# Patient Record
Sex: MALE | Race: WHITE | NOT HISPANIC OR LATINO | Employment: FULL TIME | URBAN - METROPOLITAN AREA
[De-identification: names, ages, dates, MRNs, and addresses within clinical notes are randomized per-mention and may not be internally consistent; named-entity substitution may affect disease eponyms.]

---

## 2017-02-27 ENCOUNTER — HOSPITAL PATHOLOGY (OUTPATIENT)
Dept: OTHER | Facility: CLINIC | Age: 52
End: 2017-02-27

## 2017-03-01 LAB — COPATH REPORT: NORMAL

## 2017-03-04 ENCOUNTER — HOSPITAL ENCOUNTER (OUTPATIENT)
Facility: CLINIC | Age: 52
Setting detail: OBSERVATION
Discharge: HOME | End: 2017-03-05
Attending: EMERGENCY MEDICINE | Admitting: HOSPITALIST
Payer: COMMERCIAL

## 2017-03-04 ENCOUNTER — APPOINTMENT (OUTPATIENT)
Dept: CT IMAGING | Facility: CLINIC | Age: 52
End: 2017-03-04
Attending: EMERGENCY MEDICINE
Payer: COMMERCIAL

## 2017-03-04 DIAGNOSIS — N23 RENAL COLIC: ICD-10-CM

## 2017-03-04 LAB
ALBUMIN UR-MCNC: 30 MG/DL
ANION GAP SERPL CALCULATED.3IONS-SCNC: 9 MMOL/L (ref 3–14)
APPEARANCE UR: ABNORMAL
BACTERIA #/AREA URNS HPF: ABNORMAL /HPF
BASOPHILS # BLD AUTO: 0 10E9/L (ref 0–0.2)
BASOPHILS NFR BLD AUTO: 0.4 %
BILIRUB UR QL STRIP: NEGATIVE
BUN SERPL-MCNC: 15 MG/DL (ref 7–30)
CALCIUM SERPL-MCNC: 8.8 MG/DL (ref 8.5–10.1)
CHLORIDE SERPL-SCNC: 110 MMOL/L (ref 94–109)
CO2 SERPL-SCNC: 24 MMOL/L (ref 20–32)
COLOR UR AUTO: YELLOW
CREAT SERPL-MCNC: 1.11 MG/DL (ref 0.66–1.25)
DIFFERENTIAL METHOD BLD: ABNORMAL
EOSINOPHIL # BLD AUTO: 0 10E9/L (ref 0–0.7)
EOSINOPHIL NFR BLD AUTO: 0.3 %
ERYTHROCYTE [DISTWIDTH] IN BLOOD BY AUTOMATED COUNT: 12.3 % (ref 10–15)
GFR SERPL CREATININE-BSD FRML MDRD: 69 ML/MIN/1.7M2
GLUCOSE SERPL-MCNC: 108 MG/DL (ref 70–99)
GLUCOSE UR STRIP-MCNC: NEGATIVE MG/DL
HCT VFR BLD AUTO: 46.6 % (ref 40–53)
HGB BLD-MCNC: 16.1 G/DL (ref 13.3–17.7)
HGB UR QL STRIP: ABNORMAL
IMM GRANULOCYTES # BLD: 0 10E9/L (ref 0–0.4)
IMM GRANULOCYTES NFR BLD: 0.2 %
KETONES UR STRIP-MCNC: NEGATIVE MG/DL
LEUKOCYTE ESTERASE UR QL STRIP: NEGATIVE
LYMPHOCYTES # BLD AUTO: 0.9 10E9/L (ref 0.8–5.3)
LYMPHOCYTES NFR BLD AUTO: 8.6 %
MCH RBC QN AUTO: 31.6 PG (ref 26.5–33)
MCHC RBC AUTO-ENTMCNC: 34.5 G/DL (ref 31.5–36.5)
MCV RBC AUTO: 91 FL (ref 78–100)
MONOCYTES # BLD AUTO: 0.7 10E9/L (ref 0–1.3)
MONOCYTES NFR BLD AUTO: 6.8 %
MUCOUS THREADS #/AREA URNS LPF: PRESENT /LPF
NEUTROPHILS # BLD AUTO: 8.4 10E9/L (ref 1.6–8.3)
NEUTROPHILS NFR BLD AUTO: 83.7 %
NITRATE UR QL: NEGATIVE
NRBC # BLD AUTO: 0 10*3/UL
NRBC BLD AUTO-RTO: 0 /100
PH UR STRIP: 8.5 PH (ref 5–7)
PLATELET # BLD AUTO: 209 10E9/L (ref 150–450)
POTASSIUM SERPL-SCNC: 4.3 MMOL/L (ref 3.4–5.3)
RBC # BLD AUTO: 5.1 10E12/L (ref 4.4–5.9)
RBC #/AREA URNS AUTO: 44 /HPF (ref 0–2)
SODIUM SERPL-SCNC: 143 MMOL/L (ref 133–144)
SP GR UR STRIP: 1.02 (ref 1–1.03)
SQUAMOUS #/AREA URNS AUTO: <1 /HPF (ref 0–1)
URN SPEC COLLECT METH UR: ABNORMAL
UROBILINOGEN UR STRIP-MCNC: 0 MG/DL (ref 0–2)
WBC # BLD AUTO: 10.1 10E9/L (ref 4–11)
WBC #/AREA URNS AUTO: 0 /HPF (ref 0–2)

## 2017-03-04 PROCEDURE — 74176 CT ABD & PELVIS W/O CONTRAST: CPT

## 2017-03-04 PROCEDURE — 25000132 ZZH RX MED GY IP 250 OP 250 PS 637: Performed by: PHYSICIAN ASSISTANT

## 2017-03-04 PROCEDURE — G0378 HOSPITAL OBSERVATION PER HR: HCPCS

## 2017-03-04 PROCEDURE — 99207 ZZC CDG-MDM COMPONENT: MEETS MODERATE - DOWN CODED: CPT | Performed by: PHYSICIAN ASSISTANT

## 2017-03-04 PROCEDURE — 96361 HYDRATE IV INFUSION ADD-ON: CPT

## 2017-03-04 PROCEDURE — 25000128 H RX IP 250 OP 636: Performed by: EMERGENCY MEDICINE

## 2017-03-04 PROCEDURE — 96376 TX/PRO/DX INJ SAME DRUG ADON: CPT

## 2017-03-04 PROCEDURE — 80048 BASIC METABOLIC PNL TOTAL CA: CPT | Performed by: EMERGENCY MEDICINE

## 2017-03-04 PROCEDURE — 99219 ZZC INITIAL OBSERVATION CARE,LEVL II: CPT | Performed by: PHYSICIAN ASSISTANT

## 2017-03-04 PROCEDURE — 81001 URINALYSIS AUTO W/SCOPE: CPT | Performed by: EMERGENCY MEDICINE

## 2017-03-04 PROCEDURE — 85025 COMPLETE CBC W/AUTO DIFF WBC: CPT | Performed by: EMERGENCY MEDICINE

## 2017-03-04 PROCEDURE — 25000128 H RX IP 250 OP 636: Performed by: PHYSICIAN ASSISTANT

## 2017-03-04 PROCEDURE — 96375 TX/PRO/DX INJ NEW DRUG ADDON: CPT

## 2017-03-04 PROCEDURE — 96374 THER/PROPH/DIAG INJ IV PUSH: CPT

## 2017-03-04 PROCEDURE — 87086 URINE CULTURE/COLONY COUNT: CPT | Performed by: PHYSICIAN ASSISTANT

## 2017-03-04 PROCEDURE — 99285 EMERGENCY DEPT VISIT HI MDM: CPT | Mod: 25

## 2017-03-04 RX ORDER — PROCHLORPERAZINE 25 MG
25 SUPPOSITORY, RECTAL RECTAL EVERY 12 HOURS PRN
Status: DISCONTINUED | OUTPATIENT
Start: 2017-03-04 | End: 2017-03-05 | Stop reason: HOSPADM

## 2017-03-04 RX ORDER — AMOXICILLIN 250 MG
2 CAPSULE ORAL DAILY PRN
Status: DISCONTINUED | OUTPATIENT
Start: 2017-03-04 | End: 2017-03-05 | Stop reason: HOSPADM

## 2017-03-04 RX ORDER — SODIUM CHLORIDE 9 MG/ML
INJECTION, SOLUTION INTRAVENOUS CONTINUOUS
Status: DISCONTINUED | OUTPATIENT
Start: 2017-03-04 | End: 2017-03-05 | Stop reason: HOSPADM

## 2017-03-04 RX ORDER — OXYCODONE HYDROCHLORIDE 5 MG/1
5-10 TABLET ORAL
Status: DISCONTINUED | OUTPATIENT
Start: 2017-03-04 | End: 2017-03-05 | Stop reason: HOSPADM

## 2017-03-04 RX ORDER — ACETAMINOPHEN 325 MG/1
975 TABLET ORAL 3 TIMES DAILY
Status: DISCONTINUED | OUTPATIENT
Start: 2017-03-04 | End: 2017-03-05 | Stop reason: HOSPADM

## 2017-03-04 RX ORDER — ONDANSETRON 4 MG/1
4 TABLET, ORALLY DISINTEGRATING ORAL EVERY 6 HOURS PRN
Status: DISCONTINUED | OUTPATIENT
Start: 2017-03-04 | End: 2017-03-05 | Stop reason: HOSPADM

## 2017-03-04 RX ORDER — HYDROMORPHONE HYDROCHLORIDE 1 MG/ML
.3-.5 INJECTION, SOLUTION INTRAMUSCULAR; INTRAVENOUS; SUBCUTANEOUS
Status: DISCONTINUED | OUTPATIENT
Start: 2017-03-04 | End: 2017-03-05 | Stop reason: HOSPADM

## 2017-03-04 RX ORDER — AMOXICILLIN 250 MG
2 CAPSULE ORAL DAILY PRN
Status: ON HOLD | COMMUNITY
End: 2017-03-05

## 2017-03-04 RX ORDER — TAMSULOSIN HYDROCHLORIDE 0.4 MG/1
0.4 CAPSULE ORAL DAILY
Status: ON HOLD | COMMUNITY
End: 2017-03-05

## 2017-03-04 RX ORDER — OXYCODONE HYDROCHLORIDE 5 MG/1
5-15 TABLET ORAL
Status: ON HOLD | COMMUNITY
End: 2017-03-05

## 2017-03-04 RX ORDER — HYDROMORPHONE HYDROCHLORIDE 1 MG/ML
0.5 INJECTION, SOLUTION INTRAMUSCULAR; INTRAVENOUS; SUBCUTANEOUS ONCE
Status: COMPLETED | OUTPATIENT
Start: 2017-03-04 | End: 2017-03-04

## 2017-03-04 RX ORDER — HYDROMORPHONE HYDROCHLORIDE 1 MG/ML
.3-.5 INJECTION, SOLUTION INTRAMUSCULAR; INTRAVENOUS; SUBCUTANEOUS
Status: DISCONTINUED | OUTPATIENT
Start: 2017-03-04 | End: 2017-03-04

## 2017-03-04 RX ORDER — LEVOTHYROXINE SODIUM 125 UG/1
125 TABLET ORAL EVERY EVENING
Status: DISCONTINUED | OUTPATIENT
Start: 2017-03-04 | End: 2017-03-05 | Stop reason: HOSPADM

## 2017-03-04 RX ORDER — PROCHLORPERAZINE MALEATE 5 MG
5-10 TABLET ORAL EVERY 6 HOURS PRN
Status: DISCONTINUED | OUTPATIENT
Start: 2017-03-04 | End: 2017-03-05 | Stop reason: HOSPADM

## 2017-03-04 RX ORDER — ONDANSETRON 2 MG/ML
4 INJECTION INTRAMUSCULAR; INTRAVENOUS ONCE
Status: COMPLETED | OUTPATIENT
Start: 2017-03-04 | End: 2017-03-04

## 2017-03-04 RX ORDER — ONDANSETRON 2 MG/ML
4 INJECTION INTRAMUSCULAR; INTRAVENOUS EVERY 6 HOURS PRN
Status: DISCONTINUED | OUTPATIENT
Start: 2017-03-04 | End: 2017-03-05 | Stop reason: HOSPADM

## 2017-03-04 RX ORDER — AMOXICILLIN 250 MG
1-2 CAPSULE ORAL 2 TIMES DAILY
Status: DISCONTINUED | OUTPATIENT
Start: 2017-03-04 | End: 2017-03-05 | Stop reason: HOSPADM

## 2017-03-04 RX ORDER — NALOXONE HYDROCHLORIDE 0.4 MG/ML
.1-.4 INJECTION, SOLUTION INTRAMUSCULAR; INTRAVENOUS; SUBCUTANEOUS
Status: DISCONTINUED | OUTPATIENT
Start: 2017-03-04 | End: 2017-03-05 | Stop reason: HOSPADM

## 2017-03-04 RX ORDER — IBUPROFEN 600 MG/1
600 TABLET, FILM COATED ORAL 3 TIMES DAILY
Status: DISCONTINUED | OUTPATIENT
Start: 2017-03-04 | End: 2017-03-05 | Stop reason: HOSPADM

## 2017-03-04 RX ORDER — HYDROXYZINE PAMOATE 25 MG/1
25 CAPSULE ORAL EVERY 6 HOURS PRN
Status: ON HOLD | COMMUNITY
End: 2017-03-05

## 2017-03-04 RX ORDER — TAMSULOSIN HYDROCHLORIDE 0.4 MG/1
0.4 CAPSULE ORAL DAILY
Status: DISCONTINUED | OUTPATIENT
Start: 2017-03-05 | End: 2017-03-05 | Stop reason: HOSPADM

## 2017-03-04 RX ADMIN — HYDROMORPHONE HYDROCHLORIDE 0.5 MG: 10 INJECTION, SOLUTION INTRAMUSCULAR; INTRAVENOUS; SUBCUTANEOUS at 20:29

## 2017-03-04 RX ADMIN — HYDROMORPHONE HYDROCHLORIDE 1 MG: 1 INJECTION, SOLUTION INTRAMUSCULAR; INTRAVENOUS; SUBCUTANEOUS at 16:52

## 2017-03-04 RX ADMIN — SODIUM CHLORIDE: 9 INJECTION, SOLUTION INTRAVENOUS at 20:27

## 2017-03-04 RX ADMIN — SODIUM CHLORIDE 1000 ML: 9 INJECTION, SOLUTION INTRAVENOUS at 20:52

## 2017-03-04 RX ADMIN — HYDROMORPHONE HYDROCHLORIDE 0.5 MG: 1 INJECTION, SOLUTION INTRAMUSCULAR; INTRAVENOUS; SUBCUTANEOUS at 16:20

## 2017-03-04 RX ADMIN — IBUPROFEN 600 MG: 600 TABLET ORAL at 20:29

## 2017-03-04 RX ADMIN — ONDANSETRON 4 MG: 2 INJECTION INTRAMUSCULAR; INTRAVENOUS at 16:18

## 2017-03-04 RX ADMIN — LEVOTHYROXINE SODIUM 125 MCG: 125 TABLET ORAL at 22:05

## 2017-03-04 RX ADMIN — HYDROMORPHONE HYDROCHLORIDE 1 MG: 1 INJECTION, SOLUTION INTRAMUSCULAR; INTRAVENOUS; SUBCUTANEOUS at 18:30

## 2017-03-04 RX ADMIN — SODIUM CHLORIDE 1000 ML: 9 INJECTION, SOLUTION INTRAVENOUS at 16:18

## 2017-03-04 RX ADMIN — SENNOSIDES AND DOCUSATE SODIUM 2 TABLET: 8.6; 5 TABLET ORAL at 20:29

## 2017-03-04 RX ADMIN — ACETAMINOPHEN 975 MG: 325 TABLET, FILM COATED ORAL at 20:29

## 2017-03-04 RX ADMIN — SODIUM CHLORIDE: 9 INJECTION, SOLUTION INTRAVENOUS at 20:40

## 2017-03-04 RX ADMIN — SODIUM CHLORIDE: 9 INJECTION, SOLUTION INTRAVENOUS at 22:05

## 2017-03-04 RX ADMIN — HYDROMORPHONE HYDROCHLORIDE 0.5 MG: 10 INJECTION, SOLUTION INTRAMUSCULAR; INTRAVENOUS; SUBCUTANEOUS at 22:33

## 2017-03-04 ASSESSMENT — ENCOUNTER SYMPTOMS
DYSURIA: 0
HEMATURIA: 0
FLANK PAIN: 1
CHILLS: 0
DIFFICULTY URINATING: 0
FREQUENCY: 0
FEVER: 0
NAUSEA: 1
VOMITING: 1
ABDOMINAL PAIN: 1
CONSTIPATION: 1

## 2017-03-04 NOTE — IP AVS SNAPSHOT
[facility] Observation Department    [address]    [facility] [address]    Phone:  [phone]                                       After Visit Summary   3/4/2017    Rey Andre    MRN: [id number]           After Visit Summary Signature Page     I have received my discharge instructions, and my questions have been answered. I have discussed any challenges I see with this plan with the nurse or doctor.    ..........................................................................................................................................  Patient/Patient Representative Signature      ..........................................................................................................................................  Patient Representative Print Name and Relationship to Patient    ..................................................               ................................................  Date                                            Time    ..........................................................................................................................................  Reviewed by Signature/Title    ...................................................              ..............................................  Date                                                            Time

## 2017-03-04 NOTE — ED NOTES
Right flank pain since this morning.  Feels like previous kidney stones.      ABCs intact.  Alert and oriented x 3.

## 2017-03-04 NOTE — IP AVS SNAPSHOT
MRN:[id number]                      After Visit Summary   3/4/2017    Rey Andre    MRN: [id number]           Thank you!     Thank you for choosing [facility] for your care. Our goal is always to provide you with excellent care. Hearing back from our patients is one way we can continue to improve our services. Please take a few minutes to complete the written survey that you may receive in the mail after you visit. If you would like to speak to someone directly about your visit please contact Patient Relations at [phone]. Thank you!          Patient Information     Date Of Birth          1965        About your hospital stay     You were admitted on:  March 4, 2017 You last received care in the:  [facility] Observation Department    You were discharged on:  March 5, 2017        Reason for your hospital stay       You were admitted for concern of right flank pain related to a kidney stone. You were treated supportively with IV fluids, pain control, and Flomax. Urology was consulted and took you for removal of your kidney stone this morning. You tolerated the procedure well and safe to discharge home with follow up.                  Who to Call     For medical emergencies, please call [phone].  For non-urgent questions about your medical care, please call your primary care provider or clinic, [phone]  For questions related to your surgery, please call your surgery clinic        Attending Provider     Provider Specialty    Dorothy Florez MD Emergency Medicine    Hermes Quiñonez MD Internal Medicine       Primary Care Provider Office Phone # Fax #    Pau Pozo [phone]       [facility] [id number] [place] [address]        After Care Instructions     Activity       Your activity upon discharge: activity as tolerated            Diet       Follow this diet upon discharge: Orders Placed This Encounter      Advance  "Diet as Tolerated: Clear Liquid Diet                  Follow-up Appointments     Follow-up and recommended labs and tests        Follow up with me,  Chris Barrios or colleague, within 2 weeks. to evaluate after surgery.  The following labs/tests are recommended: stent removal.                             Pending Results     Date and Time Order Name Status Description    3/5/2017 0915 Stone analysis In process     3/5/2017 0622 XR Surgery JEFFERY L/T 5 Min Fluoro w Stills Preliminary     3/4/2017 2030 Urine Culture Aerobic Bacterial Preliminary             Statement of Approval     Ordered          17 1215  I have reviewed and agree with all the recommendations and orders detailed in this document.  EFFECTIVE NOW     Approved and electronically signed by:  Nohemi Trammell PA-C             Admission Information     Date & Time Provider Department Dept. Phone    3/4/2017 Hermes Quiñonez MD [facility] Observation Department [phone]      Your Vitals Were     Blood Pressure Pulse Temperature Respirations Height Weight    131/81 66 95.9  F (35.5  C) (Axillary) 12 1.803 m (5' 11\") 113.4 kg (250 lb)    Pulse Oximetry BMI (Body Mass Index)                97% 34.87 kg/m2          MyChart Information     [employer] lets you send messages to your doctor, view your test results, renew your prescriptions, schedule appointments and more. To sign up, go to [email or web address] . Click on \"Log in\" on the left side of the screen, which will take you to the Welcome page. Then click on \"Sign up Now\" on the right side of the page.     You will be asked to enter the access code listed below, as well as some personal information. Please follow the directions to create your username and password.     Your access code is: [email or web address]  Expires: 6/3/2017 12:16 PM     Your access code will  in 90 days. If you need help or a new code, please call your [place] clinic or [phone].        Care EveryWhere ID  "    This is your Care EveryWhere ID. This could be used by other organizations to access your [facility] medical records  [id number]           Review of your medicines      START taking        Dose / Directions    ciprofloxacin 500 MG tablet   Commonly known as:  CIPRO   Used for:  Renal colic        Dose:  500 mg   Take 1 tablet (500 mg) by mouth 2 times daily for 2 days   Quantity:  4 tablet   Refills:  0       HYDROmorphone 4 MG tablet   Commonly known as:  DILAUDID   Used for:  Renal colic        Dose:  4 mg   Take 1 tablet (4 mg) by mouth every 6 hours as needed for moderate to severe pain maximum 4 tablet(s) per day   Quantity:  12 tablet   Refills:  0       tolterodine 2 MG 24 hr capsule   Commonly known as:  DETROL LA   Used for:  Renal colic        Dose:  2 mg   Take 1 capsule (2 mg) by mouth daily   Quantity:  20 capsule   Refills:  1         CONTINUE these medicines which have NOT CHANGED        Dose / Directions    SYNTHROID PO        Dose:  125 mcg   Take 125 mcg by mouth every evening   Refills:  0         STOP taking     hydrOXYzine 25 MG capsule   Commonly known as:  VISTARIL           oxyCODONE 5 MG IR tablet   Commonly known as:  ROXICODONE           senna-docusate 8.6-50 MG per tablet   Commonly known as:  SENOKOT-S;PERICOLACE           tamsulosin 0.4 MG capsule   Commonly known as:  FLOMAX                Where to get your medicines      These medications were sent to [facility] [place], [place] - [address] [place] [address]     Phone:  [phone]     ciprofloxacin 500 MG tablet    tolterodine 2 MG 24 hr capsule         Some of these will need a paper prescription and others can be bought over the counter. Ask your nurse if you have questions.     Bring a paper prescription for each of these medications     HYDROmorphone 4 MG tablet                Protect others around you: Learn how to safely use, store and throw away your medicines at  www.disposemymeds.org.             Medication List: This is a list of all your medications and when to take them. Check marks below indicate your daily home schedule. Keep this list as a reference.      Medications           Morning Afternoon Evening Bedtime As Needed    ciprofloxacin 500 MG tablet   Commonly known as:  CIPRO   Take 1 tablet (500 mg) by mouth 2 times daily for 2 days                                HYDROmorphone 4 MG tablet   Commonly known as:  DILAUDID   Take 1 tablet (4 mg) by mouth every 6 hours as needed for moderate to severe pain maximum 4 tablet(s) per day                                SYNTHROID PO   Take 125 mcg by mouth every evening   Last time this was given:  125 mcg on 3/4/2017 10:05 PM                                tolterodine 2 MG 24 hr capsule   Commonly known as:  DETROL LA   Take 1 capsule (2 mg) by mouth daily

## 2017-03-04 NOTE — ED PROVIDER NOTES
"  History     Chief Complaint:  Flank Pain      HPI   Rey Andre is a 51 year old male with history of kidney stones with two prior lithotripsy's and stenting last completed in 2014 who presents to the emergency department today with flank pain. This morning the patient awoke with a constant isolated right flank pain with associated nausea and vomiting. Denies exacerbating or alleviating factors to this otherwise constant pain. He states that his current symptoms are similar to prior kidney stones. Denies urinary symptoms. Denies changes to bowel habits; however, he states that he feels constipated with diffuse abdominal discomfort. Denies fevers or chills. He took two tablets of Oxycodone an hour ago without relief of his symptoms. 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     Allergies:  No Known Drug Allergies      Medications:    Detrol  Oxycodone   Zofran   Synthroid    Past Medical History:    Kidney stone   Sleep apnea   Thyroid     Past Surgical History:    Orthopedic surgery   T & A  Laser holmium lithotripsy ureter insert stent, combined - 07/27/2012 - 05/24/2014  Cystoscopy, retrogrades, combined     Family History:    History reviewed. No pertinent family history.        Social History:  The patient was accompanied to the ED by wife.  Smoking Status: Never smoker  Alcohol Use: No     Marital Status:        Review of Systems   Constitutional: Negative for chills and fever.   Gastrointestinal: Positive for abdominal pain, constipation, nausea and vomiting.   Genitourinary: Positive for flank pain. Negative for decreased urine volume, difficulty urinating, dysuria, frequency, hematuria and urgency.   All other systems reviewed and are negative.    Physical Exam   First Vitals:  BP: (!) 154/95  Pulse: 66  Temp: 97.6  F (36.4  C)  Resp: 20  Height: 180.3 cm (5' 11\")  Weight: 113.4 kg (250 lb)  SpO2: 95 %    Physical Exam  Constitutional: The patient is " oriented to person, place, and time. Alert and cooperative.  HENT:   Right Ear: External ear normal.   Left Ear: External ear normal.   Nose: Nose normal.   Mouth/Throat: Uvula is midline, oropharynx is clear and moist and mucous membranes are normal. No posterior oropharyngeal edema or erythema.   Eyes: Conjunctivae, EOM and lids are normal. Pupils are equal, round, and reactive to light.   Neck: Trachea normal. Normal range of motion. Neck supple.   Cardiovascular: Normal rate, regular rhythm, normal heart sounds, and intact distal pulses.    Pulmonary/Chest: Effort normal and breath sounds equal bilaterally. No crackles or wheezing.   Abdominal: Soft. Mild diffuse tenderness. No rebound and no guarding. R CVA tenderness.  Musculoskeletal: Normal range of motion.  No extremity tenderness or edema.  Neurological: Alert and Oriented. Strength 5/5 in upper and lower extremities bilaterally. Sensation intact to light touch throughout.  Skin: Skin is dry. No rash noted.       Emergency Department Course     Imaging:  Radiology findings were communicated with the patient who voiced understanding of the findings.    CT Abdomen Pelvis w/o Contrast:   IMPRESSION:  1. At least partially obstructive right ureteropelvic junction  calculus measuring 0.6 x 0.6 x 0.9 cm.  2. Multiple nonobstructive right intrarenal calculi.  3. Groundglass density in the adipose tissues of the root of the  mesentery with minimally prominent lymph nodes that is stable since  2014 and therefore benign.   Preliminary result per radiology      Laboratory:  Laboratory findings were communicated with the patient who voiced understanding of the findings.    CBC:WBC 10.1, HGB 16.1,    BMP: Chloride 110 (H), Glucose 108 (H) o/w WNL Creatinine 1.11     UA: Blood Small (A), pH 8.5 (H), Protein albumin 30 (A), RBC 44 (H), Bacteria Few (A), Mucous Present (A) o/w Negative      Interventions:  1618 NS 1000 mL IV   1618 Zofran 4 mg IV   1620 Dilaudid  0.5 mg IV   1652 Dilaudid 1 mg IV   1830 Dilaudid 1 mg IV     Emergency Department Course:  Nursing notes and vitals reviewed.  I performed an exam of the patient as documented above.   The patient was sent for a CT Abdomen Pelvis w/o Contrast while in the emergency department, results above.    IV was inserted and blood was drawn for laboratory testing, results above.   The patient provided a urine sample here in the emergency department. This was sent for laboratory testing, findings above.     1724: I spoke with Dr. Barriso of the urology service regarding patient's presentation, findings, and plan of care.     1857: I spoke with Jacque SCHRADER of the hospitalist service regarding patient's presentation, findings, and plan of care.   .  I discussed the treatment plan with the patient. They expressed understanding of this plan and consented to admission. Dr. Pereira will admit the patient to a monitored bed for further evaluation and treatment.     Impression & Plan      Medical Decision Making:  Rey Andre is a 51 year old male with a history of kidney stones who presents to the emergency department for evaluation of right flank pain. Upon presentation to the ED, the patient is nontoxic appearing. He is hypertensive, but vital signs are otherwise within normal limits and stable. On exam, he is well appearing. He is alert, oriented, and neurologic exam is nonfocal. Cardiopulmonary exam is unremarkable. On exam of his abdomen he does have mild right CVA tenderness with palpation. He also endorses mild diffuse abdominal tenderness with palpation. There is no rebound or guarding. The rest of his exam is as mentioned above. Labs were obtained and are as mentioned above. Notably  he does not have a leukocytosis. Urinalysis is not concerning for an infectious process. CT of the abdomen was obtained and does demonstrate a partially obstructed UPJ calculus measuring 6 x 6 x 9 mm. These results were discussed  with the patient and he notes understanding. Given the large size of the stone I discussed the patient with the urologist on call. The urologist notes that if the patient's pain is well managed he can likely be managed as a outpatient with close follow up in clinic. Upon my repeat evaluation of the patient he notes that he is continuing to have significant pain. He states that he has already been taking Oxycodone at home for a recent Sen's Neuroma removal and he does not feel that his pain was well managed; therefore, he does feel that admission for pain management would be beneficial for him at this time. The patient was discussed then with the hospitalist and they agreed to accept this patient. The patient was stable/improved at the time of admission.      Diagnosis:    ICD-10-CM    1. Renal colic N23         Scribe Disclosure:  Stan PERDOMO, am serving as a scribe at 3:56 PM on 3/4/2017 to document services personally performed by Dorothy Florez MD, based on my observations and the provider's statements to me.   3/4/2017   [facility] EMERGENCY DEPARTMENT       Dorothy Florez MD  03/05/17 0139

## 2017-03-05 ENCOUNTER — APPOINTMENT (OUTPATIENT)
Dept: GENERAL RADIOLOGY | Facility: CLINIC | Age: 52
End: 2017-03-05
Attending: UROLOGY
Payer: COMMERCIAL

## 2017-03-05 ENCOUNTER — ANESTHESIA (OUTPATIENT)
Dept: SURGERY | Facility: CLINIC | Age: 52
End: 2017-03-05
Payer: COMMERCIAL

## 2017-03-05 ENCOUNTER — ANESTHESIA EVENT (OUTPATIENT)
Dept: SURGERY | Facility: CLINIC | Age: 52
End: 2017-03-05
Payer: COMMERCIAL

## 2017-03-05 VITALS
WEIGHT: 250 LBS | BODY MASS INDEX: 35 KG/M2 | DIASTOLIC BLOOD PRESSURE: 73 MMHG | RESPIRATION RATE: 18 BRPM | HEIGHT: 71 IN | TEMPERATURE: 95.9 F | OXYGEN SATURATION: 95 % | HEART RATE: 63 BPM | SYSTOLIC BLOOD PRESSURE: 120 MMHG

## 2017-03-05 DIAGNOSIS — N20.1 CALCULUS OF URETER: Primary | ICD-10-CM

## 2017-03-05 LAB
ANION GAP SERPL CALCULATED.3IONS-SCNC: 9 MMOL/L (ref 3–14)
BACTERIA SPEC CULT: NO GROWTH
BUN SERPL-MCNC: 18 MG/DL (ref 7–30)
CALCIUM SERPL-MCNC: 7.8 MG/DL (ref 8.5–10.1)
CHLORIDE SERPL-SCNC: 111 MMOL/L (ref 94–109)
CO2 SERPL-SCNC: 25 MMOL/L (ref 20–32)
CREAT SERPL-MCNC: 0.93 MG/DL (ref 0.66–1.25)
ERYTHROCYTE [DISTWIDTH] IN BLOOD BY AUTOMATED COUNT: 12.7 % (ref 10–15)
GFR SERPL CREATININE-BSD FRML MDRD: 85 ML/MIN/1.7M2
GLUCOSE SERPL-MCNC: 89 MG/DL (ref 70–99)
HCT VFR BLD AUTO: 39.9 % (ref 40–53)
HGB BLD-MCNC: 13.4 G/DL (ref 13.3–17.7)
Lab: NORMAL
MCH RBC QN AUTO: 31.6 PG (ref 26.5–33)
MCHC RBC AUTO-ENTMCNC: 33.6 G/DL (ref 31.5–36.5)
MCV RBC AUTO: 94 FL (ref 78–100)
MICRO REPORT STATUS: NORMAL
PLATELET # BLD AUTO: 180 10E9/L (ref 150–450)
POTASSIUM SERPL-SCNC: 4 MMOL/L (ref 3.4–5.3)
RBC # BLD AUTO: 4.24 10E12/L (ref 4.4–5.9)
SODIUM SERPL-SCNC: 145 MMOL/L (ref 133–144)
SPECIMEN SOURCE: NORMAL
WBC # BLD AUTO: 7.1 10E9/L (ref 4–11)

## 2017-03-05 PROCEDURE — 85027 COMPLETE CBC AUTOMATED: CPT | Performed by: PHYSICIAN ASSISTANT

## 2017-03-05 PROCEDURE — 36000058 ZZH SURGERY LEVEL 3 EA 15 ADDTL MIN: Performed by: UROLOGY

## 2017-03-05 PROCEDURE — 88300 SURGICAL PATH GROSS: CPT | Performed by: UROLOGY

## 2017-03-05 PROCEDURE — 25000132 ZZH RX MED GY IP 250 OP 250 PS 637: Performed by: UROLOGY

## 2017-03-05 PROCEDURE — 96361 HYDRATE IV INFUSION ADD-ON: CPT

## 2017-03-05 PROCEDURE — 27210794 ZZH OR GENERAL SUPPLY STERILE: Performed by: UROLOGY

## 2017-03-05 PROCEDURE — 40000306 ZZH STATISTIC PRE PROC ASSESS II: Performed by: UROLOGY

## 2017-03-05 PROCEDURE — 25800025 ZZH RX 258: Performed by: ANESTHESIOLOGY

## 2017-03-05 PROCEDURE — 25000125 ZZHC RX 250: Performed by: NURSE ANESTHETIST, CERTIFIED REGISTERED

## 2017-03-05 PROCEDURE — 25000128 H RX IP 250 OP 636: Performed by: NURSE ANESTHETIST, CERTIFIED REGISTERED

## 2017-03-05 PROCEDURE — 82365 CALCULUS SPECTROSCOPY: CPT | Performed by: UROLOGY

## 2017-03-05 PROCEDURE — G0378 HOSPITAL OBSERVATION PER HR: HCPCS

## 2017-03-05 PROCEDURE — 25800025 ZZH RX 258: Performed by: UROLOGY

## 2017-03-05 PROCEDURE — 80048 BASIC METABOLIC PNL TOTAL CA: CPT | Performed by: PHYSICIAN ASSISTANT

## 2017-03-05 PROCEDURE — 96376 TX/PRO/DX INJ SAME DRUG ADON: CPT

## 2017-03-05 PROCEDURE — 25000566 ZZH SEVOFLURANE, EA 15 MIN: Performed by: UROLOGY

## 2017-03-05 PROCEDURE — 27210995 ZZH RX 272: Performed by: UROLOGY

## 2017-03-05 PROCEDURE — C1769 GUIDE WIRE: HCPCS | Performed by: UROLOGY

## 2017-03-05 PROCEDURE — 25000125 ZZHC RX 250: Performed by: ANESTHESIOLOGY

## 2017-03-05 PROCEDURE — 25000128 H RX IP 250 OP 636: Performed by: PHYSICIAN ASSISTANT

## 2017-03-05 PROCEDURE — 25500064 ZZH RX 255 OP 636: Performed by: UROLOGY

## 2017-03-05 PROCEDURE — C2617 STENT, NON-COR, TEM W/O DEL: HCPCS | Performed by: UROLOGY

## 2017-03-05 PROCEDURE — 88300 SURGICAL PATH GROSS: CPT | Mod: 26 | Performed by: UROLOGY

## 2017-03-05 PROCEDURE — 99217 ZZC OBSERVATION CARE DISCHARGE: CPT | Performed by: PHYSICIAN ASSISTANT

## 2017-03-05 PROCEDURE — 99222 1ST HOSP IP/OBS MODERATE 55: CPT | Mod: 25 | Performed by: UROLOGY

## 2017-03-05 PROCEDURE — 52356 CYSTO/URETERO W/LITHOTRIPSY: CPT | Performed by: UROLOGY

## 2017-03-05 PROCEDURE — 71000012 ZZH RECOVERY PHASE 1 LEVEL 1 FIRST HR: Performed by: UROLOGY

## 2017-03-05 PROCEDURE — 40000277 XR SURGERY CARM FLUORO LESS THAN 5 MIN W STILLS

## 2017-03-05 PROCEDURE — 37000009 ZZH ANESTHESIA TECHNICAL FEE, EACH ADDTL 15 MIN: Performed by: UROLOGY

## 2017-03-05 PROCEDURE — 37000008 ZZH ANESTHESIA TECHNICAL FEE, 1ST 30 MIN: Performed by: UROLOGY

## 2017-03-05 PROCEDURE — 36000060 ZZH SURGERY LEVEL 3 W FLUORO 1ST 30 MIN: Performed by: UROLOGY

## 2017-03-05 PROCEDURE — 71000013 ZZH RECOVERY PHASE 1 LEVEL 1 EA ADDTL HR: Performed by: UROLOGY

## 2017-03-05 PROCEDURE — 36415 COLL VENOUS BLD VENIPUNCTURE: CPT | Performed by: PHYSICIAN ASSISTANT

## 2017-03-05 PROCEDURE — 27211024 ZZHC OR SUPPLY OTHER OPNP: Performed by: UROLOGY

## 2017-03-05 PROCEDURE — 74010 XR KUB: CPT | Mod: 52

## 2017-03-05 DEVICE — STENT URETERAL DBL PIGTAIL INLAY 6FRX26CM 778626: Type: IMPLANTABLE DEVICE | Site: URETER | Status: FUNCTIONAL

## 2017-03-05 RX ORDER — LIDOCAINE HYDROCHLORIDE 10 MG/ML
INJECTION, SOLUTION INFILTRATION; PERINEURAL PRN
Status: DISCONTINUED | OUTPATIENT
Start: 2017-03-05 | End: 2017-03-05

## 2017-03-05 RX ORDER — ONDANSETRON 4 MG/1
4 TABLET, ORALLY DISINTEGRATING ORAL EVERY 30 MIN PRN
Status: DISCONTINUED | OUTPATIENT
Start: 2017-03-05 | End: 2017-03-05 | Stop reason: HOSPADM

## 2017-03-05 RX ORDER — HYDROMORPHONE HYDROCHLORIDE 1 MG/ML
.3-.5 INJECTION, SOLUTION INTRAMUSCULAR; INTRAVENOUS; SUBCUTANEOUS EVERY 10 MIN PRN
Status: DISCONTINUED | OUTPATIENT
Start: 2017-03-05 | End: 2017-03-05 | Stop reason: HOSPADM

## 2017-03-05 RX ORDER — PROPOFOL 10 MG/ML
INJECTION, EMULSION INTRAVENOUS PRN
Status: DISCONTINUED | OUTPATIENT
Start: 2017-03-05 | End: 2017-03-05

## 2017-03-05 RX ORDER — DEXAMETHASONE SODIUM PHOSPHATE 4 MG/ML
INJECTION, SOLUTION INTRA-ARTICULAR; INTRALESIONAL; INTRAMUSCULAR; INTRAVENOUS; SOFT TISSUE PRN
Status: DISCONTINUED | OUTPATIENT
Start: 2017-03-05 | End: 2017-03-05

## 2017-03-05 RX ORDER — GLYCOPYRROLATE 0.2 MG/ML
INJECTION, SOLUTION INTRAMUSCULAR; INTRAVENOUS PRN
Status: DISCONTINUED | OUTPATIENT
Start: 2017-03-05 | End: 2017-03-05

## 2017-03-05 RX ORDER — NALOXONE HYDROCHLORIDE 0.4 MG/ML
.1-.4 INJECTION, SOLUTION INTRAMUSCULAR; INTRAVENOUS; SUBCUTANEOUS
Status: DISCONTINUED | OUTPATIENT
Start: 2017-03-05 | End: 2017-03-05 | Stop reason: HOSPADM

## 2017-03-05 RX ORDER — ONDANSETRON 2 MG/ML
4 INJECTION INTRAMUSCULAR; INTRAVENOUS EVERY 30 MIN PRN
Status: DISCONTINUED | OUTPATIENT
Start: 2017-03-05 | End: 2017-03-05 | Stop reason: HOSPADM

## 2017-03-05 RX ORDER — CIPROFLOXACIN 500 MG/1
500 TABLET, FILM COATED ORAL 2 TIMES DAILY
Qty: 4 TABLET | Refills: 0 | Status: SHIPPED | OUTPATIENT
Start: 2017-03-05 | End: 2017-03-07

## 2017-03-05 RX ORDER — FENTANYL CITRATE 50 UG/ML
25-50 INJECTION, SOLUTION INTRAMUSCULAR; INTRAVENOUS
Status: DISCONTINUED | OUTPATIENT
Start: 2017-03-05 | End: 2017-03-05 | Stop reason: HOSPADM

## 2017-03-05 RX ORDER — SODIUM CHLORIDE, SODIUM LACTATE, POTASSIUM CHLORIDE, CALCIUM CHLORIDE 600; 310; 30; 20 MG/100ML; MG/100ML; MG/100ML; MG/100ML
INJECTION, SOLUTION INTRAVENOUS CONTINUOUS
Status: DISCONTINUED | OUTPATIENT
Start: 2017-03-05 | End: 2017-03-05 | Stop reason: HOSPADM

## 2017-03-05 RX ORDER — FENTANYL CITRATE 50 UG/ML
INJECTION, SOLUTION INTRAMUSCULAR; INTRAVENOUS PRN
Status: DISCONTINUED | OUTPATIENT
Start: 2017-03-05 | End: 2017-03-05

## 2017-03-05 RX ORDER — TOLTERODINE 2 MG/1
2 CAPSULE, EXTENDED RELEASE ORAL DAILY
Qty: 20 CAPSULE | Refills: 1 | Status: SHIPPED | OUTPATIENT
Start: 2017-03-05 | End: 2020-08-20

## 2017-03-05 RX ORDER — LIDOCAINE 40 MG/G
CREAM TOPICAL
Status: DISCONTINUED | OUTPATIENT
Start: 2017-03-05 | End: 2017-03-05 | Stop reason: HOSPADM

## 2017-03-05 RX ORDER — HYDROMORPHONE HYDROCHLORIDE 4 MG/1
4 TABLET ORAL EVERY 6 HOURS PRN
Qty: 12 TABLET | Refills: 0 | Status: ON HOLD | OUTPATIENT
Start: 2017-03-05 | End: 2020-03-06

## 2017-03-05 RX ORDER — MEPERIDINE HYDROCHLORIDE 25 MG/ML
12.5 INJECTION INTRAMUSCULAR; INTRAVENOUS; SUBCUTANEOUS
Status: DISCONTINUED | OUTPATIENT
Start: 2017-03-05 | End: 2017-03-05 | Stop reason: HOSPADM

## 2017-03-05 RX ADMIN — FENTANYL CITRATE 50 MCG: 50 INJECTION INTRAMUSCULAR; INTRAVENOUS at 10:09

## 2017-03-05 RX ADMIN — FENTANYL CITRATE 100 MCG: 50 INJECTION, SOLUTION INTRAMUSCULAR; INTRAVENOUS at 08:13

## 2017-03-05 RX ADMIN — ONDANSETRON 4 MG: 2 INJECTION INTRAMUSCULAR; INTRAVENOUS at 08:26

## 2017-03-05 RX ADMIN — DEXAMETHASONE SODIUM PHOSPHATE 4 MG: 4 INJECTION, SOLUTION INTRAMUSCULAR; INTRAVENOUS at 08:13

## 2017-03-05 RX ADMIN — SODIUM CHLORIDE: 9 INJECTION, SOLUTION INTRAVENOUS at 04:04

## 2017-03-05 RX ADMIN — SODIUM CHLORIDE, POTASSIUM CHLORIDE, SODIUM LACTATE AND CALCIUM CHLORIDE: 600; 310; 30; 20 INJECTION, SOLUTION INTRAVENOUS at 08:01

## 2017-03-05 RX ADMIN — PROPOFOL 250 MG: 10 INJECTION, EMULSION INTRAVENOUS at 08:13

## 2017-03-05 RX ADMIN — GLYCOPYRROLATE 0.2 MG: 0.2 INJECTION, SOLUTION INTRAMUSCULAR; INTRAVENOUS at 08:13

## 2017-03-05 RX ADMIN — HYDROMORPHONE HYDROCHLORIDE 0.5 MG: 10 INJECTION, SOLUTION INTRAMUSCULAR; INTRAVENOUS; SUBCUTANEOUS at 11:07

## 2017-03-05 RX ADMIN — MIDAZOLAM HYDROCHLORIDE 2 MG: 1 INJECTION, SOLUTION INTRAMUSCULAR; INTRAVENOUS at 08:09

## 2017-03-05 RX ADMIN — HYDROMORPHONE HYDROCHLORIDE 0.5 MG: 10 INJECTION, SOLUTION INTRAMUSCULAR; INTRAVENOUS; SUBCUTANEOUS at 04:04

## 2017-03-05 RX ADMIN — FENTANYL CITRATE 50 MCG: 50 INJECTION INTRAMUSCULAR; INTRAVENOUS at 09:40

## 2017-03-05 RX ADMIN — LIDOCAINE HYDROCHLORIDE 50 MG: 10 INJECTION, SOLUTION INFILTRATION; PERINEURAL at 08:13

## 2017-03-05 ASSESSMENT — ENCOUNTER SYMPTOMS: DYSRHYTHMIAS: 0

## 2017-03-05 ASSESSMENT — LIFESTYLE VARIABLES: TOBACCO_USE: 0

## 2017-03-05 NOTE — PLAN OF CARE
Problem: Discharge Planning  Goal: Discharge Planning (Adult, OB, Behavioral, Peds)  PRIMARY DIAGNOSIS: ACUTE RENAL COLIC  OUTPATIENT/OBSERVATION GOALS TO BE MET BEFORE DISCHARGE:     1. Pain status: Improved but still requiring IV narcotics.   2. Tolerating adequate PO diet: Yes, will be NPO after midnight for possible Cystoscopy with stent placement in AM  3. Cleared by consultants (if involved): No,Urology following will see in AM  4. ADLs back to baseline?  Yes  5. Activity and level of assistance: Ambulating independently.  6. Barriers to discharge noted No

## 2017-03-05 NOTE — PHARMACY-ADMISSION MEDICATION HISTORY
Admission medication history interview status for this patient is complete. See [facility] admission navigator for allergy information, prior to admission medications and immunization status.     Medication history interview source(s):Patient  Medication history resources (including written lists, pill bottles, clinic record):Pill bottles  [facility] pharmacy[facility]    Changes made to PTA medication list:  Added: Senna-S, Hydroxyzine, Tamsulosin  Deleted: Zofran, percocet, tolteridone  Changed: Oxycodone from 1-2 q4h prn to 1-3 q3h prn    Actions taken by pharmacist (provider contacted, etc):None     Additional medication history information:None    Medication reconciliation/reorder completed by provider prior to medication history? No    For patients on insulin therapy: No     Prior to Admission medications    Medication Sig Last Dose Taking? Auth Provider   senna-docusate (SENOKOT-S;PERICOLACE) 8.6-50 MG per tablet Take 2 tablets by mouth daily as needed for constipation 3/4/2017 at am Yes Unknown, Entered By History   hydrOXYzine (VISTARIL) 25 MG capsule Take 25 mg by mouth every 6 hours as needed for itching, anxiety or other 3/4/2017 at 1100 Yes Unknown, Entered By History   tamsulosin (FLOMAX) 0.4 MG capsule Take 0.4 mg by mouth daily 3/4/2017 at 1200 Yes Unknown, Entered By History   oxyCODONE (ROXICODONE) 5 MG IR tablet Take 5-15 mg by mouth every 3 hours as needed for moderate to severe pain 3/4/2017 at 1500 Yes Unknown, Entered By History   Levothyroxine Sodium (SYNTHROID PO) Take 125 mcg by mouth every evening 3/3/2017 at pm Yes Unknown, Entered By History

## 2017-03-05 NOTE — PLAN OF CARE
Problem: Discharge Planning  Goal: Discharge Planning (Adult, OB, Behavioral, Peds)  PRIMARY DIAGNOSIS: ACUTE RENAL COLIC  OUTPATIENT/OBSERVATION GOALS TO BE MET BEFORE DISCHARGE:      1. Pain status: Improved but still requiring IV narcotics.   2. Tolerating adequate PO diet: NPO for Cystoscopy with stent placement in AM  3. Cleared by consultants (if involved): Urology to see in AM  4. ADLs back to baseline? Yes  5. Activity and level of assistance: Ambulating independently.  6. Barriers to discharge noted No

## 2017-03-05 NOTE — OP NOTE
DATE OF PROCEDURE:  03/05/2017      PREOPERATIVE DIAGNOSIS:  Right upper ureteral stone.      POSTOPERATIVE DIAGNOSIS:  Right upper ureteral stone.      PROCEDURE:  Cystoscopy, right ureteroscopy, holmium laser lithotripsy of right ureteral stone.  Basketing of fragments and insertion of right double-J stent.      SURGEON:  Chris Barrios MD      ANESTHESIA:  General.      INDICATIONS:  Please see my preoperative consultation.      DESCRIPTION OF PROCEDURE:  Rey Andre was brought to the operative suite and after induction of anesthesia, was placed in the dorsal lithotomy position with the genital area prepped and draped in customary fashion.  Timeout was called.      A 24 [place] cystoscope was then passed into the urethra.  The penile urethra was normal.  The external sphincter was intact.  When viewed from the verumontanum there was minimal enlargement of the lateral lobes of the prostate and the bladder was entered without difficulty.  There was no evidence of neoplasm or stone in the bladder.  There was very mild trabeculation noted.  Ureteric orifices were in normal position  Using a 6 [place]er open-ended ureteral catheter, I then passed an open-ended catheter into the ureter.  I was able to pass an 0.35 Glidewire up the ureter, past the stone into the renal pelvis where the stone was coiled.  On the fluoroscan the stone seemed to be between L3 and L4.  I then withdrew the cystoscope and the open-ended catheter, secured the wire in the usual fashion.  I then passed a 6.9 [place] Islander rigid ureteroscope through the urethra into the bladder carefully negotiated into the right ureter.  Using a 0.035 Sensor wire, I was then able to carefully negotiate the instrument over the pelvic brim and into the upper ureter where the stone was identified.  I then removed the Sensor wire then passed a 1.9 0 tip basket up the ureter, past the stone and opened the basket.  With the stone in the ureter using a 365 micron holmium  laser fiber and with some difficulty engaging the fiber on the stone initially at a setting of 0.2 joules at 50 a second, and also 1 joule at 10 a second I was able to essentially fragment the stone into small pieces and carefully retrieve the fragments.  Finally, ureteroscopy showed no significant residual stones in the ureter, although number of fragments small pieces will pass spontaneously and after withdrawing the ureteroscope, I passed a 6-Maori 26 cm double-pigtail stent over the Glidewire until one end was in the kidney and the other was released in the bladder after removing the wire.  The bladder was emptied with the cystoscope sheath so stone fragments could be retrieved.  A 30 mg suppository was placed in the rectum.  The patient was then taken to the recovery room, having tolerated the procedure well.      CONCLUSION:  The patient will go home today with the stent in, which will need to stay in for 2 weeks which could then be removed in the office.  He will need to be seen in followup about a month after that for an ultrasound of the kidneys and a KUB and then will have discussions about preventative measures to try and prevent further stones forming in the future.  Certainly at this point he will need metabolic stone evaluation due to the recurrent nature of stones in the past.         CHRIS OVERTON MD             D: 2017 09:34   T: 2017 10:47   MT: [id number]      Name:     RAFFY MELGAR   MRN:      [id number]        Account:        [id number]   :      1965           Procedure Date: 2017      Document: [id number]       cc: Chris Pozo MD

## 2017-03-05 NOTE — PLAN OF CARE
Problem: Discharge Planning  Goal: Discharge Planning (Adult, OB, Behavioral, Peds)  PRIMARY DIAGNOSIS: Acute Traumatic Pain  OUTPATIENT/OBSERVATION GOALS TO BE MET BEFORE DISCHARGE:     1. Tolerate PO Intake: Patient was sick earlier today and feeling nauseated with pain, stomach feels sore and bloated. Patient trying to tolerate ice and water.   2. Cleared for discharge from consultants involved: No  3. ADLs back to baseline?  No  4. Activity and level of assistance: Ambulating independently.  5. Pain status: Improved but still requiring IV narcotics.  6. Barriers to discharge noted No

## 2017-03-05 NOTE — ANESTHESIA PREPROCEDURE EVALUATION
PAC NOTE:       ANESTHESIA PRE EVALUATION:  Anesthesia Evaluation     . Pt has had prior anesthetic. Type: General    No history of anesthetic complications     ROS/MED HX    ENT/Pulmonary:     (+)sleep apnea, , . .   (-) tobacco use, asthma and Other pulmonary disease   Neurologic:      (-) Other neuro hx and Dementia   Cardiovascular:        (-) hypertension, CAD, CHF, arrhythmias, valvular problems/murmurs and dyslipidemia   METS/Exercise Tolerance:     Hematologic:        (-) history of blood clots and anemia   Musculoskeletal:        (-) arthritis   GI/Hepatic:        (-) GERD, hiatal hernia and hepatitis   Renal/Genitourinary:     (+) Nephrolithiasis ,       Endo:     (+) thyroid problem hypothyroidism, Obesity, .   (-) Type I DM, Type II DM and chronic steroid usage   Psychiatric:        (-) psychiatric history   Infectious Disease:         Malignancy:         Other:               Physical Exam      Airway   Mallampati: II  TM distance: >3 FB  Neck ROM: full    Dental     Cardiovascular   Rhythm and rate: regular and normal  (-) no murmur    Pulmonary    breath sounds clear to auscultation    Other findings: Lab Test        03/05/17 03/04/17 05/23/14                       0523          1620          1115          WBC          7.1          10.1         6.4           HGB          13.4         16.1         14.4          MCV          94           91           91            PLT          180          209          182            Lab Test        03/05/17 03/04/17 05/23/14                       0523          1620          1115          NA           145*         143          144           POTASSIUM    4.0          4.3          4.3           CHLORIDE     111*         110*         106           CO2          25           24           27            BUN          18           15           19            CR           0.93         1.11         1.24          ANIONGAP     9            9            11             DAMARIS          7.8*         8.8          8.8           GLC          89           108*         87                   Anesthesia Plan      History & Physical Review  History and physical reviewed and following examination; no interval change.    ASA Status:  2 emergent.    NPO Status:  > 8 hours    Plan for General and LMA with Propofol induction. Maintenance will be Balanced.    PONV prophylaxis:  Ondansetron (or other 5HT-3) and Dexamethasone or Solumedrol       Postoperative Care  Postoperative pain management:  IV analgesics and Oral pain medications.      Consents  Anesthetic plan, risks, benefits and alternatives discussed with:  Patient..                            .

## 2017-03-05 NOTE — ED NOTES
Observation Brochure and Video   Patient and family informed of observation status based on provider's order. Observation Brochure was given and video watched.  Jonathan Seaver, RN

## 2017-03-05 NOTE — PLAN OF CARE
Problem: Discharge Planning  Goal: Discharge Planning (Adult, OB, Behavioral, Peds)  ROOM # 208-2     Living Situation (if not independent, order SW consult): home with wife.   Facility name:     Activity level at baseline: IND  Activity level on admit: IND     Is patient a falls risk? No  Falls armband on? No  Within Arm's Reach? Yes   Bed alarm turned on?   No  Personal alarm in place and turned on?   Not applicable     Patient registered to observation; given Patient Bill of Rights; given the opportunity to ask questions about observation status and their plan of care.  Patient has been oriented to the observation room, bathroom and call light is in place.     :  Wife.

## 2017-03-05 NOTE — DISCHARGE SUMMARY
[facility] Outpatient / Observation Unit  Discharge Summary        Rey Andre MRN# [id number]   YOB: 1965 Age: 51 year old     Date of Admission:  3/4/2017  Date of Discharge:  3/5/2017  Admitting Physician:  Hermes Quiñonez MD  Discharge Physician: Nohemi Trammell PA-C  Discharging Service: Hospitalist      Primary Provider: Pau Pozo  Primary Care Physician Phone Number: [phone]         Primary Discharge Diagnoses:    Rey Andre was admitted on 3/4/2017 for acute renal colic.     1. Acute renal colic: sxs improved. Stone passed.  2. S/p cystoscopy, right ureteroscopy, holmium laser lithotripsy of right ureteral stone, basketing of fragments and insertion of stent         Secondary Discharge Diagnoses:     Past Medical History   Diagnosis Date     Kidney stone      Sleep apnea      CPAP     Thyroid disease             Code Status:      Full Code        Brief Hospital Summary:       Reason for your hospital stay      You were admitted for concern of right flank pain related to a kidney stone. You were treated supportively with IV fluids, pain control, and Flomax. Urology was consulted and took you for removal of your kidney stone this morning. You tolerated the procedure well and safe to discharge home with follow up.       Please refer to initial admission history and physical for further details.   Briefly, Rey Andre was admitted on 3/4/2017 with acute renal colic. Initial work up in the ED did not reveal evidence of grossly infected stone or significant renal failure/ATN to require inpatient hospitalization. Pt was registered to the Observation Unit for pain control and supportive measures.     Pt was resuscitated with IVF, and anti-emetics. Urine was strained and stone likely not passed. Urology consult was obtained and patient was taken for a procedure for stone removal. Patient returned to the observation unit after his procedure until he was able to void. Labs reviewed  and significant results addressed. On the day of discharge, pt's pain was controlled and was able to tolerate PO intake.  Medications were reviewed and adjustments made as necessary. Pt is instructed to follow up as below.         Significant Lab During Hospitalization:        Recent Labs  Lab 03/05/17 0523 03/04/17  1620   WBC 7.1 10.1   HGB 13.4 16.1   HCT 39.9* 46.6   MCV 94 91    209       Recent Labs  Lab 03/05/17  0523 03/04/17  1620   * 143   POTASSIUM 4.0 4.3   CHLORIDE 111* 110*   CO2 25 24   ANIONGAP 9 9   GLC 89 108*   BUN 18 15   CR 0.93 1.11   GFRESTIMATED 85 69   GFRESTBLACK >90African American GFR Calc 84   DAMARIS 7.8* 8.8       Recent Labs  Lab 03/04/17  1640   COLOR Yellow   APPEARANCE Cloudy   URINEGLC Negative   URINEBILI Negative   URINEKETONE Negative   SG 1.021   UBLD Small*   URINEPH 8.5*   PROTEIN 30*   NITRITE Negative   LEUKEST Negative   RBCU 44*   WBCU 0              Significant Imaging During Hospitalization:      Results for orders placed or performed during the hospital encounter of 03/04/17   Abd/pelvis CT no contrast - Stone Protocol    Narrative    CT ABDOMEN PELVIS WITHOUT CONTRAST  3/4/2017 4:40 PM    HISTORY:  Right flank pain. History of stones.    TECHNIQUE: Scans obtained from the diaphragm through the pelvis  without oral or IV contrast.   Radiation dose for this scan was reduced using automated exposure  control, adjustment of the mA and/or kV according to patient size, or  iterative reconstruction technique.    COMPARISON:  CT abdomen and pelvis dated 5/18/2014.    FINDINGS:   Visualized portions of the lung bases and mediastinal contents are  unremarkable. There are no aggressive osseous lesions. Degenerative  changes are seen in the lower lumbar spine. There is partial fusion of  the anterior aspects of the bilateral SI joints, similar to the prior  study.    The liver, gallbladder, pancreas, spleen, bilateral adrenal glands and  left kidney are normal in  appearance for noncontrast CT. Multiple  nonobstructive right intrarenal calculi are noted numbering at least  three with the largest measuring up to 0.5 cm. There is mild right  hydronephrosis. There is a right ureteropelvic junction calculus  measuring 0.6 x 0.6 x 0.9 cm. The ureter distal to this portion is of  normal caliber without other evidence for ureteral calculus seen.  Urinary bladder is mostly decompressed but is otherwise unremarkable.    No adenopathy, free fluid, or free air is seen in the peritoneal  cavity. Aorta is normal appearance.    The colon is grossly of normal caliber without pericolonic  inflammatory change to suggest acute diverticulitis. Appendix is  normal in appearance. Small bowel is of normal caliber. The stomach  contains some fluid and air but is otherwise unremarkable.    There is groundglass density of the adipose tissue of the root of the  mesentery, similar to the prior study. Minimally prominent lymph nodes  are seen in the mesenteric adipose tissue in this region but are less  than CT pathologic size criteria and do not appear significantly  changed since 2014. This is likely a chronic benign finding.      Impression    IMPRESSION:  1. At least partially obstructive right ureteropelvic junction  calculus measuring 0.6 x 0.6 x 0.9 cm.  2. Multiple nonobstructive right intrarenal calculi.  3. Groundglass density in the adipose tissues of the root of the  mesentery with minimally prominent lymph nodes that is stable since  2014 and therefore benign.     MANASA ZUNIGA MD   XR KUB    Narrative    XR KUB 3/5/2017 7:05 AM    HISTORY: Ureteral stone.    COMPARISON: CT scan dated March 4, 2017.      Impression    IMPRESSION: There is a 1.2 cm calcification overlying the right  lateral process of L3, likely within the right mid ureter.    BARBARA GARCIA MD   XR Surgery JEFFERY L/T 5 Min Fluoro w Stills    Narrative    SURGERY C-ARM FLUOROSCOPY LESS THAN FIVE MINUTES WITH STILLS  3/5/2017  10:37 AM     HISTORY: Cysto       Impression    IMPRESSION: 0.5 minutes of fluoroscopy time provided for placement of  a right ureteral stent. Two images in one projection.              Pending Results:        Unresulted Labs Ordered in the Past 30 Days of this Admission     Date and Time Order Name Status Description    3/5/2017 0915 Stone analysis In process     3/4/2017 2030 Urine Culture Aerobic Bacterial Preliminary             Consultations This Hospital Stay:      Consultation during this admission received from urology         Discharge Instructions and Follow-Up:      Follow-up Appointments    Follow up with me,  Chris Barrios or colleague, within 2 weeks to evaluate after surgery.   The following labs/tests are recommended: stent removal.           Discharge Disposition:      Discharged to home         Discharge Medications:        Current Discharge Medication List      START taking these medications    Details   HYDROmorphone (DILAUDID) 4 MG tablet Take 1 tablet (4 mg) by mouth every 6 hours as needed for moderate to severe pain maximum 4 tablet(s) per day  Qty: 12 tablet, Refills: 0    Associated Diagnoses: Renal colic      ciprofloxacin (CIPRO) 500 MG tablet Take 1 tablet (500 mg) by mouth 2 times daily for 2 days  Qty: 4 tablet, Refills: 0    Associated Diagnoses: Renal colic      tolterodine (DETROL LA) 2 MG 24 hr capsule Take 1 capsule (2 mg) by mouth daily  Qty: 20 capsule, Refills: 1    Associated Diagnoses: Renal colic         CONTINUE these medications which have NOT CHANGED    Details   Levothyroxine Sodium (SYNTHROID PO) Take 125 mcg by mouth every evening         STOP taking these medications       senna-docusate (SENOKOT-S;PERICOLACE) 8.6-50 MG per tablet Comments:   Reason for Stopping:         hydrOXYzine (VISTARIL) 25 MG capsule Comments:   Reason for Stopping:         tamsulosin (FLOMAX) 0.4 MG capsule Comments:   Reason for Stopping:         oxyCODONE (ROXICODONE) 5 MG IR tablet  "Comments:   Reason for Stopping:                 Allergies:         Allergies   Allergen Reactions     No Known Drug Allergies          Condition and Physical on Discharge:      Discharge condition: Stable   Vitals: Blood pressure 131/81, pulse 66, temperature 95.9  F (35.5  C), temperature source Axillary, resp. rate 12, height 1.803 m (5' 11\"), weight 113.4 kg (250 lb), SpO2 97 %.  250 lbs 0 oz      GENERAL:  Comfortable.  PSYCH: pleasant, oriented, No acute distress.  HEENT:  PERRLA. Normal conjunctiva, normal hearing, nasal mucosa and Oropharynx are normal.  NECK:  Supple, no neck vein distention, adenopathy or bruits, normal thyroid.  HEART:  Normal S1, S2 with no murmur, no pericardial rub, gallops or S3 or S4.  LUNGS:  Clear to auscultation, normal respiratory effort. No wheezing, rales or ronchi.  ABDOMEN:  Soft, no hepatosplenomegaly, normal bowel sounds. Non-tender, non distended.   EXTREMITIES:  No pedal edema.  SKIN:  Dry to touch, No rash, wound or ulcerations.  NEUROLOGIC:  CN 2-12 intact, BL 5/5 symmetric upper and lower extremity strength, sensation is intact with no focal deficits.     Nohemi Trammell PA-C  "

## 2017-03-05 NOTE — ANESTHESIA POSTPROCEDURE EVALUATION
Patient: Rey Andre    Procedure(s):  Cystoscopy, Right Retrogrades, LASER Lithotripsy, stone extraction and insertion of right ureteral stent. - Wound Class: II-Clean Contaminated    Diagnosis:Kidney Stones  Diagnosis Additional Information: Cystoscopy, Right Retrogrades, LASER Lithotripsy, stone extraction and insertion of right ureteral stent. - Wound Class: II-Clean Contaminated    Anesthesia Type:  General, LMA    Note:  Anesthesia Post Evaluation    Patient location during evaluation: PACU  Patient participation: Able to fully participate in evaluation  Level of consciousness: awake  Pain management: adequate  Airway patency: patent  Cardiovascular status: acceptable  Respiratory status: acceptable  Hydration status: euvolemic  PONV: controlled     Anesthetic complications: None          Last vitals:  Vitals:    03/05/17 0404 03/05/17 0735 03/05/17 1053   BP: 111/68 126/79 140/88   Pulse:      Resp: 18 12 12   Temp: 97.1  F (36.2  C) 98.1  F (36.7  C) 95.9  F (35.5  C)   SpO2: 94% 96% 97%         Electronically Signed By: Chris Elliott MD  March 5, 2017  11:17 AM

## 2017-03-05 NOTE — PLAN OF CARE
Problem: Discharge Planning  Goal: Discharge Planning (Adult, OB, Behavioral, Peds)  OBSERVATION patient END time: 0093

## 2017-03-05 NOTE — H&P
[facility] Outpatient / Observation Unit  History and Physical Exam     Rey Andre MRN# [id number]   YOB: 1965 Age: 51 year old      Date of Admission:  3/4/2017    Primary care provider: Pau Pozo          Assessment:   Rey Andre is a 51 year old male with a PMH significant for previous hx of renal stones s/p stents and lithotripsy (last episode in 2014 by Dr. Lerma), who presents with complaints of right flank pain along with nausea and vomiting since this am.   Work up in the ED reveals:  6x6x9 mm right UPJ stone that's partially obstructive but normal CR. UA shows few bacteria but no WBC, and no obvious infection.    Patient will be registered to Observation for further evaluation and symptom management for acute renal colic.     1. Acute renal colic-admitted to OBS for pain control. Required 2mg of IV Dilaudid in ED but still with persistent pain. Also less likely to pass stone given its size.     2. S/p recent neuroma excision of right foot. Stable.   3. Hypothyroidism: stable, resume home meds.            Plan:     1. [place] to Observation  2. Aggressive IV hydration with 2nd NS bolus and NS at 175cc/hr.   3. Cont supportive care with anti-emetics and pain control (hold toradol)  4. Strain Urine (but highly doubt it will pass given it's size), send stone for analysis if applicable  5. Initiate Flomax  6. KUB in am, and Urology consultation in am. But ok to cancel consult if pt sxs and pain is controlled on oral pain meds and can proceed with outpt follow up.   7. Regular diet and NPO after MN in case OR is warranted.   8. DVT prophylaxis: pt at low risk, encourage ambulation  9. Cont PRN pain meds for neuroma excision   10. Resume Levothyroxine.                   Chief Complaint:   Nausea, vomiting and right flank pain         History of Present Illness:      Rey Andre is a 51 year old male with a PMH significant for previous hx of renal stones s/p stents and lithotripsy  (last episode in 2014 by Dr. Lerma), who presents with complaints of right flank pain along with nausea and vomiting since this am. Pt recently had excision of lauren neuroma this past Monday. He was taking a total of 4-6 oxycodone a day for the pain and recently titrated down to plain Ibuprofen since Thurs. His last kidney stone intervention was 2 yrs ago but has had previous episode of waxing and waning flank pain that resolves. However this am he had sharp pain in the right flank along with n/v that has been persistent. He also developed n/v. He took Oxycodone and did not control his sxs and hence brought him to the ED.   Work up in the ED reveals: 6x6x9 mm right UPJ stone that's partially obstructive but normal CR. UA shows few bacteria but no WBC, and no obvious infection. He denies any fever, chills, no dysuria or frequency. Last BM was this am and normal. Right foot actually healing well, he is ambulatory with a post op shoe. No calf swelling or tenderness.   Patient will be registered to Observation for further evaluation and pain management for acute renal colic           Past Medical History:     Past Medical History   Diagnosis Date     Kidney stone      Sleep apnea      CPAP     Thyroid disease            Past Surgical History:     Past Surgical History   Procedure Laterality Date     Orthopedic surgery       Ent surgery       T & A     Laser holmium lithotripsy ureter(s), insert stent, combined  7/27/2012     Procedure: COMBINED CYSTOSCOPY, URETEROSCOPY, LASER HOLMIUM LITHOTRIPSY URETER(S), INSERT STENT;  Video cystoscopy, Right Retrograde Right Ureteroscopy with Holmium Laser, Stone Extraction,  JJ Stent Placement ;  Surgeon: Francisco J Lerma MD;  Location:  OR     Laser holmium lithotripsy ureter(s), insert stent, combined  5/24/2014     Procedure: COMBINED CYSTOSCOPY, URETEROSCOPY, LASER HOLMIUM LITHOTRIPSY URETER(S), INSERT STENT;  Surgeon: Francisco J Lerma MD;  Location:  OR      "Cystoscopy, retrogrades, combined  5/24/2014     Procedure: COMBINED CYSTOSCOPY, RETROGRADES;  Surgeon: Francisco J Lerma MD;  Location:  OR               Social History:     Social History     Social History     Marital status:      Spouse name: N/A     Number of children: N/A     Years of education: N/A     Non smoker, social ETOH          Family History:   Father with DVT         Allergies:      Allergies   Allergen Reactions     No Known Drug Allergies                Medications:     Prior to Admission medications    Medication Sig Last Dose Taking? Auth Provider   senna-docusate (SENOKOT-S;PERICOLACE) 8.6-50 MG per tablet Take 2 tablets by mouth daily as needed for constipation 3/4/2017 at am Yes Unknown, Entered By History   hydrOXYzine (VISTARIL) 25 MG capsule Take 25 mg by mouth every 6 hours as needed for itching, anxiety or other 3/4/2017 at 1100 Yes Unknown, Entered By History   tamsulosin (FLOMAX) 0.4 MG capsule Take 0.4 mg by mouth daily 3/4/2017 at 1200 Yes Unknown, Entered By History   oxyCODONE (ROXICODONE) 5 MG IR tablet Take 5-15 mg by mouth every 3 hours as needed for moderate to severe pain 3/4/2017 at 1500 Yes Unknown, Entered By History   Levothyroxine Sodium (SYNTHROID PO) Take 125 mcg by mouth every evening 3/3/2017 at pm Yes Unknown, Entered By History              Review of Systems:   A Comprehensive greater than 10 system review of systems was carried out.  Pertinent positives and negatives are noted above.  Otherwise negative for contributory information.            Physical Exam:   Blood pressure 139/81, pulse 66, temperature 96.1  F (35.6  C), resp. rate 18, height 1.803 m (5' 11\"), weight 113.4 kg (250 lb), SpO2 93 %.    GENERAL: healthy, alert, mild distress, non-toxic appearing  EYES: Eyes grossly normal to inspection, extraocular movements - intact, and PERRL  HENT: Nose- normal; Mouth- no ulcers, no lesions.   ORAL MUCOSA: within normal limits, no visible " lesions  NECK: no tenderness, no adenopathy, no asymmetry, no masses, no stiffness; thyroid- normal to palpation  RESP: lungs clear to auscultation - no rales, no rhonchi, no wheezes  CV: regular rates and rhythm, normal S1 S2, no S3 or S4 and no murmur, no click or rub ABDOMEN: soft, nontender, without hepatosplenomegaly or masses, aorta normal and bowel sounds normal  BACK: absent CVAT, no paralumbar tenderness  MS: extremities- no gross deformities noted, no edema, right foot dressing c/d/i, Calves supple and soft w/o swelling or tenderness  SKIN: no suspicious lesions, no rashes  NEURO: strength and tone- normal, sensory exam- grossly normal, mentation- intact, speech- normal, reflexes- symmetric  PSYCH: Alert and oriented times 3. Affect is normal.            Data:       Recent Labs  Lab 03/04/17  1620   WBC 10.1   HGB 16.1   HCT 46.6   MCV 91          Recent Labs  Lab 03/04/17  1620      POTASSIUM 4.3   CHLORIDE 110*   CO2 24   ANIONGAP 9   *   BUN 15   CR 1.11   GFRESTIMATED 69   GFRESTBLACK 84   DAMARIS 8.8       Recent Labs  Lab 03/04/17  1640   COLOR Yellow   APPEARANCE Cloudy   URINEGLC Negative   URINEBILI Negative   URINEKETONE Negative   SG 1.021   UBLD Small*   URINEPH 8.5*   PROTEIN 30*   NITRITE Negative   LEUKEST Negative   RBCU 44*   WBCU 0         Results for orders placed or performed during the hospital encounter of 03/04/17   Abd/pelvis CT no contrast - Stone Protocol    Narrative    CT ABDOMEN PELVIS WITHOUT CONTRAST  3/4/2017 4:40 PM    HISTORY:  Right flank pain. History of stones.    TECHNIQUE: Scans obtained from the diaphragm through the pelvis  without oral or IV contrast.   Radiation dose for this scan was reduced using automated exposure  control, adjustment of the mA and/or kV according to patient size, or  iterative reconstruction technique.    COMPARISON:  CT abdomen and pelvis dated 5/18/2014.    FINDINGS:   Visualized portions of the lung bases and mediastinal  contents are  unremarkable. There are no aggressive osseous lesions. Degenerative  changes are seen in the lower lumbar spine. There is partial fusion of  the anterior aspects of the bilateral SI joints, similar to the prior  study.    The liver, gallbladder, pancreas, spleen, bilateral adrenal glands and  left kidney are normal in appearance for noncontrast CT. Multiple  nonobstructive right intrarenal calculi are noted numbering at least  three with the largest measuring up to 0.5 cm. There is mild right  hydronephrosis. There is a right ureteropelvic junction calculus  measuring 0.6 x 0.6 x 0.9 cm. The ureter distal to this portion is of  normal caliber without other evidence for ureteral calculus seen.  Urinary bladder is mostly decompressed but is otherwise unremarkable.    No adenopathy, free fluid, or free air is seen in the peritoneal  cavity. Aorta is normal appearance.    The colon is grossly of normal caliber without pericolonic  inflammatory change to suggest acute diverticulitis. Appendix is  normal in appearance. Small bowel is of normal caliber. The stomach  contains some fluid and air but is otherwise unremarkable.    There is groundglass density of the adipose tissue of the root of the  mesentery, similar to the prior study. Minimally prominent lymph nodes  are seen in the mesenteric adipose tissue in this region but are less  than CT pathologic size criteria and do not appear significantly  changed since 2014. This is likely a chronic benign finding.      Impression    IMPRESSION:  1. At least partially obstructive right ureteropelvic junction  calculus measuring 0.6 x 0.6 x 0.9 cm.  2. Multiple nonobstructive right intrarenal calculi.  3. Groundglass density in the adipose tissues of the root of the  mesentery with minimally prominent lymph nodes that is stable since  2014 and therefore benign.     MD Jacque GUAJARDO PA-C

## 2017-03-05 NOTE — PLAN OF CARE
Problem: Discharge Planning  Goal: Discharge Planning (Adult, OB, Behavioral, Peds)  Outcome: Adequate for Discharge Date Met:  03/05/17  Patient's After Visit Summary was reviewed with patient and/or significant other.   Patient verbalized understanding of After Visit Summary, recommended follow up and was given an opportunity to ask questions.   Discharge medications sent home with patient/family: YES,    Discharged with spouse. Patient discharged via wheelchair and was stable at discharge.

## 2017-03-05 NOTE — ANESTHESIA CARE TRANSFER NOTE
Patient: Rey Andre    Procedure(s):  Cystoscopy, Right Retrogrades, LASER Lithotripsy, stone extraction and insertion of right ureteral stent. - Wound Class: II-Clean Contaminated    Diagnosis: Kidney Stones  Diagnosis Additional Information: No value filed.    Anesthesia Type:   General, LMA     Note:  Airway :Face Mask  Patient transferred to:PACU  Comments: Pt sv good tidal volumes, awake LMA removed prepare to transfer to PACU,  Report to PACU RN.  VSS transfer care      Vitals: (Last set prior to Anesthesia Care Transfer)    CRNA VITALS  3/5/2017 0854 - 3/5/2017 0930      3/5/2017             Pulse: 84    SpO2: 93 %    Resp Rate (observed): (!)  2                Electronically Signed By: CASSANDRA Ramos CRNA  March 5, 2017  9:30 AM

## 2017-03-05 NOTE — CONSULTS
INITIAL UROLOGY CONSULTATION       HISTORY OF PRESENT ILLNESS:  It was a great pleasure to see Rey Andre, a very pleasant 51-year-old gentleman in initial consultation today at the request of the inpatient Hospitalist Service.  He was admitted during the night with severe right-sided flank pain and CT scan in the emergency room had shown what appeared to be a 1.2 cm calculus in the region of the right upper ureter with moderate hydronephrosis.  There was also a 5 mm stone in the upper dre of the right kidney.  No stones were seen in the left kidney.      He does have a past history of urinary stone disease.  He has had 2 prior treatments by ESWL in the past.      ALLERGIES:  None.      MEDICATIONS:  Currently on Synthroid, has more recently been using Detrol, oxycodone and Zofran for renal colic.      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      PAST SURGICAL HISTORY:  Includes surgery for Sen's metatarsalgia, which is recent, tonsils and adenoids procedure many years ago and on 2 occasions, he has had to ureteroscopy with laser lithotripsy for ureteral stones.      FAMILY HISTORY:  None relevant.      SOCIAL HISTORY:  The patient is .  He does not smoke cigarettes and does drink alcohol and he works all [employer], which is the [employer].      REVIEW OF SYSTEMS:  Other than the symptoms of flank pain and abdominal pain, he also has been having some constipation, nausea and vomiting.  There were no other significant features, however, in a 10-system review.      PHYSICAL EXAMINATION:   VITAL SIGNS:  At the time I saw him, his vital signs were as follows:  The patient was afebrile with a blood pressure of 111/59 and a pulse rate of 66.   IN GENERAL:  Pleasant gentleman in moderate distress, although he has been receiving Dilaudid and is  otherwise well oriented in time, place and person.   HEENT:  There is no evidence of jaundice and the mucous membranes are normal.     MENTAL STATUS:  Mildly anxious.   SKIN:  The skin is otherwise normal to examination.    RESPIRATORY:  The respiratory cycle is normal.  It was also normal to percussion and auscultation.   CARDIOVASCULAR:  Heart sounds were normal without murmurs.   ABDOMEN:  Soft abdomen, there was some mild right upper quadrant tenderness.     BACK AND FLANKS:  There was some mild to moderate tenderness in the right renal angle as compared to the left.   EXTREMITIES:  There is no peripheral edema; however, there was significant swelling of the right foot from recent surgery for surgery for Sen's metatarsalgia.    NEUROLOGIC:  There were no focal abnormal signs in the central or peripheral nervous systems.        IMPRESSION:  The patient has at least a 1.2 cm stone in the right upper ureter.  The patient is very keen not to have a stent in for a lengthy period.  The options are therefore to either put a stent in today, disimpact the stone and put it back into the kidney and do shock wave treatment at a later time or attempt ureteroscopy today and [place] laser lithotripsy appears not too challenging.  Either way, he will need a stent in and there is no way to avoid that.      LABORATORY STUDIES:  Showed that the creatinine is 0.93, white cell count is normal 7100 and hematocrit 39.9 with hemoglobin of 13.4.  Urinalysis showed large number of red cells in the urine, but was otherwise nitrite negative.  CT scan and KUB this morning shows a 1.2 cm stone in the right upper ureter, which appears to be probably in the upper part of the ureter rather than ureteropelvic junction.  There was also a 5 mm stone in the upper dre of the right kidney.  No stones were seen in the left kidney.  There is mild to moderate hydronephrosis of the right kidney.  The stone has not changed in position on the KUB this  morning.      CONCLUSION:  I will proceed as planned with cystoscopy, possible ureteroscopy with holmium laser, removal of fragments with stent placement.  If not feasible, then we will merely just place a stent.  If that is the case, he will need ESWL.         AWA OVERTON MD             D: 2017 09:29   T: 2017 10:52   MT: [id number]      Name:     RAFFY MELGAR   MRN:      [id number]        Account:       [id number]   :      1965           Consult Date:  2017      Document: [id number]

## 2017-03-06 LAB — COPATH REPORT: NORMAL

## 2017-03-10 LAB
APPEARANCE STONE: NORMAL
COMPN STONE: NORMAL
NUMBER STONE: NORMAL
SIZE STONE: NORMAL MM3
WT STONE: 30 MG

## 2017-03-17 ENCOUNTER — OFFICE VISIT (OUTPATIENT)
Dept: UROLOGY | Facility: CLINIC | Age: 52
End: 2017-03-17
Payer: COMMERCIAL

## 2017-03-17 VITALS — OXYGEN SATURATION: 98 % | HEIGHT: 71 IN | HEART RATE: 86 BPM | WEIGHT: 240 LBS | BODY MASS INDEX: 33.6 KG/M2

## 2017-03-17 DIAGNOSIS — N20.0 CALCULUS OF KIDNEY: Primary | ICD-10-CM

## 2017-03-17 PROCEDURE — 52310 CYSTOSCOPY AND TREATMENT: CPT | Performed by: UROLOGY

## 2017-03-17 PROCEDURE — 99212 OFFICE O/P EST SF 10 MIN: CPT | Mod: 25 | Performed by: UROLOGY

## 2017-03-17 RX ORDER — CIPROFLOXACIN 500 MG/1
500 TABLET, FILM COATED ORAL ONCE
Qty: 1 TABLET | Refills: 0 | Status: SHIPPED | OUTPATIENT
Start: 2017-03-17 | End: 2017-03-17

## 2017-03-17 ASSESSMENT — PAIN SCALES - GENERAL: PAINLEVEL: MILD PAIN (3)

## 2017-03-17 NOTE — PROGRESS NOTES
Office Visit Note  [facility]  [phone]    UROLOGIC DIAGNOSES:   Right ureteral stone    CURRENT INTERVENTIONS:   S/P removal via ureteroscopy    HISTORY:   This is a 50-year-old gentleman who was recently seen by Dr. Barrios for a large 9 mm proximal right ureteral stone. The stone was removed in the operating room and a stent was placed. He is here today for stent removal. He has felt well since his procedure.      PAST MEDICAL HISTORY:   Past Medical History   Diagnosis Date     Kidney stone      Sleep apnea      CPAP     Thyroid disease        PAST SURGICAL HISTORY:   Past Surgical History   Procedure Laterality Date     Orthopedic surgery       Ent surgery       T & A     Laser holmium lithotripsy ureter(s), insert stent, combined  7/27/2012     Procedure: COMBINED CYSTOSCOPY, URETEROSCOPY, LASER HOLMIUM LITHOTRIPSY URETER(S), INSERT STENT;  Video cystoscopy, Right Retrograde Right Ureteroscopy with Holmium Laser, Stone Extraction,  JJ Stent Placement ;  Surgeon: Francisco J Lerma MD;  Location: [facility]     Laser holmium lithotripsy ureter(s), insert stent, combined  5/24/2014     Procedure: COMBINED CYSTOSCOPY, URETEROSCOPY, LASER HOLMIUM LITHOTRIPSY URETER(S), INSERT STENT;  Surgeon: Francisco J Lerma MD;  Location: [facility] OR     Cystoscopy, retrogrades, combined  5/24/2014     Procedure: COMBINED CYSTOSCOPY, RETROGRADES;  Surgeon: Francisco J Leram MD;  Location: [facility]     Laser holmium lithotripsy ureter(s), insert stent, combined Right 3/5/2017     Procedure: COMBINED CYSTOSCOPY, URETEROSCOPY, LASER HOLMIUM LITHOTRIPSY URETER(S), INSERT STENT;  Surgeon: Chris Barrios MD;  Location: [facility] OR     Cystoscopy         FAMILY HISTORY: History reviewed. No pertinent family history.    SOCIAL HISTORY:   Social History   Substance Use Topics     Smoking status: Never Smoker     Smokeless tobacco: Not on file     Alcohol use No       Current Outpatient Prescriptions   Medication  "    ciprofloxacin (CIPRO) 500 MG tablet     HYDROmorphone (DILAUDID) 4 MG tablet     tolterodine (DETROL LA) 2 MG 24 hr capsule     Levothyroxine Sodium (SYNTHROID PO)     No current facility-administered medications for this visit.          PHYSICAL EXAM:    Pulse 86  Ht 1.803 m (5' 11\")  Wt 108.9 kg (240 lb)  SpO2 98%  BMI 33.47 kg/m2    HEENT: Normocephalic and atraumatic   Cardiac: Not done  Back/Flank: Not done  CNS/PNS: Not done  Respiratory: Normal non-labored breathing  Abdomen: Soft nontender and nondistended  Peripheral Vascular: Not done  Mental Status: Not done    Penis: Not done  Scrotal Skin: Not done  Testicles: Not done  Epididymis: Not done  Digital Rectal Exam:     Cystoscopy: I performed flexible cystoscopy in the stent was removed without difficulty    Imaging: None    Urinalysis: UA RESULTS:  Recent Labs   Lab Test  03/04/17   1640   COLOR  Yellow   APPEARANCE  Cloudy   URINEGLC  Negative   URINEBILI  Negative   URINEKETONE  Negative   SG  1.021   UBLD  Small*   URINEPH  8.5*   PROTEIN  30*   NITRITE  Negative   LEUKEST  Negative   RBCU  44*   WBCU  0       PSA:     Post Void Residual:     Other labs: None today      IMPRESSION:  Doing well, stent removed    PLAN:  We discussed prevention methods for future stones. Dr. Barrios had requested that the patient come back to the clinic in 1 month with a KUB so we will get that scheduled.    Total Time: 15 minutes                                      Total in Consultation: 10 minutes      Francisco J Lerma M.D.            "

## 2017-03-17 NOTE — PATIENT INSTRUCTIONS
"     AFTER YOUR CYSTOSCOPY         You have just completed a cystoscopy, or \"cysto\", which allowed your physician to learn more about your bladder (or to remove a stent placed after surgery). We suggest that you continue to avoid caffeine, fruit juice, and alcohol for the next 24 hours, however, you are encouraged to return to your normal activities.       A few things that are considered normal after your cystoscopy:    * small amount of bleeding (or spotting) that clears within the next 24 hours    * slight burning sensation with urination    * sensation to of needing to avoid more frequently    * the feeling of \"air\" in your urine    * mild discomfort that is relieved with Tylonol        Please contact our office promptly if you:    * develop a fever above 101 degrees    * are unable to urinate    * develop bright red blood that does not stop    * severe pain or swelling        And of course, please contact our office with any concerns or questions [phone]        "

## 2017-03-17 NOTE — MR AVS SNAPSHOT
"              After Visit Summary   3/17/2017    Rey Andre    MRN: [id number]           Patient Information     Date Of Birth          1965        Visit Information        Provider Department      3/17/2017 1:00 PM Francisco J Lerma MD; UB CYF [facility] [place]        Today's Diagnoses     Calculus of kidney    -  1      Care Instructions         AFTER YOUR CYSTOSCOPY         You have just completed a cystoscopy, or \"cysto\", which allowed your physician to learn more about your bladder (or to remove a stent placed after surgery). We suggest that you continue to avoid caffeine, fruit juice, and alcohol for the next 24 hours, however, you are encouraged to return to your normal activities.       A few things that are considered normal after your cystoscopy:    * small amount of bleeding (or spotting) that clears within the next 24 hours    * slight burning sensation with urination    * sensation to of needing to avoid more frequently    * the feeling of \"air\" in your urine    * mild discomfort that is relieved with Tylonol        Please contact our office promptly if you:    * develop a fever above 101 degrees    * are unable to urinate    * develop bright red blood that does not stop    * severe pain or swelling        And of course, please contact our office with any concerns or questions [phone]              Follow-ups after your visit        Follow-up notes from your care team     Return in about 1 month (around 4/17/2017) for KUB with [facility].      Your next 10 appointments already scheduled     Apr 18, 2017  3:30 PM CDT   XR KUB with [id number]   [facility] ([facility])    [address] [place] [id number] [phone]           Please bring a list of your current medicines to your exam. (Include vitamins, minerals and over-thecounter medicines.) Leave your valuables at home.  Tell your " "doctor if there is a chance you may be pregnant.  You do not need to do anything special for this exam.            2017  4:00 PM CDT   Return Visit with Chris Barrios MD   [facility] [place] ([facility])    [address]  [address]  [facility] [id number] [phone]              Future tests that were ordered for you today     Open Future Orders        Priority Expected Expires Ordered    XR KUB Routine 3/17/2017 3/17/2018 3/17/2017            Who to contact     If you have questions or need follow up information about today's clinic visit or your schedule please contact [facility] directly at [phone].  Normal or non-critical lab and imaging results will be communicated to you by FriendsEAThart, letter or phone within 4 business days after the clinic has received the results. If you do not hear from us within 7 days, please contact the clinic through [employer]EA[employer] or phone. If you have a critical or abnormal lab result, we will notify you by phone as soon as possible.  Submit refill requests through [employer] or call your pharmacy and they will forward the refill request to us. Please allow 3 business days for your refill to be completed.          Additional Information About Your Visit        Cloneless Information     Cl[employer]eless lets you send messages to your doctor, view your test results, renew your prescriptions, schedule appointments and more. To sign up, go to [email or web address] . Click on \"Log in\" on the left side of the screen, which will take you to the Welcome page. Then click on \"Sign up Now\" on the right side of the page.     You will be asked to enter the access code listed below, as well as some personal information. Please follow the directions to create your username and password.     Your access code is: [email or web address]  Expires: 6/3/2017  1:16 PM     Your access code will  in " "90 days. If you need help or a new code, please call your [facility] or [phone].        Care EveryWhere ID     This is your Care EveryWhere ID. This could be used by other organizations to access your [place] medical records  [id number]        Your Vitals Were     Pulse Height Pulse Oximetry BMI (Body Mass Index)          86 1.803 m (5' 11\") 98% 33.47 kg/m2         Blood Pressure from Last 3 Encounters:   03/05/17 120/73   05/24/14 152/88   05/23/14 114/70    Weight from Last 3 Encounters:   03/17/17 108.9 kg (240 lb)   03/04/17 113.4 kg (250 lb)   05/23/14 120.2 kg (265 lb)                 Today's Medication Changes          These changes are accurate as of: 3/17/17  1:30 PM.  If you have any questions, ask your nurse or doctor.               Start taking these medicines.        Dose/Directions    ciprofloxacin 500 MG tablet   Commonly known as:  CIPRO   Used for:  Calculus of kidney   Started by:  Francisco J Lerma MD        Dose:  500 mg   Take 1 tablet (500 mg) by mouth once for 1 dose   Quantity:  1 tablet   Refills:  0            Where to get your medicines      These medications were sent to [facility] [address] [place] [address] AT [facility] [address]  [address] [place] [address]     Phone:  [phone]     ciprofloxacin 500 MG tablet                Primary Care Provider Office Phone # Fax #    Pau Pozo [phone]       [facility] [address] [facility] [address]        Thank you!     Thank you for choosing [facility] [place]  for your care. Our goal is always to provide you with excellent care. Hearing back from our patients is one way we can continue to improve our services. Please take a few minutes to complete the written survey that you may receive in the mail after your visit with us. Thank you!             Your Updated Medication List - Protect others around " you: Learn how to safely use, store and throw away your medicines at www.disposemymeds.org.          This list is accurate as of: 3/17/17  1:30 PM.  Always use your most recent med list.                   Brand Name Dispense Instructions for use    ciprofloxacin 500 MG tablet    CIPRO    1 tablet    Take 1 tablet (500 mg) by mouth once for 1 dose       HYDROmorphone 4 MG tablet    DILAUDID    12 tablet    Take 1 tablet (4 mg) by mouth every 6 hours as needed for moderate to severe pain maximum 4 tablet(s) per day       SYNTHROID PO      Take 125 mcg by mouth every evening       tolterodine 2 MG 24 hr capsule    DETROL LA    20 capsule    Take 1 capsule (2 mg) by mouth daily

## 2017-03-17 NOTE — NURSING NOTE
Prior to the start of the procedure and with procedural staff participation, I verbally confirmed the patient s identity using two indicators, relevant allergies, that the procedure was appropriate and matched the consent or emergent situation, and that the correct equipment/implants were available. Immediately prior to starting the procedure I conducted the Time Out with the procedural staff and re-confirmed the patient s name, procedure, and site/side. (The Joint Commission universal protocol was followed.)  Yes    Sedation (Moderate or Deep): None  Pt has signed the consent form stating that we will be doing a CYSTOSCOPY (with or without stent removal) today, and that it is the correct procedure. I verbally confirmed the patient s identity using two indicators, relevant allergies, and that the correct equipment was available. Post-op information given to the pt as needed at check-out. I have sent an appropriate antibiotic to the pharmacy in our building as recommended by the MD. GIANA Sweet CMA

## 2017-03-17 NOTE — LETTER
3/17/2017       RE: Rey Andre  [address]  [place] [address]     Dear Colleague,    Thank you for referring your patient, Rey Andre, to the [facility] [place] at [facility]. Please see a copy of my visit note below.    Office Visit Note  [facility]  [phone]    UROLOGIC DIAGNOSES:   Right ureteral stone    CURRENT INTERVENTIONS:   S/P removal via ureteroscopy    HISTORY:   This is a 50-year-old gentleman who was recently seen by Dr. Barrios for a large 9 mm proximal right ureteral stone. The stone was removed in the operating room and a stent was placed. He is here today for stent removal. He has felt well since his procedure.      PAST MEDICAL HISTORY:   Past Medical History   Diagnosis Date     Kidney stone      Sleep apnea      CPAP     Thyroid disease        PAST SURGICAL HISTORY:   Past Surgical History   Procedure Laterality Date     Orthopedic surgery       Ent surgery       T & A     Laser holmium lithotripsy ureter(s), insert stent, combined  7/27/2012     Procedure: COMBINED CYSTOSCOPY, URETEROSCOPY, LASER HOLMIUM LITHOTRIPSY URETER(S), INSERT STENT;  Video cystoscopy, Right Retrograde Right Ureteroscopy with Holmium Laser, Stone Extraction,  JJ Stent Placement ;  Surgeon: Francisco J Lerma MD;  Location: [facility] OR     Laser holmium lithotripsy ureter(s), insert stent, combined  5/24/2014     Procedure: COMBINED CYSTOSCOPY, URETEROSCOPY, LASER HOLMIUM LITHOTRIPSY URETER(S), INSERT STENT;  Surgeon: Francisco J Lerma MD;  Location: [facility] OR     Cystoscopy, retrogrades, combined  5/24/2014     Procedure: COMBINED CYSTOSCOPY, RETROGRADES;  Surgeon: Francisco J Lerma MD;  Location: [facility] OR     Laser holmium lithotripsy ureter(s), insert stent, combined Right 3/5/2017     Procedure: COMBINED CYSTOSCOPY, URETEROSCOPY, LASER HOLMIUM LITHOTRIPSY URETER(S), INSERT STENT;  Surgeon: Sophie  "Chris Encinas MD;  Location: [facility] OR     Cystoscopy         FAMILY HISTORY: History reviewed. No pertinent family history.    SOCIAL HISTORY:   Social History   Substance Use Topics     Smoking status: Never Smoker     Smokeless tobacco: Not on file     Alcohol use No       Current Outpatient Prescriptions   Medication     ciprofloxacin (CIPRO) 500 MG tablet     HYDROmorphone (DILAUDID) 4 MG tablet     tolterodine (DETROL LA) 2 MG 24 hr capsule     Levothyroxine Sodium (SYNTHROID PO)     No current facility-administered medications for this visit.          PHYSICAL EXAM:    Pulse 86  Ht 1.803 m (5' 11\")  Wt 108.9 kg (240 lb)  SpO2 98%  BMI 33.47 kg/m2    HEENT: Normocephalic and atraumatic   Cardiac: Not done  Back/Flank: Not done  CNS/PNS: Not done  Respiratory: Normal non-labored breathing  Abdomen: Soft nontender and nondistended  Peripheral Vascular: Not done  Mental Status: Not done    Penis: Not done  Scrotal Skin: Not done  Testicles: Not done  Epididymis: Not done  Digital Rectal Exam:     Cystoscopy: I performed flexible cystoscopy in the stent was removed without difficulty    Imaging: None    Urinalysis: UA RESULTS:  Recent Labs   Lab Test  03/04/17   1640   COLOR  Yellow   APPEARANCE  Cloudy   URINEGLC  Negative   URINEBILI  Negative   URINEKETONE  Negative   SG  1.021   UBLD  Small*   URINEPH  8.5*   PROTEIN  30*   NITRITE  Negative   LEUKEST  Negative   RBCU  44*   WBCU  0       PSA:     Post Void Residual:     Other labs: None today      IMPRESSION:  Doing well, stent removed    PLAN:  We discussed prevention methods for future stones. Dr. Barrios had requested that the patient come back to the clinic in 1 month with a KUB so we will get that scheduled.    Total Time: 15 minutes                                      Total in Consultation: 10 minutes      Francisco J Lerma M.D.              "

## 2020-03-05 ENCOUNTER — APPOINTMENT (OUTPATIENT)
Dept: INTERVENTIONAL RADIOLOGY/VASCULAR | Facility: CLINIC | Age: 55
DRG: 024 | End: 2020-03-05
Payer: COMMERCIAL

## 2020-03-05 ENCOUNTER — APPOINTMENT (OUTPATIENT)
Dept: CT IMAGING | Facility: CLINIC | Age: 55
DRG: 024 | End: 2020-03-05
Attending: EMERGENCY MEDICINE
Payer: COMMERCIAL

## 2020-03-05 ENCOUNTER — HOSPITAL ENCOUNTER (INPATIENT)
Facility: CLINIC | Age: 55
LOS: 2 days | Discharge: HOME | DRG: 024 | End: 2020-03-07
Attending: EMERGENCY MEDICINE | Admitting: INTERNAL MEDICINE
Payer: COMMERCIAL

## 2020-03-05 ENCOUNTER — APPOINTMENT (OUTPATIENT)
Dept: CT IMAGING | Facility: CLINIC | Age: 55
DRG: 024 | End: 2020-03-05
Payer: COMMERCIAL

## 2020-03-05 ENCOUNTER — APPOINTMENT (OUTPATIENT)
Dept: CARDIOLOGY | Facility: CLINIC | Age: 55
DRG: 024 | End: 2020-03-05
Payer: COMMERCIAL

## 2020-03-05 ENCOUNTER — APPOINTMENT (OUTPATIENT)
Dept: SPEECH THERAPY | Facility: CLINIC | Age: 55
DRG: 024 | End: 2020-03-05
Payer: COMMERCIAL

## 2020-03-05 DIAGNOSIS — I63.412 CEREBROVASCULAR ACCIDENT (CVA) DUE TO EMBOLISM OF LEFT MIDDLE CEREBRAL ARTERY (H): ICD-10-CM

## 2020-03-05 DIAGNOSIS — R52 PAIN: Primary | ICD-10-CM

## 2020-03-05 DIAGNOSIS — I63.512 CEREBROVASCULAR ACCIDENT (CVA) DUE TO OCCLUSION OF LEFT MIDDLE CEREBRAL ARTERY (H): ICD-10-CM

## 2020-03-05 PROBLEM — I63.9 STROKE, EMBOLIC (H): Status: ACTIVE | Noted: 2020-03-05

## 2020-03-05 PROBLEM — G47.33 OSA ON CPAP: Status: ACTIVE | Noted: 2020-03-05

## 2020-03-05 PROBLEM — I63.9 STROKE (H): Status: ACTIVE | Noted: 2020-03-05

## 2020-03-05 LAB
ALBUMIN SERPL-MCNC: 3.6 G/DL (ref 3.4–5)
ALP SERPL-CCNC: 82 U/L (ref 40–150)
ALT SERPL W P-5'-P-CCNC: 34 U/L (ref 0–70)
ANION GAP SERPL CALCULATED.3IONS-SCNC: 1 MMOL/L (ref 3–14)
APTT PPP: 27 SEC (ref 22–37)
AST SERPL W P-5'-P-CCNC: 13 U/L (ref 0–45)
BASOPHILS # BLD AUTO: 0 10E9/L (ref 0–0.2)
BASOPHILS NFR BLD AUTO: 0.3 %
BILIRUB DIRECT SERPL-MCNC: <0.1 MG/DL (ref 0–0.2)
BILIRUB SERPL-MCNC: 0.3 MG/DL (ref 0.2–1.3)
BUN SERPL-MCNC: 16 MG/DL (ref 7–30)
CALCIUM SERPL-MCNC: 8.1 MG/DL (ref 8.5–10.1)
CHLORIDE SERPL-SCNC: 112 MMOL/L (ref 94–109)
CHOLEST SERPL-MCNC: 158 MG/DL
CK SERPL-CCNC: 84 U/L (ref 30–300)
CO2 SERPL-SCNC: 27 MMOL/L (ref 20–32)
CREAT SERPL-MCNC: 1.01 MG/DL (ref 0.66–1.25)
DIFFERENTIAL METHOD BLD: NORMAL
EOSINOPHIL # BLD AUTO: 0.1 10E9/L (ref 0–0.7)
EOSINOPHIL NFR BLD AUTO: 1.9 %
ERYTHROCYTE [DISTWIDTH] IN BLOOD BY AUTOMATED COUNT: 13.1 % (ref 10–15)
GFR SERPL CREATININE-BSD FRML MDRD: 83 ML/MIN/{1.73_M2}
GLUCOSE BLDC GLUCOMTR-MCNC: 99 MG/DL (ref 70–99)
GLUCOSE SERPL-MCNC: 105 MG/DL (ref 70–99)
GLUCOSE SERPL-MCNC: 123 MG/DL (ref 70–99)
GLUCOSE SERPL-MCNC: 94 MG/DL (ref 70–99)
HBA1C MFR BLD: 5.5 % (ref 0–5.6)
HCT VFR BLD AUTO: 42.6 % (ref 40–53)
HDLC SERPL-MCNC: 48 MG/DL
HGB BLD-MCNC: 14.4 G/DL (ref 13.3–17.7)
IMM GRANULOCYTES # BLD: 0 10E9/L (ref 0–0.4)
IMM GRANULOCYTES NFR BLD: 0 %
INR PPP: 1.02 (ref 0.86–1.14)
INTERPRETATION ECG - MUSE: NORMAL
LDLC SERPL CALC-MCNC: 84 MG/DL
LYMPHOCYTES # BLD AUTO: 1.2 10E9/L (ref 0.8–5.3)
LYMPHOCYTES NFR BLD AUTO: 18 %
MCH RBC QN AUTO: 31.2 PG (ref 26.5–33)
MCHC RBC AUTO-ENTMCNC: 33.8 G/DL (ref 31.5–36.5)
MCV RBC AUTO: 92 FL (ref 78–100)
MONOCYTES # BLD AUTO: 0.6 10E9/L (ref 0–1.3)
MONOCYTES NFR BLD AUTO: 9.1 %
NEUTROPHILS # BLD AUTO: 4.6 10E9/L (ref 1.6–8.3)
NEUTROPHILS NFR BLD AUTO: 70.7 %
NONHDLC SERPL-MCNC: 110 MG/DL
NRBC # BLD AUTO: 0 10*3/UL
NRBC BLD AUTO-RTO: 0 /100
PLATELET # BLD AUTO: 188 10E9/L (ref 150–450)
POTASSIUM SERPL-SCNC: 4.2 MMOL/L (ref 3.4–5.3)
PROT SERPL-MCNC: 6.9 G/DL (ref 6.8–8.8)
RBC # BLD AUTO: 4.61 10E12/L (ref 4.4–5.9)
SODIUM SERPL-SCNC: 140 MMOL/L (ref 133–144)
TRIGL SERPL-MCNC: 129 MG/DL
TROPONIN I SERPL-MCNC: <0.015 UG/L (ref 0–0.04)
TROPONIN I SERPL-MCNC: <0.015 UG/L (ref 0–0.04)
WBC # BLD AUTO: 6.5 10E9/L (ref 4–11)

## 2020-03-05 PROCEDURE — 82947 ASSAY GLUCOSE BLOOD QUANT: CPT | Performed by: STUDENT IN AN ORGANIZED HEALTH CARE EDUCATION/TRAINING PROGRAM

## 2020-03-05 PROCEDURE — 25800030 ZZH RX IP 258 OP 636: Performed by: NEUROLOGICAL SURGERY

## 2020-03-05 PROCEDURE — 99285 EMERGENCY DEPT VISIT HI MDM: CPT | Mod: 25

## 2020-03-05 PROCEDURE — 96368 THER/DIAG CONCURRENT INF: CPT

## 2020-03-05 PROCEDURE — 03CG3ZZ EXTIRPATION OF MATTER FROM INTRACRANIAL ARTERY, PERCUTANEOUS APPROACH: ICD-10-PCS | Performed by: NEUROLOGICAL SURGERY

## 2020-03-05 PROCEDURE — C1769 GUIDE WIRE: HCPCS

## 2020-03-05 PROCEDURE — 93306 TTE W/DOPPLER COMPLETE: CPT | Mod: 26 | Performed by: INTERNAL MEDICINE

## 2020-03-05 PROCEDURE — 80061 LIPID PANEL: CPT | Performed by: STUDENT IN AN ORGANIZED HEALTH CARE EDUCATION/TRAINING PROGRAM

## 2020-03-05 PROCEDURE — 0042T CT HEAD PERFUSION WITH CONTRAST: CPT

## 2020-03-05 PROCEDURE — 82550 ASSAY OF CK (CPK): CPT | Performed by: STUDENT IN AN ORGANIZED HEALTH CARE EDUCATION/TRAINING PROGRAM

## 2020-03-05 PROCEDURE — C1757 CATH, THROMBECTOMY/EMBOLECT: HCPCS

## 2020-03-05 PROCEDURE — 25000128 H RX IP 250 OP 636: Performed by: EMERGENCY MEDICINE

## 2020-03-05 PROCEDURE — C1887 CATHETER, GUIDING: HCPCS

## 2020-03-05 PROCEDURE — 99292 CRITICAL CARE ADDL 30 MIN: CPT | Mod: GC | Performed by: PSYCHIATRY & NEUROLOGY

## 2020-03-05 PROCEDURE — 85730 THROMBOPLASTIN TIME PARTIAL: CPT | Performed by: EMERGENCY MEDICINE

## 2020-03-05 PROCEDURE — 85025 COMPLETE CBC W/AUTO DIFF WBC: CPT | Performed by: EMERGENCY MEDICINE

## 2020-03-05 PROCEDURE — 70496 CT ANGIOGRAPHY HEAD: CPT

## 2020-03-05 PROCEDURE — 27210804 ZZH SHEATH CR3

## 2020-03-05 PROCEDURE — 84484 ASSAY OF TROPONIN QUANT: CPT | Performed by: STUDENT IN AN ORGANIZED HEALTH CARE EDUCATION/TRAINING PROGRAM

## 2020-03-05 PROCEDURE — 93005 ELECTROCARDIOGRAM TRACING: CPT

## 2020-03-05 PROCEDURE — 00000146 ZZHCL STATISTIC GLUCOSE BY METER IP

## 2020-03-05 PROCEDURE — 85610 PROTHROMBIN TIME: CPT | Performed by: EMERGENCY MEDICINE

## 2020-03-05 PROCEDURE — 93306 TTE W/DOPPLER COMPLETE: CPT

## 2020-03-05 PROCEDURE — 96365 THER/PROPH/DIAG IV INF INIT: CPT | Mod: 59

## 2020-03-05 PROCEDURE — 25000128 H RX IP 250 OP 636

## 2020-03-05 PROCEDURE — 96376 TX/PRO/DX INJ SAME DRUG ADON: CPT

## 2020-03-05 PROCEDURE — 27210732 ZZH ACCESSORY CR1

## 2020-03-05 PROCEDURE — 25000125 ZZHC RX 250: Performed by: EMERGENCY MEDICINE

## 2020-03-05 PROCEDURE — 27210886 ZZH ACCESSORY CR5

## 2020-03-05 PROCEDURE — 20000003 ZZH R&B ICU

## 2020-03-05 PROCEDURE — 25000125 ZZHC RX 250

## 2020-03-05 PROCEDURE — 27211142 ZZHC CATH NEURO CR21

## 2020-03-05 PROCEDURE — 80076 HEPATIC FUNCTION PANEL: CPT | Performed by: STUDENT IN AN ORGANIZED HEALTH CARE EDUCATION/TRAINING PROGRAM

## 2020-03-05 PROCEDURE — 27210736 ZZH ACCESSORY CR13

## 2020-03-05 PROCEDURE — 83036 HEMOGLOBIN GLYCOSYLATED A1C: CPT | Performed by: STUDENT IN AN ORGANIZED HEALTH CARE EDUCATION/TRAINING PROGRAM

## 2020-03-05 PROCEDURE — 82947 ASSAY GLUCOSE BLOOD QUANT: CPT | Performed by: PSYCHIATRY & NEUROLOGY

## 2020-03-05 PROCEDURE — C1760 CLOSURE DEV, VASC: HCPCS

## 2020-03-05 PROCEDURE — 25500064 ZZH RX 255 OP 636: Performed by: NEUROLOGICAL SURGERY

## 2020-03-05 PROCEDURE — 25500064 ZZH RX 255 OP 636: Performed by: INTERNAL MEDICINE

## 2020-03-05 PROCEDURE — 80048 BASIC METABOLIC PNL TOTAL CA: CPT | Performed by: EMERGENCY MEDICINE

## 2020-03-05 PROCEDURE — 25000128 H RX IP 250 OP 636: Performed by: NEUROLOGICAL SURGERY

## 2020-03-05 PROCEDURE — 96375 TX/PRO/DX INJ NEW DRUG ADDON: CPT

## 2020-03-05 PROCEDURE — 70450 CT HEAD/BRAIN W/O DYE: CPT | Mod: 76

## 2020-03-05 PROCEDURE — 36415 COLL VENOUS BLD VENIPUNCTURE: CPT | Performed by: STUDENT IN AN ORGANIZED HEALTH CARE EDUCATION/TRAINING PROGRAM

## 2020-03-05 PROCEDURE — 27210914 ZZH SHEATH CR8

## 2020-03-05 PROCEDURE — 27210738 ZZH ACCESSORY CR2

## 2020-03-05 PROCEDURE — 99152 MOD SED SAME PHYS/QHP 5/>YRS: CPT

## 2020-03-05 PROCEDURE — 99291 CRITICAL CARE FIRST HOUR: CPT | Mod: GC | Performed by: PSYCHIATRY & NEUROLOGY

## 2020-03-05 PROCEDURE — 99223 1ST HOSP IP/OBS HIGH 75: CPT | Mod: AI | Performed by: INTERNAL MEDICINE

## 2020-03-05 PROCEDURE — 70450 CT HEAD/BRAIN W/O DYE: CPT

## 2020-03-05 PROCEDURE — 96366 THER/PROPH/DIAG IV INF ADDON: CPT

## 2020-03-05 PROCEDURE — 27210742 ZZH CATH CR1

## 2020-03-05 PROCEDURE — 84484 ASSAY OF TROPONIN QUANT: CPT | Performed by: PSYCHIATRY & NEUROLOGY

## 2020-03-05 PROCEDURE — 92610 EVALUATE SWALLOWING FUNCTION: CPT | Mod: GN | Performed by: SPEECH-LANGUAGE PATHOLOGIST

## 2020-03-05 RX ORDER — LABETALOL HYDROCHLORIDE 5 MG/ML
INJECTION, SOLUTION INTRAVENOUS
Status: DISCONTINUED
Start: 2020-03-05 | End: 2020-03-05 | Stop reason: WASHOUT

## 2020-03-05 RX ORDER — PROCHLORPERAZINE 25 MG
25 SUPPOSITORY, RECTAL RECTAL EVERY 12 HOURS PRN
Status: DISCONTINUED | OUTPATIENT
Start: 2020-03-05 | End: 2020-03-05

## 2020-03-05 RX ORDER — FENTANYL CITRATE 50 UG/ML
25-50 INJECTION, SOLUTION INTRAMUSCULAR; INTRAVENOUS EVERY 5 MIN PRN
Status: DISCONTINUED | OUTPATIENT
Start: 2020-03-05 | End: 2020-03-05

## 2020-03-05 RX ORDER — METOCLOPRAMIDE HYDROCHLORIDE 5 MG/ML
10 INJECTION INTRAMUSCULAR; INTRAVENOUS EVERY 6 HOURS PRN
Status: DISCONTINUED | OUTPATIENT
Start: 2020-03-05 | End: 2020-03-05

## 2020-03-05 RX ORDER — ONDANSETRON 4 MG/1
4 TABLET, ORALLY DISINTEGRATING ORAL EVERY 6 HOURS PRN
Status: DISCONTINUED | OUTPATIENT
Start: 2020-03-05 | End: 2020-03-05

## 2020-03-05 RX ORDER — IOPAMIDOL 612 MG/ML
100 INJECTION, SOLUTION INTRAVASCULAR ONCE
Status: COMPLETED | OUTPATIENT
Start: 2020-03-05 | End: 2020-03-05

## 2020-03-05 RX ORDER — DEXTROSE MONOHYDRATE 25 G/50ML
25-50 INJECTION, SOLUTION INTRAVENOUS
Status: DISCONTINUED | OUTPATIENT
Start: 2020-03-05 | End: 2020-03-06

## 2020-03-05 RX ORDER — ONDANSETRON 4 MG/1
4 TABLET, ORALLY DISINTEGRATING ORAL EVERY 6 HOURS PRN
Status: DISCONTINUED | OUTPATIENT
Start: 2020-03-05 | End: 2020-03-07 | Stop reason: HOSPADM

## 2020-03-05 RX ORDER — FLUMAZENIL 0.1 MG/ML
0.2 INJECTION, SOLUTION INTRAVENOUS
Status: DISCONTINUED | OUTPATIENT
Start: 2020-03-05 | End: 2020-03-05

## 2020-03-05 RX ORDER — PROCHLORPERAZINE MALEATE 10 MG
10 TABLET ORAL EVERY 6 HOURS PRN
Status: DISCONTINUED | OUTPATIENT
Start: 2020-03-05 | End: 2020-03-07 | Stop reason: HOSPADM

## 2020-03-05 RX ORDER — METOCLOPRAMIDE 5 MG/1
10 TABLET ORAL EVERY 6 HOURS PRN
Status: DISCONTINUED | OUTPATIENT
Start: 2020-03-05 | End: 2020-03-07 | Stop reason: HOSPADM

## 2020-03-05 RX ORDER — METOCLOPRAMIDE HYDROCHLORIDE 5 MG/ML
10 INJECTION INTRAMUSCULAR; INTRAVENOUS EVERY 6 HOURS PRN
Status: DISCONTINUED | OUTPATIENT
Start: 2020-03-05 | End: 2020-03-07 | Stop reason: HOSPADM

## 2020-03-05 RX ORDER — ONDANSETRON 2 MG/ML
4 INJECTION INTRAMUSCULAR; INTRAVENOUS EVERY 6 HOURS PRN
Status: DISCONTINUED | OUTPATIENT
Start: 2020-03-05 | End: 2020-03-05

## 2020-03-05 RX ORDER — PROCHLORPERAZINE 25 MG
25 SUPPOSITORY, RECTAL RECTAL EVERY 12 HOURS PRN
Status: DISCONTINUED | OUTPATIENT
Start: 2020-03-05 | End: 2020-03-07 | Stop reason: HOSPADM

## 2020-03-05 RX ORDER — FENTANYL CITRATE 50 UG/ML
INJECTION, SOLUTION INTRAMUSCULAR; INTRAVENOUS
Status: COMPLETED
Start: 2020-03-05 | End: 2020-03-05

## 2020-03-05 RX ORDER — NICOTINE POLACRILEX 4 MG
15-30 LOZENGE BUCCAL
Status: DISCONTINUED | OUTPATIENT
Start: 2020-03-05 | End: 2020-03-06

## 2020-03-05 RX ORDER — SODIUM CHLORIDE 9 MG/ML
INJECTION, SOLUTION INTRAVENOUS CONTINUOUS
Status: DISCONTINUED | OUTPATIENT
Start: 2020-03-05 | End: 2020-03-06

## 2020-03-05 RX ORDER — NALOXONE HYDROCHLORIDE 0.4 MG/ML
.1-.4 INJECTION, SOLUTION INTRAMUSCULAR; INTRAVENOUS; SUBCUTANEOUS
Status: DISCONTINUED | OUTPATIENT
Start: 2020-03-05 | End: 2020-03-05

## 2020-03-05 RX ORDER — METOCLOPRAMIDE 10 MG/1
10 TABLET ORAL EVERY 6 HOURS PRN
Status: DISCONTINUED | OUTPATIENT
Start: 2020-03-05 | End: 2020-03-05

## 2020-03-05 RX ORDER — ONDANSETRON 2 MG/ML
4 INJECTION INTRAMUSCULAR; INTRAVENOUS EVERY 6 HOURS PRN
Status: DISCONTINUED | OUTPATIENT
Start: 2020-03-05 | End: 2020-03-07 | Stop reason: HOSPADM

## 2020-03-05 RX ORDER — LIDOCAINE HYDROCHLORIDE 10 MG/ML
INJECTION, SOLUTION INFILTRATION; PERINEURAL
Status: DISCONTINUED
Start: 2020-03-05 | End: 2020-03-05 | Stop reason: HOSPADM

## 2020-03-05 RX ORDER — PROCHLORPERAZINE MALEATE 10 MG
10 TABLET ORAL EVERY 6 HOURS PRN
Status: DISCONTINUED | OUTPATIENT
Start: 2020-03-05 | End: 2020-03-05

## 2020-03-05 RX ORDER — LABETALOL HYDROCHLORIDE 5 MG/ML
10 INJECTION, SOLUTION INTRAVENOUS EVERY 10 MIN PRN
Status: DISCONTINUED | OUTPATIENT
Start: 2020-03-05 | End: 2020-03-07 | Stop reason: HOSPADM

## 2020-03-05 RX ORDER — HEPARIN SODIUM 200 [USP'U]/100ML
INJECTION, SOLUTION INTRAVENOUS
Status: DISCONTINUED
Start: 2020-03-05 | End: 2020-03-05 | Stop reason: HOSPADM

## 2020-03-05 RX ORDER — LABETALOL HYDROCHLORIDE 5 MG/ML
INJECTION, SOLUTION INTRAVENOUS
Status: COMPLETED
Start: 2020-03-05 | End: 2020-03-05

## 2020-03-05 RX ORDER — IOPAMIDOL 755 MG/ML
120 INJECTION, SOLUTION INTRAVASCULAR ONCE
Status: COMPLETED | OUTPATIENT
Start: 2020-03-05 | End: 2020-03-05

## 2020-03-05 RX ORDER — NALOXONE HYDROCHLORIDE 0.4 MG/ML
.1-.4 INJECTION, SOLUTION INTRAMUSCULAR; INTRAVENOUS; SUBCUTANEOUS
Status: DISCONTINUED | OUTPATIENT
Start: 2020-03-05 | End: 2020-03-07 | Stop reason: HOSPADM

## 2020-03-05 RX ORDER — FENTANYL CITRATE 50 UG/ML
INJECTION, SOLUTION INTRAMUSCULAR; INTRAVENOUS
Status: DISCONTINUED
Start: 2020-03-05 | End: 2020-03-05 | Stop reason: HOSPADM

## 2020-03-05 RX ADMIN — LIDOCAINE HYDROCHLORIDE 10 ML: 10 INJECTION, SOLUTION INFILTRATION; PERINEURAL at 08:35

## 2020-03-05 RX ADMIN — MIDAZOLAM HYDROCHLORIDE 0.5 MG: 1 INJECTION, SOLUTION INTRAMUSCULAR; INTRAVENOUS at 08:48

## 2020-03-05 RX ADMIN — SODIUM CHLORIDE 100 ML: 9 INJECTION, SOLUTION INTRAVENOUS at 07:18

## 2020-03-05 RX ADMIN — HEPARIN SODIUM 10000 UNITS: 10000 INJECTION INTRAVENOUS; SUBCUTANEOUS at 08:48

## 2020-03-05 RX ADMIN — IOPAMIDOL 45 ML: 612 INJECTION, SOLUTION INTRAVENOUS at 09:23

## 2020-03-05 RX ADMIN — MIDAZOLAM HYDROCHLORIDE 1 MG: 1 INJECTION, SOLUTION INTRAMUSCULAR; INTRAVENOUS at 08:30

## 2020-03-05 RX ADMIN — LABETALOL HYDROCHLORIDE 10 MG: 5 INJECTION, SOLUTION INTRAVENOUS at 09:28

## 2020-03-05 RX ADMIN — HUMAN ALBUMIN MICROSPHERES AND PERFLUTREN 9 ML: 10; .22 INJECTION, SOLUTION INTRAVENOUS at 14:43

## 2020-03-05 RX ADMIN — FENTANYL CITRATE 25 MCG: 50 INJECTION, SOLUTION INTRAMUSCULAR; INTRAVENOUS at 09:06

## 2020-03-05 RX ADMIN — MIDAZOLAM HYDROCHLORIDE 0.5 MG: 1 INJECTION, SOLUTION INTRAMUSCULAR; INTRAVENOUS at 08:40

## 2020-03-05 RX ADMIN — MIDAZOLAM HYDROCHLORIDE 0.5 MG: 1 INJECTION, SOLUTION INTRAMUSCULAR; INTRAVENOUS at 09:06

## 2020-03-05 RX ADMIN — HEPARIN SODIUM 10000 UNITS: 10000 INJECTION INTRAVENOUS; SUBCUTANEOUS at 08:49

## 2020-03-05 RX ADMIN — FENTANYL CITRATE 50 MCG: 50 INJECTION, SOLUTION INTRAMUSCULAR; INTRAVENOUS at 08:30

## 2020-03-05 RX ADMIN — IOPAMIDOL 120 ML: 755 INJECTION, SOLUTION INTRAVENOUS at 07:18

## 2020-03-05 RX ADMIN — FENTANYL CITRATE 25 MCG: 50 INJECTION, SOLUTION INTRAMUSCULAR; INTRAVENOUS at 08:40

## 2020-03-05 RX ADMIN — SODIUM CHLORIDE: 9 INJECTION, SOLUTION INTRAVENOUS at 12:02

## 2020-03-05 RX ADMIN — FENTANYL CITRATE 25 MCG: 50 INJECTION, SOLUTION INTRAMUSCULAR; INTRAVENOUS at 08:49

## 2020-03-05 ASSESSMENT — VISUAL ACUITY
OU: NORMAL ACUITY

## 2020-03-05 ASSESSMENT — ACTIVITIES OF DAILY LIVING (ADL)
BATHING: 0-->INDEPENDENT
AMBULATION: 0-->INDEPENDENT
ADLS_ACUITY_SCORE: 11
RETIRED_COMMUNICATION: 0-->UNDERSTANDS/COMMUNICATES WITHOUT DIFFICULTY
ADLS_ACUITY_SCORE: 14
DRESS: 0-->INDEPENDENT
RETIRED_EATING: 0-->INDEPENDENT
TOILETING: 0-->INDEPENDENT
TRANSFERRING: 0-->INDEPENDENT
ADLS_ACUITY_SCORE: 11
FALL_HISTORY_WITHIN_LAST_SIX_MONTHS: NO
COGNITION: 0 - NO COGNITION ISSUES REPORTED
SWALLOWING: 0-->SWALLOWS FOODS/LIQUIDS WITHOUT DIFFICULTY

## 2020-03-05 ASSESSMENT — ENCOUNTER SYMPTOMS
FACIAL ASYMMETRY: 1
SPEECH DIFFICULTY: 1

## 2020-03-05 ASSESSMENT — MIFFLIN-ST. JEOR: SCORE: 2024.67

## 2020-03-05 NOTE — ED TRIAGE NOTES
Patient went to bed normal last night around 22:30, woke this morning, wife heard a thump, found patient on the floor, unable to speak, right-sided paralysis, right facial droop. En route, per EMS, patient was able to say a few words and began to lift right arm and right leg, slightly improved facial droop. On arrival, patient presents with obvious right facial droop, uncoordinated movements of right side extremities and aphasic, vital signs are stable, respirations are even and non-labored.

## 2020-03-05 NOTE — ED NOTES
Bed: ST03  Expected date:   Expected time:   Means of arrival:   Comments:  595  Stroke alert [phone]

## 2020-03-05 NOTE — PLAN OF CARE
Discharge Planner SLP   Patient plan for discharge: Did not state  Current status: SLP: Pt presents with no appreciable dysphagia on clinical swallow evaluation. Okay to sit upright per RN. Pt currently responding yea/okay to all questions, however, wife reported continued improvements. Oral motor exam significant for slight right facial droop. No tongue deviation or reduced ROM. Ice chips, thin liquids by cup and straw, puree solids and regular solids trialed with no oral deficits, no s/sx of aspiration throughout evaluation and no oral residue following.     Recommend: regular diet and thin liquids with standard aspiration precautions. Will hold speech/language/cognitive evaluation until tomorrow.     Barriers to return to prior living situation: improving speech/language deficits  Recommendations for discharge: ARU  Rationale for recommendations: Further recommendations pending speech/language evaluation. Will plan 1x follow up for swallowing at a meal to ensure diet tolerance       Entered by: Medina Jordan 03/05/2020 11:53 AM

## 2020-03-05 NOTE — H&P
[facility]    History and Physical  Hospitalist       Date of Admission:  3/5/2020    Assessment & Plan   54 year old male with past medical hx of DAMARIS on CPAP who was not able to move right side or able to talk at 6:30 AM today:    Summary:    Principal Problem:    Probable Embolic Stroke, Left M2 -- Aphasia, right francis-paresis   -- last well at 10:30 PM (or possibly 5:45 AM today)   -- Stroke code -- lytics with IR to try embolectomy, then admit to ICU    Active Problems:    DAMARIS on CPAP   -- continue CPAP at bedtime        Plan: Discussed with ER physician and Neurology (Dr. Caldwell), and discussed with wife.      DVT Prophylaxis: Pneumatic Compression Devices  Code Status: Full Code    Disposition: Expected discharge in 3 days -- may need rehab pending symptom recovery.     Baudilio Escamilla MD  Pager: [phone]  Cell Phone:  [phone]     Primary Care Physician   Pau Pozo    Chief Complaint   Aphasia, right hemiparesis     History is obtained from Patient's wife     History of Present Illness   54 year old male with DAMARIS on CPAP otherwise healthy, who presents via EMS for evaluation of right sided paralysis starting early this morning. The patient was last known to have normal activity when went to bed at 10:30 PM last night, wife woke up at 5:45 AM to shower and did not speak to him, and son left about 6 AM and said goodbye and wife says he she thought he said goodbye then, and then around 6:30 AM she interacted with him and he could not talk or move right side.   morning around an hour ago was found in his bed with right sided paralysis and mumbling speech. EMS called, they note that the patient was able to move his right leg and arm slightly while en route. They state that his blood pressure en route was 130s/80s. The patient has no history of a past stroke.  No hx of diabetes.     In ER /98, RR 11, labs normal, CTA of head with left M2 thrombus.      PAST MEDICAL  HISTORY    Past Medical History:   Diagnosis Date     Hypothyroidism      Kidney stone     Recurent stones     DAMARIS on CPAP         PAST SURGICAL HISTORY    Past Surgical History:   Procedure Laterality Date     CYSTOSCOPY       CYSTOSCOPY, RETROGRADES, COMBINED  5/24/2014    Procedure: COMBINED CYSTOSCOPY, RETROGRADES;  Surgeon: Francisco J Lerma MD;  Location:  OR     ENT SURGERY      T & A     LASER HOLMIUM LITHOTRIPSY URETER(S), INSERT STENT, COMBINED  7/27/2012    Procedure: COMBINED CYSTOSCOPY, URETEROSCOPY, LASER HOLMIUM LITHOTRIPSY URETER(S), INSERT STENT;  Video cystoscopy, Right Retrograde Right Ureteroscopy with Holmium Laser, Stone Extraction,  JJ Stent Placement ;  Surgeon: Francisco J Lerma MD;  Location: [facility] OR     LASER HOLMIUM LITHOTRIPSY URETER(S), INSERT STENT, COMBINED  5/24/2014    Procedure: COMBINED CYSTOSCOPY, URETEROSCOPY, LASER HOLMIUM LITHOTRIPSY URETER(S), INSERT STENT;  Surgeon: Francisco J Lerma MD;  Location: [facility] OR     LASER HOLMIUM LITHOTRIPSY URETER(S), INSERT STENT, COMBINED Right 3/5/2017    Procedure: COMBINED CYSTOSCOPY, URETEROSCOPY, LASER HOLMIUM LITHOTRIPSY URETER(S), INSERT STENT;  Surgeon: Chris Barrios MD;  Location:  OR     ROTATOR CUFF REPAIR RT/LT          Prior to Admission Medications   Prior to Admission Medications   Prescriptions Last Dose Informant Patient Reported? Taking?   HYDROmorphone (DILAUDID) 4 MG tablet  Spouse/Significant Other No Yes   Sig: Take 1 tablet (4 mg) by mouth every 6 hours as needed for moderate to severe pain maximum 4 tablet(s) per day   Levothyroxine Sodium (SYNTHROID PO) 3/4/2020 at Unknown time Spouse/Significant Other Yes Yes   Sig: Take 125 mcg by mouth every evening   NONFORMULARY 3/4/2020 at Unknown time Spouse/Significant Other Yes Yes   Sig: Take 1 tablet by mouth daily Fen-Phen 37.5mg herbal diet medication.   tolterodine (DETROL LA) 2 MG 24 hr capsule 3/4/2020 at Unknown time Spouse/Significant Other  "No Yes   Sig: Take 1 capsule (2 mg) by mouth daily      Facility-Administered Medications: None     Allergies   Allergies   Allergen Reactions     No Known Drug Allergies        SOCIAL HISTORY    Social History     Social History Narrative    , 3 children, works as a golf pro, no advanced directives but is full code. (last updated 3/5/2020)       Social History     Tobacco Use     Smoking status: Never Smoker   Substance Use Topics     Alcohol use: No     Drug use: No        FAMILY HISTORY    Family History   Problem Relation Age of Onset     Other - See Comments Father         Possible Autoimmune disorder      Prostate Cancer Father         Review of Systems   Review of systems not obtained due to patient factors - aphasia      PHYSICAL EXAM     Temp: 98.6  F (37  C) Temp src: Oral BP: (!) 123/107 Pulse: 75 Heart Rate: 80 Resp: 10 SpO2: 97 % O2 Device: Nasal cannula Oxygen Delivery: 3 LPM  Vital Signs with Ranges  Temp:  [98.6  F (37  C)] 98.6  F (37  C)  Pulse:  [70-85] 75  Heart Rate:  [67-84] 80  Resp:  [10-19] 10  BP: (123-158)/() 123/107  SpO2:  [95 %-100 %] 97 %  262 lbs 0 oz    Constitutional: Awake, alert, but can't follow simple commands, no apparent distress.  Eyes: Conjunctiva and pupils examined and normal.  HEENT: Moist mucous membranes, normal dentition.  Respiratory: Clear to auscultation bilaterally, no crackles or wheezing.  Cardiovascular: Regular rate and rhythm, normal S1 and S2, and no murmur noted, no carotid bruits.  No ankle edema.   GI: Soft, non-distended, non-tender, normal bowel sounds.  Lymph/Hematologic: No anterior cervical, supraclavicular or axillary adenopathy.  Skin: No rashes, no cyanosis.   Musculoskeletal: No joint swelling, erythema or tenderness.  Neurologic: Alert, but not able to follow simple commands, and says \"Yes\" when asked any question, and can not say \"no ifs, ands, or buts\", minimal right face droop, right  4/5 with right 5/5, and able to lift right " hand off bed.  Psychiatric:  No obvious anxiety or depression.    Data   Data reviewed today:  I personally reviewed the EKG tracing showing NSR with rate of 81.  Recent Labs   Lab 03/05/20  0731   WBC 6.5   HGB 14.4   MCV 92      INR 1.02      POTASSIUM 4.2   CHLORIDE 112*   CO2 27   BUN 16   CR 1.01   ANIONGAP 1*   DAMARIS 8.1*   *       Imaging:  Recent Results (from the past 24 hour(s))   CT Head w/o Contrast    Narrative    CT SCAN OF THE HEAD WITHOUT CONTRAST   3/5/2020 7:16 AM     HISTORY: TIA, initial exam. Code Stroke. Right-sided weakness and  aphasia.    TECHNIQUE:  Axial images of the head and coronal reformations without  IV contrast material.  Radiation dose for this scan was reduced using  automated exposure control, adjustment of the mA and/or kV according  to patient size, or iterative reconstruction technique.    COMPARISON: None.    FINDINGS:  The ventricles are normal in size, shape and configuration.   The brain parenchyma and subarachnoid spaces are normal. There is no  evidence of intracranial hemorrhage, mass, acute infarct or anomaly.     The visualized portions of the sinuses and mastoids appear normal.  There is no evidence of trauma.       Impression    IMPRESSION:  Normal CT scan of the head.     GAURI NEIL MD   CTA Head Neck with Contrast    Narrative    CTA  HEAD NECK WITH CONTRAST 3/5/2020 7:19 AM     HISTORY: TIA, initial exam. Code Stroke. Right-sided weakness.  Aphasia.    TECHNIQUE: Axial images were obtained through the head and neck with  intravenous contrast. 70 mL of Isovue-370 was given. Multiplanar  reconstructions were performed. 3-D reconstructions off a remote  workstation for CT angiography were also acquired. Carotid stenoses  were evaluated by comparing the caliber of the proximal internal  carotid artery to the caliber of the distal internal carotid artery.  Radiation dose for this scan was reduced using automated exposure  control, adjustment of the  mA and/or kV according to patient size, or  iterative reconstruction technique.    FINDINGS:  Brachiocephalic vessels: Normal.    Right carotid system: Normal.    Left carotid system: Normal.    Right vertebral artery: Normal.    Left vertebral artery: Normal.    [place] of [place]: There is some attenuation of the distal branches of  the left M2 superior division middle cerebral artery branch. There is  some probable minimal nonocclusive clot in the superior division  branch 5 mm from its origin. The right middle cerebral artery, distal  internal carotid arteries, and anterior cerebral arteries are normal.  The basilar artery and posterior cerebral arteries are normal.      Impression    IMPRESSION:  1. Partially occlusive thrombus in the left superior division M2  branch with some attenuation of distal branches.  2. Results discussed with Dr. Caldwell.    GAURI NEIL MD   CT Head Perfusion w Contrast    Narrative    CT HEAD PERFUSION WITH CONTRAST 3/5/2020 7:31 AM     HISTORY: TIA, initial exam. Code Stroke. Aphasia. Right-sided  weakness.    TECHNIQUE: Axial images were obtained through the brain with  intravenous contrast. 50 mL Isovue-370 was given. Radiation dose for  this scan was reduced using automated exposure control, adjustment of  the mA and/or kV according to patient size, or iterative  reconstruction technique.    FINDINGS: Perfusion-weighted images show focal perfusion abnormalities  in two separate locations within the left frontal lobe on the cerebral  blood flow images. On the cerebral blood volume images, there is only  perfusion abnormality in the more anterior left frontal lobe region.  The more posterior perfusion defect lesion seen on cerebral blood flow  imaging is within normal limits on the cerebral blood volume imaging.  Remainder of brain perfusion is normal.      Impression    IMPRESSION: Abnormal perfusion to the left frontal lobe consistent  with some ischemia and developing  infarct.    GAURI NEIL MD

## 2020-03-05 NOTE — BRIEF OP NOTE
[facility] Brief Operative Note    Pre-operative diagnosis: Stroke   Post-operative diagnosis Right MCA Stroke   Procedure: Right thrombectomy   Surgeon(s): Saleem   Estimated blood loss: 1cc   Specimens:    Findings: TICI #

## 2020-03-05 NOTE — PHARMACY-ADMISSION MEDICATION HISTORY
Pharmacy Medication History  Admission medication history interview status for the 3/5/2020  admission is complete. See EPIC admission navigator for prior to admission medications     Medication history sources: Patient's family/friend (wife)  Medication history source reliability: Good  Adherence assessment: Good    Significant changes made to the medication list:  Added Fen-Phen 1 daily for diet        Medication reconciliation completed by provider prior to medication history? No    Time spent in this activity: 15      Prior to Admission medications    Medication Sig Last Dose Taking? Auth Provider   HYDROmorphone (DILAUDID) 4 MG tablet Take 1 tablet (4 mg) by mouth every 6 hours as needed for moderate to severe pain maximum 4 tablet(s) per day  Yes Chris Barrios MD   Levothyroxine Sodium (SYNTHROID PO) Take 125 mcg by mouth every evening 3/4/2020 at Unknown time Yes Unknown, Entered By History   NONFORMULARY Take 1 tablet by mouth daily Fen-Phen 37.5mg herbal diet medication. 3/4/2020 at Unknown time Yes Unknown, Entered By History   tolterodine (DETROL LA) 2 MG 24 hr capsule Take 1 capsule (2 mg) by mouth daily 3/4/2020 at Unknown time Yes Chris Barrios MD

## 2020-03-05 NOTE — CONSULTS
"  [facility]    Stroke Consult Note    Reason for Consult: stroke code    Chief Complaint: Cerebrovascular Accident      HPI  Rey Andre is a 54 year old male with PMHx of DAMARIS on CPAP and hypothyroidism, who presented via EMS for evaluation of aphasia and mild right sided weakness noted this AM.     The patient was last known to have normal activity when went to bed at 10:30 PM last night, wife woke up at 5:45 AM to shower but did not speak to him, and son left about 6 AM and said goodbye and wife says he she thought he said goodbye then, and then around 6:30 AM she interacted with him and noted he could not talk or move right side.  EMS called, they note that the patient was able to move his right leg and arm slightly while en route. They state that his blood pressure en route was 130s/80s. The patient has no history of a past stroke.  No hx of diabetes.     Baseline mRS: 0    TPA Treatment   TIMLESS enrolled: Study drug TNK vs Placebo.      Trial: A PHASE III, PROSPECTIVE, DOUBLE-BLIND,  RANDOMIZED, PLACEBO-CONTROLLED TRIAL  OF THROMBOLYSIS IN  IMAGING-ELIGIBLE, LATE-WINDOW PATIENTS  TO ASSESS THE EFFICACY AND SAFETY OF  IV TENECTEPLASE (The TIMELESS Trial)    Participation or LAR/Surrogate Prospective Consent     IRB Number: [id number]    PI: Vitor Caldwell [id number]    Primary Study Coordinators:  Adonay Campbell ([email or web address]) and Sirena Rowe ([email or web address])     24/7 On-Call  is available at [phone] \"Request a NETT coordinator via Zip-It Page\"    Estimated duration of study: 3 months (90 days)    Pt was screened and consented for TIMELESS trial by note author. The LAR, [NAME AND RELATION TO PT] was used as a surrogate due to the patient's medical condition. The consent discussion lasted approximately [TIME] minutes. Potential benefits, risks, and study procedures were discussed. Treatment options such as standard of care and study specific events were " "discussed. An opportunity for questions was provided, [PROVIDE QUESTIONS ASKED AND ANSWERS GIVEN]. Following the consent conversation, the subject was enrolled into the study. A copy of the consent was given to the [LAR/SUBJECT] at approximately [TIME].     For researcher contact info, please see the Research Studies tab in EPIC.      Endovascular Treatment  Endovascular treatment initiated for L M2 occlusion occlusion    Impression  Ischemic Stroke due to embolic stroke of undetermined source (ESUS)    Recommendations  Acute Ischemic Stroke (post tPA) Recommendations  - Close monitoring and neurochecks per post-tPA orders for any evidence of hemorrhagic transformation or allergic reaction  - Labetalol PRN to maintain BP < 180/105  - Hold all anti-thrombotic and anti-coagulant medications for 24 hrs post-IV (tPA)  - Hold pharmacologic VTE prophylaxis for 24 hrs post-IV (tPA)  - Statin:  Lipitor 40 mg daily  - Repeat HCT 24 hrs post-tPA  - MRI Stroke Protocol  - TTE with Bubble Study  - Telemetry, EKG  - Bedside Glucose Monitoring  - A1c, Lipid Panel, Troponin x 3  - PT/OT/SLP  - PM&R  - Stroke Education  - Stroke Class per [facility] ([facility])  - Depression Screen  - Euthermia, Euglycemia    Patient Follow-up    - final recommendation pending work-up    Thank you for this consult. We will continue to follow.     Rashi Peterson MD  Fellow, Vascular Neurology  Text Page    To page stroke neurology after hours through [facility], click here: AMCOM  Choose \"On Call\" tab at top, then search dropdown box for \"Neurology Adult\"    ______________________________________________________    Past Medical History   Past Medical History:   Diagnosis Date     Kidney stone      Sleep apnea     CPAP     Thyroid disease      Past Surgical History   Past Surgical History:   Procedure Laterality Date     CYSTOSCOPY       CYSTOSCOPY, RETROGRADES, COMBINED  5/24/2014    Procedure: COMBINED CYSTOSCOPY, RETROGRADES;  Surgeon: " Francisco J Lerma MD;  Location: [facility] OR     ENT SURGERY      T & A     LASER HOLMIUM LITHOTRIPSY URETER(S), INSERT STENT, COMBINED  7/27/2012    Procedure: COMBINED CYSTOSCOPY, URETEROSCOPY, LASER HOLMIUM LITHOTRIPSY URETER(S), INSERT STENT;  Video cystoscopy, Right Retrograde Right Ureteroscopy with Holmium Laser, Stone Extraction,  JJ Stent Placement ;  Surgeon: Francisco J Lerma MD;  Location: [facility] OR     LASER HOLMIUM LITHOTRIPSY URETER(S), INSERT STENT, COMBINED  5/24/2014    Procedure: COMBINED CYSTOSCOPY, URETEROSCOPY, LASER HOLMIUM LITHOTRIPSY URETER(S), INSERT STENT;  Surgeon: Francisco J Lerma MD;  Location: [facility] OR     LASER HOLMIUM LITHOTRIPSY URETER(S), INSERT STENT, COMBINED Right 3/5/2017    Procedure: COMBINED CYSTOSCOPY, URETEROSCOPY, LASER HOLMIUM LITHOTRIPSY URETER(S), INSERT STENT;  Surgeon: Chris Barrios MD;  Location: [facility] OR     ORTHOPEDIC SURGERY       Medications   Home Meds  Prior to Admission medications    Medication Sig Start Date End Date Taking? Authorizing Provider   HYDROmorphone (DILAUDID) 4 MG tablet Take 1 tablet (4 mg) by mouth every 6 hours as needed for moderate to severe pain maximum 4 tablet(s) per day 3/5/17   Chris Barrios MD   Levothyroxine Sodium (SYNTHROID PO) Take 125 mcg by mouth every evening    Unknown, Entered By History   tolterodine (DETROL LA) 2 MG 24 hr capsule Take 1 capsule (2 mg) by mouth daily 3/5/17   Chris Barrios MD       Scheduled Meds    fentaNYL (PF)         heparin (PRESSURE BAG) 2 unit/mL in 0.9% NaCl         lidocaine         midazolam           Infusion Meds      PRN Meds      Allergies   Allergies   Allergen Reactions     No Known Drug Allergies      Family History   No family history on file.  Social History   Social History     Tobacco Use     Smoking status: Never Smoker   Substance Use Topics     Alcohol use: No     Drug use: No       Review of Systems   The 10 point Review of Systems is negative other  than noted in the HPI or here.        PHYSICAL EXAMINATION  Temp:  [98.6  F (37  C)] 98.6  F (37  C)  Pulse:  [74-80] 80  Heart Rate:  [75-83] 83  Resp:  [11-18] 12  BP: (141-153)/(79-98) 141/90  SpO2:  [95 %-96 %] 95 %     Neurologic  Mental Status:  alert, oriented x 3, follows commands, Aphasia, receptive >expressive  Cranial Nerves:  visual fields intact, PERRL, EOMI with normal smooth pursuit, facial sensation intact and symmetric, hearing not formally tested but intact to conversation, palate elevation symmetric and uvula midline, no dysarthria, shoulder shrug strong bilaterally, tongue protrusion midline, facial asymmetry  Motor:  normal muscle tone and bulk, no abnormal movements, able to move all limbs spontaneously, strength 5/5 throughout upper and lower extremities, no pronator drift  Reflexes:  toes down-going  Sensory:  light touch sensation intact and symmetric throughout upper and lower extremities, no extinction on double simultaneous stimulation   Coordination:  normal finger-to-nose and heel-to-shin bilaterally without dysmetria, rapid alternating movements symmetric  Station/Gait:  deferred      Dysphagia Screen  Per Nursing    Stroke Scales    NIHSS  Interval baseline (03/05/20 0808)   Interval Comments     1a. Level of Consciousness 0-->Alert, keenly responsive   1b. LOC Questions 2   1c. LOC Commands 1   2.   Best Gaze 0-->Normal   3.   Visual 0-->No visual loss   4.   Facial Palsy 1-->Minor paralysis (flattened nasolabial fold, asymmetry on smiling)   5a. Motor Arm, Left 0-->No drift, limb holds 90 (or 45) degrees for full 10 secs   5b. Motor Arm, Right 0-->No drift, limb holds 90 (or 45) degrees for full 10 secs   6a. Motor Leg, Left 0-->No drift, leg holds 30 degree position for full 5 secs   6b. Motor Leg, right 0-->No drift, leg holds 30 degree position for full 5 secs   7.   Limb Ataxia 0-->Absent   8.   Sensory 0-->Normal, no sensory loss   9.   Best Language 2   10. Dysarthria 0   11.  Extinction and Inattention  0-->No abnormality   Total 6 (03/05/20 0808)       Imaging  I personally reviewed all imaging; relevant findings per HPI.     Lab Results Data   CBC  Recent Labs   Lab 03/05/20  0731   WBC 6.5   RBC 4.61   HGB 14.4   HCT 42.6        Basic Metabolic Panel    Recent Labs   Lab 03/05/20  0731      POTASSIUM 4.2   CHLORIDE 112*   CO2 27   BUN 16   CR 1.01   *   DAMARIS 8.1*     Liver Panel  No lab results found.  INR  Recent Labs   Lab Test 03/05/20  0731   INR 1.02      Lipid ProfileNo lab results found.  A1CNo lab results found.  Troponin Vlad results for input(s): TROPI in the last 168 hours.       Stroke Code / Stroke Consult Data Data   Stroke Code Data  (for stroke code without tele)  Stroke code activated 03/05/20   0701   First stroke provider response 03/05/20   0720   Last known normal 03/04/20   1030   Time of discovery   (or onset of symptoms) 03/05/20   0630   Head CT read by me 03/05/20   0720   Was stroke code de-escalated? No

## 2020-03-05 NOTE — ED PROVIDER NOTES
History     Chief Complaint:  Right Sided Paralysis; Speech Difficulty       The history is provided by the EMS personnel. The history is limited by the condition of the patient.      Rey Andre is a 54 year old male who presents via EMS for evaluation of right sided paralysis starting early this morning. The patient was last known to have normal activity around eight and a half hours ago last night, and this morning around an hour ago was found in his bed with right sided paralysis and mumbling speech. EMS states that on arrival, they found the patient on the floor unable to move his right side and with a right facial droop. They note that the patient was able to move his right leg and arm slightly while en route, however note no significant improvement. They state that his blood pressure en route was 130s/80s. The patient has no history of a past stroke, however presents today as EMS was concerned for this.     Allergies:  No Known Drug Allergies    Medications:    Synthroid  Tolterodine    Past Medical History:    Kidney stone   Sleep apnea   Thyroid disease     Past Surgical History:    Cystoscopy  Cystoscopy, retrogrades combined  Tonsillectomy and adenoidectomy  Laser holmium lithotripsy ureter, insert stent combined x3    Family History:    History reviewed. No pertinent family history.    Social History:  The patient presents to the ED alone.  Smoking Status: Never Smoker  Alcohol Use: No  Drug Use: No  PCP: Pau Pozo     Review of Systems   Unable to perform ROS: Acuity of condition   Neurological: Positive for facial asymmetry and speech difficulty.     Physical Exam     Patient Vitals for the past 24 hrs:   BP Temp Temp src Pulse Heart Rate Resp SpO2 Height Weight   03/05/20 0820 136/83 -- -- -- 71 12 96 % -- --   03/05/20 0802 (!) 141/90 -- -- 80 83 12 95 % -- --   03/05/20 0745 (!) 151/98 -- -- 74 76 11 95 % -- --   03/05/20 0730 (!) 147/90 -- -- -- 80 14 96 % -- --   03/05/20 0701 (!)  "153/79 98.6  F (37  C) Oral 75 75 18 96 % 1.778 m (5' 10\") 118.8 kg (262 lb)       Physical Exam  Vitals: reviewed by me  General: Pt seen on hospital mirna, krystina, cooperative, and alert to conversation but unable to speak or follow commands  Eyes: Tracking well, clear conjunctiva BL  ENT: MMM, midline trachea.   Lungs:   No tachypnea, no accessory muscle use. No respiratory distress.   CV: Rate as above, regular rhythm.    Abd: Soft, non tender, no guarding, no rebound. Non distended  MSK: no peripheral edema or joint effusion.  No evidence of trauma  Skin: No rash, normal turgor and temperature  Neuro: Unable to speak, does appear to be aware of his surroundings, moving left arm and left leg independently, right arm and right leg are quite weak by comparison.  Psych: Not RIS, no e/o AH/VH      Emergency Department Course     ECG:  Indication: Right sided paralysis   Time: 0729  Vent. Rate 81 bpm. MA interval 188. QRS duration 120. QT/QTc 406/471. P-R-T axis 53 -49 16. Normal sinus rhythm. Left anterior fascicular block. Abnormal ECG. Read time: 0730      Imaging:  Radiology findings were communicated with the patient, family and admitting MD who voiced understanding of the findings.    CT Head without contrast:   Normal CT scan of the head.   As per radiology.    CTA Head Neck with contrast:   1. Partially occlusive thrombus in the left superior division M2 branch with some attenuation of distal branches.  2. Results discussed with Dr. Arrieta.   As per radiology.    CT Head Perfusion with contrast:   Abnormal perfusion to the left frontal lobe consistent with some ischemia and developing infarct.  As per radiology.    Laboratory:  Laboratory findings were communicated with the patient, family and admitting MD who voiced understanding of the findings.    CBC: WBC: 6.5, HGB: 14.4, PLT: 188    BMP: Glucose 105 (high), Anion Gap 1 (low), Calcium 8.1 (low), Chloride 112 (high) o/w WNL (Creatinine: 1.01)    PTT: " 27    INR: 1.02     Interventions:  0814 Tenecteplase or placebo 25mg IV    Emergency Department Course:  Past medical records, nursing notes, and vitals reviewed.    0658 I performed an exam of the patient as documented above.     EKG obtained in the ED, see results above.   IV was inserted and blood was drawn for laboratory testing, results above.  The patient was sent for a CT while in the emergency department, results above.     0700 Code stroke called.     0702 I spoke with Dr. Gomez of neuro/stroke who will come evaluate the patient.    0737 I spoke with Dr. Bourgeois, radiology, concerning the patient's CT findings.     0738 I rechecked the patient and discussed the results of his workup thus far.     0829 Patient transferred to IR.     0835 I consulted with Dr. Fuller, hospitalist, regarding the patient's history and presentation here in the emergency department.     Findings and plan explained to the patient and spouse. Patient will be transferred to IR and then admitted to an inpatient medical bed. Discussed the case with Dr. Fuller, who will admit the patient to an ICU bed for further monitoring, evaluation, and treatment.    I personally reviewed the laboratory and imaging results with the patient and spouse and answered all related questions prior to admission.     Impression & Plan     Medical Decision Making:  eRy Andre is a 54 year old male who presents to the ER with what appears to be an acute right sided neurologic deficit in the setting of aphasia. He had a normal set of vitals on arrival, but certainly did appear to be in neurologic distress as he was unable to move his right arm and right leg reliably, and also could not communicate. He did seem to be aware, but he was unable to follow commands. I immediately called a code stoke. He does have a left M2 lesion, which would explain most of his symptoms if not all of them. Neurology opted to take him to IR, which I think is the next best step  in the management with this patient. I spoke with Dr. Fuller who kindly agreed to take the patient to ICU after IR. Family was at bedside and was aware of the plan as well, will plan for continued neurologic cares with the neuro critical care team, and the IR team.     Critical Care Time: was 35 minutes for this patient excluding procedures    Diagnosis:    ICD-10-CM    1. Cerebrovascular accident (CVA) due to occlusion of left middle cerebral artery (H) I63.512        Disposition:  Transferred to IR.     Scribe Disclosure:  I, Real Montoya, am serving as a scribe at 7:10 AM on 3/5/2020 to document services personally performed by Shai Maldonado MD based on my observations and the provider's statements to me.      Shai Maldonado MD  03/05/20 5498

## 2020-03-05 NOTE — PROGRESS NOTES
"Trial: A PHASE III, PROSPECTIVE, DOUBLE-BLIND,  RANDOMIZED, PLACEBO-CONTROLLED TRIAL  OF THROMBOLYSIS IN  IMAGING-ELIGIBLE, LATE-WINDOW PATIENTS  TO ASSESS THE EFFICACY AND SAFETY OF  IV TENECTEPLASE (The TIMELESS Trial)    Participation or LAR/Surrogate Prospective Consent     IRB Number: [id number]    PI: Vitor Caldwell [phone]    Primary Study Coordinators:  Adonay Campbell ([email or web address]) and Sirena Rowe ([email or web address])     24/7 On-Call  is available at [phone] \"Request a NETT coordinator via [employer]-It Page\"    Estimated duration of study: 3 months (90 days)    Pt was screened and consented for TIMELESS trial by note author. The LAR, Marcela Andre, Spouse was used as a surrogate due to the patient's medical condition. The consent discussion lasted approximately 15 minutes. Potential benefits, risks, and study procedures were discussed. Treatment options such as standard of care and study specific events were discussed. An opportunity for questions was provided, [Question: Would I know which medication he got, TNK vs Placebo; Answer: No.]. Following the consent conversation, the subject was enrolled into the study. A copy of the consent was given to the spouse, Marcela Andre at approximately 1:58PM.     For researcher contact info, please see the Research Studies tab in [facility].      Rashi Peterson MD  Fellow, Vascular Neurology  Text Page    To page stroke neurology after hours through [facility], click here: [facility]  Choose \"On Call\" tab at top, then search dropdown box for \"Neurology Adult\"      "

## 2020-03-05 NOTE — PROGRESS NOTES
Pt arrived from IR at 1030am via cart.  Neuro: Disoriented to place, time and situation initially, but now A/Ox4. R facial droop. Expressive and receptive aphagia. Garbled and slurred speech at times  Pulm: LS clear. On RA.   CV: SR w/ BPs WNL  : Art d/beth at 1pm. Due to void.   GI: On regular diet. BS present  Extremities: purposeful movements   Skin: intact, except R procedural incision  Lines: PIVx2   Family: wife and family members updated at bedside.   Plan: continue to monitor neuro status.

## 2020-03-05 NOTE — IR NOTE
Interventional Radiology Intra-procedural Nursing Note    Patient Name: Rey Andre  Medical Record Number: [id number]  Today's Date: March 5, 2020    Start Time: 0833  End of procedure time: 0905  Procedure: cerebral angiogram, thrombectomy  Report given to: ADA Torres, ICU   Time pt departs:  1000  : n/a    Other Notes: patient arrives from ED to IR room 3 for stroke intervention. Last known normal was last night.   Neuro- perrla, expressive aphasia, right facial droop, following simple commands, +hand  bilaterally, wiggles all toes to command, moving all extremities, no obvious hemiplegia noted.    Consent was obtained by Dr. Gomez from patient's wife. Patient received study drug in ED at 0814. (TIMELESS trial).    Patient was transferred to procedure table, bilateral groins prepped and draped under sterile technique.    Versed 2.5mg IV  Fentanyl 125mcg IV  Labetolol 10mg IV    Patient tolerated procedure well. VSS. Patient alert, respirations regular and unlabored, no c/o pain at this time.   Right groin access site is c/d/i angioseal in place, +csm to right foot.  Patient transferred to ICU in stable condition accompanied by myself, IR staff    0833 timeout  0835 10ml 1% lidocaine right groin  0839 6f right femoral arterial sheath placed  0851 clot I.d.  0901 TICI III recannalization achieved  0905 sheath removed, 6f angioseal in place  0910 +hemostasis achieved, after 5 minutes manual pressure was held    Bonnie Mckeon RN on 3/5/2020 at 10:33 AM

## 2020-03-05 NOTE — PLAN OF CARE
PT/OT: Orders received, chart reviewed. Pt just admitted with probably CVA and has gone to IR for an angiogram and thrombectomy. Pt is now on strict bedrest.

## 2020-03-05 NOTE — PROGRESS NOTES
CM    I: SW consult for discharge planning is acknowledged. SLP is currently recommending: ARU. Awaiting additional therapy recommendations. Pt on PT/OT schedule for: Friday 3/6.    P: Continue to assist as needed.    BHUMIKA Flores   [facility]  [phone]

## 2020-03-06 ENCOUNTER — APPOINTMENT (OUTPATIENT)
Dept: PHYSICAL THERAPY | Facility: CLINIC | Age: 55
DRG: 024 | End: 2020-03-06
Payer: COMMERCIAL

## 2020-03-06 ENCOUNTER — APPOINTMENT (OUTPATIENT)
Dept: MRI IMAGING | Facility: CLINIC | Age: 55
DRG: 024 | End: 2020-03-06
Attending: PSYCHIATRY & NEUROLOGY
Payer: COMMERCIAL

## 2020-03-06 ENCOUNTER — APPOINTMENT (OUTPATIENT)
Dept: SPEECH THERAPY | Facility: CLINIC | Age: 55
DRG: 024 | End: 2020-03-06
Attending: INTERNAL MEDICINE
Payer: COMMERCIAL

## 2020-03-06 ENCOUNTER — APPOINTMENT (OUTPATIENT)
Dept: OCCUPATIONAL THERAPY | Facility: CLINIC | Age: 55
DRG: 024 | End: 2020-03-06
Payer: COMMERCIAL

## 2020-03-06 PROBLEM — R00.2 PALPITATIONS: Status: ACTIVE | Noted: 2020-03-06

## 2020-03-06 PROBLEM — I77.810 DILATED AORTIC ROOT (H): Status: ACTIVE | Noted: 2020-03-06

## 2020-03-06 PROBLEM — I63.9 STROKE (H): Status: RESOLVED | Noted: 2020-03-05 | Resolved: 2020-03-06

## 2020-03-06 LAB
ANION GAP SERPL CALCULATED.3IONS-SCNC: 3 MMOL/L (ref 3–14)
BUN SERPL-MCNC: 12 MG/DL (ref 7–30)
CALCIUM SERPL-MCNC: 8.5 MG/DL (ref 8.5–10.1)
CHLORIDE SERPL-SCNC: 110 MMOL/L (ref 94–109)
CO2 SERPL-SCNC: 27 MMOL/L (ref 20–32)
CREAT SERPL-MCNC: 0.82 MG/DL (ref 0.66–1.25)
ERYTHROCYTE [DISTWIDTH] IN BLOOD BY AUTOMATED COUNT: 13.4 % (ref 10–15)
GFR SERPL CREATININE-BSD FRML MDRD: >90 ML/MIN/{1.73_M2}
GLUCOSE BLDC GLUCOMTR-MCNC: 92 MG/DL (ref 70–99)
GLUCOSE BLDC GLUCOMTR-MCNC: 93 MG/DL (ref 70–99)
GLUCOSE SERPL-MCNC: 97 MG/DL (ref 70–99)
HCT VFR BLD AUTO: 43 % (ref 40–53)
HGB BLD-MCNC: 14.6 G/DL (ref 13.3–17.7)
MAGNESIUM SERPL-MCNC: 2.1 MG/DL (ref 1.6–2.3)
MCH RBC QN AUTO: 31.5 PG (ref 26.5–33)
MCHC RBC AUTO-ENTMCNC: 34 G/DL (ref 31.5–36.5)
MCV RBC AUTO: 93 FL (ref 78–100)
PHOSPHATE SERPL-MCNC: 2.6 MG/DL (ref 2.5–4.5)
PLATELET # BLD AUTO: 201 10E9/L (ref 150–450)
POTASSIUM SERPL-SCNC: 4 MMOL/L (ref 3.4–5.3)
RBC # BLD AUTO: 4.64 10E12/L (ref 4.4–5.9)
SODIUM SERPL-SCNC: 140 MMOL/L (ref 133–144)
WBC # BLD AUTO: 8.2 10E9/L (ref 4–11)

## 2020-03-06 PROCEDURE — 36415 COLL VENOUS BLD VENIPUNCTURE: CPT | Performed by: INTERNAL MEDICINE

## 2020-03-06 PROCEDURE — 97535 SELF CARE MNGMENT TRAINING: CPT | Mod: GO | Performed by: OCCUPATIONAL THERAPIST

## 2020-03-06 PROCEDURE — 70544 MR ANGIOGRAPHY HEAD W/O DYE: CPT

## 2020-03-06 PROCEDURE — 25000132 ZZH RX MED GY IP 250 OP 250 PS 637: Performed by: STUDENT IN AN ORGANIZED HEALTH CARE EDUCATION/TRAINING PROGRAM

## 2020-03-06 PROCEDURE — 97165 OT EVAL LOW COMPLEX 30 MIN: CPT | Mod: GO | Performed by: OCCUPATIONAL THERAPIST

## 2020-03-06 PROCEDURE — 85027 COMPLETE CBC AUTOMATED: CPT | Performed by: INTERNAL MEDICINE

## 2020-03-06 PROCEDURE — 99239 HOSP IP/OBS DSCHRG MGMT >30: CPT | Performed by: INTERNAL MEDICINE

## 2020-03-06 PROCEDURE — 00000146 ZZHCL STATISTIC GLUCOSE BY METER IP

## 2020-03-06 PROCEDURE — 92523 SPEECH SOUND LANG COMPREHEN: CPT | Mod: GN | Performed by: SPEECH-LANGUAGE PATHOLOGIST

## 2020-03-06 PROCEDURE — 25500064 ZZH RX 255 OP 636: Performed by: INTERNAL MEDICINE

## 2020-03-06 PROCEDURE — 25000128 H RX IP 250 OP 636: Performed by: PSYCHIATRY & NEUROLOGY

## 2020-03-06 PROCEDURE — 80048 BASIC METABOLIC PNL TOTAL CA: CPT | Performed by: INTERNAL MEDICINE

## 2020-03-06 PROCEDURE — 25000132 ZZH RX MED GY IP 250 OP 250 PS 637: Performed by: INTERNAL MEDICINE

## 2020-03-06 PROCEDURE — 25000128 H RX IP 250 OP 636: Performed by: NEUROLOGICAL SURGERY

## 2020-03-06 PROCEDURE — A9585 GADOBUTROL INJECTION: HCPCS | Performed by: INTERNAL MEDICINE

## 2020-03-06 PROCEDURE — 84100 ASSAY OF PHOSPHORUS: CPT | Performed by: INTERNAL MEDICINE

## 2020-03-06 PROCEDURE — 25000132 ZZH RX MED GY IP 250 OP 250 PS 637: Performed by: PHYSICIAN ASSISTANT

## 2020-03-06 PROCEDURE — 83735 ASSAY OF MAGNESIUM: CPT | Performed by: INTERNAL MEDICINE

## 2020-03-06 PROCEDURE — 99232 SBSQ HOSP IP/OBS MODERATE 35: CPT | Mod: GC | Performed by: PSYCHIATRY & NEUROLOGY

## 2020-03-06 PROCEDURE — 70553 MRI BRAIN STEM W/O & W/DYE: CPT

## 2020-03-06 PROCEDURE — 97161 PT EVAL LOW COMPLEX 20 MIN: CPT | Mod: GP | Performed by: PHYSICAL THERAPIST

## 2020-03-06 PROCEDURE — 12000000 ZZH R&B MED SURG/OB

## 2020-03-06 RX ORDER — ATORVASTATIN CALCIUM 40 MG/1
40 TABLET, FILM COATED ORAL EVERY EVENING
Status: DISCONTINUED | OUTPATIENT
Start: 2020-03-06 | End: 2020-03-07 | Stop reason: HOSPADM

## 2020-03-06 RX ORDER — ACETAMINOPHEN 650 MG/1
650 SUPPOSITORY RECTAL EVERY 4 HOURS PRN
Status: DISCONTINUED | OUTPATIENT
Start: 2020-03-06 | End: 2020-03-07 | Stop reason: HOSPADM

## 2020-03-06 RX ORDER — ACETAMINOPHEN 325 MG/1
650 TABLET ORAL EVERY 4 HOURS PRN
Status: DISCONTINUED | OUTPATIENT
Start: 2020-03-06 | End: 2020-03-07 | Stop reason: HOSPADM

## 2020-03-06 RX ORDER — CLOPIDOGREL BISULFATE 75 MG/1
75 TABLET ORAL DAILY
Qty: 20 TABLET | Refills: 0 | Status: SHIPPED | OUTPATIENT
Start: 2020-03-06 | End: 2020-03-13

## 2020-03-06 RX ORDER — ATORVASTATIN CALCIUM 40 MG/1
40 TABLET, FILM COATED ORAL EVERY EVENING
Qty: 30 TABLET | Refills: 3 | Status: SHIPPED | OUTPATIENT
Start: 2020-03-06 | End: 2021-08-12

## 2020-03-06 RX ORDER — CLOPIDOGREL BISULFATE 75 MG/1
300 TABLET ORAL ONCE
Status: COMPLETED | OUTPATIENT
Start: 2020-03-06 | End: 2020-03-06

## 2020-03-06 RX ORDER — ACETAMINOPHEN 325 MG/1
650 TABLET ORAL EVERY 4 HOURS PRN
Refills: 0
Start: 2020-03-06 | End: 2021-04-23

## 2020-03-06 RX ORDER — ASPIRIN 81 MG/1
81 TABLET ORAL DAILY
Status: DISCONTINUED | OUTPATIENT
Start: 2020-03-06 | End: 2020-03-07 | Stop reason: HOSPADM

## 2020-03-06 RX ORDER — GADOBUTROL 604.72 MG/ML
10 INJECTION INTRAVENOUS ONCE
Status: COMPLETED | OUTPATIENT
Start: 2020-03-06 | End: 2020-03-06

## 2020-03-06 RX ORDER — CLOPIDOGREL BISULFATE 75 MG/1
75 TABLET ORAL DAILY
Status: DISCONTINUED | OUTPATIENT
Start: 2020-03-06 | End: 2020-03-07 | Stop reason: HOSPADM

## 2020-03-06 RX ADMIN — ASPIRIN 81 MG: 81 TABLET, DELAYED RELEASE ORAL at 14:20

## 2020-03-06 RX ADMIN — LABETALOL HYDROCHLORIDE 10 MG: 5 INJECTION, SOLUTION INTRAVENOUS at 06:25

## 2020-03-06 RX ADMIN — GADOBUTROL 10 ML: 604.72 INJECTION INTRAVENOUS at 11:38

## 2020-03-06 RX ADMIN — LABETALOL HYDROCHLORIDE 10 MG: 5 INJECTION, SOLUTION INTRAVENOUS at 17:49

## 2020-03-06 RX ADMIN — LEVOTHYROXINE SODIUM 125 MCG: 75 TABLET ORAL at 21:06

## 2020-03-06 RX ADMIN — ATORVASTATIN CALCIUM 40 MG: 40 TABLET, FILM COATED ORAL at 21:06

## 2020-03-06 RX ADMIN — CLOPIDOGREL BISULFATE 300 MG: 75 TABLET, FILM COATED ORAL at 14:19

## 2020-03-06 RX ADMIN — ACETAMINOPHEN 650 MG: 325 TABLET, FILM COATED ORAL at 06:21

## 2020-03-06 ASSESSMENT — VISUAL ACUITY
OU: NORMAL ACUITY

## 2020-03-06 ASSESSMENT — ACTIVITIES OF DAILY LIVING (ADL)
PREVIOUS_RESPONSIBILITIES: MEAL PREP;HOUSEKEEPING;LAUNDRY;SHOPPING;YARDWORK;MEDICATION MANAGEMENT;FINANCES;DRIVING;WORK
ADLS_ACUITY_SCORE: 11

## 2020-03-06 ASSESSMENT — MIFFLIN-ST. JEOR: SCORE: 2028.25

## 2020-03-06 NOTE — PLAN OF CARE
Discharge Planner OT   Patient plan for discharge: Home with family  Current status: OT eval completed and treatment initiated. Pt is in ICU, OK to see per RN. A/O x 4, cooperative. Strength, ROM, sensation, coordination, vision all equal and WNL. Completed bed mobility, sit<>stand, LE dressing, toilet transfer and short distance ambulation all with SBA without AD. Completed grooming and hygienes (I) after set up while up in chair. Updated RN.  systolic. 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  Barriers to return to prior living situation: none anticipated  Recommendations for discharge: Home  Rationale for recommendations: Anticipate patient will be back to baseline or near baseline by the time of hospital discharge. Recommend inpt PT for high level balance assessment and performing stairs prior to discharge (has about 30 steps at home).       Entered by: Nohemi Macedo 03/06/2020 9:29 AM

## 2020-03-06 NOTE — PROGRESS NOTES
Pt up to bathroom with standby assist and steady on feet. Voiding good amounts. Taking regular diet. Alert/oriented. Speech continues to be hesitant/slow, word finding Able to make needs known.  Rt groin dressing dry/intact WNL.  CMS of rt leg WNL.  Eval:  Stable Neuros.

## 2020-03-06 NOTE — PROGRESS NOTES
03/06/20 1423   General Information, SLP   Type of Evaluation Speech and Language   Type of Visit Initial   Start of Care Date 03/05/20   Onset of Illness/Injury or Date of Surgery - Date 03/05/20   Referring Physician Baudilio Fuller MD   Patient/Family Goals Statement return home   Pertinent History of Current Problem CVA with TPA. Symptoms resolved per pt/wife report.    General Observations alert and appropriate in conversation   Oral Motor Sensory Function   Completed on Swallow Evaluation Completed Clinical Bedside Swallow Evaluation   Comments slight right droop   Speech   Deficits in Articulation None   Western Aphasia Battery- Revised Bedside Record From   Spontaneous Speech Content Score (out of 10) 10   Spontaneous Speech Fluency Score (out of 10) 10   Auditory Verbal Comprehension Score (out of 10) 10   Sequential Commands Score (out of 10) 10   Repetition Score (out of 10) 9   Object Naming Score (out of 10) 10   Bedside Aphasia Sum 59   WAB-R Bedside Aphasia Score 98.33   Reading Score (out of 10) 8   Writing Score (out of 10) 10   Bedside Language Sum 77   Bedside Language Score 96.25   Aphasia Severity Level   (WFL)   Comments WFL. Pt reported feeling at 100% back to baseline   Cognitive Status Examination   Attention intact   Clinical Impression, SLP Eval   Criteria for Skilled Therapeutic Interventions Met Does not meet criteria for skilled intervention   Rehab Potential Good, to achieve stated therapy goals   Anticipated Discharge Disposition Home   Risks and Benefits of Treatment have been explained. Yes   Patient, Family & other staff in agreement with plan of care Yes   Clinical Impression Comments SLP: Pt presents with no speech/language deficits on Western Aphasia Bedside - R. Please refer to flowsheet for subtests. Education provided to pt and wife. They agreed to seek Outpatient SLP services for full speech/language evaluation if any difficulty with higher level/executive  functioning tasks.    Total Evaluation Time      Total Evaluation Time (Minutes) 35

## 2020-03-06 NOTE — PROGRESS NOTES
CM    I: SW consult for discharge planning was closed; all therapies are recommending home with no services. Pt appears to have a safe discharge plan in place.     P: No further SW interventions anticipated at this time.    BHUMIKA Flores   [facility]  [phone]

## 2020-03-06 NOTE — PLAN OF CARE
Speech Language Therapy Discharge Summary    Reason for therapy discharge:    All goals and outcomes met, no further needs identified.    Progress towards therapy goal(s). See goals on Care Plan in Epic electronic health record for goal details.  Goals met    Therapy recommendation(s):    No further therapy is recommended.SLP: Pt presents with no speech/language deficits on Western Aphasia Bedside - R. Please refer to flowsheet for subtests. Education provided to pt and wife. They agreed to seek Outpatient SLP services for full speech/language evaluation if any difficulty with higher level/executive functioning tasks. Pt/wife denied any dysphagia symptoms. Slight right droop noted.

## 2020-03-06 NOTE — PROGRESS NOTES
[facility]    Hospitalist Progress Note    Assessment & Plan   54 year old male who was admitted on 3/5/2020 acute left M2 stroke with aphasia and right hemiplegia, now better:    Impression:   Principal Problem:    Embolic Stroke, Left M2 -- Aphasia, right francis-paresis    Active Problems:    DAMARIS on CPAP      Dilated aortic root -- 4.5 cm on Echo 3/5/20      Palpitations -- hx of episodic palpatitions, ?possible PAF   -- cardiac event recorder on discharge      Plan:  Transfer to neuro floor, brain MRI today.  Discussed with patient and wife, and nurse, and will discuss with neuro.     DVT Prophylaxis: Pneumatic Compression Devices  Code Status: Full Code    Disposition: Expected discharge home tomorrow    Baudilio Escamilla MD  Pager [phone]  Cell Phone [phone]  Text Page (7am to 6pm)  (35 min total)     Interval History   He feels back to normal, eating regular diet.  Wife notices he occasionally mis-speaks, but otherwise doing great.  Wife reported he had a headache for several days prior to the stroke -- he reports he had a dull pain in the occipital area bilaterally, none now.  He does not get migraine HA's.     Physical Exam   Temp: 97.3  F (36.3  C) Temp src: Axillary BP: (!) 147/112 Pulse: 81 Heart Rate: 73 Resp: 16 SpO2: 97 % O2 Device: None (Room air)       O2 sat 95% on room air (suspect above O2 sat an error)    Vitals:    03/05/20 0701 03/06/20 0400   Weight: 118.8 kg (262 lb) 119.2 kg (262 lb 12.6 oz)     Vital Signs with Ranges  Temp:  [97  F (36.1  C)-98.3  F (36.8  C)] 97.3  F (36.3  C)  Pulse:  [60-86] 81  Heart Rate:  [63-93] 73  Resp:  [9-17] 16  BP: (126-150)/() 147/112  SpO2:  [94 %-98 %] 97 %  I/O last 3 completed shifts:  In: 2087.5 [P.O.:740; I.V.:1347.5]  Out: 2645 [Urine:2645]    # Pain Assessment:  Current Pain Score 3/6/2020   Patient currently in pain? no   Pain score (0-10) -   Pain location -   Pain descriptors -   Rey s pain level was assessed  "and he currently denies pain.        Constitutional: Awake, alert, cooperative, no apparent distress  Respiratory: Clear to auscultation bilaterally, no crackles or wheezing  Cardiovascular: Regular rate and rhythm, normal S1 and S2, and no murmur noted  GI: Normal bowel sounds, soft, non-distended, non-tender  Extrem: No calf tenderness, no ankle edema  Neuro: Ox3, no focal motor or sensory deficits, and able to say \"No ifs, ands or buts\".     Medications     - MEDICATION INSTRUCTIONS -       - MEDICATION INSTRUCTIONS -       - MEDICATION INSTRUCTIONS -         aspirin  81 mg Oral Daily     atorvastatin  40 mg Oral QPM     clopidogrel  300 mg Oral Once     clopidogrel  75 mg Oral Daily     levothyroxine  125 mcg Oral QPM     sodium chloride (PF)  3 mL Intracatheter Q8H       Data   Recent Labs   Lab 20  0525 20  2149 20  1444 20  0731   WBC 8.2  --   --  6.5   HGB 14.6  --   --  14.4   MCV 93  --   --  92     --   --  188   INR  --   --   --  1.02     --   --  140   POTASSIUM 4.0  --   --  4.2   CHLORIDE 110*  --   --  112*   CO2 27  --   --  27   BUN 12  --   --  16   CR 0.82  --   --  1.01   ANIONGAP 3  --   --  1*   DAMARIS 8.5  --   --  8.1*   GLC 97 94 123* 105*   ALBUMIN  --   --  3.6  --    PROTTOTAL  --   --  6.9  --    BILITOTAL  --   --  0.3  --    ALKPHOS  --   --  82  --    ALT  --   --  34  --    AST  --   --  13  --    TROPI  --  <0.015 <0.015  --        Imaging:   Recent Results (from the past 24 hour(s))   Echocardiogram Complete - Bubble study    Narrative    [id number]  [id number]  [id number]  [id number]^ARMEN^GREG^[facility]  Echocardiography Laboratory  [address] [place] [id number]        Name: RAFFY MELGAR  MRN: [id number]  : 1965  Study Date: 2020 02:07 PM  Age: 54 yrs  Gender: Male  Patient Location: [facility]  Reason For Study: TIA  Ordering Physician: GREG AYERS  Referring Physician: TOBY" SHAMOLI  Performed By: Jimmy Sullivan RDCS     BSA: 2.3 m2  Height: 70 in  Weight: 252 lb  HR: 73  BP: 132/77 mmHg  _____________________________________________________________________________  __        Procedure  Complete Portable Bubble Echo Adult. Optison (NDC #[id number]) given  intravenously.  _____________________________________________________________________________  __        Interpretation Summary     1. The left ventricle is normal in size. The visual ejection fraction is  estimated at 60%.  2. The right ventricle is normal in structure, function and size.  3. No valve disease.  4. Bubble study positive with Valsalva. Septum not well visualized due to poor  image quality, cannot comment if it is anuerysmal.  5. Mild aortic root dilatation. 4.5cm. Ascending aorta normal at 3.4cm.     No previous echo for comparison.  _____________________________________________________________________________  __        Left Ventricle  The left ventricle is normal in size. There is mild eccentric left ventricular  hypertrophy. The visual ejection fraction is estimated at 60%. Left  ventricular diastolic function is indeterminate. Normal left ventricular wall  motion. Septal motion is consistent with conduction abnormality.     Right Ventricle  The right ventricle is normal in structure, function and size.     Atria  Normal left atrial size. Right atrial size is normal. Bubble study positive  with Valsalva. Septum not well visualized due to poor image quality, cannot  comment if it is anuerysmal. A contrast injection (Bubble Study) was performed  that was moderately positive for flow across the interatrial septum.     Mitral Valve  The mitral valve is normal in structure and function.        Tricuspid Valve  The tricuspid valve is normal in structure and function.     Aortic Valve  The aortic valve is normal in structure and function.     Pulmonic Valve  The pulmonic valve is normal in structure and function.      Vessels  Mild aortic root dilatation. Normal ascending, transverse (arch), and  descending aorta. Inferior vena cava not well visualized for estimation of  right atrial pressure.     Pericardium  There is no pericardial effusion.        Rhythm  Sinus rhythm was noted.  _____________________________________________________________________________  __  MMode/2D Measurements & Calculations  IVSd: 1.3 cm     LVIDd: 5.1 cm  LVIDs: 3.2 cm  LVPWd: 1.0 cm  FS: 38.0 %  LV mass(C)d: 227.0 grams  LV mass(C)dI: 98.6 grams/m2  Ao root diam: 4.5 cm  LA dimension: 3.6 cm  asc Aorta Diam: 3.4 cm  LA/Ao: 0.79  LA Volume (BP): 75.7 ml  LA Volume Index (BP): 32.9 ml/m2  RWT: 0.40           Doppler Measurements & Calculations  MV E max tila: 61.1 cm/sec  MV A max tila: 76.2 cm/sec  MV E/A: 0.80  MV dec time: 0.25 sec  PA acc time: 0.08 sec  E/E' av.6  Lateral E/e': 8.8  Medial E/e': 8.4           _____________________________________________________________________________  __           Report approved by: Amaris Mallory 2020 03:54 PM      MRA Brain ([place]) wo Contrast    Narrative    MR ANGIOGRAM OF THE HEAD WITHOUT CONTRAST   3/6/2020 12:11 PM     HISTORY:  MR brain with perfusion. Timeless. Follow-up  stroke/thrombectomy. Before 1 p.m. please. Previous left middle  cerebral artery superior M2 division partially occlusive thrombus.    TECHNIQUE:  3D time-of-flight MR angiogram of the head without  contrast.    COMPARISON:  CT angiogram yesterday.    FINDINGS:  There has been complete recanalization of the left middle  cerebral artery superior M2 division with no residual thrombus or  occlusion visible. No new occlusion is seen elsewhere. No aneurysms  visible.      Impression    IMPRESSION:  Normal MR angiogram of the head.  Resolution of left  middle cerebral artery superior M2 division occlusion.      SAVANNA ADAMS MD   MR Brain w/o & w Contrast    Narrative    MRI BRAIN WITHOUT AND WITH CONTRAST   3/6/2020 12:23 PM    HISTORY:  MR brain with perfusion.  Timeless.  Follow-up  stroke/thrombectomy.  Before 1pm please. Previous thrombi in the left  middle cerebral artery superior M2 division.    TECHNIQUE:  Multiplanar, multisequence MRI of the brain without and  with 10 mL Gadavist.    COMPARISON: CT angiogram yesterday.    FINDINGS:  Diffusion-weighted images show a few small scattered acute  infarcts in the cortex and subcortical white matter of the left  frontal, parietal and occipital lobes, the insula and the head and  body of the left caudate nucleus.  There is no evidence of hemorrhage  or any other infarct.  There are no gadolinium enhancing lesions.     The facial structures appear normal. The arteries at the base of the  brain and the dural venous sinuses appear patent.       Impression    IMPRESSION:   Scattered recent infarcts in the left middle cerebral  artery territory as described above. No hemorrhage.     SAVANNA ADAMS MD

## 2020-03-06 NOTE — PLAN OF CARE
Patient alert and oriented x4, slight rt facial droop, speech clear some hesitation, no asphasia present, steady on feet up with SBA of 1, voiding without difficulty, bp within given parameter, wife Marcela at bedside,plan of care reviewed, questions answered and emotional support given

## 2020-03-06 NOTE — DISCHARGE SUMMARY
[facility]    Discharge Summary  Hospitalist    Date of Admission:  3/5/2020  Date of Discharge:  3/7/2020  Discharging Provider: Baudilio Escamilla MD    Discharge Diagnoses   Principal Problem:    Embolic Stroke, Left M2 -- Aphasia, right francis-paresis  Active Problems:    DAMARIS on CPAP    Dilated aortic root -- 4.5 cm on Echo 3/5/20    Palpitations      History of Present Illness   54 year old male with DAMARIS on CPAP otherwise healthy, who presents via EMS for evaluation of right sided paralysis starting early this morning. The patient was last known to have normal activity when went to bed at 10:30 PM last night, wife woke up at 5:45 AM to shower and did not speak to him, and son left about 6 AM and said goodbye and wife says he she thought he said goodbye then, and then around 6:30 AM she interacted with him and he could not talk or move right side.   morning around an hour ago was found in his bed with right sided paralysis and mumbling speech. EMS called, they note that the patient was able to move his right leg and arm slightly while en route. They state that his blood pressure en route was 130s/80s. The patient has no history of a past stroke.  No hx of diabetes.      In ER /98, RR 11, labs normal, CTA of head with left M2 thrombus.       Hospital Course   Received lytic and then underwent embolectomy, and post-procedure he had no weakness and minimal word finding difficulties and at discharge his speech appeared completely intact.  His bran MRI after the procedure showed:  Diffusion-weighted images show a few small scattered acute  infarcts in the cortex and subcortical white matter of the left  frontal, parietal and occipital lobes, the insula and the head and  body of the left caudate nucleus    Was seen by Neurology who recommended:  - Start Tab Aspirin 81 mg daily  - Tab Plavix 75 mg daily for 3 weeks   - Statin: Lipitor 40 mg daily  - BP goal <140/90  - Continue using CPAP every  night >4 hours  - Stroke Education  - Holter event monitor for 4 weeks     Incidentally, he did report occasional palpitations which might occur every 2-3 weeks and last 10 minutes.  No hx of atrial fib that he is aware of, but has not been on cardiac telemetry when he has had one of these events.  Instructed how to check pulse, and to feel his pulse if an episode occurs.  Hopefully he will have an event when on cardiac event monitor, otherwise will try to get recording at some other time.       Baudilio Escamilla MD, MD  Pager: [phone]  Cell Phone:  [phone]       Significant Results and Procedures   As above    Pending Results   These results will be followed up by Dr. Escamilla  Unresulted Labs Ordered in the Past 30 Days of this Admission     No orders found from 2/4/2020 to 3/6/2020.          Code Status   Full Code       Primary Care Physician   Pau Pozo    Physical Exam   Temp: 97.9  F (36.6  C) Temp src: Oral BP: 131/77 Pulse: 81 Heart Rate: 85 Resp: 16 SpO2: 96 % O2 Device: None (Room air)    Vitals:    03/05/20 0701 03/06/20 0400   Weight: 118.8 kg (262 lb) 119.2 kg (262 lb 12.6 oz)     Vital Signs with Ranges  Temp:  [97  F (36.1  C)-97.9  F (36.6  C)] 97.9  F (36.6  C)  Pulse:  [61-81] 81  Heart Rate:  [64-86] 85  Resp:  [9-17] 16  BP: (126-156)/() 131/77  SpO2:  [94 %-97 %] 96 %  I/O last 3 completed shifts:  In: 660 [P.O.:360; I.V.:300]  Out: -     Exam on discharge:   Lungs clear  CV with reg S1S2  Neuro -- alert, Ox3, no deficits.     Discharge Disposition   Discharged to home  Condition at discharge: Good    Consultations This Hospital Stay   PHYSICAL THERAPY ADULT IP CONSULT  OCCUPATIONAL THERAPY ADULT IP CONSULT  SPEECH LANGUAGE PATH ADULT IP CONSULT  SWALLOW EVAL SPEECH PATH AT BEDSIDE IP CONSULT  SMOKING CESSATION PROGRAM IP CONSULT  PATIENT LEARNING CENTER IP CONSULT  PHYSICAL THERAPY ADULT IP CONSULT  OCCUPATIONAL THERAPY ADULT IP CONSULT  SPEECH LANGUAGE PATH ADULT IP  CONSULT  SOCIAL WORK IP CONSULT  SMOKING CESSATION PROGRAM IP CONSULT  PHARMACY IP CONSULT  NEUROLOGY IP CONSULT    Time Spent on this Encounter   I spent a total of 35 minutes discharging this patient.     Discharge Orders      Reason for your hospital stay    Stroke     Follow-up and recommended labs and tests     Follow up with primary care provider, Pau Pozo, in 1 week, and follow-up with [facility] Neurology in 6 weeks.     Activity    Your activity upon discharge: activity as tolerated     Discharge Instructions    Call Dr. Fuller if any medical questions at Cell Phone [phone].     Full Code     Cardiac Event Monitor Adult Pediatric     Echocardiogram MORGAN    Administration of IV contrast will be tailored to this examination per the appropriate written protocol listed in the Echocardiography department Protocol Book, or by the supervising Cardiologist. This may result in an order change.    Use of contrast is at the discretion of the supervising Cardiologist.     Diet    Follow this diet upon discharge: Orders Placed This Encounter      Regular Diet Adult     Discharge Medications   Current Discharge Medication List      START taking these medications    Details   acetaminophen (TYLENOL) 325 MG tablet Take 2 tablets (650 mg) by mouth every 4 hours as needed for mild pain  Refills: 0    Associated Diagnoses: Pain      aspirin (ASA) 81 MG EC tablet Take 1 tablet (81 mg) by mouth daily  Refills: 0    Associated Diagnoses: Cerebrovascular accident (CVA) due to embolism of left middle cerebral artery (H)      atorvastatin (LIPITOR) 40 MG tablet Take 1 tablet (40 mg) by mouth every evening  Qty: 30 tablet, Refills: 3    Comments: Future refills by PCP Dr. Pau Pozo with phone number [phone].  Associated Diagnoses: Cerebrovascular accident (CVA) due to embolism of left middle cerebral artery (H)      clopidogrel (PLAVIX) 75 MG tablet Take 1 tablet (75 mg) by mouth daily  Qty: 20 tablet, Refills: 0     Associated Diagnoses: Cerebrovascular accident (CVA) due to embolism of left middle cerebral artery (H)         CONTINUE these medications which have NOT CHANGED    Details   Levothyroxine Sodium (SYNTHROID PO) Take 125 mcg by mouth every evening      tolterodine (DETROL LA) 2 MG 24 hr capsule Take 1 capsule (2 mg) by mouth daily  Qty: 20 capsule, Refills: 1    Associated Diagnoses: Renal colic         STOP taking these medications       HYDROmorphone (DILAUDID) 4 MG tablet Comments:   Reason for Stopping:         NONFORMULARY Comments:   Reason for Stopping:             Allergies   Allergies   Allergen Reactions     No Known Drug Allergies      Data   Most Recent 3 CBC's:  Recent Labs   Lab Test 03/07/20  0853 03/06/20  0525 03/05/20  0731   WBC 7.0 8.2 6.5   HGB 15.4 14.6 14.4   MCV 92 93 92    201 188      Most Recent 3 BMP's:  Recent Labs   Lab Test 03/07/20  0853 03/06/20  0525 03/05/20 2149 03/05/20  0731    140  --   --  140   POTASSIUM 4.2 4.0  --   --  4.2   CHLORIDE 109 110*  --   --  112*   CO2 24 27  --   --  27   BUN 16 12  --   --  16   CR 0.87 0.82  --   --  1.01   ANIONGAP 5 3  --   --  1*   DAMARIS 8.8 8.5  --   --  8.1*   GLC 92 97 94   < > 105*    < > = values in this interval not displayed.     Most Recent 2 LFT's:  Recent Labs   Lab Test 03/05/20  1444   AST 13   ALT 34   ALKPHOS 82   BILITOTAL 0.3     Most Recent INR's and Anticoagulation Dosing History:  Anticoagulation Dose History     Recent Dosing and Labs Latest Ref Rng & Units 3/5/2020    INR 0.86 - 1.14 1.02        Most Recent 3 Troponin's:  Recent Labs   Lab Test 03/05/20  2149 03/05/20  1444   TROPI <0.015 <0.015     Most Recent Cholesterol Panel:  Recent Labs   Lab Test 03/05/20  1444   CHOL 158   LDL 84   HDL 48   TRIG 129     Most Recent 6 Bacteria Isolates From Any Culture (See EPIC Reports for Culture Details):  Recent Labs   Lab Test 03/04/17  1640   CULT No growth     Most Recent TSH, T4 and A1c Labs:  Recent Labs    Lab Test 03/05/20  1444   A1C 5.5

## 2020-03-06 NOTE — PROGRESS NOTES
03/06/20 0848   Quick Adds   Type of Visit Initial Occupational Therapy Evaluation   Living Environment   Lives With spouse;child(marisa), dependent  (2 teenagers at home)   Living Arrangements house   Living Environment Comment 2 steps to enter home, 30 steps inside the home. Bedroom upstairs, bathroom on each level. walk in shower   Self-Care   Usual Activity Tolerance excellent   Current Activity Tolerance fair   Regular Exercise Yes   Equipment Currently Used at Home none   Functional Level   Ambulation 0-->independent   Transferring 0-->independent   Toileting 0-->independent   Bathing 0-->independent   Dressing 0-->independent   Eating 0-->independent   Communication 0-->understands/communicates without difficulty   Swallowing 0-->swallows foods/liquids without difficulty   Cognition 0 - no cognition issues reported   Fall history within last six months no   Prior Functional Level Comment Very active: golfs, skis. Works, drives, shares household duties with wife.   General Information   Onset of Illness/Injury or Date of Surgery - Date 03/05/20   Referring Physician Baudilio Fuller MD   Patient/Family Goals Statement Go home asap   Additional Occupational Profile Info/Pertinent History of Current Problem Embolic stroke of undetermined source resulting in L. M2 occlusion s/p study drug and thrombectomy   Precautions/Limitations fall precautions   Cognitive Status Examination   Orientation orientation to person, place and time   Level of Consciousness alert   Follows Commands (Cognition) WNL   Visual Perception   Visual Perception Wears glasses   Visual Perception Comments glasses for reading   Sensory Examination   Sensory Quick Adds No deficits were identified   Pain Assessment   Patient Currently in Pain Yes, see Vital Sign flowsheet  (headache)   Integumentary/Edema   Integumentary/Edema no deficits were identifed   Posture   Posture not impaired   Range of Motion (ROM)   ROM Quick Adds No deficits  were identified   Strength   Strength Comments Deficits resolving, strength 4+/5 B   Hand Strength   Hand Strength Comments good, equal   Muscle Tone Assessment   Muscle Tone Quick Adds No deficits were identified   Coordination   Upper Extremity Coordination No deficits were identified   Mobility   Bed Mobility Comments SBA supine to EOB   Transfer Skill: Bed to Chair/Chair to Bed   Level of [place]: Bed to Chair stand-by assist   Transfer Skill: Sit to Stand   Level of [place]: Sit/Stand stand-by assist   Transfer Skill: Toilet Transfer   Level of [place]: Toilet stand-by assist   Balance   Balance Comments SBA to ambulate short distances without AD   Lower Body Dressing   Level of [place]: Dress Lower Body stand-by assist   Toileting   Level of [place]: Toilet stand-by assist   Grooming   Level of [place]: Grooming independent   Physical Assist/Nonphysical Assist: Grooming set-up required   Eating/Self Feeding   Level of [place]: Eating independent   Instrumental Activities of Daily Living (IADL)   Previous Responsibilities meal prep;housekeeping;laundry;shopping;yardwork;medication management;finances;driving;work   Activities of Daily Living Analysis   Impairments Contributing to Impaired Activities of Daily Living strength decreased;balance impaired   ADL Comments dysarthric speech, resolving   General Therapy Interventions   Planned Therapy Interventions ADL retraining;cognition   Clinical Impression   Criteria for Skilled Therapeutic Interventions Met yes, treatment indicated   OT Diagnosis impaired IADL   Influenced by the following impairments medical status in ICU   Assessment of Occupational Performance 3-5 Performance Deficits   Identified Performance Deficits IADL   Clinical Decision Making (Complexity) Low complexity   Therapy Frequency Daily   Predicted Duration of Therapy Intervention (days/wks) 2 days   Anticipated Discharge Disposition Home   Risks and Benefits  of Treatment have been explained. Yes   Patient, Family & other staff in agreement with plan of care Yes   Total Evaluation Time   Total Evaluation Time (Minutes) 15

## 2020-03-06 NOTE — PLAN OF CARE
SLP: Session cancelled this morning due to patient preparing to leave unit for MRI. Discussed with RN.

## 2020-03-06 NOTE — PLAN OF CARE
Discharge Planner PT   Patient plan for discharge: home  Current status: Patient seen for initial eval. Patient denied any symptoms of weakness or numbness. Patient able to demonstrate independence with all bed mobility, transfer and amb on level surface and up and down 3 steps without LOB. No further PT needs noted.   Barriers to return to prior living situation: none  Recommendations for discharge: home  Rationale for recommendations: Patient is able to demonstrate independence with all functional mobility.        Entered by: Jolly Byrne 03/06/2020 10:47 AM

## 2020-03-06 NOTE — PROGRESS NOTES
Charge RN stated f/u with Dr. Caldwell requested to be scheduled in 6 weeks at [facility].  The following appt scheduled (6 week timeframe not available).    Apr 03, 2020 11:30 AM CDT  New Visit with Vitor Caldwell MD  [facility] ([facility])    Татьяна Patricia RN, BSN, PHN  Inpatient Care Coordination  [facility]  Phone: [phone]

## 2020-03-06 NOTE — PROGRESS NOTES
03/06/20 0933   Quick Adds   Type of Visit Initial PT Evaluation   Living Environment   Lives With child(marisa), dependent;spouse   Living Arrangements house   Home Accessibility stairs to enter home;stairs within home   Number of Stairs, Main Entrance 2   Stair Railings, Main Entrance railings safe and in good condition  (On one side. )   Number of Stairs, Within Home, Primary   (2 flights inside home to upstairs and basement. )   Stair Railings, Within Home, Primary railings safe and in good condition   Transportation Anticipated car, drives self;family or friend will provide   Living Environment Comment Patient is active and independent. Works as a  and golf pro.    Self-Care   Usual Activity Tolerance excellent   Current Activity Tolerance fair   Regular Exercise Yes   Activity/Exercise Type   (He Skis and golfs. )   Equipment Currently Used at Home none   Functional Level Prior   Ambulation 0-->independent   Transferring 0-->independent   Toileting 0-->independent   Bathing 0-->independent   Communication 0-->understands/communicates without difficulty   Swallowing 0-->swallows foods/liquids without difficulty   Cognition 0 - no cognition issues reported   Fall history within last six months no   Which of the above functional risks had a recent onset or change? none   Prior Functional Level Comment Very active jobs-Skiing and golf.    General Information   Onset of Illness/Injury or Date of Surgery - Date 03/05/20   Referring Physician Baudilio Escamilla   Patient/Family Goals Statement To go home.   Pertinent History of Current Problem (include personal factors and/or comorbidities that impact the POC) 54 year old male who was admitted on 3/5/2020 acute left M2 stroke with aphasia and right hemiplegia, now better:   Precautions/Limitations no known precautions/limitations   Cognitive Status Examination   Orientation orientation to person, place and time   Level of Consciousness alert   Follows  "Commands and Answers Questions 100% of the time   Personal Safety and Judgment intact   Memory intact   Cognitive Comment Did not do specific cognitive testing but no difficulty recalling PLOF or following directions.    Pain Assessment   Patient Currently in Pain No   Integumentary/Edema   Integumentary/Edema no deficits were identifed   Posture    Posture Not impaired   Range of Motion (ROM)   ROM Quick Adds No deficits were identified   Strength   Manual Muscle Testing Quick Adds No deficits were identified   Bed Mobility   Bed Mobility Comments Independent   Transfer Skills   Transfer Comments Independent   Gait   Gait Comments Independent on level surface and up and down 3 steps with one rail. Patient amb 500 feet without AD. Able to speed up and slow down, make quick turns and turn head without LOB.    Balance   Balance Comments Good balance with static standing, dynamic standing, rhomberg, eyes closed.    Sensory Examination   Sensory Perception no deficits were identified   Coordination   Coordination no deficits were identified   General Therapy Interventions   Intervention Comments No PT indicated. Patient is independent.    Clinical Impression   Criteria for Skilled Therapeutic Intervention no problems identified which require skilled intervention   Clinical Presentation Stable/Uncomplicated   Clinical Decision Making (Complexity) Low complexity   Therapy Frequency   (No futher PT indicated. )   Anticipated Discharge Disposition Home   Risk & Benefits of therapy have been explained Yes   Patient, Family & other staff in agreement with plan of care Yes   [facility] [place]-[facility] TM \"6 Clicks\"   2016, Trustees of [facility], under license to [employer].  All rights reserved.   6 Clicks Short Forms Basic Mobility Inpatient Short Form   [facility] AM-[facility]  \"6 Clicks\" V.2 Basic Mobility Inpatient Short Form   1. Turning from your back to your side while in a flat bed without using bedrails? 4 - " None   2. Moving from lying on your back to sitting on the side of a flat bed without using bedrails? 4 - None   3. Moving to and from a bed to a chair (including a wheelchair)? 4 - None   4. Standing up from a chair using your arms (e.g., wheelchair, or bedside chair)? 4 - None   5. To walk in hospital room? 4 - None   6. Climbing 3-5 steps with a railing? 4 - None   Basic Mobility Raw Score (Score out of 24.Lower scores equate to lower levels of function) 24   Total Evaluation Time   Total Evaluation Time (Minutes) 25

## 2020-03-06 NOTE — PROVIDER NOTIFICATION
Text paged Dr Caldwell to clarify BP goal and report 0600 neuro change. Pt c/o 5/10 HA, denies photophobia, denies nausea. Pt does acknowledge that he awakens  with a HA at home somewhat frequently.     *SBP goal <140  *change prn labetalol order to reflect new BP goal  *Pt to go to MRI as soon as available (spoke with MRI, they are busy until around 0830, will call unit closer to that time to arrange transport)

## 2020-03-06 NOTE — PROGRESS NOTES
Stroke research team met with wife and pt at bedside following transfer out of ICU. Next steps from a study standpoint were discussed and LAR/pt advised that research team will contact them by phone at day 30, and will set up a final visit in Dr. Caldwell's stroke clinic around day 90 for the final study outcome assessments.     Pt and LAR were agreement of the plan and appreciative of the care received by nursing, stroke, and neurointerventional teams.

## 2020-03-06 NOTE — PROGRESS NOTES
"  [facility]    Stroke Progress Note    Interval Events  No acute overnight events. Had headache 4/10 this morning but no change in his neurological examination.     Impression  Ischemic Stroke due to embolic stroke of undetermined source (ESUS) resulting in Left M2 occlusion s/p study drug and thrombectomy    Stroke Evaluation:  MRI brain: Scattered recent infarcts in the left middle cerebral artery territory  Follow-up MRA head s/p tpa and thrombectomy: Normal MR angiogram of the head.  Resolution of left middle cerebral artery superior M2 division occlusion.  LDL 84  A1c 5.5  TTE: EF 60%, normal LV, normal left atrium, positive bubble    Recommendations:  - Start Tab Aspirin 81 mg daily  - Tab Plavix 75 mg daily for 3 weeks   - Statin: Lipitor 40 mg daily with goal LDL <70  - BP goal <140/90  - Continue using CPAP every night >4 hours  - Stroke Education  - Holter event monitor for 4 weeks   - OK to be discharged home when cleared by PT/OT    NIHSS score 2  mRS score 1  Barthel Index score 100  GOS score 5     Patient Follow-up  - MORGAN as an outpatient (need to schedule early next week)  - Follow-up with Dr Caldwell in stroke clinic in 4-6 weeks     We will sign off. Please call us with any questions.      Rashi Peterson MD  Fellow, Vascular Neurology  Text Page     To page stroke neurology after hours through [facility], click here: [facility]  Choose \"On Call\" tab at top, then search dropdown box for \"Neurology Adult\"   ______________________________________________________    Medications   Home Meds  Prior to Admission medications    Medication Sig Start Date End Date Taking? Authorizing Provider   HYDROmorphone (DILAUDID) 4 MG tablet Take 1 tablet (4 mg) by mouth every 6 hours as needed for moderate to severe pain maximum 4 tablet(s) per day 3/5/17  Yes Chris Barrios MD   Levothyroxine Sodium (SYNTHROID PO) Take 125 mcg by mouth every evening   Yes Unknown, Entered By History   NONFORMULARY " Take 1 tablet by mouth daily Fen-Phen 37.5mg herbal diet medication.   Yes Unknown, Entered By History   tolterodine (DETROL LA) 2 MG 24 hr capsule Take 1 capsule (2 mg) by mouth daily 3/5/17  Yes Chris Barrios MD       Scheduled Meds    aspirin  81 mg Oral Daily     atorvastatin  40 mg Oral QPM     clopidogrel  75 mg Oral Daily     levothyroxine  125 mcg Oral QPM     sodium chloride (PF)  3 mL Intracatheter Q8H       Infusion Meds    - MEDICATION INSTRUCTIONS -       - MEDICATION INSTRUCTIONS -       - MEDICATION INSTRUCTIONS -         PRN Meds  sodium chloride 0.9%, acetaminophen **OR** acetaminophen, labetalol, - MEDICATION INSTRUCTIONS -, metoclopramide **OR** metoclopramide, naloxone, - MEDICATION INSTRUCTIONS -, ondansetron **OR** ondansetron, prochlorperazine **OR** prochlorperazine **OR** prochlorperazine, sodium chloride (PF), - MEDICATION INSTRUCTIONS -       PHYSICAL EXAMINATION  Temp:  [97  F (36.1  C)-98.3  F (36.8  C)] 97.3  F (36.3  C)  Pulse:  [60-86] 81  Heart Rate:  [63-93] 73  Resp:  [9-17] 16  BP: (126-150)/() 147/112  SpO2:  [94 %-98 %] 97 %     Neurologic  Mental Status:  alert, oriented x 3, follows commands, speech clear and fluent, naming and repetition improved but still has difficulty with complex sentences, mild expressive aphasia and paraphasic errors   Cranial Nerves:  visual fields intact, PERRL, EOMI with normal smooth pursuit, facial sensation intact and symmetric, facial movements symmetric, hearing not formally tested but intact to conversation, palate elevation symmetric and uvula midline, no dysarthria, shoulder shrug strong bilaterally, tongue protrusion midline  Motor:  normal muscle tone and bulk, no abnormal movements, able to move all limbs spontaneously, strength 5/5 throughout upper and lower extremities, no pronator drift  Reflexes:  toes down-going  Sensory:  light touch sensation intact and symmetric throughout upper and lower extremities, no extinction  on double simultaneous stimulation   Coordination:  normal finger-to-nose and heel-to-shin bilaterally without dysmetria, rapid alternating movements symmetric  Station/Gait:  deferred     Stroke Scales    NIHSS  Interval 24 hrs post treatment (03/06/20 1424)   Interval Comments     1a. Level of Consciousness 0-->Alert, keenly responsive   1b. LOC Questions 0-->Answers both questions correctly   1c. LOC Commands 0-->Performs both tasks correctly   2.   Best Gaze 0-->Normal   3.   Visual 0-->No visual loss   4.   Facial Palsy 1-->Minor paralysis (flattened nasolabial fold, asymmetry on smiling)   5a. Motor Arm, Left 0-->No drift, limb holds 90 (or 45) degrees for full 10 secs   5b. Motor Arm, Right 0-->No drift, limb holds 90 (or 45) degrees for full 10 secs   6a. Motor Leg, Left 0-->No drift, leg holds 30 degree position for full 5 secs   6b. Motor Leg, right 0-->No drift, leg holds 30 degree position for full 5 secs   7.   Limb Ataxia 0-->Absent   8.   Sensory 0-->Normal, no sensory loss   9.   Best Language 1-->Mild-to-moderate aphasia, some obvious loss of fluency or facility of comprehension, without significant limitation on ideas expressed or form of expression. Reduction of speech and/or comprehension, however, makes conversation. . . (see row details)   10. Dysarthria 0-->Normal   11. Extinction and Inattention  0-->No abnormality   Total 2 (03/06/20 1424)       Imaging  I personally reviewed all imaging; relevant findings per HPI.     Lab Results Data   CBC  Recent Labs   Lab 03/06/20 0525 03/05/20  0731   WBC 8.2 6.5   RBC 4.64 4.61   HGB 14.6 14.4   HCT 43.0 42.6    188     Basic Metabolic Panel    Recent Labs   Lab 03/06/20  0525 03/05/20  2149 03/05/20  1444 03/05/20  0731     --   --  140   POTASSIUM 4.0  --   --  4.2   CHLORIDE 110*  --   --  112*   CO2 27  --   --  27   BUN 12  --   --  16   CR 0.82  --   --  1.01   GLC 97 94 123* 105*   DAMARIS 8.5  --   --  8.1*     Liver Panel  Recent  Labs   Lab Test 03/05/20  1444   PROTTOTAL 6.9   ALBUMIN 3.6   BILITOTAL 0.3   ALKPHOS 82   AST 13   ALT 34     INR  Recent Labs   Lab Test 03/05/20  0731   INR 1.02      Lipid Profile  Recent Labs   Lab Test 03/05/20  1444   CHOL 158   HDL 48   LDL 84   TRIG 129     A1C  Recent Labs   Lab Test 03/05/20  1444   A1C 5.5     Troponin I  Recent Labs   Lab 03/05/20  2149 03/05/20  1444   TROPI <0.015 <0.015

## 2020-03-07 ENCOUNTER — APPOINTMENT (OUTPATIENT)
Dept: OCCUPATIONAL THERAPY | Facility: CLINIC | Age: 55
DRG: 024 | End: 2020-03-07
Payer: COMMERCIAL

## 2020-03-07 ENCOUNTER — APPOINTMENT (OUTPATIENT)
Dept: CARDIOLOGY | Facility: CLINIC | Age: 55
DRG: 024 | End: 2020-03-07
Payer: COMMERCIAL

## 2020-03-07 VITALS
HEART RATE: 81 BPM | HEIGHT: 70 IN | DIASTOLIC BLOOD PRESSURE: 85 MMHG | WEIGHT: 262.79 LBS | TEMPERATURE: 97.5 F | RESPIRATION RATE: 16 BRPM | SYSTOLIC BLOOD PRESSURE: 133 MMHG | OXYGEN SATURATION: 96 % | BODY MASS INDEX: 37.62 KG/M2

## 2020-03-07 LAB
ANION GAP SERPL CALCULATED.3IONS-SCNC: 5 MMOL/L (ref 3–14)
BUN SERPL-MCNC: 16 MG/DL (ref 7–30)
CALCIUM SERPL-MCNC: 8.8 MG/DL (ref 8.5–10.1)
CHLORIDE SERPL-SCNC: 109 MMOL/L (ref 94–109)
CO2 SERPL-SCNC: 24 MMOL/L (ref 20–32)
CREAT SERPL-MCNC: 0.87 MG/DL (ref 0.66–1.25)
ERYTHROCYTE [DISTWIDTH] IN BLOOD BY AUTOMATED COUNT: 13.4 % (ref 10–15)
GFR SERPL CREATININE-BSD FRML MDRD: >90 ML/MIN/{1.73_M2}
GLUCOSE SERPL-MCNC: 92 MG/DL (ref 70–99)
HCT VFR BLD AUTO: 45.1 % (ref 40–53)
HGB BLD-MCNC: 15.4 G/DL (ref 13.3–17.7)
MCH RBC QN AUTO: 31.5 PG (ref 26.5–33)
MCHC RBC AUTO-ENTMCNC: 34.1 G/DL (ref 31.5–36.5)
MCV RBC AUTO: 92 FL (ref 78–100)
PLATELET # BLD AUTO: 234 10E9/L (ref 150–450)
POTASSIUM SERPL-SCNC: 4.2 MMOL/L (ref 3.4–5.3)
RBC # BLD AUTO: 4.89 10E12/L (ref 4.4–5.9)
SODIUM SERPL-SCNC: 138 MMOL/L (ref 133–144)
WBC # BLD AUTO: 7 10E9/L (ref 4–11)

## 2020-03-07 PROCEDURE — 36415 COLL VENOUS BLD VENIPUNCTURE: CPT | Performed by: INTERNAL MEDICINE

## 2020-03-07 PROCEDURE — 93270 REMOTE 30 DAY ECG REV/REPORT: CPT

## 2020-03-07 PROCEDURE — 85027 COMPLETE CBC AUTOMATED: CPT | Performed by: INTERNAL MEDICINE

## 2020-03-07 PROCEDURE — 25000132 ZZH RX MED GY IP 250 OP 250 PS 637: Performed by: STUDENT IN AN ORGANIZED HEALTH CARE EDUCATION/TRAINING PROGRAM

## 2020-03-07 PROCEDURE — 25000132 ZZH RX MED GY IP 250 OP 250 PS 637: Performed by: INTERNAL MEDICINE

## 2020-03-07 PROCEDURE — 80048 BASIC METABOLIC PNL TOTAL CA: CPT | Performed by: INTERNAL MEDICINE

## 2020-03-07 PROCEDURE — 97535 SELF CARE MNGMENT TRAINING: CPT | Mod: GO | Performed by: OCCUPATIONAL THERAPIST

## 2020-03-07 PROCEDURE — 93272 ECG/REVIEW INTERPRET ONLY: CPT | Performed by: INTERNAL MEDICINE

## 2020-03-07 RX ADMIN — CLOPIDOGREL BISULFATE 75 MG: 75 TABLET, FILM COATED ORAL at 09:01

## 2020-03-07 RX ADMIN — ASPIRIN 81 MG: 81 TABLET, DELAYED RELEASE ORAL at 09:01

## 2020-03-07 ASSESSMENT — ACTIVITIES OF DAILY LIVING (ADL)
ADLS_ACUITY_SCORE: 11

## 2020-03-07 NOTE — PLAN OF CARE
Discharge Planner OT   Patient plan for discharge: Home today  Current status: Pt ambulating (I)ly, going to water fountain (I)ly on unit. Pt completed SLUMS with score of 28/30, indicating normal score. A point was deducted for recall of 4/5 words as well as for naming 12 animals in one minute instead of 15+. Pt was asked to watch out for cognitive function in his everyday life/work, and was advised to attend outpatient OT IF he or his family/co-workers notice any cognitive challenges.  Barriers to return to prior living situation: none  Recommendations for discharge: Home  Rationale for recommendations: Pt is meeting OT goals and is at or near his baseline with ADL, IADL and mobility.       Entered by: Nohemi Macedo 03/07/2020 8:41 AM     Occupational Therapy Discharge Summary    Reason for therapy discharge:    All goals and outcomes met, no further needs identified.    Progress towards therapy goal(s). See goals on Care Plan in [facility] record for goal details.  Goals met    Therapy recommendation(s):    No further therapy is recommended.

## 2020-03-07 NOTE — PLAN OF CARE
Patient alert and oriented x4, very slight rt facial droop, speech clear, no asphasia present, steady on feet up ind, voiding without difficulty, bp above 140, prn labetalol given x 1, wife Marcela at bedside,plan of care reviewed, questions answered

## 2020-03-07 NOTE — DISCHARGE INSTRUCTIONS
Your risk factors for stroke or TIA (transient ischemic attack):    Your Risk Factors Your Results Normal Ranges   High blood pressure BP Readings from Last 1 Encounters:   03/06/20 (!) 147/112    Less than 120/80   Cholesterol              Total Lab Results   Component Value Date    CHOL 158 03/05/2020      Less than 150    Triglycerides   Lab Results   Component Value Date    TRIG 129 03/05/2020    Less than 150   LDL Lab Results   Component Value Date    LDL 84 03/05/2020       Less than 70   HDL Lab Results   Component Value Date    HDL 48 03/05/2020            Greater than 40 (men)  Greater than 50 (women)   Diabetes Recent Labs   Lab 03/06/20  0525   GLC 97    Fasting blood glucose    Smoking/tobacco use  Quit smoking and tobacco   Overweight  Lose 1-2 pounds a week   Lack of exercise  30 minutes moderate activity each day   Other risk factors include carotid (neck) artery disease, atrial fibrillation and stress. You may be on new medicine to treat high blood pressure, cholesterol, diabetes or atrial fibrillation.    Understanding Stroke Booklet given to patient. Please refer to booklet for further information.    Stroke warning signs and symptoms - CALL 911 right away for:  - Sudden numbness or weakness in the face, arm or leg (often on one side of the body).  - Sudden confusion or trouble understanding what is going on.  - Sudden blurred or decreased vision in one or both eyes.  - Sudden trouble speaking, loss of balance, dizziness or problems with coordination.  - Sudden, severe headache for no reason.  - Fainting or seizures.  - Symptoms may go away then come back suddenly.    Recommendations:  - Start Tab Aspirin 81 mg daily  - Tab Plavix 75 mg daily for 3 weeks   - Statin: Lipitor 40 mg daily with goal LDL <70  - BP goal <140/90  - Continue using CPAP every night >4 hours  - Holter event monitor for 4 weeks     Patient Follow-up  - MORGAN as an outpatient (need to schedule early next week)  -  Follow-up with Dr Caldwell, neurology in stroke clinic in 4-6 weeks

## 2020-03-07 NOTE — PLAN OF CARE
Pt here with a CVA. A&O x 4, mild aphasia, slow delayed speech. Neuros intact. VSS on RA, wears CPAP overnight. Tele SR w/ BBB. Regular diet, with thin liquids. Takes pills whole with water. Up with independently in room. Denies pain. Pt scoring green on the Aggression Stop Light Tool. Plan to discharge to home today.

## 2020-03-11 ENCOUNTER — HOSPITAL ENCOUNTER (OUTPATIENT)
Facility: CLINIC | Age: 55
End: 2020-03-11
Admitting: INTERNAL MEDICINE
Payer: COMMERCIAL

## 2020-03-13 ENCOUNTER — OFFICE VISIT (OUTPATIENT)
Dept: CARDIOLOGY | Facility: CLINIC | Age: 55
End: 2020-03-13
Payer: COMMERCIAL

## 2020-03-13 VITALS
WEIGHT: 257.2 LBS | OXYGEN SATURATION: 95 % | BODY MASS INDEX: 36.01 KG/M2 | DIASTOLIC BLOOD PRESSURE: 72 MMHG | SYSTOLIC BLOOD PRESSURE: 130 MMHG | HEART RATE: 108 BPM | HEIGHT: 71 IN

## 2020-03-13 DIAGNOSIS — G47.33 OBSTRUCTIVE SLEEP APNEA ON CPAP: ICD-10-CM

## 2020-03-13 DIAGNOSIS — I63.412 CEREBROVASCULAR ACCIDENT (CVA) DUE TO EMBOLISM OF LEFT MIDDLE CEREBRAL ARTERY (H): Primary | ICD-10-CM

## 2020-03-13 DIAGNOSIS — I48.0 PAROXYSMAL ATRIAL FIBRILLATION (H): ICD-10-CM

## 2020-03-13 DIAGNOSIS — R00.2 PALPITATIONS: ICD-10-CM

## 2020-03-13 PROBLEM — I77.810 DILATED AORTIC ROOT (H): Status: RESOLVED | Noted: 2020-03-06 | Resolved: 2020-03-13

## 2020-03-13 PROBLEM — I63.9 STROKE, EMBOLIC (H): Status: RESOLVED | Noted: 2020-03-05 | Resolved: 2020-03-13

## 2020-03-13 PROCEDURE — 99204 OFFICE O/P NEW MOD 45 MIN: CPT | Performed by: INTERNAL MEDICINE

## 2020-03-13 ASSESSMENT — MIFFLIN-ST. JEOR: SCORE: 2018.78

## 2020-03-13 NOTE — PROGRESS NOTES
Service Date: 03/13/2020      PRIMARY CARE AND REFERRING PROVIDER:  Chava Collado MD      REASON FOR VISIT:     1.  Transesophageal echocardiogram to rule out intracardiac source for recent stroke.   2.  Review cardiac testing done as part of recent stroke evaluation.      HISTORY OF PRESENT ILLNESS:    Mr. 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  His medical history is significant for obstructive sleep apnea with consistent CPAP use over several years, obesity, never tobacco user.      Unfortunately, the patient was admitted recently from 03/05/2020 through 03/07/2020 with embolic stroke and symptoms of right-sided hemiparesis.  When he presented, his blood pressure was in the 130s/80s.  Brain MRI showed scattered acute infarcts in the left frontal, parietal and occipital lobes.  There was no significant stenoses of his carotid arteries, but he was found to have a partially occlusive thrombus in the left M2 branch of the middle cerebral artery (likely thromboembolic).      He underwent successful lytic therapy followed by embolectomy and post procedural, his speech and motor deficits improved.  He has no noticeable residual neurological abnormality.      His cardiac imaging showed sinus rhythm throughout.  An echocardiogram (I reviewed images) was suboptimal due to body habitus, but it showed normal left and right ventricular size and systolic function, LVEF of 60%-65%, no significant valve disease.  The left atrial size was normal.  Although the bubble study was reported as positive with Valsalva, this is not immediately apparent to me on review of the images.  His aortic root is mildly dilated at 4.5 cm with a normal ascending aorta dimension.  At discharge, he was diagnosed with a thromboembolic stroke.  Advised to take aspirin and clopidogrel for 2 weeks, after which he was to remain on aspirin monotherapy.    Cardiology an outpatient Neurology followup scheduled.  He is also scheduled for an outpatient MORGAN on next Tuesday on 03/17.     Pertinently, patient gives a long history of intermittent palpitations lasting anywhere from 10-15 minutes, occurring without any specific triggers and not limiting exercise.  He has not undergone any previous cardiac evaluation, but clearly is at high risk of atrial fibrillation.      FAMILY HISTORY:  There is no family history of atrial arrhythmias or coronary disease.      Other labs include cholesterol 158, HDL 48, LDL 84, triglycerides 129, potassium 4.9, creatinine 0.8, hemoglobin 15.4.       PHYSICAL EXAMINATION:    Pertinent for elevated BMI.  Central adiposity, alert and oriented, no obvious neurological deficit.  Normal heart sounds, no murmur, no bruit, no lower extremity edema.  Detailed exam attached to this note.      DIAGNOSES:   1.  Recent presumed cardioembolic stroke with complete resolution of symptoms after TPA and thrombectomy.   2.  History of palpitations, raising concern for paroxysmal atrial fibrillation.   3.  Obesity with a BMI of 36 kg/m2.   4.  Obstructive sleep apnea, CPAP compliant.      ASSESSMENT AND PLAN:    The patient was admitted with sudden onset right-sided hemiparesis and found to have thrombus in a branch of his middle cerebral artery without significant carotid artery disease and also multiple territory infarcts.  He has a background history of intermittent paroxysmal palpitations for several years.  He has obesity and sleep apnea and possibly untreated borderline hypertension.  The clinical suspicion for undiagnosed paroxysmal atrial fibrillation causing a cardioembolic stroke is high.  Therefore, I would recommend Eliquis anticoagulation.  He is currently wearing his heart monitor and the results are pending.     1.  Given the above rationale, my advice would be to switch him from antiplatelet therapy to empirical anticoagulation with apixaban  (Eliquis) 5 mg 2 times daily.  I have given him a prescription for this.  He is seeing Dr. Caldwell, the neurologist, in a month and if Dr. Caldwell but feels otherwise regarding his antiplatelet management, it would be at his discretion.  This was discussed with the patient.   2.  Informed consent obtained for transesophageal echocardiogram as is scheduled for next week.  No contraindications.  The patient has not had any throat irradiation, does not have removable dentures or oral appliances, no previous esophageal dilatation followed/stricture or a GI bleed.  No known anesthetic complications.  The patient agrees to proceed.   4.  Follow up with Cardiology as needed.      Total time was 40 minutes, greater than 50% spent in counseling and coordination of care.      cc:      Chava Collado [facility]   [address] [place] [address]         [facility] HUMBERTO WILLS MD             D: 2020   T: 2020   MT: SIMON      Name:     RAFFY MELGAR   MRN:      [id number]        Account:      [id number]   :      1965           Service Date: 2020      Document: [id number]

## 2020-03-13 NOTE — PATIENT INSTRUCTIONS
1.  My recommendation is that instead of aspirin and clopidogrel, you take Eliquis (apixaban).      2.  Transesophageal echocardiogram, as scheduled.    3.  Follow-up with cardiology, as needed.    If you have any questions or concerns, please contact my nurses at [phone].

## 2020-03-13 NOTE — PROGRESS NOTES
Clinic visit note dictated. Dictation reference number - [id number]          REVIEW OF SYSTEMS:  A comprehensive 10-point review of systems was completed and the pertinent positives are documented in the history of present illness.    Skin:  Negative     Eyes:  Negative    ENT:  Negative    Respiratory:  Negative    Cardiovascular:  Negative    Gastroenterology: Negative    Genitourinary:  Negative    Musculoskeletal:  Positive for arthritis(Hands, knees)  Neurologic:  Positive for headaches  Psychiatric:  Positive for excessive stress  Heme/Lymph/Imm:  Negative    Endocrine:  Negative      CURRENT MEDICATIONS:  Current Outpatient Medications   Medication Sig Dispense Refill     acetaminophen (TYLENOL) 325 MG tablet Take 2 tablets (650 mg) by mouth every 4 hours as needed for mild pain  0     apixaban ANTICOAGULANT (ELIQUIS) 5 MG tablet Take 1 tablet (5 mg) by mouth 2 times daily 60 tablet 3     atorvastatin (LIPITOR) 40 MG tablet Take 1 tablet (40 mg) by mouth every evening 30 tablet 3     Levothyroxine Sodium (SYNTHROID PO) Take 125 mcg by mouth every evening       tolterodine (DETROL LA) 2 MG 24 hr capsule Take 1 capsule (2 mg) by mouth daily 20 capsule 1         ALLERGIES:  Allergies   Allergen Reactions     No Known Drug Allergies        PAST MEDICAL HISTORY:    Past Medical History:   Diagnosis Date     Embolic stroke involving left middle cerebral artery (H) 03/2020     Hypothyroidism      Kidney stone     Recurent stones     DAMARIS on CPAP      Palpitations 3/6/2020       PAST SURGICAL HISTORY:    Past Surgical History:   Procedure Laterality Date     CYSTOSCOPY       CYSTOSCOPY, RETROGRADES, COMBINED  5/24/2014    Procedure: COMBINED CYSTOSCOPY, RETROGRADES;  Surgeon: Francisco J Lerma MD;  Location: [facility]     ENT SURGERY      T & A     LASER HOLMIUM LITHOTRIPSY URETER(S), INSERT STENT, COMBINED  7/27/2012    Procedure: COMBINED CYSTOSCOPY, URETEROSCOPY, LASER HOLMIUM LITHOTRIPSY URETER(S), INSERT STENT;   Video cystoscopy, Right Retrograde Right Ureteroscopy with Holmium Laser, Stone Extraction,  JJ Stent Placement ;  Surgeon: Francisco J Lerma MD;  Location: [facility]     LASER HOLMIUM LITHOTRIPSY URETER(S), INSERT STENT, COMBINED  5/24/2014    Procedure: COMBINED CYSTOSCOPY, URETEROSCOPY, LASER HOLMIUM LITHOTRIPSY URETER(S), INSERT STENT;  Surgeon: Francisco J Lerma MD;  Location: [facility]     LASER HOLMIUM LITHOTRIPSY URETER(S), INSERT STENT, COMBINED Right 3/5/2017    Procedure: COMBINED CYSTOSCOPY, URETEROSCOPY, LASER HOLMIUM LITHOTRIPSY URETER(S), INSERT STENT;  Surgeon: Chris Barrios MD;  Location: [facility]     ROTATOR CUFF REPAIR RT/LT         FAMILY HISTORY:    Family History   Problem Relation Age of Onset     Other - See Comments Father         Possible Autoimmune disorder      Prostate Cancer Father        SOCIAL HISTORY:    Social History     Socioeconomic History     Marital status:      Spouse name: None     Number of children: 3     Years of education: None     Highest education level: None   Occupational History     Occupation: Golf Pro   Social Needs     Financial resource strain: None     Food insecurity     Worry: None     Inability: None     Transportation needs     Medical: None     Non-medical: None   Tobacco Use     Smoking status: Never Smoker     Smokeless tobacco: Never Used   Substance and Sexual Activity     Alcohol use: No     Drug use: No     Sexual activity: None   Lifestyle     Physical activity     Days per week: None     Minutes per session: None     Stress: None   Relationships     Social connections     Talks on phone: None     Gets together: None     Attends Christian service: None     Active member of club or organization: None     Attends meetings of clubs or organizations: None     Relationship status: None     Intimate partner violence     Fear of current or ex partner: None     Emotionally abused: None     Physically abused: None     Forced sexual activity:  "None   Other Topics Concern     Parent/sibling w/ CABG, MI or angioplasty before 65F 55M? 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 (last updated 3/5/2020)        PHYSICAL EXAM:    Vitals: /72   Pulse 108   Ht 1.803 m (5' 11\")   Wt 116.7 kg (257 lb 3.2 oz)   SpO2 95%   BMI 35.87 kg/m    Wt Readings from Last 5 Encounters:   03/13/20 116.7 kg (257 lb 3.2 oz)   03/06/20 119.2 kg (262 lb 12.6 oz)   03/17/17 108.9 kg (240 lb)   03/04/17 113.4 kg (250 lb)   05/23/14 120.2 kg (265 lb)       Constitutional: Comfortable at rest. Cooperative, alert and oriented, well developed, well nourished.  Eyes: Pupils equal and round, conjunctivae and lids unremarkable, sclera white, no xanthalasma,   ENT: Satisfactory dentition.  No cyanosis or pallor.  Neck: Carotid pulses are full and equal bilaterally, no carotid bruit, no thyromegaly    Respiratory: Normal respiratory effort with symmetrical chest wall movements and no use of accessory muscles. Good air entry with normal breath sounds and no rales or wheeze.  Cardiovascular: Normal jugular venous pulse and pressure.  Normal carotid pulse character and volume.  No carotid bruit.  Apical impulse is undisplaced and normal to palpation without parasternal heave or thrill.  Heart sounds are normal and regular. No audible murmur. No S3, S4 or friction rub.    GI: Soft, nontender, no hepatosplenomegaly, no masses, active bowel sounds.    Skin: No rash, erythema, ecchymosis.  Musculoskeletal: Normal muscle tone and strength. Normal gait. No spinal deformities.  Neuropsychiatric: Oriented to time place and person.  Affect normal.  No gross motor deficits.  Extremities: No edema. No clubbing or deformities.        Encounter Diagnoses   Name Primary?     Cerebrovascular accident (CVA) due to embolism of left middle cerebral artery (H) Yes     Palpitations      Paroxysmal atrial fibrillation (H)      " BMI 36.0-36.9,adult      Obstructive sleep apnea on CPAP        No orders of the defined types were placed in this encounter.

## 2020-03-13 NOTE — LETTER
3/13/2020    [facility] [address]  [facility] [address]    RE: Rey Andre       Dear Colleague,    I had the pleasure of seeing Rey Andre in the [facility].    Clinic visit note dictated. Dictation reference number - [id number]          REVIEW OF SYSTEMS:  A comprehensive 10-point review of systems was completed and the pertinent positives are documented in the history of present illness.    Skin:  Negative     Eyes:  Negative    ENT:  Negative    Respiratory:  Negative    Cardiovascular:  Negative    Gastroenterology: Negative    Genitourinary:  Negative    Musculoskeletal:  Positive for arthritis(Hands, knees)  Neurologic:  Positive for headaches  Psychiatric:  Positive for excessive stress  Heme/Lymph/Imm:  Negative    Endocrine:  Negative      CURRENT MEDICATIONS:  Current Outpatient Medications   Medication Sig Dispense Refill     acetaminophen (TYLENOL) 325 MG tablet Take 2 tablets (650 mg) by mouth every 4 hours as needed for mild pain  0     apixaban ANTICOAGULANT (ELIQUIS) 5 MG tablet Take 1 tablet (5 mg) by mouth 2 times daily 60 tablet 3     atorvastatin (LIPITOR) 40 MG tablet Take 1 tablet (40 mg) by mouth every evening 30 tablet 3     Levothyroxine Sodium (SYNTHROID PO) Take 125 mcg by mouth every evening       tolterodine (DETROL LA) 2 MG 24 hr capsule Take 1 capsule (2 mg) by mouth daily 20 capsule 1         ALLERGIES:  Allergies   Allergen Reactions     No Known Drug Allergies        PAST MEDICAL HISTORY:    Past Medical History:   Diagnosis Date     Embolic stroke involving left middle cerebral artery (H) 03/2020     Hypothyroidism      Kidney stone     Recurent stones     DAMARIS on CPAP      Palpitations 3/6/2020       PAST SURGICAL HISTORY:    Past Surgical History:   Procedure Laterality Date     CYSTOSCOPY       CYSTOSCOPY, RETROGRADES, COMBINED  5/24/2014    Procedure: COMBINED CYSTOSCOPY, RETROGRADES;  Surgeon: Francisco J Lerma  MD Levar;  Location: [facility]     ENT SURGERY      T & A     LASER HOLMIUM LITHOTRIPSY URETER(S), INSERT STENT, COMBINED  7/27/2012    Procedure: COMBINED CYSTOSCOPY, URETEROSCOPY, LASER HOLMIUM LITHOTRIPSY URETER(S), INSERT STENT;  Video cystoscopy, Right Retrograde Right Ureteroscopy with Holmium Laser, Stone Extraction,  JJ Stent Placement ;  Surgeon: Francisco J Lerma MD;  Location: [facility]     LASER HOLMIUM LITHOTRIPSY URETER(S), INSERT STENT, COMBINED  5/24/2014    Procedure: COMBINED CYSTOSCOPY, URETEROSCOPY, LASER HOLMIUM LITHOTRIPSY URETER(S), INSERT STENT;  Surgeon: Francisco J Lerma MD;  Location: [facility]     LASER HOLMIUM LITHOTRIPSY URETER(S), INSERT STENT, COMBINED Right 3/5/2017    Procedure: COMBINED CYSTOSCOPY, URETEROSCOPY, LASER HOLMIUM LITHOTRIPSY URETER(S), INSERT STENT;  Surgeon: Chris Barrios MD;  Location: [facility]     ROTATOR CUFF REPAIR RT/LT         FAMILY HISTORY:    Family History   Problem Relation Age of Onset     Other - See Comments Father         Possible Autoimmune disorder      Prostate Cancer Father        SOCIAL HISTORY:    Social History     Socioeconomic History     Marital status:      Spouse name: None     Number of children: 3     Years of education: None     Highest education level: None   Occupational History     Occupation: Golf Pro   Social Needs     Financial resource strain: None     Food insecurity     Worry: None     Inability: None     Transportation needs     Medical: None     Non-medical: None   Tobacco Use     Smoking status: Never Smoker     Smokeless tobacco: Never Used   Substance and Sexual Activity     Alcohol use: No     Drug use: No     Sexual activity: None   Lifestyle     Physical activity     Days per week: None     Minutes per session: None     Stress: None   Relationships     Social connections     Talks on phone: None     Gets together: None     Attends Gnosticism service: None     Active member of club or organization: None      "Attends meetings of clubs or organizations: None     Relationship status: None     Intimate partner violence     Fear of current or ex partner: None     Emotionally abused: None     Physically abused: None     Forced sexual activity: None   Other Topics Concern     Parent/sibling w/ CABG, MI or angioplasty before 65F 55M? 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 (last updated 3/5/2020)        PHYSICAL EXAM:    Vitals: /72   Pulse 108   Ht 1.803 m (5' 11\")   Wt 116.7 kg (257 lb 3.2 oz)   SpO2 95%   BMI 35.87 kg/m    Wt Readings from Last 5 Encounters:   03/13/20 116.7 kg (257 lb 3.2 oz)   03/06/20 119.2 kg (262 lb 12.6 oz)   03/17/17 108.9 kg (240 lb)   03/04/17 113.4 kg (250 lb)   05/23/14 120.2 kg (265 lb)       Constitutional: Comfortable at rest. Cooperative, alert and oriented, well developed, well nourished.  Eyes: Pupils equal and round, conjunctivae and lids unremarkable, sclera white, no xanthalasma,   ENT: Satisfactory dentition.  No cyanosis or pallor.  Neck: Carotid pulses are full and equal bilaterally, no carotid bruit, no thyromegaly    Respiratory: Normal respiratory effort with symmetrical chest wall movements and no use of accessory muscles. Good air entry with normal breath sounds and no rales or wheeze.  Cardiovascular: Normal jugular venous pulse and pressure.  Normal carotid pulse character and volume.  No carotid bruit.  Apical impulse is undisplaced and normal to palpation without parasternal heave or thrill.  Heart sounds are normal and regular. No audible murmur. No S3, S4 or friction rub.    GI: Soft, nontender, no hepatosplenomegaly, no masses, active bowel sounds.    Skin: No rash, erythema, ecchymosis.  Musculoskeletal: Normal muscle tone and strength. Normal gait. No spinal deformities.  Neuropsychiatric: Oriented to time place and person.  Affect normal.  No gross motor deficits.  Extremities: " No edema. No clubbing or deformities.        Encounter Diagnoses   Name Primary?     Cerebrovascular accident (CVA) due to embolism of left middle cerebral artery (H) Yes     Palpitations      Paroxysmal atrial fibrillation (H)      BMI 36.0-36.9,adult      Obstructive sleep apnea on CPAP        No orders of the defined types were placed in this encounter.              Thank you for allowing me to participate in the care of your patient.      Sincerely,     Rebekah Sanon MD     [facility]    cc:   No referring provider defined for this encounter.

## 2020-03-13 NOTE — LETTER
3/13/2020      Chava Collado DO  [facility] [address]  [facility] [address]      RE: Rey Andre       Dear Colleague,    I had the pleasure of seeing Rey Andre in the [facility].    Service Date: 03/13/2020      PRIMARY CARE AND REFERRING PROVIDER:  hCava Collado MD      REASON FOR VISIT:     1.  Transesophageal echocardiogram to rule out intracardiac source for recent stroke.   2.  Review cardiac testing done as part of recent stroke evaluation.      HISTORY OF PRESENT ILLNESS:  Mr. 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  His medical history is significant for obstructive sleep apnea with consistent CPAP use over several years, obesity, never tobacco user.      Unfortunately, the patient was admitted recently from 03/05/2020 through 03/07/2020 with embolic stroke and symptoms of right-sided hemiparesis.  When he presented, his blood pressure was in the 130s/80s.  Brain MRI showed scattered acute infarcts in the left frontal, parietal and occipital lobes.  There was no significant stenoses of his carotid arteries, but he was found to have a partially occlusive thrombus in the left M2 branch of the middle cerebral artery (likely thromboembolic).      He underwent successful lytic therapy followed by embolectomy and post procedural, his speech and motor deficits improved.  He has no noticeable residual neurological abnormality.      His cardiac imaging showed sinus rhythm throughout.  An echocardiogram (I reviewed images) was suboptimal due to body habitus, but it showed normal left and right ventricular size and systolic function, LVEF of 60%-65%, no significant valve disease.  The left atrial size was normal.  Although the bubble study was reported as positive with Valsalva, this is not immediately apparent to me on review of the images.  His aortic root is mildly  dilated at 4.5 cm with a normal ascending aorta dimension.      Pertinently, patient gives a long history of intermittent palpitations lasting anywhere from 10-15 minutes, occurring without any specific triggers and not limiting exercise.  He has not undergone any previous cardiac evaluation, but clearly is at high risk of atrial fibrillation.      FAMILY HISTORY:  There is no family history of atrial arrhythmias or coronary disease.      Other labs include cholesterol 158, HDL 48, LDL 84, triglycerides 129, potassium 4.9, creatinine 0.8, hemoglobin 15.4.      At discharge, he was diagnosed with a thromboembolic stroke.  Advised to take aspirin and clopidogrel for 2 weeks, after which he was to remain on aspirin monotherapy.   Cardiology an outpatient Neurology followup scheduled.  He is also scheduled for an outpatient MORGAN on next Tuesday on 03/17.      PHYSICAL EXAMINATION:  Pertinent for elevated BMI.  Central adiposity, alert and oriented, no obvious neurological deficit.  Normal heart sounds, no murmur, no bruit, no lower extremity edema.  Detailed exam attached to this note.      DIAGNOSES:   1.  Recent presumed cardioembolic stroke with complete resolution of symptoms after TPA and thrombectomy.   2.  History of palpitations, raising concern for paroxysmal atrial fibrillation.   3.  Obesity with a BMI of 36 kg/m2.   4.  Obstructive sleep apnea, CPAP compliant.      ASSESSMENT AND PLAN:  The patient was admitted with sudden onset right-sided hemiparesis and found to have thrombus in a branch of his middle cerebral artery without significant carotid artery disease and also multiple territory infarcts.  He has a background history of intermittent paroxysmal palpitations for several years.  He has obesity and sleep apnea and possibly untreated borderline hypertension.  The overall clinical suspicion for undiagnosed paroxysmal atrial fibrillation causing a cardioembolic stroke is high.  Therefore, I would  recommend Eliquis anticoagulation.  He is currently wearing his heart monitor and the results are pending.   1.  Given the above rationale, my advice would be to switch him from antiplatelet therapy to empirical anticoagulation with apixaban (Eliquis) 5 mg 2 times daily.  I have given him a prescription for this.  He is seeing Dr. Caldwell, the neurologist, in a month and if Dr. Caldwell but feels otherwise regarding his antiplatelet management, it would be at his discretion.  This was discussed with the patient.   2.  Informed consent obtained for transesophageal echocardiogram as is scheduled for next week.  No contraindications.  The patient has not had any throat irradiation, does not have removable dentures or oral appliances, no previous esophageal dilatation followed/stricture or a GI bleed.  No known anesthetic complications.  The patient agrees to proceed.   4.  Follow up with Cardiology as needed.      Total time was 40 minutes, greater than 50% spent in counseling and coordination of care.      cc:      Chava Collado DO   [facility]   [address] [place], [place] [id number] [place] [address] HUMBERTO WILLS MD             D: 2020   T: 2020   MT: SIMON      Name:     RAFFY MELGAR   MRN:      [id number]        Account:      [id number]   :      1965           Service Date: 2020      Document: [id number]         Outpatient Encounter Medications as of 3/13/2020   Medication Sig Dispense Refill     acetaminophen (TYLENOL) 325 MG tablet Take 2 tablets (650 mg) by mouth every 4 hours as needed for mild pain  0     apixaban ANTICOAGULANT (ELIQUIS) 5 MG tablet Take 1 tablet (5 mg) by mouth 2 times daily 60 tablet 3     atorvastatin (LIPITOR) 40 MG tablet Take 1 tablet (40 mg) by mouth every evening 30 tablet 3     Levothyroxine Sodium (SYNTHROID PO) Take 125 mcg by mouth every evening       tolterodine (DETROL LA) 2 MG 24 hr capsule Take 1 capsule (2 mg) by mouth daily 20 capsule 1      [DISCONTINUED] aspirin (ASA) 81 MG EC tablet Take 1 tablet (81 mg) by mouth daily  0     [DISCONTINUED] clopidogrel (PLAVIX) 75 MG tablet Take 1 tablet (75 mg) by mouth daily 20 tablet 0     No facility-administered encounter medications on file as of 3/13/2020.        Again, thank you for allowing me to participate in the care of your patient.      Sincerely,    Rebekah Sanon MD     [facility]

## 2020-03-16 ENCOUNTER — TELEPHONE (OUTPATIENT)
Dept: CARDIOLOGY | Facility: CLINIC | Age: 55
End: 2020-03-16

## 2020-03-16 NOTE — TELEPHONE ENCOUNTER
Patient is scheduled for MORGAN on 3/17/2020, arriving at 11:30 for procedure at 1:30. Patient is not diabetic. Patient has no known swallowing disorder or history of esophageal bleeding varices. Patient is aware to be NPO for 6 hours except for medications. Patient has arranged for transportation after the procedure with his spouse.

## 2020-03-17 ENCOUNTER — TELEPHONE (OUTPATIENT)
Dept: CARDIOLOGY | Facility: CLINIC | Age: 55
End: 2020-03-17

## 2020-03-17 NOTE — TELEPHONE ENCOUNTER
Voicemail received from patient calling to say that he will not be proceeding with his MORGAN today since his wife is not allowed in to the hospital.     Returned patient's call. Reviewed with patient that it is the current hospital policy to not allow any visitors due to concerns regarding coronavirus. Discussed that wife is welcome to phone in/facetime with patient during provider discussions as able, but policy is not likely to change soon. Pt verbalized understanding but would still like to cancel MORGAN.     Recommended that patient contact his neurologist as they were the ones who ordered the MORGAN initially. Pt verbalized understanding. Discussed w/ CR3 MD Dr Rosado. MORGAN cancelled.

## 2020-03-17 NOTE — DISCHARGE INSTRUCTIONS
MORGAN  (Transesophageal Echocardiogram)  Discharge Instructions    After you go home:      Have an adult stay with you for 6 hours.       For 24 hours - due to the sedation you received:    Relax and take it easy.    Do NOT make any important or legal decisions.    Do NOT drive or operate machines at home or at work.    Do NOT drink alcohol.    Diet:    You may resume your normal diet, but no scratchy foods for two days.    If your throat is sore, eat cold, bland or soft foods.    You may have heartburn if the tube used in the exam entered your stomach.  If so:   - Do not eat acidic and spicy foods.   - Do not eat three hours before bedtime. Clear liquids are okay.   - When lying down, use two pillows to raise your head.    Medicines:      Take your medications, including blood thinners, unless your provider tells you not to.    If you have stopped any medicines, check with your provider about when to restart them.    You may take Tylenol (Acetaminophen) if your throat is sore.    You may take antacids if you have heartburn.      Follow Up Appointments:      Follow up with your cardiologist at [facility] of patient preference as instructed.    Follow up with your primary care provider as needed.    Call the clinic if:      You have heartburn that is severe or lasts more than 72 hours.    You have a sore throat that feels worse after 72 hours.    You have shortness of breath, neck pain, chest pain, fever, chills, coughing up blood, or other unusual signs.    Questions or concerns      [facility] [place]:    [phone] [facility] (7 days a week)

## 2020-03-18 NOTE — OP NOTE
Procedure Date: 03/05/2020      SURGEON:  Sorin Monge MD.      :  None.      PREOPERATIVE DIAGNOSIS:  Left M2 MCA occlusion.      POSTOPERATIVE DIAGNOSIS:  Left M2 MCA occlusion.      PROCEDURES PERFORMED:  Left M2 mechanical thrombectomy.      ANESTHESIA:  Local MAC.      SEDATION GIVEN:  Versed 2.5 mg IV, fentanyl 125 mg IV.      SEDATION TIME:  32 minutes.      FLUOROSCOPY TIME:  11 minutes.      TOTAL FLUOROSCOPY DOSE:  1325.74 milligrays.      CONTRAST:  Isovue 45 mL.        LOCAL ANESTHETIC:  1% lidocaine 10 mg/mL with 10 mL given.      HISTORY:  Rey is a 54-year-old gentleman who had presented acutely with an acute ischemic stroke.  He was felt to be a good candidate for mechanical thrombectomy based on the CT that demonstrated an M2 vessel occlusion.  I did talk to his family ahead of time and obtained verbal consent for the mechanical thrombectomy.  He was then brought to the angio suite for the procedure.      DESCRIPTION OF PROCEDURE:  Rey was brought to the angiography suite where he was positioned in a supine fashion on the angiography table.  Both groins were then shaved and then prepped in the usual sterile fashion.  We then performed a timeout and verified that this was the correct patient, and we are doing the correct procedure.      I then made a small skin nick with a #11 scalpel blade in the right groin area.  I then percutaneously cannulated the right femoral artery with a micropuncture set.  Then over a wire, I then placed an 80 cm 6-Mozambican shuttle sheath.  This was advanced into the descending aorta.  I then removed the inner stylet of the sheath and then placed a Vert catheter with an 0.035 wire.  This was then used to cannulate the left common carotid artery.  The shuttle was then advanced into the left common carotid artery.  I removed the Vert catheter.  I then performed a control angiogram that demonstrated that the bifurcation was opened without disease and  confirmed that there was an M2 thrombus.  I then advanced a 6-St Helenian Brenda catheter along with a Marksman microcatheter and a Synchro-2 standard microwire through the shuttle.  Under roadmap guidance, I then advanced the microcatheter and microwire into the M2 occluded branch.  The microcatheter was passed beyond the occlusion and then the microcatheter followed.  I then advanced the Brenda as far as I could.  I performed a control angiogram through the microcatheter demonstrating that I was in the distal branch of the middle cerebral artery.  I then advanced a 4 x 60 Solitaire device and sheathed this in the M2 distribution.  I then removed the Marksman microcatheter.  I then advanced the Brenda catheter up until I engaged the clot.  Using a 60 mL syringe, we then aspirated through the Brenda catheter and then pulled the Brenda and the Solitaire device all out in one slow pull.  I then performed a control angiogram that demonstrated that the M2 division had been completely recanalized.      I performed a standard AP and lateral view demonstrating that the entire MCA distribution was opened.  I then performed a groin run demonstrating that the right femoral artery was without disease and then I closed the right femoral artery using a 6-St Helenian Angio-Seal closure device.      This was the end of the procedure and the patient was then taken to the Neurosurgery intensive care unit.  I attest I was present for the entire duration of the procedure.      IMAGE INTERPRETATIONS:   1.  An injection through the left common carotid artery.  AP and lateral views were obtained.  These demonstrate that the bifurcation of the left carotid artery is without disease.   2.  Left internal carotid artery run.  AP and lateral views are obtained of the head.  Here, you can see the bifurcation of the internal carotid artery is without disease.  The M2 branch is occluded because of thrombus.   3.  A microcatheter injection into the distal M2  branch on the left.  AP and lateral and magged up views are obtained which demonstrate patency of the distal MCA.  This indicates that the microcatheter is intra-arterial.      Left internal carotid artery run.  AP and lateral cranial views are obtained.  This demonstrates complete opening of the M2 division with a TICI3 result from the thrombectomy.     4.  A left internal carotid run.  AP and lateral views are obtained of the head from standard projections.  This demonstrates that the left middle cerebral artery territory is completely perfused.  There is a competitive flow which decreases the amount of contrast into the anterior cerebral artery.      SUMMARY OF THE INTERPRETATION:   1.  Occluded M2 branch at the beginning of the procedure.   2.  TICI3 result with complete revascularization in the middle cerebral artery territory.         CHUCK KRAUSE MD             D: 2020   T: 2020   MT: [id number]      Name:     RAFFY MELGAR   MRN:      [id number]        Account:        [id number]   :      1965           Procedure Date: 2020      Document: [id number]

## 2020-04-03 ENCOUNTER — VIRTUAL VISIT (OUTPATIENT)
Dept: NEUROSURGERY | Facility: CLINIC | Age: 55
End: 2020-04-03
Attending: PSYCHIATRY & NEUROLOGY
Payer: COMMERCIAL

## 2020-04-03 DIAGNOSIS — I63.412 EMBOLIC STROKE INVOLVING LEFT MIDDLE CEREBRAL ARTERY (H): Primary | ICD-10-CM

## 2020-04-03 DIAGNOSIS — G47.33 OBSTRUCTIVE SLEEP APNEA: ICD-10-CM

## 2020-04-03 PROCEDURE — 99214 OFFICE O/P EST MOD 30 MIN: CPT | Mod: GT | Performed by: PSYCHIATRY & NEUROLOGY

## 2020-04-29 ENCOUNTER — TELEPHONE (OUTPATIENT)
Dept: CARDIOLOGY | Facility: CLINIC | Age: 55
End: 2020-04-29

## 2020-04-29 NOTE — TELEPHONE ENCOUNTER
My Chart Message 4-  After I had a stroke on March 5th, I wore a heart monitor for 30 days and I have not received results from the test.  Can you find the test results and put them in My Chart or forward to me at [email or web address].  Once we have results, I may need to do the MORGAN heart test.  Let me know if scheduling the MORGAN is needed.  Recomended by neurology if no afibrillation in 30 day heart monitor test.    Thank You,  Phillip    Event recorder summary notes 1 baseline strip - See report    Last visit with Dr. Sanon - recommendation to proceed with MORGAN. H&P done. Currently on Eliquis.   MORGAN scheduled.    Phone note 3- - Patient cancelled MORGAN as wife would be allowed to wait in hospital - See note.    Visit with Neurology 4-3-2020. Please see note.     Dr. Sanon messaged.

## 2020-04-30 ENCOUNTER — MYC MEDICAL ADVICE (OUTPATIENT)
Dept: CARDIOLOGY | Facility: CLINIC | Age: 55
End: 2020-04-30

## 2020-04-30 NOTE — TELEPHONE ENCOUNTER
Rebekah Sanon MD Theis, Marcie J, RN    Cc: [facility] Heart Team 2    Caller: Unspecified (Yesterday,  8:28 AM)                 1.  Please let the patient know that his event monitor did not show any arrhythmias.  2.  I am not sure what he wants regarding the MORGAN.  I had recommended it and scheduled it, and the patient himself canceled the test.  Whether he needs to proceed with a MORGAN is at the discretion of his neurologist.  If a MORGAN is needed, he will need another H&P visit with a cardiology ARY and then he can proceed.   3.  I do not need to see him again.    Thank you.  Dr. Fab Perkins message routed back to patient with summary of information as above.

## 2020-05-14 ENCOUNTER — TELEPHONE (OUTPATIENT)
Dept: NEUROLOGY | Facility: CLINIC | Age: 55
End: 2020-05-14

## 2020-05-14 DIAGNOSIS — I63.412 EMBOLIC STROKE INVOLVING LEFT MIDDLE CEREBRAL ARTERY (H): Primary | ICD-10-CM

## 2020-05-22 ENCOUNTER — TELEPHONE (OUTPATIENT)
Dept: UROLOGY | Facility: CLINIC | Age: 55
End: 2020-05-22

## 2020-05-22 DIAGNOSIS — Z11.59 ENCOUNTER FOR SCREENING FOR OTHER VIRAL DISEASES: Primary | ICD-10-CM

## 2020-05-22 NOTE — TELEPHONE ENCOUNTER
[facility] Call Center    Phone Message    May a detailed message be left on voicemail: yes     Reason for Call: Other: Phillip calling to request an appointment with one of our urologist. Phillip was last seen in our clinic over three years ago on 3/17/17 for kidney stones. Phillip had a PSA test on 3/16/20 and it was found he had an elevated PSA. Per COVID-19 protocols, it is recommended an encounter be sent over regardin new patients with elevated PSA's. Phillip would like to request an order for a PSA too. Phillip would also like to request a detailed message is left so that he knows which clinic is calling. Please give Phillip a call back at your earliest convenience to discuss.     Action Taken: Message routed to:  [facility] RENNY Uro    Travel Screening: Not Applicable

## 2020-05-27 ENCOUNTER — TELEPHONE (OUTPATIENT)
Dept: CARDIOLOGY | Facility: CLINIC | Age: 55
End: 2020-05-27

## 2020-05-27 ENCOUNTER — VIRTUAL VISIT (OUTPATIENT)
Dept: NEUROSURGERY | Facility: CLINIC | Age: 55
End: 2020-05-27
Attending: PSYCHIATRY & NEUROLOGY
Payer: COMMERCIAL

## 2020-05-27 DIAGNOSIS — I63.412 STROKE DUE TO EMBOLISM OF LEFT MIDDLE CEREBRAL ARTERY (H): Primary | ICD-10-CM

## 2020-05-27 DIAGNOSIS — Q21.12 PFO (PATENT FORAMEN OVALE): ICD-10-CM

## 2020-05-27 PROCEDURE — 99213 OFFICE O/P EST LOW 20 MIN: CPT | Mod: GT | Performed by: PSYCHIATRY & NEUROLOGY

## 2020-05-27 NOTE — LETTER
5/27/2020         RE: Rey Andre  [address] [address] [facility] [address]        Dear Colleague,    Thank you for referring your patient, Rey Andre, to the [facility]UROS[facility]GERY [facility]INIC. Please see a copy of my visit note below.    Chief Complaint: Follow-up stroke    Interim History:   Patient is doing well.  He is playing in a golf tournament in the near future.  He can walk three miles at a time.  Post-stroke fatigue is no longer an issue.    His 30 day event monitor did not show atrial fibrillation although he has had noticed prolonged palpitations in the past.    Interim History  He is currently wearing his 30 day event monitor, and will send it in next week.  He feels that he is doing well, with the exception of increased fatigue.  He reported the sensation of irregular heart beats (up to 10-15 minutes) to his cardiologist and they provided him an Eliquis script which he has filled.  He has not had any complications.  He notes his father also has atrial fibrillation.    History of Present Illness:   Rey Andre is an 54 year old male with DAMARIS on CPAP, hypothyroidism admitted on 3/5/2020 for evaluation of aphasia and right-sided weakness (he fell out of bed).  Head CT unremarkable, CTA with left M2 occlusion, CTP with significant mismatch.  He was enrolled in [facility] and then underwent endovascular thrombectomy with TICI 3 recanalization.  Post procedurally he did very well and his objective systems essentially resolved.  His work-up is summarized below:    Stroke Evaluation summarized:  MRI/Head CT: Small left frontal scattered infarcts  Intracranial Vascular Imaging: CTA head initially showed partially occlusive left superior division M2 branch thrombus  Cervical Carotid and Vertebral Artery Vascular Imaging: CTA neck: Vessels patent without hemodynamically significant stenosis  Echocardiogram: EF 60%, normal LV, normal left atrium, positive bubble  EKG/Telemetry: SR, +LAFB  LDL:  84  A1c: 5.5  Troponin: <0.015  Other testing: Not Applicable    Impression:   1. Embolic stroke of undetermined source s/p thrombectomy and enrollment in TIMELESS trial  2. DAMARIS    Plan:  1. No current evidence for Eliquis over Aspirin.  Empiric anticoagulation trials for Embolic stroke of undetermined source have been unrevealing and his left atrium is normal.  --Patient will get Aspirin 325mg and transition from Eliquis  2. Outpatient MORGAN pending  --if moderate/severe shunt or presence of atrial septal aneurysm would favor closure in a young patient with cryptogenic embolic stroke  3. LINQ monitor referral  --patient would like to know if it can be placed at time of MORGAN, referred patient to Cardiology re scheduling  --would rule out Afib with 1-2 months of rhythm monitoring prior to PFO closure  2. Continue Atorvastatin 40mg  --check lipid panel routine (ordered)  3. BP goal < 140/90, with tighter control associated with lower overall CV risk, if tolerated  4. CPAP for DAMARIS  5. Follow-up in 3 months      Stroke Education provided.  He will call us with any questions.  For any acute neurologic deficits he was advised to  go directly to the hospital rather than call the clinic.    Vitor Caldwell MD, MS  Neurology          ___________________________________________________________________    Past Medical History:   Diagnosis Date     Embolic stroke involving left middle cerebral artery (H) 03/2020     Hypothyroidism      Kidney stone     Recurent stones     DAMARIS on CPAP      Palpitations 3/6/2020       Current Outpatient Medications   Medication     acetaminophen (TYLENOL) 325 MG tablet     apixaban ANTICOAGULANT (ELIQUIS) 5 MG tablet     atorvastatin (LIPITOR) 40 MG tablet     Levothyroxine Sodium (SYNTHROID PO)     tolterodine (DETROL LA) 2 MG 24 hr capsule     No current facility-administered medications for this visit.        Social History     Tobacco Use     Smoking status: Never Smoker     Smokeless  "tobacco: Never Used   Substance Use Topics     Alcohol use: No     Drug use: No       Family History   Problem Relation Age of Onset     Other - See Comments Father         Possible Autoimmune disorder      Prostate Cancer Father        ROS: 10 point relevant ROS neg other than the symptoms noted above in the HPI.    There were no vitals taken for this visit.    General: Alert, cooperative  Lungs: Clear  Cardiac: No carotid bruits, RRR  Abdomen: soft  Integumentary: intact    MENTAL STATUS:  Alert, oriented x3.  Speech fluent with normal naming, repetition, comprehension.  No neglect. Good historian.  Registers 3/3 objects and recalls 3/3 objects after 3 minutes.     CRANIAL NERVES:  Pupils are equal, round, reactive to light.  Extraocular movements full.  Visual fields full.  Facial sensation, movement normal.  Palate moves symmetrically.  Tongue midline.  Sternocleidomastoid and trapezius strength intact.  Neck strength was normal.  Motor: 5/5, normal tone  Sensory: intact to light touch, temperature  Reflexes 2/4.  Babinski not present  Coordination: Good finger-nose-finger, fine finger movement, heel-shin maneuver  Gait:  Normal    NIHSS = 0  mRS = 0    Neuro Imaging: as per HPI      TELEMEDICINE CHARTING   Rey Andre is a 54 year old male who is being evaluated via a billable video visit.      The patient has been notified of following:     \"This video visit will be conducted via a call between you and your physician/provider. We have found that certain health care needs can be provided without the need for an in-person physical exam.  This service lets us provide the care you need with a video conversation.  If a prescription is necessary we can send it directly to your pharmacy.  If lab work is needed we can place an order for that and you can then stop by our lab to have the test done at a later time.    If during the course of the call the physician/provider feels a video visit is not appropriate, you " "will not be charged for this service.\"     Patient has given verbal consent for Video visit? Yes    Patient would like the video invitation sent by: Send to e-mail at: [email or web address]    Type of service:  Video Visit  Video Start Time: 2:00  Video End Time (time video stopped): 2:25    Originating Location (pt. Location): Home    Distant Location (provider location):  [facility] NEUROSURGERY [facility]     Mode of Communication:  Video Conference via [employer]          Again, thank you for allowing me to participate in the care of your patient.        Sincerely,        Vitor Caldwell MD    "

## 2020-05-27 NOTE — TELEPHONE ENCOUNTER
----- Message from Jeannine Bustillos RN sent at 5/27/2020 10:53 AM CDT -----  Regarding: Please review with Dr. Sanon- Thank you!    ----- Message -----  From: Jone Diaz APRN CNP  Sent: 5/27/2020   9:48 AM CDT  To: Jeannine Bustillos RN    This is a Dr. Sanon patient who was placed on my schedule for H and P for MORGAN    His Neurologist wants this--Dr. Garcia    The Neurology note also says if event recorder negative, do MORGAN AND implantable loop recorder    I do not see that Dr. Sanon commented on this and I am afraid that will be the expectation of the patient as Neurology told him they would try to set up both on the same day.      Event recorder was negative    Can you send this to Dr Sanon's team as they need to have a clear plan going forward     I would prefer he stay with Dr. Sanon's team for consistency    Jone Sanon messaged.

## 2020-05-27 NOTE — PROGRESS NOTES
Chief Complaint: Follow-up stroke    Interim History:   Patient is doing well.  He is playing in a golf tournament in the near future.  He can walk three miles at a time.  Post-stroke fatigue is no longer an issue.    His 30 day event monitor did not show atrial fibrillation although he has had noticed prolonged palpitations in the past.    Interim History  He is currently wearing his 30 day event monitor, and will send it in next week.  He feels that he is doing well, with the exception of increased fatigue.  He reported the sensation of irregular heart beats (up to 10-15 minutes) to his cardiologist and they provided him an Eliquis script which he has filled.  He has not had any complications.  He notes his father also has atrial fibrillation.    History of Present Illness:   Rey Andre is an 54 year old male with DAMARIS on CPAP, hypothyroidism admitted on 3/5/2020 for evaluation of aphasia and right-sided weakness (he fell out of bed).  Head CT unremarkable, CTA with left M2 occlusion, CTP with significant mismatch.  He was enrolled in [facility] and then underwent endovascular thrombectomy with TICI 3 recanalization.  Post procedurally he did very well and his objective systems essentially resolved.  His work-up is summarized below:    Stroke Evaluation summarized:  MRI/Head CT: Small left frontal scattered infarcts  Intracranial Vascular Imaging: CTA head initially showed partially occlusive left superior division M2 branch thrombus  Cervical Carotid and Vertebral Artery Vascular Imaging: CTA neck: Vessels patent without hemodynamically significant stenosis  Echocardiogram: EF 60%, normal LV, normal left atrium, positive bubble  EKG/Telemetry: SR, +LAFB  LDL: 84  A1c: 5.5  Troponin: <0.015  Other testing: Not Applicable    Impression:   1. Embolic stroke of undetermined source s/p thrombectomy and enrollment in TIMELESS trial  2. DAMARIS    Plan:  1. No current evidence for Eliquis over Aspirin.  Empiric  anticoagulation trials for Embolic stroke of undetermined source have been unrevealing and his left atrium is normal.  --Patient will get Aspirin 325mg and transition from Eliquis  2. Outpatient MORGAN pending  --if moderate/severe shunt or presence of atrial septal aneurysm would favor closure in a young patient with cryptogenic embolic stroke  3. LINQ monitor referral  --patient would like to know if it can be placed at time of MORGAN, referred patient to Cardiology re scheduling  --would rule out Afib with 1-2 months of rhythm monitoring prior to PFO closure  2. Continue Atorvastatin 40mg  --check lipid panel routine (ordered)  3. BP goal < 140/90, with tighter control associated with lower overall CV risk, if tolerated  4. CPAP for DAMARIS  5. Follow-up in 3 months      Stroke Education provided.  He will call us with any questions.  For any acute neurologic deficits he was advised to  go directly to the hospital rather than call the clinic.    Vitor Caldwell MD, MS  Neurology          ___________________________________________________________________    Past Medical History:   Diagnosis Date     Embolic stroke involving left middle cerebral artery (H) 03/2020     Hypothyroidism      Kidney stone     Recurent stones     DAMARIS on CPAP      Palpitations 3/6/2020       Current Outpatient Medications   Medication     acetaminophen (TYLENOL) 325 MG tablet     apixaban ANTICOAGULANT (ELIQUIS) 5 MG tablet     atorvastatin (LIPITOR) 40 MG tablet     Levothyroxine Sodium (SYNTHROID PO)     tolterodine (DETROL LA) 2 MG 24 hr capsule     No current facility-administered medications for this visit.        Social History     Tobacco Use     Smoking status: Never Smoker     Smokeless tobacco: Never Used   Substance Use Topics     Alcohol use: No     Drug use: No       Family History   Problem Relation Age of Onset     Other - See Comments Father         Possible Autoimmune disorder      Prostate Cancer Father        ROS: 10 point  "relevant ROS neg other than the symptoms noted above in the HPI.    There were no vitals taken for this visit.    General: Alert, cooperative  Lungs: Clear  Cardiac: No carotid bruits, RRR  Abdomen: soft  Integumentary: intact    MENTAL STATUS:  Alert, oriented x3.  Speech fluent with normal naming, repetition, comprehension.  No neglect. Good historian.  Registers 3/3 objects and recalls 3/3 objects after 3 minutes.     CRANIAL NERVES:  Pupils are equal, round, reactive to light.  Extraocular movements full.  Visual fields full.  Facial sensation, movement normal.  Palate moves symmetrically.  Tongue midline.  Sternocleidomastoid and trapezius strength intact.  Neck strength was normal.  Motor: 5/5, normal tone  Sensory: intact to light touch, temperature  Reflexes 2/4.  Babinski not present  Coordination: Good finger-nose-finger, fine finger movement, heel-shin maneuver  Gait:  Normal    NIHSS = 0  mRS = 0    Neuro Imaging: as per [facility]      TELEMEDICINE CHARTING   Rey Andre is a 54 year old male who is being evaluated via a billable video visit.      The patient has been notified of following:     \"This video visit will be conducted via a call between you and your physician/provider. We have found that certain health care needs can be provided without the need for an in-person physical exam.  This service lets us provide the care you need with a video conversation.  If a prescription is necessary we can send it directly to your pharmacy.  If lab work is needed we can place an order for that and you can then stop by our lab to have the test done at a later time.    If during the course of the call the physician/provider feels a video visit is not appropriate, you will not be charged for this service.\"     Patient has given verbal consent for Video visit? Yes    Patient would like the video invitation sent by: Send to e-mail at: [email or web address]    Type of service:  Video Visit  Video Start Time: 2:00  Video End " Time (time video stopped): 2:25    Originating Location (pt. Location): Home    Distant Location (provider location):  [facility] NEUROSURGERY [facility]     Mode of Communication:  Video Conference via [facility]

## 2020-05-28 ENCOUNTER — DOCUMENTATION ONLY (OUTPATIENT)
Dept: CARE COORDINATION | Facility: CLINIC | Age: 55
End: 2020-05-28

## 2020-05-28 NOTE — TELEPHONE ENCOUNTER
5/28/2020 reply from Dr. Sanon:  As the cardiology team, we decide whether a loop recorder is needed or not.  Patient is already on Eliquis, and a loop recorder is not needed.  He has not had any syncope just a possible cardioembolic stroke for which he is covered.    1.  Please proceed with H&P for MORGAN as scheduled.  2.  I do not think an implantable loop recorder is needed.  In any case, it will not happen on the same day.  Happy to talk to Dr. Garcia directly if he thinks it is needed.    Thank you.    Dr. Sanon     Called patient to review Dr. Sanon's reply. He is aware that she is not ordering an implantable loop recorder for him at this time.

## 2020-05-28 NOTE — TELEPHONE ENCOUNTER
MEDICAL RECORDS REQUEST   [facility]  Urology Clinic  [address] [place] [address]  PHONE: [phone]  Fax: [phone]        FUTURE VISIT INFORMATION                                                   Rey Andre, : 1965 scheduled for future visit at [facility]    APPOINTMENT INFORMATION:    Date: 6/15/20 8:45AM    Provider:  Baudilio Bustamante MD    Reason for Visit/Diagnosis: Elevated PSA    REFERRAL INFORMATION:    Referring provider:  Self    Specialty: N/A    Referring providers clinic:  N/A    Clinic contact number:  N/A    RECORDS REQUESTED FOR VISIT                                                     NOTES  STATUS/DETAILS   OFFICE NOTE from referring provider  no   OFFICE NOTE from other specialist  yes   DISCHARGE SUMMARY from hospital  no   DISCHARGE REPORT from the ER  no   OPERATIVE REPORT  yes   MEDICATION LIST  yes   LABS     URINALYSIS (UA)/ PSA   yes     PRE-VISIT CHECKLIST      Record collection complete Yes- Internal recs in CE    Appointment appropriately scheduled           (right time/right provider) Yes   MyChart activation Yes   Questionnaire complete If no, please explain: In process      Completed by: Dorothy Almanza

## 2020-06-02 ENCOUNTER — VIRTUAL VISIT (OUTPATIENT)
Dept: CARDIOLOGY | Facility: CLINIC | Age: 55
End: 2020-06-02
Payer: COMMERCIAL

## 2020-06-02 ENCOUNTER — DOCUMENTATION ONLY (OUTPATIENT)
Dept: CARDIOLOGY | Facility: CLINIC | Age: 55
End: 2020-06-02

## 2020-06-02 VITALS — BODY MASS INDEX: 35 KG/M2 | WEIGHT: 250 LBS | HEIGHT: 71 IN

## 2020-06-02 DIAGNOSIS — I63.412 CEREBROVASCULAR ACCIDENT (CVA) DUE TO EMBOLISM OF LEFT MIDDLE CEREBRAL ARTERY (H): ICD-10-CM

## 2020-06-02 DIAGNOSIS — Z13.220 SCREENING CHOLESTEROL LEVEL: Primary | ICD-10-CM

## 2020-06-02 DIAGNOSIS — R00.2 PALPITATIONS: ICD-10-CM

## 2020-06-02 DIAGNOSIS — I48.0 PAROXYSMAL ATRIAL FIBRILLATION (H): Primary | ICD-10-CM

## 2020-06-02 PROCEDURE — 99214 OFFICE O/P EST MOD 30 MIN: CPT | Mod: GT | Performed by: NURSE PRACTITIONER

## 2020-06-02 RX ORDER — ASPIRIN 325 MG
325 TABLET ORAL DAILY
Status: DISCONTINUED | OUTPATIENT
Start: 2020-06-02 | End: 2020-06-02

## 2020-06-02 RX ORDER — ASPIRIN 325 MG
325 TABLET ORAL DAILY
COMMUNITY
Start: 2020-06-02 | End: 2021-04-15

## 2020-06-02 ASSESSMENT — MIFFLIN-ST. JEOR: SCORE: 1986.12

## 2020-06-02 NOTE — LETTER
"6/2/2020    Chava Collado DO  [facility] [address]  [facility] [address]    RE: Rey Andre       Dear Colleague,    I had the pleasure of seeing Rey Andre in the [facility].    Rey Andre is a 54 year old male who is being evaluated via a billable video visit.        Rey \"Phillip\"Thai is a 54 year old gentleman who is having this video assessment today for an upcoming MORGAN. He has been seen by Dr. Sanon for a history of embolic stroke for which he was admitted from 3-5-2020 to 3-7-2020. He suffered right sided hemiparesis. MRI of the brain showed scattered acute infarcts in the left frontal, parietal, and occipital lobes. Carotids were patent but he had partially occlusive thrombus in the left M2 branch of the middle cerebral artery, likely thromboembolic.    He underwent lytic therapy with embolectomy with improvement in his speech and motor deficits and no residual.    He was in sinus rhythm during his admission. He reported a history of palpitations to Dr Sanon lasting 10-15 minutes.Event recorder as an OP showed no arrhythmias.  His transthoracic echocardiogram showed EF 60-65% with no significant valvular disease. Bubble study was reportedly positive then, Dr. Sanon felt this was not apparent on the review of the images. I do not see a hypercoag panel post stroke.    His Aortic root was estimated at 4.5cm with a normal ascending aorta. MORGAN in March was postponed due to COVID pandemic.     He completed a course of DAPT with Plavix and ASA and then was given Eliquis. When the event recorder as negative, there are discsussion that he will be transitioned to Aspirin alone.    His Neurologist is .     He has treated sleep apnea; He was continued on Atorvastatin 40mg daily. LDL was 84 HDL 40 ; he is due for lipid panel.    He has no history of dysphagia. He has treated sleep apnea.    General Appearance:    no distress, normal body habitus, " upright.    ENT/Mouth:    membranes moist, no nasal discharge or bleeding gums. Normal head shape, no evidence of injury or laceration.    EYES:    no scleral icterus, normal conjunctivae    Neck:    no evidence of thyromegaly.     Chest/Lungs:    No audible wheezing equal chest wall expansion. Non labored breathing. No cough.    Cardiovascular:    No evidence of elevated jugular venous pressure. No evidence of pitting edema bilaterally    Abdomen:    no evidence of abdominal distention. No observed jaundice.    Extremities:    no cyanosis or clubbing noted.    Skin:    no xanthelasma, normal skin collar. No evidence of facial lacerations.    Neurologic:    Normal arm motion bilateral, no tremors. No evidence of focal defect.    Psychiatric:    alert and oriented x3, calm    Impression/Plan:    1. Stroke, likely embolic  -question of positive bubble study on TTE  -now to undergo MORGAN  -preadmission COVID test scheduled 6-3  Risks of MORGAN have been reviewed including reactions to sedation, esophageal trauma or tear and dental trauma  -no history of dysphagia or other esophageal issues  -results to Dr. Sanon to decide plan from here  -consider hypercoag panel    2. DAMARIS-treated    3. Dyslipidemia  On lipitor 40mg daily  Due for a lipid panel  Will add to check in at hospital    4. No documented evidence of atrial fibrillaiton    5. Hypothyroidism-treated    6. Dilated aortic root to 4.5cm--reassess at MORGAN if possible  7. Elevated PSA--follow up planned with Urology      Thank you for allowing me to participate in the care of your patient.    Sincerely,     CASSANDRA Portillo [facility]

## 2020-06-02 NOTE — PROGRESS NOTES
This patient has MORGAN on Friday    We need to watch for covid test being done today    And can you add a lipid panel to his check in orders under dr Sanon  Neurology wants it checked and he will be fasting    Also watch for the MORGAN results so Dr. Sanon can decide the plan once the results are in

## 2020-06-02 NOTE — PROGRESS NOTES
"Rey Andre is a 54 year old male who is being evaluated via a billable video visit.      The patient has been notified of following:     \"This video visit will be conducted via a call between you and your physician/provider. We have found that certain health care needs can be provided without the need for an in-person physical exam.  This service lets us provide the care you need with a video conversation.  If a prescription is necessary we can send it directly to your pharmacy.  If lab work is needed we can place an order for that and you can then stop by our lab to have the test done at a later time.    Video visits are billed at different rates depending on your insurance coverage.  Please reach out to your insurance provider with any questions.    If during the course of the call the physician/provider feels a video visit is not appropriate, you will not be charged for this service.\"    Patient has given verbal consent for Video visit? Yes    How would you like to obtain your AVS? GisselleV[place]    Patient would like the video invitation sent by: Text to cell phone: [phone]    Will anyone else be joining your video visit? No       Review Of Systems  Skin: NEGATIVE  Eyes:Ears/Nose/Throat: wears readers  Respiratory: sleep apnea wears CPAP  Cardiovascular:NEGATIVE  Gastrointestinal: NEGATIVE  Genitourinary:NEGATIVE  Musculoskeletal: NEGATIVE  Neurologic: NEGATIVE  Psychiatric: NEGATIVE  Hematologic/Lymphatic/Immunologic: NEGATIVE  Endocrine:  Thyroid disorder  Vitals today are:  Weight   250.0 lb  ZION Granado    Video-Visit Details    History of Present Illness:    Rey \"Phillip\" Thai is a 54 year old gentleman who is having this video assessment today for an upcoming MORGAN. He has been seen by Dr. Sanon for a history of embolic stroke for which he was admitted from 3-5-2020 to 3-7-2020. He suffered right sided hemiparesis. MRI of the brain showed scattered acute infarcts in the left frontal, parietal, " and occipital lobes. Carotids were patent but he had partially occlusive thrombus in the left M2 branch of the middle cerebral artery, likely thromboembolic.    He underwent lytic therapy with embolectomy with improvement in his speech and motor deficits and no residual.    He was in sinus rhythm during his admission. He reported a history of palpitations to Dr Sanon lasting 10-15 minutes.Event recorder as an OP showed no arrhythmias.  His transthoracic echocardiogram showed EF 60-65% with no significant valvular disease. Bubble study was reportedly positive then, Dr. Sanon felt this was not apparent on the review of the images. I do not see a hypercoag panel post stroke.    His Aortic root was estimated at 4.5cm with a normal ascending aorta. MORGAN in March was postponed due to COVID pandemic.     He completed a course of DAPT with Plavix and ASA and then was given Eliquis. When the event recorder as negative, there are discsussion that he will be transitioned to Aspirin alone.    His Neurologist is .     He has treated sleep apnea; He was continued on Atorvastatin 40mg daily. LDL was 84 HDL 40 ; he is due for lipid panel.    He has no history of dysphagia. He has treated sleep apnea.    General Appearance:    no distress, normal body habitus, upright.    ENT/Mouth:    membranes moist, no nasal discharge or bleeding gums. Normal head shape, no evidence of injury or laceration.    EYES:    no scleral icterus, normal conjunctivae    Neck:    no evidence of thyromegaly.     Chest/Lungs:    No audible wheezing equal chest wall expansion. Non labored breathing. No cough.    Cardiovascular:    No evidence of elevated jugular venous pressure. No evidence of pitting edema bilaterally    Abdomen:    no evidence of abdominal distention. No observed jaundice.    Extremities:    no cyanosis or clubbing noted.    Skin:    no xanthelasma, normal skin collar. No evidence of facial lacerations.    Neurologic:    Normal  arm motion bilateral, no tremors. No evidence of focal defect.    Psychiatric:    alert and oriented x3, calm    Impression/Plan:    1. Stroke, likely embolic  -question of positive bubble study on TTE  -now to undergo MORGAN  -preadmission COVID test scheduled 6-3  Risks of MORGAN have been reviewed including reactions to sedation, esophageal trauma or tear and dental trauma  -no history of dysphagia or other esophageal issues  -results to Dr. Sanon to decide plan from here  -consider hypercoag panel    2. DAMARIS-treated    3. Dyslipidemia  On lipitor 40mg daily  Due for a lipid panel  Will add to check in at hospital    4. No documented evidence of atrial fibrillaiton    5. Hypothyroidism-treated    6. Dilated aortic root to 4.5cm--reassess at MORGAN if possible  7. Elevated PSA--follow up planned with Urology    Type of service:  Video Visit    Video Start Time: 0819am  Video End Time: 0844am    Originating Location (pt. Location): Home    Distant Location (provider location):  HOME  Platform used for Video Visit: CASSANDRA Rodriguez CNP

## 2020-06-02 NOTE — PATIENT INSTRUCTIONS
We will check a lipid panel at your MORGAN test    Dr. Sanon will get your MORGAN results and let you know the plan from here    IF you want a PSA test done, call your urologist to have that added

## 2020-06-02 NOTE — PROGRESS NOTES
Order placed for flp/alt.  Called [facility] - they do not know how to add a lab to the MORGAN care - suggested adding to the MORGAN encounter.  Called [facility] lab team and scheduled a lab draw in [facility] prior to the MORGAN.

## 2020-06-03 ENCOUNTER — DOCUMENTATION ONLY (OUTPATIENT)
Dept: CARDIOLOGY | Facility: CLINIC | Age: 55
End: 2020-06-03

## 2020-06-03 DIAGNOSIS — Z20.822 SUSPECTED COVID-19 VIRUS INFECTION: Primary | ICD-10-CM

## 2020-06-03 PROCEDURE — 87635 SARS-COV-2 COVID-19 AMP PRB: CPT | Mod: 90 | Performed by: FAMILY MEDICINE

## 2020-06-03 PROCEDURE — 99000 SPECIMEN HANDLING OFFICE-LAB: CPT | Performed by: FAMILY MEDICINE

## 2020-06-03 PROCEDURE — 99207 ZZC NO BILLABLE SERVICE THIS VISIT: CPT

## 2020-06-03 NOTE — PROGRESS NOTES
Attempted to contact patient to review pre-procedures and COVID-19 screenings for MORGAN on 6/5/2020. Left message for patient to call back.    6/4/2020: Patient is scheduled for MORGAN on 6/5/2020, arriving at 0630 for procedure at 0830. Patient is not diabetic. Patient has no known swallowing disorder or history of esophageal bleeding varices. Patient is aware to be NPO for 6 hours except for medications. Patient has arranged for transportation after the procedure.    Travel screening completed.  COVID-19 testing done 6/3/2020 at 3:00 PM  Reviewed with patient to wear a mask to arrive at the hospital.  Reminded patient of no visitor policy.  Flp/alt to be drawn in [facility] during MORGAN prep.    WELLNESS SCREENING TOOL  Symptom screening  Do you have one of the following NEW symptoms:       Fever (subjective or >100.0)? no     New cough: no     Shortness of breath  no      Chills? no      New loss of teste or smell? no     Generalized body aches? no     New persistent headache? no     New sore throat? no        Nausea, vomiting or diarrhea?  no        Within the past 3 weeks, have you been exposed to someone with a known positive illness below? no       COVID - 19 (known or suspected) no     Pertussis? no      Chicken pox? non      Measles? non     Patient notified of visitor restriction: yes    Patient informed to wear mask? yes    Patient's appointment status: Patient will be seen as scheduled on 6/5/2020

## 2020-06-04 LAB
SARS-COV-2 RNA SPEC QL NAA+PROBE: NOT DETECTED
SPECIMEN SOURCE: NORMAL

## 2020-06-05 ENCOUNTER — HOSPITAL ENCOUNTER (OUTPATIENT)
Facility: CLINIC | Age: 55
Discharge: HOME | End: 2020-06-05
Attending: INTERNAL MEDICINE | Admitting: INTERNAL MEDICINE
Payer: COMMERCIAL

## 2020-06-05 ENCOUNTER — HOSPITAL ENCOUNTER (OUTPATIENT)
Dept: LAB | Facility: CLINIC | Age: 55
End: 2020-06-05
Attending: NURSE PRACTITIONER | Admitting: INTERNAL MEDICINE
Payer: COMMERCIAL

## 2020-06-05 ENCOUNTER — HOSPITAL ENCOUNTER (OUTPATIENT)
Dept: CARDIOLOGY | Facility: CLINIC | Age: 55
End: 2020-06-05
Attending: PSYCHIATRY & NEUROLOGY | Admitting: INTERNAL MEDICINE
Payer: COMMERCIAL

## 2020-06-05 VITALS
TEMPERATURE: 97.9 F | HEART RATE: 80 BPM | BODY MASS INDEX: 37.31 KG/M2 | SYSTOLIC BLOOD PRESSURE: 132 MMHG | HEIGHT: 71 IN | DIASTOLIC BLOOD PRESSURE: 89 MMHG | OXYGEN SATURATION: 93 % | WEIGHT: 266.5 LBS | RESPIRATION RATE: 21 BRPM

## 2020-06-05 DIAGNOSIS — Q21.12 PFO (PATENT FORAMEN OVALE): Primary | ICD-10-CM

## 2020-06-05 DIAGNOSIS — Z13.220 SCREENING CHOLESTEROL LEVEL: ICD-10-CM

## 2020-06-05 DIAGNOSIS — I63.412 EMBOLIC STROKE INVOLVING LEFT MIDDLE CEREBRAL ARTERY (H): ICD-10-CM

## 2020-06-05 LAB
ALT SERPL W P-5'-P-CCNC: 48 U/L (ref 0–70)
CHOLEST SERPL-MCNC: 105 MG/DL
HDLC SERPL-MCNC: 41 MG/DL
LDLC SERPL CALC-MCNC: 43 MG/DL
NONHDLC SERPL-MCNC: 64 MG/DL
TRIGL SERPL-MCNC: 106 MG/DL

## 2020-06-05 PROCEDURE — 84460 ALANINE AMINO (ALT) (SGPT): CPT | Performed by: NURSE PRACTITIONER

## 2020-06-05 PROCEDURE — 93325 DOPPLER ECHO COLOR FLOW MAPG: CPT

## 2020-06-05 PROCEDURE — 25800030 ZZH RX IP 258 OP 636: Performed by: INTERNAL MEDICINE

## 2020-06-05 PROCEDURE — 93312 ECHO TRANSESOPHAGEAL: CPT | Mod: 26 | Performed by: INTERNAL MEDICINE

## 2020-06-05 PROCEDURE — 25000125 ZZHC RX 250: Performed by: INTERNAL MEDICINE

## 2020-06-05 PROCEDURE — 40000235 ZZH STATISTIC TELEMETRY

## 2020-06-05 PROCEDURE — 25000128 H RX IP 250 OP 636: Performed by: INTERNAL MEDICINE

## 2020-06-05 PROCEDURE — 40000857 ZZH STATISTIC TEE INCLUDES SEDATION

## 2020-06-05 PROCEDURE — 36415 COLL VENOUS BLD VENIPUNCTURE: CPT

## 2020-06-05 PROCEDURE — 93320 DOPPLER ECHO COMPLETE: CPT | Mod: 26 | Performed by: INTERNAL MEDICINE

## 2020-06-05 PROCEDURE — 93325 DOPPLER ECHO COLOR FLOW MAPG: CPT | Mod: 26 | Performed by: INTERNAL MEDICINE

## 2020-06-05 PROCEDURE — 80061 LIPID PANEL: CPT | Performed by: NURSE PRACTITIONER

## 2020-06-05 RX ORDER — FLUMAZENIL 0.1 MG/ML
0.2 INJECTION, SOLUTION INTRAVENOUS
Status: DISCONTINUED | OUTPATIENT
Start: 2020-06-05 | End: 2020-06-05 | Stop reason: HOSPADM

## 2020-06-05 RX ORDER — SODIUM CHLORIDE 9 MG/ML
INJECTION, SOLUTION INTRAVENOUS CONTINUOUS PRN
Status: DISCONTINUED | OUTPATIENT
Start: 2020-06-05 | End: 2020-06-05

## 2020-06-05 RX ORDER — NALOXONE HYDROCHLORIDE 0.4 MG/ML
.1-.4 INJECTION, SOLUTION INTRAMUSCULAR; INTRAVENOUS; SUBCUTANEOUS
Status: DISCONTINUED | OUTPATIENT
Start: 2020-06-05 | End: 2020-06-05 | Stop reason: HOSPADM

## 2020-06-05 RX ORDER — FLUMAZENIL 0.1 MG/ML
0.2 INJECTION, SOLUTION INTRAVENOUS
Status: DISCONTINUED | OUTPATIENT
Start: 2020-06-05 | End: 2020-06-05

## 2020-06-05 RX ORDER — SODIUM CHLORIDE 9 MG/ML
INJECTION, SOLUTION INTRAVENOUS CONTINUOUS PRN
Status: DISCONTINUED | OUTPATIENT
Start: 2020-06-05 | End: 2020-06-05 | Stop reason: HOSPADM

## 2020-06-05 RX ORDER — FENTANYL CITRATE 50 UG/ML
50 INJECTION, SOLUTION INTRAMUSCULAR; INTRAVENOUS ONCE
Status: DISCONTINUED | OUTPATIENT
Start: 2020-06-05 | End: 2020-06-05 | Stop reason: HOSPADM

## 2020-06-05 RX ORDER — LIDOCAINE 40 MG/G
CREAM TOPICAL
Status: DISCONTINUED | OUTPATIENT
Start: 2020-06-05 | End: 2020-06-05 | Stop reason: HOSPADM

## 2020-06-05 RX ORDER — LIDOCAINE HYDROCHLORIDE 40 MG/ML
1.5 SOLUTION TOPICAL ONCE
Status: DISCONTINUED | OUTPATIENT
Start: 2020-06-05 | End: 2020-06-05

## 2020-06-05 RX ORDER — FENTANYL CITRATE 50 UG/ML
25 INJECTION, SOLUTION INTRAMUSCULAR; INTRAVENOUS
Status: DISCONTINUED | OUTPATIENT
Start: 2020-06-05 | End: 2020-06-05 | Stop reason: HOSPADM

## 2020-06-05 RX ORDER — GLYCOPYRROLATE 0.2 MG/ML
0.1 INJECTION, SOLUTION INTRAMUSCULAR; INTRAVENOUS ONCE
Status: DISCONTINUED | OUTPATIENT
Start: 2020-06-05 | End: 2020-06-05

## 2020-06-05 RX ORDER — LIDOCAINE 40 MG/G
CREAM TOPICAL
Status: DISCONTINUED | OUTPATIENT
Start: 2020-06-05 | End: 2020-06-05

## 2020-06-05 RX ORDER — NALOXONE HYDROCHLORIDE 0.4 MG/ML
.1-.4 INJECTION, SOLUTION INTRAMUSCULAR; INTRAVENOUS; SUBCUTANEOUS
Status: DISCONTINUED | OUTPATIENT
Start: 2020-06-05 | End: 2020-06-05

## 2020-06-05 RX ORDER — DEXTROSE MONOHYDRATE 25 G/50ML
9.5 INJECTION, SOLUTION INTRAVENOUS
Status: DISCONTINUED | OUTPATIENT
Start: 2020-06-05 | End: 2020-06-05

## 2020-06-05 RX ORDER — GLYCOPYRROLATE 0.2 MG/ML
0.1 INJECTION, SOLUTION INTRAMUSCULAR; INTRAVENOUS ONCE
Status: COMPLETED | OUTPATIENT
Start: 2020-06-05 | End: 2020-06-05

## 2020-06-05 RX ORDER — FENTANYL CITRATE 50 UG/ML
50 INJECTION, SOLUTION INTRAMUSCULAR; INTRAVENOUS ONCE
Status: COMPLETED | OUTPATIENT
Start: 2020-06-05 | End: 2020-06-05

## 2020-06-05 RX ORDER — LIDOCAINE 50 MG/G
OINTMENT TOPICAL ONCE
Status: DISCONTINUED | OUTPATIENT
Start: 2020-06-05 | End: 2020-06-05

## 2020-06-05 RX ORDER — LIDOCAINE 50 MG/G
OINTMENT TOPICAL ONCE
Status: COMPLETED | OUTPATIENT
Start: 2020-06-05 | End: 2020-06-05

## 2020-06-05 RX ORDER — LIDOCAINE HYDROCHLORIDE 40 MG/ML
1.5 SOLUTION TOPICAL ONCE
Status: COMPLETED | OUTPATIENT
Start: 2020-06-05 | End: 2020-06-05

## 2020-06-05 RX ORDER — FENTANYL CITRATE 50 UG/ML
25 INJECTION, SOLUTION INTRAMUSCULAR; INTRAVENOUS
Status: DISCONTINUED | OUTPATIENT
Start: 2020-06-05 | End: 2020-06-05

## 2020-06-05 RX ORDER — DEXTROSE MONOHYDRATE 25 G/50ML
9.5 INJECTION, SOLUTION INTRAVENOUS
Status: DISCONTINUED | OUTPATIENT
Start: 2020-06-05 | End: 2020-06-05 | Stop reason: HOSPADM

## 2020-06-05 RX ADMIN — MIDAZOLAM 1 MG: 1 INJECTION INTRAMUSCULAR; INTRAVENOUS at 09:18

## 2020-06-05 RX ADMIN — GLYCOPYRROLATE 0.1 MG: 0.2 INJECTION, SOLUTION INTRAMUSCULAR; INTRAVENOUS at 08:22

## 2020-06-05 RX ADMIN — TOPICAL ANESTHETIC 1 ML: 200 SPRAY DENTAL; PERIODONTAL at 08:50

## 2020-06-05 RX ADMIN — MIDAZOLAM 1 MG: 1 INJECTION INTRAMUSCULAR; INTRAVENOUS at 08:56

## 2020-06-05 RX ADMIN — LIDOCAINE HYDROCHLORIDE 1.5 ML: 40 SOLUTION TOPICAL at 08:14

## 2020-06-05 RX ADMIN — MIDAZOLAM 2 MG: 1 INJECTION INTRAMUSCULAR; INTRAVENOUS at 09:52

## 2020-06-05 RX ADMIN — FENTANYL CITRATE 100 MCG: 0.05 INJECTION, SOLUTION INTRAMUSCULAR; INTRAVENOUS at 08:50

## 2020-06-05 RX ADMIN — SODIUM CHLORIDE: 9 INJECTION, SOLUTION INTRAVENOUS at 07:30

## 2020-06-05 RX ADMIN — MIDAZOLAM 0.5 MG: 1 INJECTION INTRAMUSCULAR; INTRAVENOUS at 08:59

## 2020-06-05 ASSESSMENT — MIFFLIN-ST. JEOR: SCORE: 2060.97

## 2020-06-05 NOTE — PROCEDURES
Kishore done  No complications  Aneurysmal septum with small + bubble pfo  Wife called with results

## 2020-06-05 NOTE — PROGRESS NOTES
[facility] Admission Nursing Note    Patient Information  Name: Rey Andre  Age: 54 year old  Reason for admission: MORGAN  [facility] arrival time: 0700    Patient Admission/Assessment   Pre-procedure assessment complete: Yes  If abnormal assessment/labs, provider notified: N/A  NPO: Yes  Medications held per instructions/orders: Yes  Consent: deferred  If applicable, pregnancy test status: deferred  Patient oriented to room: Yes  Education/questions answered: Yes  Plan- proceed as planned    Discharge Planning  Accompanied by: self  Discharge name/phone number: Marcela [phone]  Overnight post sedation caregiver: marcela  Discharge location: home    Ashish Wallace RN

## 2020-06-05 NOTE — DISCHARGE INSTRUCTIONS
MORGAN  (Transesophageal Echocardiogram)  Discharge Instructions    After you go home:      Have an adult stay with you for 6 hours.       For 24 hours - due to the sedation you received:    Relax and take it easy.    Do NOT make any important or legal decisions.    Do NOT drive or operate machines at home or at work.    Do NOT drink alcohol.    Diet:    You may resume your normal diet, but no scratchy foods for two days.    If your throat is sore, eat cold, bland or soft foods.    You may have heartburn if the tube used in the exam entered your stomach.  If so:   - Do not eat acidic and spicy foods.   - Do not eat three hours before bedtime. Clear liquids are okay.   - When lying down, use two pillows to raise your head.    Medicines:      Take your medications, including blood thinners, unless your provider tells you not to.    If you have stopped any medicines, check with your provider about when to restart them.    You may take Tylenol (Acetaminophen) if your throat is sore.    You may take antacids if you have heartburn.      Follow Up Appointments:      Follow up with your cardiologist at [facility] of patient preference as instructed.    Follow up with your primary care provider as needed.    Call the clinic if:      You have heartburn that is severe or lasts more than 72 hours.    You have a sore throat that feels worse after 72 hours.    You have shortness of breath, neck pain, chest pain, fever, chills, coughing up blood, or other unusual signs.    Questions or concerns      [facility] [place]:    [phone] [facility] (7 days a week)

## 2020-06-05 NOTE — PROGRESS NOTES
[facility] Discharge Nursing Note    Patient Information  Name: Rey Andre  Age: 54 year old    Discharge Education:  Discharge instructions reviewed: Yes  Additional education/resources provided:   Patient/patient representative verbalizes understanding: Yes  Patient discharging on new medications: N/A  Medication education completed: N/A    Discharge Plans:   Discharge location: home  Discharge ride contacted: Yes  Approximate discharge time:     Discharge Criteria:  Discharge criteria met and vital signs stable: Yes    Patient Belongs:  Patient belongings returned to patient: Yes    Roma Arechiga RN

## 2020-06-05 NOTE — PROGRESS NOTES
[facility] Procedure Nursing Note    Patient Information  Name: Rey Andre  Age: 54 year old    Procedure  Procedure: MORGAN  Procedure start time: 0852  Procedure complete time: 0924  Concerns/abnormal assessment: none  If abnormal assessment, provider notified: N/A  Plan/Other: post care as ordered , pt sleepy.  Dr Alvarenga called wife and updated.  VSS     Ashish Wallace RN

## 2020-06-09 ENCOUNTER — PRE VISIT (OUTPATIENT)
Dept: UROLOGY | Facility: CLINIC | Age: 55
End: 2020-06-09

## 2020-06-09 NOTE — TELEPHONE ENCOUNTER
Chief Complaint : Consult    Hx/Sx: Elevated PSA    Records/Orders: PSA available    Pt Contacted: N/a    At Rooming: Torrey Claudio EMT

## 2020-06-15 ENCOUNTER — VIRTUAL VISIT (OUTPATIENT)
Dept: UROLOGY | Facility: CLINIC | Age: 55
End: 2020-06-15
Payer: COMMERCIAL

## 2020-06-15 ENCOUNTER — PRE VISIT (OUTPATIENT)
Dept: UROLOGY | Facility: CLINIC | Age: 55
End: 2020-06-15

## 2020-06-15 DIAGNOSIS — R97.20 ELEVATED PROSTATE SPECIFIC ANTIGEN (PSA): Primary | ICD-10-CM

## 2020-06-15 ASSESSMENT — PAIN SCALES - GENERAL: PAINLEVEL: NO PAIN (0)

## 2020-06-15 NOTE — PROGRESS NOTES
"Rey Andre is a 54 year old male who is being evaluated via a billable video visit.      The patient has been notified of following:     \"This video visit will be conducted via a call between you and your physician/provider. We have found that certain health care needs can be provided without the need for an in-person physical exam.  This service lets us provide the care you need with a video conversation.  If a prescription is necessary we can send it directly to your pharmacy.  If lab work is needed we can place an order for that and you can then stop by our lab to have the test done at a later time.    Video visits are billed at different rates depending on your insurance coverage.  Please reach out to your insurance provider with any questions.    If during the course of the call the physician/provider feels a video visit is not appropriate, you will not be charged for this service.\"    Patient has given verbal consent for Video visit? Yes    How would you like to obtain your AVS? Mail a copy    Will anyone else be joining your video visit? No          Chief Complaint:   Elevated PSA         Consult or Referral:     Mr. Rey Andre is a 54 year old male evaluated at the request of Dr. Collado         History of Present Illness:    Rey Andre is a very pleasant 54 year old male who presents with a history of elevated PSA. This was noted to be 4.34 at recent physical exam. No significant urinary issues. No hematuria or dysuria. Has not had any previous prostate workup or prostate medications.     Family history is significant for prostate cancer in his father and grandfather. Urologic history significant for stones, last treated in 2017. Of note, he had a recent stroke. Has not had sequelae from this, but continues workup.         Past Medical History:     Past Medical History:   Diagnosis Date     Embolic stroke involving left middle cerebral artery (H) 03/2020     Hypothyroidism      Kidney stone  "    Recurent stones     DAMARIS on CPAP      Palpitations 3/6/2020          Past Surgical History:     Past Surgical History:   Procedure Laterality Date     CYSTOSCOPY       CYSTOSCOPY, RETROGRADES, COMBINED  5/24/2014    Procedure: COMBINED CYSTOSCOPY, RETROGRADES;  Surgeon: Francisco J Lerma MD;  Location: [facility] OR     ENT SURGERY      T & A     IR CAROTID CEREBRAL ANGIOGRAM LEFT  3/5/2020     LASER HOLMIUM LITHOTRIPSY URETER(S), INSERT STENT, COMBINED  7/27/2012    Procedure: COMBINED CYSTOSCOPY, URETEROSCOPY, LASER HOLMIUM LITHOTRIPSY URETER(S), INSERT STENT;  Video cystoscopy, Right Retrograde Right Ureteroscopy with Holmium Laser, Stone Extraction,  JJ Stent Placement ;  Surgeon: Francisco J Lerma MD;  Location: [facility] OR     LASER HOLMIUM LITHOTRIPSY URETER(S), INSERT STENT, COMBINED  5/24/2014    Procedure: COMBINED CYSTOSCOPY, URETEROSCOPY, LASER HOLMIUM LITHOTRIPSY URETER(S), INSERT STENT;  Surgeon: Francisco J Lerma MD;  Location: [facility] OR     LASER HOLMIUM LITHOTRIPSY URETER(S), INSERT STENT, COMBINED Right 3/5/2017    Procedure: COMBINED CYSTOSCOPY, URETEROSCOPY, LASER HOLMIUM LITHOTRIPSY URETER(S), INSERT STENT;  Surgeon: Chris Barrios MD;  Location:  OR     ROTATOR CUFF REPAIR RT/LT            Medications     Current Outpatient Medications   Medication     acetaminophen (TYLENOL) 325 MG tablet     aspirin (ASA) 325 MG tablet     atorvastatin (LIPITOR) 40 MG tablet     Levothyroxine Sodium (SYNTHROID PO)     apixaban ANTICOAGULANT (ELIQUIS ANTICOAGULANT) 5 MG tablet     tolterodine (DETROL LA) 2 MG 24 hr capsule     No current facility-administered medications for this visit.           Family History:     Family History   Problem Relation Age of Onset     Other - See Comments Father         Possible Autoimmune disorder      Prostate Cancer Father           Social History:     Social History     Socioeconomic History     Marital status:      Spouse name: Not on file     Number of  children: 3     Years of education: Not on file     Highest education level: Not on file   Occupational History     Occupation: Golf Pro   Social Needs     Financial resource strain: Not on file     Food insecurity     Worry: Not on file     Inability: Not on file     Transportation needs     Medical: Not on file     Non-medical: Not on file   Tobacco Use     Smoking status: Never Smoker     Smokeless tobacco: Never Used   Substance and Sexual Activity     Alcohol use: No     Drug use: No     Sexual activity: Not on file   Lifestyle     Physical activity     Days per week: Not on file     Minutes per session: Not on file     Stress: Not on file   Relationships     Social connections     Talks on phone: Not on file     Gets together: Not on file     Attends Bahai service: Not on file     Active member of club or organization: Not on file     Attends meetings of clubs or organizations: Not on file     Relationship status: Not on file     Intimate partner violence     Fear of current or ex partner: Not on file     Emotionally abused: Not on file     Physically abused: Not on file     Forced sexual activity: Not on file   Other Topics Concern     Parent/sibling w/ CABG, MI or angioplasty before 65F 55M? 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 (last updated 3/5/2020)           Allergies:   No known drug allergies         Review of Systems:  From intake questionnaire     Skin: negative  Eyes: negative  Ears/Nose/Throat: negative  Respiratory: No shortness of breath, dyspnea on exertion, cough, or hemoptysis  Cardiovascular: No chest pain or palpitations  Gastrointestinal: negative; no nausea/vomiting, constipation or diarrhea  Genitourinary: as per HPI  Musculoskeletal: negative  Neurologic: negative  Psychiatric: negative  Hematologic/Lymphatic/Immunologic: negative  Endocrine: negative         Physical Exam:     Patient is a 54 year  old  male   Vitals: There were no vitals taken for this visit.  Constitutional: Alert, no acute distress, oriented, conversant  Eyes: no scleral icterus; extraocular muscles intact  Respiratory: no respiratory distress, or pursed lip breathing  Musculoskeletal: normal range of motion  Skin: no notable lesions visible  Neuro: Alert, oriented, speech and mentation normal  Psych: affect and mood normal, alert and oriented to person, place and time      Labs and Pathology:    I reviewed all applicable laboratory and pathology data and went over findings with patient  Significant for   Lab Results   Component Value Date    CR 0.87 03/07/2020    CR 0.82 03/06/2020    CR 1.01 03/05/2020    CR 0.93 03/05/2017    CR 1.11 03/04/2017    CR 1.24 05/23/2014    CR 1.17 05/20/2014    CR 1.38 05/19/2014    CR 1.01 05/18/2014    CR 1.00 07/27/2012         Outside and Past Medical records:    I spent 10 minutes reviewing outside and past medical records.         Assessment and Plan:     Assessment: 54 year old male with elevated PSA to 4.34. This has not been rechecked to confirm elevation. We had a long discussion about the utility of PSA screening.  We talked about the Pros and Cons.  We discussed the findings of the PLCO and EORTC studies.  We discussed the results of the [place] trial of treatment vs. observation in clinically detected prostate cancer as well as the results of the PIVOT trial in the context of screen-detected cancer.  We discussed the recommendations by the AUA, and USPSTF. We also discussed the significant (2-6%) and rising risk of biopsy associated sepsis.    We utilized the PCPT individualized risk calculator and found   the risk of a negative biopsy is 74%  The risk of low risk cancer is 21%  The risk of intermediate to high risk is 5%    We also discussed the emerging role of MRI in the diagnosis of prostate cancer and the potential for performing MRI-Ultrasound Fusion biopsy if suspicious lesions are  noted.    At this point, will prioritize rechecking his PSA prior to additional workup. If remains elevated or rising, would likely proceed with MRI and subsequent prostate biopsy. Given recent stroke, would prioritize workup for this and anticoagulation recommendations, as PSA remains quite low at this time.    Plan:  Recheck PSA  - call with results and consider prostate MRI pending findings    Orders  Orders Placed This Encounter   Procedures     PSA tumor marker     Video-Visit Details    Type of service:  Video Visit    Video Start Time: 12:23 PM  Video End Time: 12:43 PM    Originating Location (pt. Location): Home    Distant Location (provider location): [facility] AND [facility] FOR PROSTATE AND UROLOGIC CANCERS    Platform used for Video Visit: Tabatha Bustamante MD  Urology  [facility]

## 2020-06-15 NOTE — LETTER
"6/15/2020      RE: Rey Andre  [address]  [facility] [address]       Rey Andre is a 54 year old male who is being evaluated via a billable video visit.      The patient has been notified of following:     \"This video visit will be conducted via a call between you and your physician/provider. We have found that certain health care needs can be provided without the need for an in-person physical exam.  This service lets us provide the care you need with a video conversation.  If a prescription is necessary we can send it directly to your pharmacy.  If lab work is needed we can place an order for that and you can then stop by our lab to have the test done at a later time.    Video visits are billed at different rates depending on your insurance coverage.  Please reach out to your insurance provider with any questions.    If during the course of the call the physician/provider feels a video visit is not appropriate, you will not be charged for this service.\"    Patient has given verbal consent for Video visit? Yes    How would you like to obtain your AVS? Mail a copy    Will anyone else be joining your video visit? No          Chief Complaint:   Elevated PSA         Consult or Referral:     Mr. Rey Andre is a 54 year old male evaluated at the request of Dr. Collado         History of Present Illness:    Rey Andre is a very pleasant 54 year old male who presents with a history of elevated PSA. This was noted to be 4.34 at recent physical exam. No significant urinary issues. No hematuria or dysuria. Has not had any previous prostate workup or prostate medications.     Family history is significant for prostate cancer in his father and grandfather. Urologic history significant for stones, last treated in 2017. Of note, he had a recent stroke. Has not had sequelae from this, but continues workup.         Past Medical History:     Past Medical History:   Diagnosis Date     Embolic stroke " involving left middle cerebral artery (H) 03/2020     Hypothyroidism      Kidney stone     Recurent stones     DAMARIS on CPAP      Palpitations 3/6/2020          Past Surgical History:     Past Surgical History:   Procedure Laterality Date     CYSTOSCOPY       CYSTOSCOPY, RETROGRADES, COMBINED  5/24/2014    Procedure: COMBINED CYSTOSCOPY, RETROGRADES;  Surgeon: Francisco J Lerma MD;  Location:  OR     ENT SURGERY      T & A     IR CAROTID CEREBRAL ANGIOGRAM LEFT  3/5/2020     LASER HOLMIUM LITHOTRIPSY URETER(S), INSERT STENT, COMBINED  7/27/2012    Procedure: COMBINED CYSTOSCOPY, URETEROSCOPY, LASER HOLMIUM LITHOTRIPSY URETER(S), INSERT STENT;  Video cystoscopy, Right Retrograde Right Ureteroscopy with Holmium Laser, Stone Extraction,  JJ Stent Placement ;  Surgeon: Francisco J Lerma MD;  Location:  OR     LASER HOLMIUM LITHOTRIPSY URETER(S), INSERT STENT, COMBINED  5/24/2014    Procedure: COMBINED CYSTOSCOPY, URETEROSCOPY, LASER HOLMIUM LITHOTRIPSY URETER(S), INSERT STENT;  Surgeon: Francisco J Lerma MD;  Location:  OR     LASER HOLMIUM LITHOTRIPSY URETER(S), INSERT STENT, COMBINED Right 3/5/2017    Procedure: COMBINED CYSTOSCOPY, URETEROSCOPY, LASER HOLMIUM LITHOTRIPSY URETER(S), INSERT STENT;  Surgeon: Chris Barrios MD;  Location:  OR     ROTATOR CUFF REPAIR RT/LT            Medications     Current Outpatient Medications   Medication     acetaminophen (TYLENOL) 325 MG tablet     aspirin (ASA) 325 MG tablet     atorvastatin (LIPITOR) 40 MG tablet     Levothyroxine Sodium (SYNTHROID PO)     apixaban ANTICOAGULANT (ELIQUIS ANTICOAGULANT) 5 MG tablet     tolterodine (DETROL LA) 2 MG 24 hr capsule     No current facility-administered medications for this visit.           Family History:     Family History   Problem Relation Age of Onset     Other - See Comments Father         Possible Autoimmune disorder      Prostate Cancer Father           Social History:     Social History      Socioeconomic History     Marital status:      Spouse name: Not on file     Number of children: 3     Years of education: Not on file     Highest education level: Not on file   Occupational History     Occupation: Golf Pro   Social Needs     Financial resource strain: Not on file     Food insecurity     Worry: Not on file     Inability: Not on file     Transportation needs     Medical: Not on file     Non-medical: Not on file   Tobacco Use     Smoking status: Never Smoker     Smokeless tobacco: Never Used   Substance and Sexual Activity     Alcohol use: No     Drug use: No     Sexual activity: Not on file   Lifestyle     Physical activity     Days per week: Not on file     Minutes per session: Not on file     Stress: Not on file   Relationships     Social connections     Talks on phone: Not on file     Gets together: Not on file     Attends Confucianist service: Not on file     Active member of club or organization: Not on file     Attends meetings of clubs or organizations: Not on file     Relationship status: Not on file     Intimate partner violence     Fear of current or ex partner: Not on file     Emotionally abused: Not on file     Physically abused: Not on file     Forced sexual activity: Not on file   Other Topics Concern     Parent/sibling w/ CABG, MI or angioplasty before 65F 55M? 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 (last updated 3/5/2020)           Allergies:   No known drug allergies         Review of Systems:  From intake questionnaire     Skin: negative  Eyes: negative  Ears/Nose/Throat: negative  Respiratory: No shortness of breath, dyspnea on exertion, cough, or hemoptysis  Cardiovascular: No chest pain or palpitations  Gastrointestinal: negative; no nausea/vomiting, constipation or diarrhea  Genitourinary: as per HPI  Musculoskeletal: negative  Neurologic: negative  Psychiatric:  negative  Hematologic/Lymphatic/Immunologic: negative  Endocrine: negative         Physical Exam:     Patient is a 54 year old  male   Vitals: There were no vitals taken for this visit.  Constitutional: Alert, no acute distress, oriented, conversant  Eyes: no scleral icterus; extraocular muscles intact  Respiratory: no respiratory distress, or pursed lip breathing  Musculoskeletal: normal range of motion  Skin: no notable lesions visible  Neuro: Alert, oriented, speech and mentation normal  Psych: affect and mood normal, alert and oriented to person, place and time      Labs and Pathology:    I reviewed all applicable laboratory and pathology data and went over findings with patient  Significant for   Lab Results   Component Value Date    CR 0.87 03/07/2020    CR 0.82 03/06/2020    CR 1.01 03/05/2020    CR 0.93 03/05/2017    CR 1.11 03/04/2017    CR 1.24 05/23/2014    CR 1.17 05/20/2014    CR 1.38 05/19/2014    CR 1.01 05/18/2014    CR 1.00 07/27/2012         Outside and Past Medical records:    I spent 10 minutes reviewing outside and past medical records.         Assessment and Plan:     Assessment: 54 year old male with elevated PSA to 4.34. This has not been rechecked to confirm elevation. We had a long discussion about the utility of PSA screening.  We talked about the Pros and Cons.  We discussed the findings of the PLCO and EORTC studies.  We discussed the results of the [place] trial of treatment vs. observation in clinically detected prostate cancer as well as the results of the PIVOT trial in the context of screen-detected cancer.  We discussed the recommendations by the AUA, and USPSTF. We also discussed the significant (2-6%) and rising risk of biopsy associated sepsis.    We utilized the PCPT individualized risk calculator and found   the risk of a negative biopsy is 74%  The risk of low risk cancer is 21%  The risk of intermediate to high risk is 5%    We also discussed the emerging role of MRI in  the diagnosis of prostate cancer and the potential for performing MRI-Ultrasound Fusion biopsy if suspicious lesions are noted.    At this point, will prioritize rechecking his PSA prior to additional workup. If remains elevated or rising, would likely proceed with MRI and subsequent prostate biopsy. Given recent stroke, would prioritize workup for this and anticoagulation recommendations, as PSA remains quite low at this time.    Plan:  Recheck PSA  - call with results and consider prostate MRI pending findings    Orders  Orders Placed This Encounter   Procedures     PSA tumor marker     Video-Visit Details    Type of service:  Video Visit    Video Start Time: 12:23 PM  Video End Time: 12:43 PM    Originating Location (pt. Location): Home    Distant Location (provider location): [facility] [place] FOR PROSTATE AND UROLOGIC CANCERS    Platform used for Video Visit: Tabatha Bustamante MD  Urology  [facility]

## 2020-06-15 NOTE — PATIENT INSTRUCTIONS
Schedule PSA.  We will contact you with the results.      It was a pleasure meeting with you today.  Thank you for allowing me and my team the privilege of caring for you today.  YOU are the reason we are here, and I truly hope we provided you with the excellent service you deserve.  Please let us know if there is anything else we can do for you so that we can be sure you are leaving completely satisfied with your care experience.

## 2020-06-22 ENCOUNTER — MYC MEDICAL ADVICE (OUTPATIENT)
Dept: CARDIOLOGY | Facility: CLINIC | Age: 55
End: 2020-06-22

## 2020-06-23 NOTE — TELEPHONE ENCOUNTER
Attempted to contact patient to review Dr. Sanon's request for patient to follow up with Dr. Alvarenga or Dr. Lima to discuss the post-MORGAN plan. Left a message on patient's cell phone. There is no voice mail on his land-line. Sent patient a my chart message.    My Chart Message:    Hello Mr. Andre:  Dr. Sanon's understanding is that either Dr. Alvarenga or Dr. Lima will need to discuss the plan for PFO follow up as they are the interventionalists that specialize in that procedure. Scheduling team has been trying to reach you to set that up.  If you have a better contact number than the cell [phone] please let us know.  You can call scheduling back at [phone].    The MORGAN report is now released so you can hopefully find it on your My Chart. If you would like to review the results before you visit, you can call Team 2 RNs.    Thank you,  Team 2 RNs  [phone]

## 2020-06-23 NOTE — TELEPHONE ENCOUNTER
Received the above MyChart message from pt.   Per chart review, pt's neurologist, Dr. Caldwell, ordered MORGAN. MORGAN was performed on 6/5/20.  Pt is scheduled for follow up with Dr Caldwell on 8/20/20.      DTMADELYN Conroy's note from 6/2/20 states:  Stroke, likely embolic  -question of positive bubble study on TTE  -now to undergo MORGAN  -preadmission COVID test scheduled 6-3  Risks of MORGAN have been reviewed including reactions to sedation, esophageal trauma or tear and dental trauma  -no history of dysphagia or other esophageal issues  -results to Dr. Sanon to decide plan from here  -consider hypercoag panel    Will route to Dr. Sanon to review and advise and will request that Dr. Sanon's nurse team answer pt's MyChart message.     ADA Roper 9:16 AM 6/23/2020

## 2020-06-23 NOTE — TELEPHONE ENCOUNTER
Message from Jeannine:  Jeannine Bustillos RN Jain, Rebekah Stinson MD; Chi Alvarenga MD  Cc: FRANCO Riggs [facility] Team 2    Caller: Unspecified (Yesterday,  5:29 PM)               There is no appt with  or Clarence; however, I do see the order.  It looks like scheduling left VMs for pt on 6/9 and 6/11 regarding scheduling the appt.   -Jeannine          Message from Dr. Sanon:  Jeannine Alvarenga did the MORGAN and he had sent me a message that he will make arrangements for the patient to be seen in clinic by himself or Dr. Lima. Is there an appointment in place?    Issa, would you like to proceed with this please?    Team 2 nursing -please update the patient that we will be in touch with him once we hear from Dr. Alvarenga.    Thank you   Dr. Sanon

## 2020-06-24 NOTE — TELEPHONE ENCOUNTER
Thanks, all I can suggest is we mail a letter to patient ?registered? Check with pacheco westfall and if no response then we stop

## 2020-06-24 NOTE — TELEPHONE ENCOUNTER
Per chart review, pt has read the [employer] message that was sent to him yesterday. No need to mail a certified letter to pt.    ADA Roper 9:21 AM 6/24/2020

## 2020-07-01 DIAGNOSIS — R97.20 ELEVATED PROSTATE SPECIFIC ANTIGEN (PSA): ICD-10-CM

## 2020-07-01 PROCEDURE — 36415 COLL VENOUS BLD VENIPUNCTURE: CPT | Performed by: UROLOGY

## 2020-07-01 PROCEDURE — 84153 ASSAY OF PSA TOTAL: CPT | Performed by: UROLOGY

## 2020-07-02 LAB — PSA SERPL-MCNC: 0.85 UG/L (ref 0–4)

## 2020-07-07 ENCOUNTER — VIRTUAL VISIT (OUTPATIENT)
Dept: CARDIOLOGY | Facility: CLINIC | Age: 55
End: 2020-07-07
Attending: INTERNAL MEDICINE
Payer: COMMERCIAL

## 2020-07-07 DIAGNOSIS — Q21.12 PFO (PATENT FORAMEN OVALE): ICD-10-CM

## 2020-07-07 PROCEDURE — 99215 OFFICE O/P EST HI 40 MIN: CPT | Mod: TEL | Performed by: INTERNAL MEDICINE

## 2020-07-07 NOTE — PROGRESS NOTES
Service Date: 07/07/2020      TELEPHONE VIRTUAL CONFERENCE       HISTORY OF PRESENT ILLNESS:  This is a 44-minute virtual telephone visit.  The patient, his wife and the father were on the phone on a merged phone call.  The father is a urologist.  This patient does not have any known history of a clot.  However, the father, who is the urologist, had a pulmonary embolism after venous varicosities and the patient's mother had a pulmonary embolism after an IUD perforation.  The family was never checked for hypercoagulable state.  On 03/2020, the patient presented with a stroke.  Study showed that there was an embolus or clot in the left frontal lobe.  There was left superior division of OM2.  He did a thrombectomy.  He was placed on Eliquis for a while and is now on aspirin alone.  His workup included a 30-day event monitor that did not show any atrial fibrillation.  The patient has no history of palpitations.  A hypercoagulable workup was not done because I believe the patient was on blood thinner at the time.  A transesophageal echo was performed by me.  He does indeed have a patent foramen ovale and a positive bubble study.  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  He also has a history of sleep apnea and CPAP machine, and so sometimes with sleep apnea, the right atrial pressures can rise to temporarily and open the PFO.  No venous clots were identified in the hospital.  The patient has recovered well from his stroke.  Today, again, it was a 44-minute visit.  I discussed with the patient, his wife and the father, who is a doctor, the embryology and pathophysiology of fetal circulation versus adult circulation, how a cryptogenic stroke can occur, how we cannot confirm or prove that that is what happened, that an embolus went  through a PFO and caused a stroke but no other obvious factors were identified.  I reviewed the RESPECT and the other similar trials about device closure versus blood thinners, aspirin versus Eliquis versus Coumadin or Plavix.  We discussed the devices and how the procedure is done.  We quoted a potential risk of 1% to 1.5% including device migration, perforation, bleeding, a higher incidence of arrhythmia and possible death.  We compare that to the risk of being on stronger blood thinners like Eliquis which carry 1% risk per year.  We went through the protocol where the patient will come in the hospital.  We will do the device on the day of admission.  Usually, the patient goes home the next day.  It will be under x-ray and transesophageal guidance.  We talked about the fact that I tend to do them with general anesthesia.  I reviewed the protocol for followup echos and office visits with the family, any precautions such as no driving after for 48 hours, no contact sports for the first few weeks to prevent device migration.  I answered all their questions.  I did order a hypercoagulable workup and that can be done in the next week or so and then the patient will be put on the list for PFO closure when the hospital allows elective procedures.  All questions were answered.      Chi Briceno MD       cc:   Chava Collado DO    [facility] [place] [facility] [address] [place] [address]      [place] [address]         CHI BRICENO MD             D: 2020   T: 2020   MT: ESTRELLA      Name:     RAFFY MELGAR   MRN:      [id number]        Account:      [id number]   :      1965           Service Date: 2020      Document: [id number]

## 2020-07-07 NOTE — PROGRESS NOTES
"Rey Andre is a 54 year old male who is being evaluated via a billable telephone visit.      The patient has been notified of following:     \"This telephone visit will be conducted via a call between you and your physician/provider. We have found that certain health care needs can be provided without the need for a physical exam.  This service lets us provide the care you need with a short phone conversation.  If a prescription is necessary we can send it directly to your pharmacy.  If lab work is needed we can place an order for that and you can then stop by our lab to have the test done at a later time.    Telephone visits are billed at different rates depending on your insurance coverage. During this emergency period, for some insurers they may be billed the same as an in-person visit.  Please reach out to your insurance provider with any questions.    If during the course of the call the physician/provider feels a telephone visit is not appropriate, you will not be charged for this service.\"    Patient has given verbal consent for Telephone visit?  Yes    What phone number would you like to be contacted at? [phone] - please conference in his father, Rey, at [phone]    How would you like to obtain your AVS? Mail a copy     Review Of Systems  Skin: Negative  Eyes: Positive for reading glasses  Ears/Nose/Throat: Negative  Respiratory: Positive for sleep apnea - wears a CPAP  Cardiovascular: Negative  Gastrointestinal: Negative  Genitourinary: Positive for hx kidney stones  Musculoskeletal: Positive for joint pain  Neurologic: Negative  Psychiatric: Negative   Hematologic/Lymphatic/Immunologic: Negative  Endocrine: Positive for thyroid disorder    Patient reported vitals:  BP: N/A  Heart rate: N/A  Weight: 250 lbs    Clary Joseph CMA    Phone call duration: 44 minutes    queenie"

## 2020-07-07 NOTE — LETTER
7/7/2020    Chava Collado DO  [facility] [address]  [facility] [address]    RE: Rey Andre       Dear Colleague,    I had the pleasure of seeing Rey Andre in the [facility].    Rey Andre is a 54 year old male who is being evaluated via a billable telephone visit.      Service Date: 07/07/2020      TELEPHONE VIRTUAL CONFERENCE       HISTORY OF PRESENT ILLNESS:  This is a 44-minute virtual telephone visit.  The patient, his wife and the father were on the phone on a merged phone call.  The father is a urologist.  This patient does not have any known history of a clot.  However, the father, who is the urologist, had a pulmonary embolism after venous varicosities and the patient's mother had a pulmonary embolism after an IUD perforation.  The family was never checked for hypercoagulable state.  On 03/2020, the patient presented with a stroke.  Study showed that there was an embolus or clot in the left frontal lobe.  There was left superior division of OM2.  He did a thrombectomy.  He was placed on Eliquis for a while and is now on aspirin alone.  His workup included a 30-day event monitor that did not show any atrial fibrillation.  The patient has no history of palpitations.  A hypercoagulable workup was not done because I believe the patient was on blood thinner at the time.  A transesophageal echo was performed by me.  He does indeed have a patent foramen ovale and a positive bubble study.  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  He also has a history of sleep apnea and CPAP machine, and so sometimes with sleep apnea, the right atrial pressures can rise to temporarily and open the PFO.  No venous clots were identified in the hospital.  The patient has recovered  well from his stroke.  Today, again, it was a 44-minute visit.  I discussed with the patient, his wife and the father, who is a doctor, the embryology and pathophysiology of fetal circulation versus adult circulation, how a cryptogenic stroke can occur, how we cannot confirm or prove that that is what happened, that an embolus went through a PFO and caused a stroke but no other obvious factors were identified.  I reviewed the RESPECT and the other similar trials about device closure versus blood thinners, aspirin versus Eliquis versus Coumadin or Plavix.  We discussed the devices and how the procedure is done.  We quoted a potential risk of 1% to 1.5% including device migration, perforation, bleeding, a higher incidence of arrhythmia and possible death.  We compare that to the risk of being on stronger blood thinners like Eliquis which carry 1% risk per year.  We went through the protocol where the patient will come in the hospital.  We will do the device on the day of admission.  Usually, the patient goes home the next day.  It will be under x-ray and transesophageal guidance.  We talked about the fact that I tend to do them with general anesthesia.  I reviewed the protocol for followup echos and office visits with the family, any precautions such as no driving after for 48 hours, no contact sports for the first few weeks to prevent device migration.  I answered all their questions.  I did order a hypercoagulable workup and that can be done in the next week or so and then the patient will be put on the list for PFO closure when the hospital allows elective procedures.  All questions were answered.      Chi Alvarenga MD       cc:   Chava Collado DO    [place] [address] [place] [address]      Vitor Caldwell MD    [facility]    [address] [place] [address], [place] [id number]        Thank you for allowing me to participate in the care of your patient.    Sincerely,      Chi Alvarenga MD     [facility]

## 2020-08-19 NOTE — PROGRESS NOTES
_____________________________________________________________    [facility]    at [facility] [phone]    ______________________________________________________________    Chief Complaint: Patient presents with:  Follow Up: from stroke in march. no complications.       History of Present Illness: Rey Andre is a 54 year old male presenting for follow-up for stroke.     He is a 54-year-old man who presented to the hospital in March 2020, with a history of obstructive sleep apnea and hypothyroidism who presented with aphasia and right-sided weakness, with significant deficits.  And he was enrolled in the TIMELESS trial (tenecteplase or placebo for patients with large vessel occlusion presenting outside of conventional tPA window) and then underwent successful endovascular thrombectomy for L M1 clot.     He last followed up in our office with Dr. Caldwell about 3 months ago, and at that time he was referred to cardiology for possible PFO closure.  It was also recommended that he switch from Eliquis to aspirin because he had no indication to be on Eliquis (30 day cardionet monitor showed no afib).  Outpatient MORGAN was pending at the time.  He was recommended to get an implantable loop recorder.    He saw Dr. Alvarenga of cardiology who recommended hypercoagulable work-up, which has not been done yet. He was recommended to be compliant with his CPAP for DAMARIS. He did have MORGAN which showed straightforward PFO. The patient reports he didn't get loop recorder because Dr. Alvarenga did not feel it was necessary.    He reports that since the last visit, he has some trouble sleeping.  He wakes up frequently throughout the night.  He does not specifically have trouble falling asleep.  His CPAP has been checked and is working well.  He does not have excessive daytime tiredness or take naps on a regular basis.  He also has trouble remembering certain things at work, but his wife  "does not notice any trouble with memory around the house.  He attributes this to stress.      Impression:   Problem List Items Addressed This Visit        Nervous and Auditory    Stroke due to embolism of left middle cerebral artery (H) - Primary    Relevant Orders    CARDIOLOGY EVAL ADULT REFERRAL       Circulatory    PFO (patent foramen ovale)            Plan:   -I instructed the patient to go to the lab and get the labs drawn that were recommended by Dr. Alvarenga for his hypercoagulable work-up.  For his PFO we should first be sure that no other etiology of his stroke exists before proceeding with closure. Referral to cardiology,  or Dr. Ramires for implanted loop recorder  - Follow up in 3 months    Stroke Education provided.  He will call us with any questions.  For any acute neurologic deficits he was advised to  go directly to the hospital rather than call the clinic.    Emi Soliz PA-C  Neurology  08/26/2020 2:53 PM  To page me or covering stroke neurology team member, click here: AMCOM  Choose \"On Call\" tab at top, then search dropdown box for \"Neurology Adult\" & press Enter, look for Neuro ICU/Stroke    ___________________________________________________________________    Current Medications  Current Outpatient Medications   Medication Sig     acetaminophen (TYLENOL) 325 MG tablet Take 2 tablets (650 mg) by mouth every 4 hours as needed for mild pain     aspirin (ASA) 325 MG tablet Take 1 tablet (325 mg) by mouth daily     atorvastatin (LIPITOR) 40 MG tablet Take 1 tablet (40 mg) by mouth every evening     Levothyroxine Sodium (SYNTHROID PO) Take 125 mcg by mouth every evening     No current facility-administered medications for this visit.        Past Medical History  Past Medical History:   Diagnosis Date     Embolic stroke involving left middle cerebral artery (H) 03/2020     Hyperlipidemia      Hypothyroidism      Kidney stone     Recurent stones     DAMARIS on CPAP      Palpitations 3/6/2020 "     PFO (patent foramen ovale)      Sinus of Valsalva aneurysm        Social History  Social History     Tobacco Use     Smoking status: Never Smoker     Smokeless tobacco: Never Used   Substance Use Topics     Alcohol use: No     Drug use: No       Family History  Family History   Problem Relation Age of Onset     Hyperlipidemia Mother         pulm emboli, IUD perforation and developed clot     Other - See Comments Father         Possible Autoimmune disorder , DVT, pulm embolism     Prostate Cancer Father        ROS: 10 point relevant ROS neg other than the symptoms noted above in the HPI.    Physical Exam    There were no vitals taken for this visit.    General:  no acute distress  HEENT:  normocephalic/atraumatic  Pulmonary:  no respiratory distress    Neurologic  Mental Status:  alert, oriented x 3, follows commands, speech clear and fluent, naming and repetition normal  Cranial Nerves:  EOMI with normal smooth pursuit, facial movements symmetric, hearing not formally tested but intact to conversation, no dysarthria,     Neuroimaging: as per HPI. I personally reviewed those images    Labs:    Recent Labs   Lab Test 03/05/20  0731   INR 1.02        Recent Labs   Lab Test 06/05/20  0725 03/05/20  1444   CHOL 105 158   HDL 41 48   LDL 43 84   TRIG 106 129       Recent Labs   Lab Test 03/05/20  1444   A1C 5.5       Recent Labs   Lab Test 03/05/20  2149 03/05/20  1444   TROPI <0.015 <0.015

## 2020-08-20 ENCOUNTER — VIRTUAL VISIT (OUTPATIENT)
Dept: NEUROSURGERY | Facility: CLINIC | Age: 55
End: 2020-08-20
Payer: COMMERCIAL

## 2020-08-20 DIAGNOSIS — I63.412 STROKE DUE TO EMBOLISM OF LEFT MIDDLE CEREBRAL ARTERY (H): Primary | ICD-10-CM

## 2020-08-20 DIAGNOSIS — Q21.12 PFO (PATENT FORAMEN OVALE): ICD-10-CM

## 2020-08-20 PROCEDURE — 99213 OFFICE O/P EST LOW 20 MIN: CPT | Mod: GT | Performed by: PHYSICIAN ASSISTANT

## 2020-08-20 NOTE — LETTER
8/20/2020         RE: Rey Andre  [id number]  [place] [phone]        Dear Colleague,    Thank you for referring your patient, Rey Andre, to the [facility]. Please see a copy of my visit note below.    _____________________________________________________________    [facility]    at [facility] [phone]    ______________________________________________________________    Chief Complaint: Patient presents with:  Follow Up: from stroke in march. no complications.       History of Present Illness: Rey Andre is a 54 year old male presenting for follow-up for stroke.     He is a 54-year-old man who presented to the hospital in March 2020, with a history of obstructive sleep apnea and hypothyroidism who presented with aphasia and right-sided weakness, with significant deficits.  And he was enrolled in the TIMELESS trial (tenecteplase or placebo for patients with large vessel occlusion presenting outside of conventional tPA window) and then underwent successful endovascular thrombectomy for L M1 clot.     He last followed up in our office with Dr. Caldwell about 3 months ago, and at that time he was referred to cardiology for possible PFO closure.  It was also recommended that he switch from Eliquis to aspirin because he had no indication to be on Eliquis (30 day cardionet monitor showed no afib).  Outpatient MORGAN was pending at the time.  He was recommended to get an implantable loop recorder.    He saw Dr. Alvarenga of cardiology who recommended hypercoagulable work-up, which has not been done yet. He was recommended to be compliant with his CPAP for DAMARIS. He did have MORGAN which showed straightforward PFO. The patient reports he didn't get loop recorder because Dr. Alvarenga did not feel it was necessary.    He reports that since the last visit, he has some trouble sleeping.  He wakes up frequently throughout the  "night.  He does not specifically have trouble falling asleep.  His CPAP has been checked and is working well.  He does not have excessive daytime tiredness or take naps on a regular basis.  He also has trouble remembering certain things at work, but his wife does not notice any trouble with memory around the house.  He attributes this to stress.      Impression:   Problem List Items Addressed This Visit        Nervous and Auditory    Stroke due to embolism of left middle cerebral artery (H) - Primary    Relevant Orders    CARDIOLOGY EVAL ADULT REFERRAL       Circulatory    PFO (patent foramen ovale)            Plan:   -I instructed the patient to go to the lab and get the labs drawn that were recommended by Dr. Alvarenga for his hypercoagulable work-up.  For his PFO we should first be sure that no other etiology of his stroke exists before proceeding with closure. Referral to cardiology,  or Dr. Ramires for implanted loop recorder  - Follow up in 3 months    Stroke Education provided.  He will call us with any questions.  For any acute neurologic deficits he was advised to  go directly to the hospital rather than call the clinic.    Emi Soliz PA-C  Neurology  08/26/2020 2:53 PM  To page me or covering stroke neurology team member, click here: AMCOM  Choose \"On Call\" tab at top, then search dropdown box for \"Neurology Adult\" & press Enter, look for Neuro ICU/Stroke    ___________________________________________________________________    Current Medications  Current Outpatient Medications   Medication Sig     acetaminophen (TYLENOL) 325 MG tablet Take 2 tablets (650 mg) by mouth every 4 hours as needed for mild pain     aspirin (ASA) 325 MG tablet Take 1 tablet (325 mg) by mouth daily     atorvastatin (LIPITOR) 40 MG tablet Take 1 tablet (40 mg) by mouth every evening     Levothyroxine Sodium (SYNTHROID PO) Take 125 mcg by mouth every evening     No current facility-administered medications for this visit. "        Past Medical History  Past Medical History:   Diagnosis Date     Embolic stroke involving left middle cerebral artery (H) 03/2020     Hyperlipidemia      Hypothyroidism      Kidney stone     Recurent stones     DAMARIS on CPAP      Palpitations 3/6/2020     PFO (patent foramen ovale)      Sinus of Valsalva aneurysm        Social History  Social History     Tobacco Use     Smoking status: Never Smoker     Smokeless tobacco: Never Used   Substance Use Topics     Alcohol use: No     Drug use: No       Family History  Family History   Problem Relation Age of Onset     Hyperlipidemia Mother         pulm emboli, IUD perforation and developed clot     Other - See Comments Father         Possible Autoimmune disorder , DVT, pulm embolism     Prostate Cancer Father        ROS: 10 point relevant ROS neg other than the symptoms noted above in the HPI.    Physical Exam    There were no vitals taken for this visit.    General:  no acute distress  HEENT:  normocephalic/atraumatic  Pulmonary:  no respiratory distress    Neurologic  Mental Status:  alert, oriented x 3, follows commands, speech clear and fluent, naming and repetition normal  Cranial Nerves:  EOMI with normal smooth pursuit, facial movements symmetric, hearing not formally tested but intact to conversation, no dysarthria,     Neuroimaging: as per HPI. I personally reviewed those images    Labs:    Recent Labs   Lab Test 03/05/20  0731   INR 1.02        Recent Labs   Lab Test 06/05/20  0725 03/05/20  1444   CHOL 105 158   HDL 41 48   LDL 43 84   TRIG 106 129       Recent Labs   Lab Test 03/05/20  1444   A1C 5.5       Recent Labs   Lab Test 03/05/20  2149 03/05/20  1444   TROPI <0.015 <0.015       Again, thank you for allowing me to participate in the care of your patient.        Sincerely,        Emi Soliz PA-C

## 2020-08-20 NOTE — NURSING NOTE
"Rey Andre is a 54 year old male who is being evaluated via a billable video visit.      The patient has been notified of following:     \"This video visit will be conducted via a call between you and your physician/provider. We have found that certain health care needs can be provided without the need for an in-person physical exam.  This service lets us provide the care you need with a video conversation.  If a prescription is necessary we can send it directly to your pharmacy.  If lab work is needed we can place an order for that and you can then stop by our lab to have the test done at a later time.    Video visits are billed at different rates depending on your insurance coverage.  Please reach out to your insurance provider with any questions.    If during the course of the call the physician/provider feels a video visit is not appropriate, you will not be charged for this service.\"    Patient has given verbal consent for Video visit? Yes  How would you like to obtain your AVS? MyChart  If you are dropped from the video visit, the video invite should be resent to: Send to e-mail at: [email or web address]  Will anyone else be joining your video visit? No      Video-Visit Details    Type of service:  Video Visit    Video Start Time: 1604  Video End Time: 1636    Originating Location (pt. Location): Home    Distant Location (provider location):  [facility] NEUROS[facility]     Platform used for Video Visit: Edmar Soliz PA-C      "

## 2020-08-25 ENCOUNTER — TELEPHONE (OUTPATIENT)
Facility: CLINIC | Age: 55
End: 2020-08-25

## 2020-08-26 PROBLEM — I63.412 STROKE DUE TO EMBOLISM OF LEFT MIDDLE CEREBRAL ARTERY (H): Status: ACTIVE | Noted: 2020-03-05

## 2020-09-11 ENCOUNTER — TELEPHONE (OUTPATIENT)
Dept: CARDIOLOGY | Facility: CLINIC | Age: 55
End: 2020-09-11

## 2020-09-11 NOTE — TELEPHONE ENCOUNTER
Wellness Screening Tool    Symptom Screening:    Do you have one of the following NEW symptoms:      Fever (subjective or >100.0)?  No    New cough? No    Shortness of breath? No    Chills? No    New loss of taste or smell? No    Generalized body aches? No    New persistent headache? No    New sore throat? No    Nausea, vomiting or diarrhea? No    Within the past 2 weeks, have you been exposed to someone with a known positive illness below?      COVID - 19 (known or suspected) No    Chicken pox?  No    Measles? No    Pertussis? No    Have you had a positive COVID-19 diagnostic test (nasal swab test) in the last 14 days or are you currently   on self-quarantine restrictions (i.e.travel restriction, exposure, etc?) No        Patient notified of visitor restriction: Yes  Patient informed to wear a mask: Yes    Patient's appointment status: Patient will be seen in clinic as scheduled on wellness call done [facility]

## 2020-09-14 ENCOUNTER — OFFICE VISIT (OUTPATIENT)
Dept: CARDIOLOGY | Facility: CLINIC | Age: 55
End: 2020-09-14
Attending: PHYSICIAN ASSISTANT
Payer: COMMERCIAL

## 2020-09-14 VITALS
BODY MASS INDEX: 37.8 KG/M2 | SYSTOLIC BLOOD PRESSURE: 133 MMHG | DIASTOLIC BLOOD PRESSURE: 87 MMHG | WEIGHT: 271 LBS | HEART RATE: 87 BPM

## 2020-09-14 DIAGNOSIS — Z11.59 ENCOUNTER FOR SCREENING FOR OTHER VIRAL DISEASES: Primary | ICD-10-CM

## 2020-09-14 DIAGNOSIS — I63.412 STROKE DUE TO EMBOLISM OF LEFT MIDDLE CEREBRAL ARTERY (H): ICD-10-CM

## 2020-09-14 PROCEDURE — 99205 OFFICE O/P NEW HI 60 MIN: CPT | Performed by: INTERNAL MEDICINE

## 2020-09-14 NOTE — LETTER
9/14/2020    Chava Collado DO  [facility] [address]  [facility] [address]    RE: Rey ALFRED Thai       Dear Colleague,    I had the pleasure of seeing Rey ALFRED Thai in the [facility].    Electrophysiology/ Clinic Note         H&P and Plan:     REASON FOR VISIT: Electrophysiology evaluation.      HISTORY OF PRESENT ILLNESS:  54-year-old gentleman with a history of obstructive sleep apnea (on CPAP), who presented with an embolic CVA and was referred here for consideration for loop recording implantation.     Patient presented with right-sided hemiparesis on 03/05/2020.  At that time he was found to have embolic stroke.  He underwent successful lytic therapy followed by embolectomy and post procedural, his speech and motor deficits improved.  An echocardiogram was obtained and he was found to have a PFO.  He was recent evaluated by Dr. Cunningham and has plans to occlude the PFO.    Today, he informs he is doing well.  He denies any sense of chest pain, shortness of breath, lightheadedness, near-syncope or syncope.     Previous studies:  - Echocardiogram : Normal LV function.  EF estimated at  60%-65%.  There was no significant valve disease.  Aortic root was mildly dilated at 4.5 cm with a normal ascending aorta dimension.        ASSESSMENT AND PLAN:   1.    Embolic CVA.  Unclear etiology.  Atrial fibrillation should be excluded.  I agree with the recommendation of loop recording plantation.    It was explained details.  He understand that is a 1-2% risk associated procedure.  He would like to schedule procedure.  We will make arrangements to have it done.      Lefty Ramires MD    Physical Exam:  Vitals: /87   Pulse 87   Wt 122.9 kg (271 lb)   BMI 37.80 kg/m      Constitutional:  AAO x3.  Pt is in NAD.  HEAD: normocephalic.  SKIN: Skin normal color, texture and turgor with no lesions or eruptions.  Eyes: PERRL, EOMI.  ENT:  Supple, normal JVP. No lymphadenopathy or  SUBJECTIVE/OBJECTIVE:                Felipe Campbell is a 63 year old male coming in for a follow-up visit for Medication Therapy Management.  He was referred to me from Dr. Schneider and his insurance plan.     Chief Complaint: Follow up from our visit on 5/5/16.  This is his first MTM visit in >1 year.  This visit serves as an initial visit with his [employer] insurance.  He has no questions or concerns today - is pleased with how well things have been going.    Tobacco: History of tobacco dependence - quit 1979  Alcohol: Less than 1 beverages / week    Medication Adherence: no issues reported    Diabetes:  Pt currently taking metformin 1000mg BID, Lantus 10 units daily and Novolog 4-5 units TID with meals (generally 1 unit per carb choice plus one additional unit)  Pt is not experiencing side effects  SMBG: TID.   Ranges (per pts glucometer): 7 day avg 113mg/dL (n25), 14 day avg 114mg/dL (n53), 30 day avg 115mg/dL (n109)  Symptoms of low blood sugar? none. Frequency of hypoglycemia? Never.  He does have glucagon available if needed, and his wife is familiar with administration  Recent symptoms of high blood sugar? none  Eye exam: up to date  Foot exam: up to date  Microalbumin is not < 30 mg/g. Pt is taking an ACEi/ARB.  Aspirin: Taking 81mg daily and denies side effects  Diet: He's lost about 25 pounds since starting insulin.  He attributes this to being more aware of his diet and carb intake    Microalbuminuria:  Pt is taking lisinopril 5mg daily.  He denies side effects of therapy but does wonder how this works.    Hyperlipidemia: Current therapy includes pravastatin 80mg once daily and fish oil 2000mg/day.  Pt reports no significant myalgias or other side effects.   The 10-year ASCVD risk score (Devens DC Jr, et al., 2013) is: 23.4%    Values used to calculate the score:      Age: 63 years      Sex: Male      Is Non- : No      Diabetic: Yes      Tobacco smoker: No      Systolic  Blood Pressure: 128 mmHg      Is BP treated: Yes      HDL Cholesterol: 57 mg/dL      Total Cholesterol: 183 mg/dL     Insomnia: He is now using melatonin as needed, which he finds effective.  He generally takes 2mg when needed and denies side effects.    ED: He continues using Viagra as needed, which he finds effective.  He denies side effects of therapy.    Arthritis:  He's been taking APAP 1000mg PRN knee pain, and has found this effective.  He denies side effects.  He's aware of maximum APAP dosing/day.    Allergies:  He continues taking Zyrtec 10mg daily PRN, which he finds effective.  He denies side effects.    Current labs include:BP Readings from Last 3 Encounters:   06/13/17 128/66   12/06/16 132/72   11/08/16 119/80     Today's Vitals: /72     Lab Results   Component Value Date    A1C 6.8 06/13/2017   .  Lab Results   Component Value Date    CHOL 183 11/30/2016     Lab Results   Component Value Date    TRIG 154 11/30/2016     Lab Results   Component Value Date    HDL 57 11/30/2016     Lab Results   Component Value Date    LDL 95 11/30/2016       Liver Function Studies -   Recent Labs   Lab Test  11/30/16   0732   02/22/11   0732   PROTTOTAL   --    --   6.6*   ALBUMIN   --    --   3.9   BILITOTAL   --    --   0.3   ALKPHOS   --    --   59   AST  16   < >  19   ALT  26   < >  24    < > = values in this interval not displayed.       Lab Results   Component Value Date    UCRR 134 06/13/2017    MICROL 47 06/13/2017    UMALCR 35.22 (H) 06/13/2017       Last Basic Metabolic Panel:  Lab Results   Component Value Date     06/13/2017      Lab Results   Component Value Date    POTASSIUM 4.0 06/13/2017     Lab Results   Component Value Date    CHLORIDE 102 06/13/2017     Lab Results   Component Value Date    BUN 19 06/13/2017     Lab Results   Component Value Date    CR 0.96 06/13/2017     GFR Estimate   Date Value Ref Range Status   06/13/2017 79 >60 mL/min/1.7m2 Final     Comment:     Non   thyroid enlargement.  Chest:  CTAB.  Cardiac: RRR, normal  S1 and S2.  No murmurs rubs or gallop.    Abdomen:  Normal BS.  Soft, non-tender and non-distended.  No rebound or guarding.    Extremities:  Pedious pulses palpable B/L.  No LE edema noticed.   Neurological: Strength and sensation grossly symmetric and intact throughout.       CURRENT MEDICATIONS:  Current Outpatient Medications   Medication Sig Dispense Refill     acetaminophen (TYLENOL) 325 MG tablet Take 2 tablets (650 mg) by mouth every 4 hours as needed for mild pain  0     aspirin (ASA) 325 MG tablet Take 1 tablet (325 mg) by mouth daily       atorvastatin (LIPITOR) 40 MG tablet Take 1 tablet (40 mg) by mouth every evening 30 tablet 3     Levothyroxine Sodium (SYNTHROID PO) Take 125 mcg by mouth every evening         ALLERGIES     Allergies   Allergen Reactions     No Known Drug Allergies        PAST MEDICAL HISTORY:  Past Medical History:   Diagnosis Date     Embolic stroke involving left middle cerebral artery (H) 03/2020     Hyperlipidemia      Hypothyroidism      Kidney stone     Recurent stones     DAMARIS on CPAP      Palpitations 3/6/2020     PFO (patent foramen ovale)      Sinus of Valsalva aneurysm        PAST SURGICAL HISTORY:  Past Surgical History:   Procedure Laterality Date     CYSTOSCOPY       CYSTOSCOPY, RETROGRADES, COMBINED  5/24/2014    Procedure: COMBINED CYSTOSCOPY, RETROGRADES;  Surgeon: Francisco J Lerma MD;  Location:  OR     ENT SURGERY      T & A     IR CAROTID CEREBRAL ANGIOGRAM LEFT  3/5/2020     KNEE SURGERY Right     arthroscopic     LASER HOLMIUM LITHOTRIPSY URETER(S), INSERT STENT, COMBINED  7/27/2012    Procedure: COMBINED CYSTOSCOPY, URETEROSCOPY, LASER HOLMIUM LITHOTRIPSY URETER(S), INSERT STENT;  Video cystoscopy, Right Retrograde Right Ureteroscopy with Holmium Laser, Stone Extraction,  JJ Stent Placement ;  Surgeon: Francisco J Lerma MD;  Location:  OR     LASER HOLMIUM LITHOTRIPSY URETER(S), INSERT  American GFR Calc   11/30/2016 79 >60 mL/min/1.7m2 Final     Comment:     Non  GFR Calc   05/27/2016 70 >60 mL/min/1.7m2 Final     Comment:     Non  GFR Calc       TSH   Date Value Ref Range Status   05/27/2016 2.39 0.40 - 4.00 mU/L Final   ]    Most Recent Immunizations   Administered Date(s) Administered     Influenza (IIV3) 11/06/2008     Influenza Vaccine, 3 YRS +, IM (QUADRIVALENT W/PRESERVATIVES) 10/01/2015     Pneumococcal (PCV 13) 12/14/2015     Pneumococcal 23 valent 11/06/2008     TD (ADULT, 7+) 07/18/2008     TDAP Vaccine (Adacel) 06/12/1997     Zoster vaccine, live 08/02/2013       ASSESSMENT:              Current medications were reviewed today as discussed above.      Medication Adherence: no issues identified    Diabetes: Stable. Patient is meeting A1c goal of < 7%.     Microalbuminuria: Stable.  On recommended tx,  BP likely wouldn't tolerate a higher dose.    Hyperlipidemia: Needs Improvement. Pt is not on high intensity statin which is indicated based on 2013 ACC/AHA guidelines for lipid management.  His LDL was historically in the 70s, it increased to the 90s at last check.  He'll be due for labs again in November, and his lipids may improve given recent weight loss.  If they don't, would recommend changing pravastatin to high-intensity statin.  He had a rash with atorvastatin so would need to try Crestor.  If his LDL remains in the 70s, would be reasonable to continue with pravastatin as his LDL would likely fall <40mg/dL with a change in therapy in which we'd recommend going back to pravastatin.    Insomnia: Stable.    ED: Stable.    Arthritis:  Stable.    Allergies:  Stable.     PLAN:                1.  Future orders placed for pre-visit labs with PCP in late November.  If LDL remains in the 90s, will change pravastatin to Crestor 20mg daily.    I spent 30 minutes with this patient today. All changes were made via collaborative practice agreement with Jennie  STENT, COMBINED  5/24/2014    Procedure: COMBINED CYSTOSCOPY, URETEROSCOPY, LASER HOLMIUM LITHOTRIPSY URETER(S), INSERT STENT;  Surgeon: Francisco J Lerma MD;  Location: [facility] OR     LASER HOLMIUM LITHOTRIPSY URETER(S), INSERT STENT, COMBINED Right 3/5/2017    Procedure: COMBINED CYSTOSCOPY, URETEROSCOPY, LASER HOLMIUM LITHOTRIPSY URETER(S), INSERT STENT;  Surgeon: Chris Barrios MD;  Location: [facility] OR     ROTATOR CUFF REPAIR RT/LT         FAMILY HISTORY:  Family History   Problem Relation Age of Onset     Hyperlipidemia Mother         pulm emboli, IUD perforation and developed clot     Other - See Comments Father         Possible Autoimmune disorder , DVT, pulm embolism     Prostate Cancer Father        SOCIAL HISTORY:  Social History     Socioeconomic History     Marital status:      Spouse name: None     Number of children: 3     Years of education: None     Highest education level: None   Occupational History     Occupation: Golf Pro   Social Needs     Financial resource strain: None     Food insecurity     Worry: None     Inability: None     Transportation needs     Medical: None     Non-medical: None   Tobacco Use     Smoking status: Never Smoker     Smokeless tobacco: Never Used   Substance and Sexual Activity     Alcohol use: No     Drug use: No     Sexual activity: None   Lifestyle     Physical activity     Days per week: None     Minutes per session: None     Stress: None   Relationships     Social connections     Talks on phone: None     Gets together: None     Attends Jainism service: None     Active member of club or organization: None     Attends meetings of clubs or organizations: None     Relationship status: None     Intimate partner violence     Fear of current or ex partner: None     Emotionally abused: None     Physically abused: None     Forced sexual activity: None   Other Topics Concern     Parent/sibling w/ CABG, MI or angioplasty before 65F 55M? Not Asked   Social History  Curt. A copy of the visit note was provided to the patient's primary care provider.     Will follow up pending labs in late November.    The patient was given a summary of these recommendations as an after visit summary.  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 (last updated 3/5/2020)        Review of Systems:  Skin:  Negative     Eyes:  Negative    ENT:  Negative    Respiratory:  Positive for sleep apnea;CPAP  Cardiovascular:  Negative    Gastroenterology: Negative    Genitourinary:  not assessed    Musculoskeletal:  Positive for arthritis(Hands, knees)  Neurologic:  Positive for headaches  Psychiatric:  Positive for excessive stress  Heme/Lymph/Imm:  Negative    Endocrine:  Positive for thyroid disorder      Recent Lab Results:  LIPID RESULTS:  Lab Results   Component Value Date    CHOL 105 06/05/2020    HDL 41 06/05/2020    LDL 43 06/05/2020    TRIG 106 06/05/2020       LIVER ENZYME RESULTS:  Lab Results   Component Value Date    AST 13 03/05/2020    ALT 48 06/05/2020       CBC RESULTS:  Lab Results   Component Value Date    WBC 7.0 03/07/2020    RBC 4.89 03/07/2020    HGB 15.4 03/07/2020    HCT 45.1 03/07/2020    MCV 92 03/07/2020    MCH 31.5 03/07/2020    MCHC 34.1 03/07/2020    RDW 13.4 03/07/2020     03/07/2020       BMP RESULTS:  Lab Results   Component Value Date     03/07/2020    POTASSIUM 4.2 03/07/2020    CHLORIDE 109 03/07/2020    CO2 24 03/07/2020    ANIONGAP 5 03/07/2020    GLC 92 03/07/2020    BUN 16 03/07/2020    CR 0.87 03/07/2020    GFRESTIMATED >90 03/07/2020    GFRESTBLACK >90 03/07/2020    DAMARIS 8.8 03/07/2020        A1C RESULTS:  Lab Results   Component Value Date    A1C 5.5 03/05/2020       INR RESULTS:  Lab Results   Component Value Date    INR 1.02 03/05/2020         ECHOCARDIOGRAM  No results found for this or any previous visit (from the past 8760 hour(s)).      No orders of the defined types were placed in this encounter.    No orders of the defined types were placed in this encounter.    There are no discontinued medications.      Encounter Diagnosis   Name Primary?     Stroke due to embolism of left middle cerebral artery (H)         Thank you for allowing me to participate in the care of your patient.    Sincerely,     Lefty Ramires MD     [facility]    cc:   Emi Soliz PA-C  [address] [place] [address]

## 2020-09-14 NOTE — PROGRESS NOTES
Electrophysiology/ Clinic Note         H&P and Plan:     REASON FOR VISIT: Electrophysiology evaluation.      HISTORY OF PRESENT ILLNESS:  54-year-old gentleman with a history of obstructive sleep apnea (on CPAP), who presented with an embolic CVA and was referred here for consideration for loop recording implantation.     Patient presented with right-sided hemiparesis on 03/05/2020.  At that time he was found to have embolic stroke.  He underwent successful lytic therapy followed by embolectomy and post procedural, his speech and motor deficits improved.  An echocardiogram was obtained and he was found to have a PFO.  He was recent evaluated by Dr. Cunningham and has plans to occlude the PFO.    Today, he informs he is doing well.  He denies any sense of chest pain, shortness of breath, lightheadedness, near-syncope or syncope.     Previous studies:  - Echocardiogram : Normal LV function.  EF estimated at  60%-65%.  There was no significant valve disease.  Aortic root was mildly dilated at 4.5 cm with a normal ascending aorta dimension.        ASSESSMENT AND PLAN:   1.    Embolic CVA.  Unclear etiology.  Atrial fibrillation should be excluded.  I agree with the recommendation of loop recording plantation.    It was explained details.  He understand that is a 1-2% risk associated procedure.  He would like to schedule procedure.  We will make arrangements to have it done.      Lefty Ramires MD    Physical Exam:  Vitals: /87   Pulse 87   Wt 122.9 kg (271 lb)   BMI 37.80 kg/m      Constitutional:  AAO x3.  Pt is in NAD.  HEAD: normocephalic.  SKIN: Skin normal color, texture and turgor with no lesions or eruptions.  Eyes: PERRL, EOMI.  ENT:  Supple, normal JVP. No lymphadenopathy or thyroid enlargement.  Chest:  CTAB.  Cardiac: RRR, normal  S1 and S2.  No murmurs rubs or gallop.    Abdomen:  Normal BS.  Soft, non-tender and non-distended.  No rebound or guarding.    Extremities:  Pedious pulses palpable B/L.  No  LE edema noticed.   Neurological: Strength and sensation grossly symmetric and intact throughout.       CURRENT MEDICATIONS:  Current Outpatient Medications   Medication Sig Dispense Refill     acetaminophen (TYLENOL) 325 MG tablet Take 2 tablets (650 mg) by mouth every 4 hours as needed for mild pain  0     aspirin (ASA) 325 MG tablet Take 1 tablet (325 mg) by mouth daily       atorvastatin (LIPITOR) 40 MG tablet Take 1 tablet (40 mg) by mouth every evening 30 tablet 3     Levothyroxine Sodium (SYNTHROID PO) Take 125 mcg by mouth every evening         ALLERGIES     Allergies   Allergen Reactions     No Known Drug Allergies        PAST MEDICAL HISTORY:  Past Medical History:   Diagnosis Date     Embolic stroke involving left middle cerebral artery (H) 03/2020     Hyperlipidemia      Hypothyroidism      Kidney stone     Recurent stones     DAMARIS on CPAP      Palpitations 3/6/2020     PFO (patent foramen ovale)      Sinus of Valsalva aneurysm        PAST SURGICAL HISTORY:  Past Surgical History:   Procedure Laterality Date     CYSTOSCOPY       CYSTOSCOPY, RETROGRADES, COMBINED  5/24/2014    Procedure: COMBINED CYSTOSCOPY, RETROGRADES;  Surgeon: Francisco J Lerma MD;  Location: [facility] OR     ENT SURGERY      T & A     IR CAROTID CEREBRAL ANGIOGRAM LEFT  3/5/2020     KNEE SURGERY Right     arthroscopic     LASER HOLMIUM LITHOTRIPSY URETER(S), INSERT STENT, COMBINED  7/27/2012    Procedure: COMBINED CYSTOSCOPY, URETEROSCOPY, LASER HOLMIUM LITHOTRIPSY URETER(S), INSERT STENT;  Video cystoscopy, Right Retrograde Right Ureteroscopy with Holmium Laser, Stone Extraction,  JJ Stent Placement ;  Surgeon: Francisco J Lerma MD;  Location: [facility] OR     LASER HOLMIUM LITHOTRIPSY URETER(S), INSERT STENT, COMBINED  5/24/2014    Procedure: COMBINED CYSTOSCOPY, URETEROSCOPY, LASER HOLMIUM LITHOTRIPSY URETER(S), INSERT STENT;  Surgeon: Francisco J Lerma MD;  Location: [facility] OR     LASER HOLMIUM LITHOTRIPSY URETER(S), INSERT STENT,  COMBINED Right 3/5/2017    Procedure: COMBINED CYSTOSCOPY, URETEROSCOPY, LASER HOLMIUM LITHOTRIPSY URETER(S), INSERT STENT;  Surgeon: Chris Barrios MD;  Location: [facility] OR     ROTATOR CUFF REPAIR RT/LT         FAMILY HISTORY:  Family History   Problem Relation Age of Onset     Hyperlipidemia Mother         pulm emboli, IUD perforation and developed clot     Other - See Comments Father         Possible Autoimmune disorder , DVT, pulm embolism     Prostate Cancer Father        SOCIAL HISTORY:  Social History     Socioeconomic History     Marital status:      Spouse name: None     Number of children: 3     Years of education: None     Highest education level: None   Occupational History     Occupation: Golf Pro   Social Needs     Financial resource strain: None     Food insecurity     Worry: None     Inability: None     Transportation needs     Medical: None     Non-medical: None   Tobacco Use     Smoking status: Never Smoker     Smokeless tobacco: Never Used   Substance and Sexual Activity     Alcohol use: No     Drug use: No     Sexual activity: None   Lifestyle     Physical activity     Days per week: None     Minutes per session: None     Stress: None   Relationships     Social connections     Talks on phone: None     Gets together: None     Attends Yarsani service: None     Active member of club or organization: None     Attends meetings of clubs or organizations: None     Relationship status: None     Intimate partner violence     Fear of current or ex partner: None     Emotionally abused: None     Physically abused: None     Forced sexual activity: None   Other Topics Concern     Parent/sibling w/ CABG, MI or angioplasty before 65F 55M? 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 (last updated 3/5/2020)        Review of Systems:  Skin:  Negative     Eyes:  Negative    ENT:  Negative    Respiratory:  Positive for sleep  apnea;CPAP  Cardiovascular:  Negative    Gastroenterology: Negative    Genitourinary:  not assessed    Musculoskeletal:  Positive for arthritis(Hands, knees)  Neurologic:  Positive for headaches  Psychiatric:  Positive for excessive stress  Heme/Lymph/Imm:  Negative    Endocrine:  Positive for thyroid disorder      Recent Lab Results:  LIPID RESULTS:  Lab Results   Component Value Date    CHOL 105 06/05/2020    HDL 41 06/05/2020    LDL 43 06/05/2020    TRIG 106 06/05/2020       LIVER ENZYME RESULTS:  Lab Results   Component Value Date    AST 13 03/05/2020    ALT 48 06/05/2020       CBC RESULTS:  Lab Results   Component Value Date    WBC 7.0 03/07/2020    RBC 4.89 03/07/2020    HGB 15.4 03/07/2020    HCT 45.1 03/07/2020    MCV 92 03/07/2020    MCH 31.5 03/07/2020    MCHC 34.1 03/07/2020    RDW 13.4 03/07/2020     03/07/2020       BMP RESULTS:  Lab Results   Component Value Date     03/07/2020    POTASSIUM 4.2 03/07/2020    CHLORIDE 109 03/07/2020    CO2 24 03/07/2020    ANIONGAP 5 03/07/2020    GLC 92 03/07/2020    BUN 16 03/07/2020    CR 0.87 03/07/2020    GFRESTIMATED >90 03/07/2020    GFRESTBLACK >90 03/07/2020    DAMARIS 8.8 03/07/2020        A1C RESULTS:  Lab Results   Component Value Date    A1C 5.5 03/05/2020       INR RESULTS:  Lab Results   Component Value Date    INR 1.02 03/05/2020         ECHOCARDIOGRAM  No results found for this or any previous visit (from the past 8760 hour(s)).      No orders of the defined types were placed in this encounter.    No orders of the defined types were placed in this encounter.    There are no discontinued medications.      Encounter Diagnosis   Name Primary?     Stroke due to embolism of left middle cerebral artery (H)          CC  Emi Soliz PA-C  [address] [place] [address]

## 2020-10-06 ENCOUNTER — HOSPITAL ENCOUNTER (OUTPATIENT)
Facility: CLINIC | Age: 55
End: 2020-10-06
Payer: COMMERCIAL

## 2020-10-07 DIAGNOSIS — I63.412 STROKE DUE TO EMBOLISM OF LEFT MIDDLE CEREBRAL ARTERY (H): Primary | ICD-10-CM

## 2020-10-07 RX ORDER — LIDOCAINE 40 MG/G
CREAM TOPICAL
Status: CANCELLED | OUTPATIENT
Start: 2020-10-07

## 2020-10-07 RX ORDER — SODIUM CHLORIDE 450 MG/100ML
INJECTION, SOLUTION INTRAVENOUS CONTINUOUS
Status: CANCELLED | OUTPATIENT
Start: 2020-10-07

## 2020-10-07 RX ORDER — CEFAZOLIN SODIUM IN 0.9 % NACL 3 G/100 ML
3 INTRAVENOUS SOLUTION, PIGGYBACK (ML) INTRAVENOUS
Status: CANCELLED | OUTPATIENT
Start: 2020-10-07

## 2020-10-07 NOTE — PROGRESS NOTES
Called patient with pre-procedure instructions for loop recorder implant and left message asking for patient to call back to review below information:     Anticoagulation: NA   Oral diabetes meds: NA  Insulin: NA  Diuretic: NA  Contrast allergy: No known  Pt informed to be NPO at midnight  (if procedure scheduled 1pm or later, may have clear liquid breakfast before 8am)    Instructed pt to shower the morning of the procedure, and then put on a clean shirt in order to help prevent infection.     Pt has post-procedure transportation and 24 hours monitoring set up.   Pt aware of no driving for 24 hours post procedure due to sedation.     COVID test scheduled: 10/08/2020    Pt reminded to self-quarantine from the time of the COVID test to the procedure.    Pt aware of arrival time 11:00AM  and location. Pt verbalized understanding of instructions.

## 2020-10-08 DIAGNOSIS — Z11.59 ENCOUNTER FOR SCREENING FOR OTHER VIRAL DISEASES: ICD-10-CM

## 2020-10-08 PROCEDURE — U0003 INFECTIOUS AGENT DETECTION BY NUCLEIC ACID (DNA OR RNA); SEVERE ACUTE RESPIRATORY SYNDROME CORONAVIRUS 2 (SARS-COV-2) (CORONAVIRUS DISEASE [COVID-19]), AMPLIFIED PROBE TECHNIQUE, MAKING USE OF HIGH THROUGHPUT TECHNOLOGIES AS DESCRIBED BY CMS-2020-01-R: HCPCS | Performed by: INTERNAL MEDICINE

## 2020-10-09 LAB
SARS-COV-2 RNA SPEC QL NAA+PROBE: NOT DETECTED
SPECIMEN SOURCE: NORMAL

## 2020-10-12 ENCOUNTER — HOSPITAL ENCOUNTER (OUTPATIENT)
Facility: CLINIC | Age: 55
Discharge: HOME | End: 2020-10-12
Admitting: INTERNAL MEDICINE
Payer: COMMERCIAL

## 2020-10-12 ENCOUNTER — SURGERY (OUTPATIENT)
Age: 55
End: 2020-10-12
Payer: COMMERCIAL

## 2020-10-12 VITALS
SYSTOLIC BLOOD PRESSURE: 122 MMHG | OXYGEN SATURATION: 97 % | BODY MASS INDEX: 37.84 KG/M2 | WEIGHT: 270.3 LBS | HEART RATE: 75 BPM | HEIGHT: 71 IN | TEMPERATURE: 96.8 F | DIASTOLIC BLOOD PRESSURE: 74 MMHG | RESPIRATION RATE: 16 BRPM

## 2020-10-12 DIAGNOSIS — I63.412 STROKE DUE TO EMBOLISM OF LEFT MIDDLE CEREBRAL ARTERY (H): ICD-10-CM

## 2020-10-12 LAB
ANION GAP SERPL CALCULATED.3IONS-SCNC: 4 MMOL/L (ref 3–14)
BUN SERPL-MCNC: 18 MG/DL (ref 7–30)
CALCIUM SERPL-MCNC: 8.4 MG/DL (ref 8.5–10.1)
CHLORIDE SERPL-SCNC: 112 MMOL/L (ref 94–109)
CO2 SERPL-SCNC: 26 MMOL/L (ref 20–32)
CREAT SERPL-MCNC: 0.93 MG/DL (ref 0.66–1.25)
ERYTHROCYTE [DISTWIDTH] IN BLOOD BY AUTOMATED COUNT: 13.5 % (ref 10–15)
GFR SERPL CREATININE-BSD FRML MDRD: >90 ML/MIN/{1.73_M2}
GLUCOSE SERPL-MCNC: 89 MG/DL (ref 70–99)
HCT VFR BLD AUTO: 43.6 % (ref 40–53)
HGB BLD-MCNC: 14.9 G/DL (ref 13.3–17.7)
MCH RBC QN AUTO: 32 PG (ref 26.5–33)
MCHC RBC AUTO-ENTMCNC: 34.2 G/DL (ref 31.5–36.5)
MCV RBC AUTO: 94 FL (ref 78–100)
PLATELET # BLD AUTO: 235 10E9/L (ref 150–450)
POTASSIUM SERPL-SCNC: 4 MMOL/L (ref 3.4–5.3)
RBC # BLD AUTO: 4.65 10E12/L (ref 4.4–5.9)
SODIUM SERPL-SCNC: 142 MMOL/L (ref 133–144)
WBC # BLD AUTO: 6.2 10E9/L (ref 4–11)

## 2020-10-12 PROCEDURE — 999N000071 HC STATISTIC HEART CATH LAB OR EP LAB

## 2020-10-12 PROCEDURE — 93005 ELECTROCARDIOGRAM TRACING: CPT

## 2020-10-12 PROCEDURE — 33285 INSJ SUBQ CAR RHYTHM MNTR: CPT | Performed by: INTERNAL MEDICINE

## 2020-10-12 PROCEDURE — 250N000009 HC RX 250: Performed by: INTERNAL MEDICINE

## 2020-10-12 PROCEDURE — 250N000011 HC RX IP 250 OP 636: Performed by: INTERNAL MEDICINE

## 2020-10-12 PROCEDURE — 85027 COMPLETE CBC AUTOMATED: CPT | Performed by: INTERNAL MEDICINE

## 2020-10-12 PROCEDURE — 999N000054 HC STATISTIC EKG NON-CHARGEABLE

## 2020-10-12 PROCEDURE — C1764 EVENT RECORDER, CARDIAC: HCPCS | Performed by: INTERNAL MEDICINE

## 2020-10-12 PROCEDURE — 80048 BASIC METABOLIC PNL TOTAL CA: CPT | Performed by: INTERNAL MEDICINE

## 2020-10-12 PROCEDURE — 272N000001 HC OR GENERAL SUPPLY STERILE: Performed by: INTERNAL MEDICINE

## 2020-10-12 PROCEDURE — 36415 COLL VENOUS BLD VENIPUNCTURE: CPT

## 2020-10-12 PROCEDURE — 258N000002 HC RX IP 258 OP 250: Performed by: INTERNAL MEDICINE

## 2020-10-12 PROCEDURE — 999N000099 HC STATISTIC MODERATE SEDATION < 10 MIN: Performed by: INTERNAL MEDICINE

## 2020-10-12 PROCEDURE — 999N000184 HC STATISTIC TELEMETRY

## 2020-10-12 PROCEDURE — 99152 MOD SED SAME PHYS/QHP 5/>YRS: CPT | Mod: 59 | Performed by: INTERNAL MEDICINE

## 2020-10-12 PROCEDURE — 93010 ELECTROCARDIOGRAM REPORT: CPT | Performed by: INTERNAL MEDICINE

## 2020-10-12 DEVICE — SYS INS CARD MNTR REVEAL LINQ
Type: IMPLANTABLE DEVICE | Status: NON-FUNCTIONAL
Removed: 2024-01-23

## 2020-10-12 RX ORDER — FENTANYL CITRATE 50 UG/ML
INJECTION, SOLUTION INTRAMUSCULAR; INTRAVENOUS
Status: DISCONTINUED | OUTPATIENT
Start: 2020-10-12 | End: 2020-10-12 | Stop reason: HOSPADM

## 2020-10-12 RX ORDER — CEFAZOLIN SODIUM IN 0.9 % NACL 3 G/100 ML
3 INTRAVENOUS SOLUTION, PIGGYBACK (ML) INTRAVENOUS
Status: COMPLETED | OUTPATIENT
Start: 2020-10-12 | End: 2020-10-12

## 2020-10-12 RX ORDER — HEPARIN SODIUM 200 [USP'U]/100ML
100-500 INJECTION, SOLUTION INTRAVENOUS CONTINUOUS PRN
Status: DISCONTINUED | OUTPATIENT
Start: 2020-10-12 | End: 2020-10-12 | Stop reason: HOSPADM

## 2020-10-12 RX ORDER — CEFAZOLIN SODIUM 1 G/3ML
1 INJECTION, POWDER, FOR SOLUTION INTRAMUSCULAR; INTRAVENOUS
Status: DISCONTINUED | OUTPATIENT
Start: 2020-10-12 | End: 2020-10-12 | Stop reason: HOSPADM

## 2020-10-12 RX ORDER — OXYCODONE AND ACETAMINOPHEN 5; 325 MG/1; MG/1
1 TABLET ORAL EVERY 4 HOURS PRN
Status: DISCONTINUED | OUTPATIENT
Start: 2020-10-12 | End: 2020-10-12 | Stop reason: HOSPADM

## 2020-10-12 RX ORDER — DOBUTAMINE HYDROCHLORIDE 200 MG/100ML
5-40 INJECTION INTRAVENOUS CONTINUOUS PRN
Status: DISCONTINUED | OUTPATIENT
Start: 2020-10-12 | End: 2020-10-12 | Stop reason: HOSPADM

## 2020-10-12 RX ORDER — HEPARIN SODIUM 200 [USP'U]/100ML
100-600 INJECTION, SOLUTION INTRAVENOUS CONTINUOUS PRN
Status: DISCONTINUED | OUTPATIENT
Start: 2020-10-12 | End: 2020-10-12 | Stop reason: HOSPADM

## 2020-10-12 RX ORDER — LIDOCAINE 40 MG/G
CREAM TOPICAL
Status: DISCONTINUED | OUTPATIENT
Start: 2020-10-12 | End: 2020-10-12 | Stop reason: HOSPADM

## 2020-10-12 RX ORDER — SODIUM CHLORIDE 450 MG/100ML
INJECTION, SOLUTION INTRAVENOUS CONTINUOUS
Status: DISCONTINUED | OUTPATIENT
Start: 2020-10-12 | End: 2020-10-12 | Stop reason: HOSPADM

## 2020-10-12 RX ADMIN — FENTANYL CITRATE 50 MCG: 50 INJECTION, SOLUTION INTRAMUSCULAR; INTRAVENOUS at 13:17

## 2020-10-12 RX ADMIN — MIDAZOLAM 1 MG: 1 INJECTION INTRAMUSCULAR; INTRAVENOUS at 13:11

## 2020-10-12 RX ADMIN — FENTANYL CITRATE 50 MCG: 50 INJECTION, SOLUTION INTRAMUSCULAR; INTRAVENOUS at 13:14

## 2020-10-12 RX ADMIN — LIDOCAINE HYDROCHLORIDE 10 ML: 10 INJECTION, SOLUTION EPIDURAL; INFILTRATION; INTRACAUDAL; PERINEURAL at 13:22

## 2020-10-12 RX ADMIN — MIDAZOLAM 1 MG: 1 INJECTION INTRAMUSCULAR; INTRAVENOUS at 13:15

## 2020-10-12 RX ADMIN — SODIUM CHLORIDE: 4.5 INJECTION, SOLUTION INTRAVENOUS at 12:14

## 2020-10-12 RX ADMIN — Medication 3 G: at 13:06

## 2020-10-12 ASSESSMENT — MIFFLIN-ST. JEOR: SCORE: 2078.2

## 2020-10-12 NOTE — PROGRESS NOTES
PATIENT/VISITOR WELLNESS SCREENING    Step 1 Patient Screening    1. In the last month, have you been in contact with someone who was confirmed or suspected to have Coronavirus/COVID-19? No    2. Do you have the following symptoms?  Fever/Chills? No   Cough? No   Shortness of breath? No   New loss of taste or smell? No  Sore throat? No  Muscle or body aches? No  Headaches? No  Fatigue? No  Vomiting or diarrhea? No    Step 2 Visitor Screening    1. Name of Visitor (1 visitor per patient): Arabella    2. In the last month, have you been in contact with someone who was confirmed or suspected to have Coronavirus/COVID-19? No    3. Do you have the following symptoms?  Fever/Chills? No   Cough? No   Shortness of breath? No   Skin rash? No   Loss of taste or smell? No  Sore throat? No  Runny or stuffy nose? No  Muscle or body aches? No  Headaches? No  Fatigue? No  Vomiting or diarrhea? No    If the visitor has positive symptoms, notify supervisor/manger  Per policy, the visitor will need to leave the facility     Step 3 Refer to logic grid below for actions    NO SYMPTOM(S)    ACTIONS:  1. Standard rooming process  2. Provider to assess per normal protocol  3. Implement precautions as needed and per guidelines     POSITIVE SYMPTOM(S)  If positive for ANY of the following symptoms: fever, cough, shortness of breath, rash    ACTION:  1. Continue to have the patient wear a mask   2. Room patient as soon as possible  3. Don appropriate PPE when entering room  4. Provider evaluation

## 2020-10-12 NOTE — PRE-PROCEDURE
GENERAL PRE-PROCEDURE:   Procedure:  Loop recorder  Date/Time:  10/12/2020 11:44 AM    Written consent obtained?: Yes    Risks and benefits: Risks, benefits and alternatives were discussed    Consent given by:  Patient  Patient states understanding of procedure being performed: Yes    Patient's understanding of procedure matches consent: Yes    Procedure consent matches procedure scheduled: Yes    Expected level of sedation:  Moderate  Appropriately NPO:  Yes  ASA Class:  Class 3- Severe systemic disease, definite functional limitations  Mallampati  :  Grade 2- soft palate, base of uvula, tonsillar pillars, and portion of posterior pharyngeal wall visible  Lungs:  Lungs clear with good breath sounds bilaterally  Heart:  Normal heart sounds and rate  History & Physical reviewed:  History and physical reviewed and no updates needed  Statement of review:  I have reviewed the lab findings, diagnostic data, medications, and the plan for sedation

## 2020-10-12 NOTE — PROGRESS NOTES
Care Suites Post Procedure Note    Patient Information  Name: Rey Andre  Age: 54 year old    Post Procedure  Time patient returned to Care Suites: 1330  Concerns/abnormal assessment: no  If abnormal assessment, provider notified: N/A  Plan/Other: monitor.    Jevon Pace RN     Care Suites Discharge Nursing Note    Patient Information  Name: Rey Andre  Age: 54 year old    Discharge Education:  Discharge instructions reviewed: Yes  Additional education/resources provided: per PM rep.  Patient/patient representative verbalizes understanding: Yes  Patient discharging on new medications: No  Medication education completed: N/A    Discharge Plans:   Discharge location: home  Discharge ride contacted: Yes  Approximate discharge time: 1430    Discharge Criteria:  Discharge criteria met and vital signs stable: Yes    Patient Belongs:  Patient belongings returned to patient: Yes    Jevon Pace RN

## 2020-10-12 NOTE — DISCHARGE INSTRUCTIONS
Loop Recorder Discharge Instructions    After you go home:      Have an adult stay with you for 6 hours.    You may resume your normal diet.       For 24 hours - due to the sedation you received:    Relax and take it easy.    Do NOT make any important or legal decisions.    Do NOT drive or operate machines at home or at work.    Do NOT drink alcohol.    Care of Chest Incision:      Keep the bandage on for 3 days. You may remove the dressing on Thursday. Change it only if it gets loose or soaked. If you need to change it, use 4x4-inch gauze and a large clear bandage.     Leave the strips of tape on. They will fall off on their own, or we will remove them at your first check-up.    Check your wound daily for signs of infection, such as increased redness, severe swelling or draining. Fever may also be a sign of infection. Call us if you see any of these signs.    If there are no signs of infection, you may shower after the bandage comes off in 3 days. If you take a tub bath, keep the wound dry.    No soaking the incision (swimming pool, bathtub, hot tub) for 2 weeks.    You may have mild to medium pain for 3 to 5 days. Take Acetaminophen (Tylenol) or Ibuprofen (Advil) for the pain. If the pain persists or is severe, call us.    Activity:      Avoid strenuous activity until incision well healed.    Bleeding:      If you start bleeding from the incision site, sit down and press firmly on the site for 10 minutes.     Once bleeding stops, call [facility] as soon as you can.       Call 911 right away if you have heavy bleeding or bleeding that does not stop.      Medicines:      Take your medications, including blood thinners, unless your provider tells you not to.    If you have stopped any medicines, check with your provider about when to restart them.    Follow Up Appointments:      Follow up with Device Clinic at [facility] of patient preference in 7-10 days.    Call the clinic if:      You have a large or  growing hard lump around the site.    The site is red, swollen, hot or tender.    Blood or fluid is draining from the site.    You have chills or a fever greater than 101 F (38 C).    You feel dizzy or light-headed.    Questions or concerns.    Telling others about your device:      Before you leave the hospital, you will receive a temporary ID card. A permanent card will be mailed to you about 6 to 8 weeks later. Always carry the ID card with you. It has important details about your device.    You may also get a Medical Alert bracelet or tag that says you have a loop recorder.  Go to [email or web address].     Always tell doctors, dentists and other care providers that you have a device implanted in you.    Let us know before you plan any surgeries. Your care team must take special steps to keep you safe during certain procedures. These steps will depend on the type of device you have. Your provider will need to see your ID card. They may need to call us for instructions.    Device Safety:      Please refer to device  s booklet for further information.        [facility] [place]:    [phone] [facility] (7 days a week)

## 2020-10-12 NOTE — PROGRESS NOTES
Care Suites Admission Nursing Note    Patient Information  Name: Rey Andre  Age: 54 year old  Reason for admission: loop recorder  Care Suites arrival time: 1100    Visitor Information  Name: Phillip  Informed of visitor restrictions: Yes  1 visitor allowed per patient   Visitor must screen negative for COVID symptoms   Visitor must wear a mask  Waiting rooms closed to visitors    Patient Admission/Assessment   Pre-procedure assessment complete: Yes  If abnormal assessment/labs, provider notified: N/A  NPO: Yes  Medications held per instructions/orders: Yes  Consent: obtained  If applicable, pregnancy test status: deferred  Patient oriented to room: Yes  Education/questions answered: Yes  Plan/other: proceed as planned discharge instructions reviewed pre procedure    Discharge Planning  Discharge name/phone number: Arabella [phone]  Overnight post sedation caregiver: Arabella  Discharge location: home    Ashish Wallace RN

## 2020-10-13 ENCOUNTER — TELEPHONE (OUTPATIENT)
Dept: CARDIOLOGY | Facility: CLINIC | Age: 55
End: 2020-10-13

## 2020-10-13 DIAGNOSIS — I63.412 STROKE DUE TO EMBOLISM OF LEFT MIDDLE CEREBRAL ARTERY (H): Primary | ICD-10-CM

## 2020-10-13 DIAGNOSIS — Z45.09 ENCOUNTER FOR LOOP RECORDER CHECK: ICD-10-CM

## 2020-10-13 LAB — INTERPRETATION ECG - MUSE: NORMAL

## 2020-10-13 NOTE — TELEPHONE ENCOUNTER
Pt had ILR implanted yesterday. Left VM to call back to device clinic     ADDENDUM 3:20PM. Spoke with pt.     Post device implant discharge phone call.    Reviewed the following:  Remove outer dressing 3 days after implant. May shower after outer dressing removed. Leave steri-strips in place, will be removed at 1 week device check  Watch for redness, drainage, warmth, or fever. Call device clinic if any signs of infection.     1 week device check scheduled: Monday 10/19/2020, 9am, [place]    Pt states understanding of all instructions.

## 2020-10-16 ENCOUNTER — TELEPHONE (OUTPATIENT)
Dept: CARDIOLOGY | Facility: CLINIC | Age: 55
End: 2020-10-16

## 2020-10-16 NOTE — TELEPHONE ENCOUNTER
Wellness Screening Tool    Symptom Screening:    Do you have one of the following NEW symptoms:      Fever (subjective or >100.0)?  No    New cough? No    Shortness of breath? No    Chills? No    New loss of taste or smell? No    Generalized body aches? No    New persistent headache? No    New sore throat? No    Nausea, vomiting or diarrhea? No    Within the past 2 weeks, have you been exposed to someone with a known positive illness below?      COVID - 19 (known or suspected) No    Chicken pox?  No    Measles? No    Pertussis? No    Have you had a positive COVID-19 diagnostic test (nasal swab test) in the last 14 days or are you currently   on self-quarantine restrictions (i.e.travel restriction, exposure, etc?) No        Patient notified of visitor restriction: Yes  Patient informed to wear a mask: Yes    Patient's appointment status: Patient will be seen in clinic as scheduled on 10/19/20

## 2020-10-19 ENCOUNTER — HOSPITAL ENCOUNTER (OUTPATIENT)
Dept: LAB | Facility: CLINIC | Age: 55
Discharge: HOME | End: 2020-10-19
Attending: INTERNAL MEDICINE | Admitting: INTERNAL MEDICINE
Payer: COMMERCIAL

## 2020-10-19 ENCOUNTER — ANCILLARY PROCEDURE (OUTPATIENT)
Dept: CARDIOLOGY | Facility: CLINIC | Age: 55
End: 2020-10-19
Attending: INTERNAL MEDICINE
Payer: COMMERCIAL

## 2020-10-19 DIAGNOSIS — Z45.09 ENCOUNTER FOR LOOP RECORDER CHECK: ICD-10-CM

## 2020-10-19 DIAGNOSIS — I63.412 EMBOLIC STROKE INVOLVING LEFT MIDDLE CEREBRAL ARTERY (H): ICD-10-CM

## 2020-10-19 DIAGNOSIS — I63.412 STROKE DUE TO EMBOLISM OF LEFT MIDDLE CEREBRAL ARTERY (H): Primary | ICD-10-CM

## 2020-10-19 DIAGNOSIS — Q21.12 PFO (PATENT FORAMEN OVALE): ICD-10-CM

## 2020-10-19 DIAGNOSIS — I63.412 STROKE DUE TO EMBOLISM OF LEFT MIDDLE CEREBRAL ARTERY (H): ICD-10-CM

## 2020-10-19 LAB
CHOLEST SERPL-MCNC: 125 MG/DL
HDLC SERPL-MCNC: 45 MG/DL
LDLC SERPL CALC-MCNC: 29 MG/DL
NONHDLC SERPL-MCNC: 80 MG/DL
TRIGL SERPL-MCNC: 257 MG/DL

## 2020-10-19 PROCEDURE — 85307 ASSAY ACTIVATED PROTEIN C: CPT | Performed by: INTERNAL MEDICINE

## 2020-10-19 PROCEDURE — 80061 LIPID PANEL: CPT | Performed by: INTERNAL MEDICINE

## 2020-10-19 PROCEDURE — 85300 ANTITHROMBIN III ACTIVITY: CPT | Performed by: INTERNAL MEDICINE

## 2020-10-19 PROCEDURE — G0452 MOLECULAR PATHOLOGY INTERPR: HCPCS | Mod: 26 | Performed by: PATHOLOGY

## 2020-10-19 PROCEDURE — 36415 COLL VENOUS BLD VENIPUNCTURE: CPT | Performed by: INTERNAL MEDICINE

## 2020-10-19 PROCEDURE — 81241 F5 GENE: CPT | Performed by: INTERNAL MEDICINE

## 2020-10-19 PROCEDURE — 93291 INTERROG DEV EVAL SCRMS IP: CPT | Performed by: INTERNAL MEDICINE

## 2020-10-19 PROCEDURE — 81240 F2 GENE: CPT | Performed by: INTERNAL MEDICINE

## 2020-10-19 PROCEDURE — 85303 CLOT INHIBIT PROT C ACTIVITY: CPT | Performed by: INTERNAL MEDICINE

## 2020-10-20 LAB — AT III ACT/NOR PPP CHRO: 95 % (ref 85–135)

## 2020-10-22 LAB — COPATH REPORT: NORMAL

## 2020-10-23 LAB
APCR PPP: 2.87 {RATIO}
PROT C ACT/NOR PPP CHRO: 129 % (ref 70–170)

## 2020-10-26 LAB
MDC_IDC_MSMT_BATTERY_STATUS: NORMAL
MDC_IDC_PG_IMPLANT_DTM: NORMAL
MDC_IDC_PG_MFG: NORMAL
MDC_IDC_PG_MODEL: NORMAL
MDC_IDC_PG_SERIAL: NORMAL
MDC_IDC_PG_TYPE: NORMAL
MDC_IDC_SESS_CLINIC_NAME: NORMAL
MDC_IDC_SESS_DTM: NORMAL
MDC_IDC_SESS_TYPE: NORMAL
MDC_IDC_SET_ZONE_DETECTION_INTERVAL: 2000 MS
MDC_IDC_SET_ZONE_DETECTION_INTERVAL: 3000 MS
MDC_IDC_SET_ZONE_DETECTION_INTERVAL: 340 MS
MDC_IDC_SET_ZONE_TYPE: NORMAL
MDC_IDC_STAT_AT_BURDEN_PERCENT: 0 %
MDC_IDC_STAT_AT_DTM_END: NORMAL
MDC_IDC_STAT_AT_DTM_START: NORMAL
MDC_IDC_STAT_EPISODE_RECENT_COUNT: 0
MDC_IDC_STAT_EPISODE_RECENT_COUNT_DTM_END: NORMAL
MDC_IDC_STAT_EPISODE_RECENT_COUNT_DTM_START: NORMAL
MDC_IDC_STAT_EPISODE_TOTAL_COUNT: 0
MDC_IDC_STAT_EPISODE_TOTAL_COUNT_DTM_END: NORMAL
MDC_IDC_STAT_EPISODE_TOTAL_COUNT_DTM_START: NORMAL
MDC_IDC_STAT_EPISODE_TYPE: NORMAL

## 2020-10-29 ENCOUNTER — TELEPHONE (OUTPATIENT)
Dept: CARDIOLOGY | Facility: CLINIC | Age: 55
End: 2020-10-29

## 2020-10-29 NOTE — TELEPHONE ENCOUNTER
1.Pt has lab values ordered by DR Alvarenga.   Component      Latest Ref Rng & Units 10/19/2020   Activated Prot C Resistance Ratio      >2.12 2.87     Component      Latest Ref Rng & Units 10/19/2020   Antithrombin III Chromogenic      85 - 135 % 95   All labs not recorded here are in epic.     2. Pt had loop recorder placed 10/12 and reading done 10/19 in epic.    3. DR Alvarenga Office visit note 7/7/20 discussing PFO.     4. Nurse practitioner asking for time line on PFO closure?  TERESITA Wilson RN

## 2020-11-02 NOTE — TELEPHONE ENCOUNTER
If 30 days of loop recorder negative then gets pfo closed nikolay cardioform  harpal should see him for preop

## 2020-11-04 NOTE — TELEPHONE ENCOUNTER
Called Pt informed him of lab results for clotting factors being negative, or normal. Informed Pt will be monitoring for loop recorder results for next 30 days and if no issue will go forward with PFO closure. Informed Pt closure would not be till after first of year Pt understood. Gave Pt phone numbers for EP , and this nurse. TERESITA Wilson RN

## 2020-11-29 ENCOUNTER — HEALTH MAINTENANCE LETTER (OUTPATIENT)
Age: 55
End: 2020-11-29

## 2020-12-02 NOTE — TELEPHONE ENCOUNTER
Heart Care    Narrative & Impression    [employer] Reveal LINQ Loop Recorder - 7 day in-clinic check  Symptom: 0  Tachy: 0  Pause: 0  Javier: 0  AT: 0  AF: 0  Time in AT/AF: 0.0%  Heart rate: Stable with good variability.  HRs per histogram range from  and are primarily from .  Sensing: Measured between 0.17 and 0.19mV  Presenting Rhythm: Shows NSR in the 80's with good p-wave and r-wave visualization on EGM  Battery: Good   Careplan: Steri-strips removed.  Incision has a small clot over incision with no redness, drainage, swelling or signs of infection.  Small amount of yellowing ecchymosis present below and distal to the incision.  Reviewed remote monitoring, activity resumption, incision care and monitoring, routine device clinic follow-up and when to call the clinic.  Scheduled for remote device check on 1/18/21.  Pt is due to have labs drawn for Dr. Alvarenga prior to PFO closure, spoke with scheduling and patient is scheduled to have labs drawn at 3:00pm today in [place].  Patient verbalized understanding of all instructions and will call the clinic with any questions.  ADA Domínguez       Pt's device not to be checked until 1/18/21, and next PFO opening not until Fed 2021. Will forward this to PFO team if they want to give Pt tentative date for PFO closure.  TERESITA Wilson RN

## 2021-01-18 ENCOUNTER — ANCILLARY PROCEDURE (OUTPATIENT)
Dept: CARDIOLOGY | Facility: CLINIC | Age: 56
End: 2021-01-18
Attending: INTERNAL MEDICINE
Payer: COMMERCIAL

## 2021-01-18 ENCOUNTER — TELEPHONE (OUTPATIENT)
Dept: CARDIOLOGY | Facility: CLINIC | Age: 56
End: 2021-01-18

## 2021-01-18 DIAGNOSIS — I63.9 CRYPTOGENIC STROKE (H): Primary | ICD-10-CM

## 2021-01-18 DIAGNOSIS — Z45.09 ENCOUNTER FOR LOOP RECORDER CHECK: ICD-10-CM

## 2021-01-18 DIAGNOSIS — I63.412 STROKE DUE TO EMBOLISM OF LEFT MIDDLE CEREBRAL ARTERY (H): ICD-10-CM

## 2021-01-18 PROCEDURE — G2066 INTER DEVC REMOTE 30D: HCPCS | Performed by: INTERNAL MEDICINE

## 2021-01-18 PROCEDURE — 93298 REM INTERROG DEV EVAL SCRMS: CPT | Performed by: INTERNAL MEDICINE

## 2021-01-18 NOTE — TELEPHONE ENCOUNTER
"Received call back from patient who stated that he may have been awake on 1/8/21 at 0704, but cannot remember for certain.  He stated he was asymptomatic with episodes.  Patient stated he does have sleep apnea and regularly uses his CPAP at night.     Patient also stated that he was told that he \"has a hole in his heart\" and they had discussed having this repaired this year.  He is wondering when this will be scheduled.  Per chart review, pt had hypercoagulable work-up completed and plan was for PFO closure sometime after the first of the year.      Will route update to Dr. Alvarenga for review.    ADA Domínguez    "

## 2021-01-18 NOTE — TELEPHONE ENCOUNTER
Routine from [employer] Reveal Linq Loop Recorder Check done on 01/18/2021. Results showed:  Symptom: 0    Tachy: 0    Pause: 3. EGM's show 4-6 second pauses all occurring during sleeping hours. Reviewed logged times of 11/30/20 at 03:56AM, 01/02/21 at 05:41AM, and 01/08/21 at 07:04AM and asked that patient call heart clinic back if he was awake during logged episodes.   Javier: 0    AT: 0    AF: 0    Time in AT/AF: 0 %    Heart rate: SR. Excellent variability    Battery: Ok    Careplan: Follow-up in three months with remote loop check on 04/19/2021. Called and left message with results and follow-up plan along with device clinic phone number to call back to discuss logged episodes. Loop was placed for cryptogenic stroke however will send FYI regarding logged pause episodes Dr. Alvarenga.

## 2021-01-19 NOTE — TELEPHONE ENCOUNTER
Per Chadd CNP's recommendations, requested that EP nurses review pt's recent pauses on Loop recorder with Dr. Ramires to see if he has any further recommendations. After Dr. Ramires has weighed in, Chadd CNP plans to discuss PFO closure date with Dr. Alvarenga.     Called pt, no answer, left voicemail reviewing above information. Discussed that will callback once more known and left callback number if any questions/concerns arise.     AAD Roper 2:11 PM 1/19/2021

## 2021-01-20 NOTE — CONFIDENTIAL NOTE
Reviewed loop results and documented pause episodes with Dr. Ramires per request. Dr. Ramires stated nothing he would do at this time and stated patient should follow-up with his sleep MD if he has sleep apnea and is wearing a CPAP at night.     Will route update to Jone Diaz and Jeannine Bustillos RN per request.

## 2021-01-21 LAB
MDC_IDC_EPISODE_DTM: NORMAL
MDC_IDC_EPISODE_DURATION: 3.67 S
MDC_IDC_EPISODE_DURATION: 4.27 S
MDC_IDC_EPISODE_DURATION: 5.65 S
MDC_IDC_EPISODE_ID: 1
MDC_IDC_EPISODE_ID: 2
MDC_IDC_EPISODE_ID: 3
MDC_IDC_EPISODE_TYPE: NORMAL
MDC_IDC_MSMT_BATTERY_STATUS: NORMAL
MDC_IDC_PG_IMPLANT_DTM: NORMAL
MDC_IDC_PG_MFG: NORMAL
MDC_IDC_PG_MODEL: NORMAL
MDC_IDC_PG_SERIAL: NORMAL
MDC_IDC_PG_TYPE: NORMAL
MDC_IDC_SESS_CLINIC_NAME: NORMAL
MDC_IDC_SESS_DTM: NORMAL
MDC_IDC_SESS_TYPE: NORMAL
MDC_IDC_SET_ZONE_DETECTION_INTERVAL: 2000 MS
MDC_IDC_SET_ZONE_DETECTION_INTERVAL: 3000 MS
MDC_IDC_SET_ZONE_DETECTION_INTERVAL: 340 MS
MDC_IDC_SET_ZONE_TYPE: NORMAL
MDC_IDC_STAT_AT_BURDEN_PERCENT: 0 %
MDC_IDC_STAT_AT_DTM_END: NORMAL
MDC_IDC_STAT_AT_DTM_START: NORMAL
MDC_IDC_STAT_EPISODE_RECENT_COUNT: 0
MDC_IDC_STAT_EPISODE_RECENT_COUNT: 3
MDC_IDC_STAT_EPISODE_RECENT_COUNT_DTM_END: NORMAL
MDC_IDC_STAT_EPISODE_RECENT_COUNT_DTM_START: NORMAL
MDC_IDC_STAT_EPISODE_TOTAL_COUNT: 0
MDC_IDC_STAT_EPISODE_TOTAL_COUNT: 3
MDC_IDC_STAT_EPISODE_TOTAL_COUNT_DTM_END: NORMAL
MDC_IDC_STAT_EPISODE_TOTAL_COUNT_DTM_START: NORMAL
MDC_IDC_STAT_EPISODE_TYPE: NORMAL

## 2021-01-21 NOTE — TELEPHONE ENCOUNTER
Called and spoke with pt. Discussed that Dr. Ramires reviewed recent pauses noted on his loop recorder and does not have any recommendations expcept to follow up with his sleep MD regarding his sleep apnea. Pt verbalized understanding and agrees with this plan.   Also discussed that Dr. Alvarenga and MADELYN Florentino have reviewed and confirm that pt can schedule PFO closure. Offered pt closure date of 3/17/21 with Dr. Alvarenga with in clinic visit with MADELYN Florentino 1 week prior and 2 weeks post (since MADELYN Florentino is rounding the week after his procedure). Pt states he needs to review with his wife and will callback to let me know if this procedure date and pre-post visit dates will work.    Awaiting return call from pt.     ADA Roper 1:21 PM 1/21/2021

## 2021-02-10 NOTE — TELEPHONE ENCOUNTER
Have not received return call from pt yet regarding whether or not he would like to go forward with PFO closure with Dr. Alvarenga on 3/17/21. Called pt, no answer, left voicemail requesting a return call regarding whether or not he has made a decision about PFO closure.     Reminder sent to call pt on 2/15/21 if have not heard back.     ADA Roper 4:42 PM 2/10/2021

## 2021-02-15 NOTE — TELEPHONE ENCOUNTER
Have not received return call from pt. Will sent [employer] message to pt with notification if not read by 2/17.     ADA Roper 3:54 PM 2/15/2021

## 2021-02-17 NOTE — TELEPHONE ENCOUNTER
Called and spoke with pt. He reports that he has planned a downhill ski trip with his family the last week in March for Spring Break. Reviewed with pt that he will need to avoid heavy aeorbic/contact activities after the PFO closure so would recommend delaying PFO closure until after his downhill ski trip. Pt agrees with this plan as he believes he will likely fall at times on the ski trip. Pt states April would be a good month for him to have PFO closure done, because in May his job gets busier. Discussed with pt that will review with Vee-MADELYN Gonzalez and Dr. Alvarenga to see if a date in April can be found or not- did explain that there are already two patient's on the schedule for April already.     ADA Roper 1:58 PM 2/17/2021

## 2021-02-19 NOTE — TELEPHONE ENCOUNTER
Called and spoke with pt. Discussed that provider  found that Dr. Alvarenga would be able to do PFO closure on 4/26/21 at 8:30am with 6:30am check-in time. Also discussed that MADELYN Florentino could see him for pre-procedure visit on 4/15/21 at 10:10am and post-procedure visit on 5/4/21 at 8:50am. Pt verbalized understanding and agrees with this plan for procedure date/time and appt dates/times.     Discussed with pt that will confirm with Dr. Alvarenga whether or not he plans to use general anesthesia or not and if plan will be to discharge same day or next day after procedure. Will call back to review and then have scheduling get procedure scheduled.     ADA Roper 10:58 AM 2/19/2021

## 2021-02-25 ENCOUNTER — HOSPITAL ENCOUNTER (INPATIENT)
Facility: CLINIC | Age: 56
Setting detail: SURGERY ADMIT
End: 2021-02-25
Payer: COMMERCIAL

## 2021-02-25 ENCOUNTER — CARE COORDINATION (OUTPATIENT)
Dept: CARDIOLOGY | Facility: CLINIC | Age: 56
End: 2021-02-25

## 2021-02-25 DIAGNOSIS — Q21.12 PFO (PATENT FORAMEN OVALE): Primary | ICD-10-CM

## 2021-02-25 DIAGNOSIS — Q21.12 PFO (PATENT FORAMEN OVALE): ICD-10-CM

## 2021-02-25 NOTE — TELEPHONE ENCOUNTER
Called pt, no answer, left voicemail that Dr. Alvarenga does plan to use general anesthesia for his PFO closure procedure and will get orders entered to get appt scheduled and scheduling will be reaching out to him to get appt and 1 week post-procedure echo scheduled.     Left call back number for questions/concers.     ADA Roper 11:55 AM 2/25/2021

## 2021-04-11 DIAGNOSIS — Z11.59 ENCOUNTER FOR SCREENING FOR OTHER VIRAL DISEASES: ICD-10-CM

## 2021-04-14 NOTE — PROGRESS NOTES
"History of Present Illness:     Rey \"Phillip\" Thai is a 54 year old gentleman who presents today with his wife for cardiac assessment for upcoming percutaneous PFO closure. He was referred to Dr. Alvarenga by Dr. Sanon for a history of embolic stroke for which he was admitted from 3-5-2020 to 3-7-2020. He suffered right sided hemiparesis. MRI of the brain showed scattered acute infarcts in the left frontal, parietal, and occipital lobes. Carotids were patent but he had partially occlusive thrombus in the left M2 branch of the middle cerebral artery, likely thromboembolic.     He underwent lytic therapy with embolectomy with improvement in his speech and motor deficits and no residual.     He was in sinus rhythm during his admission. He reported a history of palpitations to Dr Sanon lasting 10-15 minutes.Event recorder as an OP showed no arrhythmias.  However his Neurologist sent him to EP and a loop recorder was implanted. He has not had atrial fibrillation identified on there but has had 6-8 second pauses during sleep. Dr. Ramires reviewed and suggested he return to the sleep clinic and have his CPAP assessed which he states he has done. He is looking for an alternate sleep clinic so I have referred him to [facility] in [place].    His transthoracic echocardiogram showed EF 60-65% with no significant valvular disease. Bubble study was reportedly positive.    He went on for MORGAN and aorta measured 44mm. He has a PFO confirmed with adequate rim tissue for closure     I do not see a hypercoag panel post stroke and he was placed on Eliquis early into his  Stroke. His father, who is a urologist, had a pulmonary embolism after venous varicosities and the patient's mother had a pulmonary embolism after an IUD perforation.  The family was never checked for hypercoagulable state.  I will check hypercoaguable panel upon check in the day of his procedure.     He completed a course of DAPT with Plavix and ASA and then " was given Eliquis. When the event recorder as negative, he was transitioned to Aspirin 325mg daily alone.     His Neurologist is .        He has treated dyslipidemia on Atorvastatin 40mg daily. Lipids in October: LDL was 29 HDL 45 ; glucose levels have been normal.     He has no history of dysphagia. He has treated sleep apnea. He has kidney stones and has a residual one in his right kidney-he has occasional pain with this that dose not last long. He follows with Dr. 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    He has a pre-procedure COVID test scheduled which I reviewed today  He denies motion sickness or previous issues with anesthesia.         Impression/Plan:     1. Stroke, likely embolic with PFO  Scheduled for percutaneous closure    -hypercoag panel will be drawn the day of the procedure  On 4- he will load with Plavix 300mg once, then 75mg daily including 4- which is the day of his procedure  On 4-, reduce Aspirin to 81mg daily  Risks of the procedure have been reviewed  covid test pre procedure reviewed  He has had one vaccine  Follow up with me is scheduled after    -we reivewed NPO after midnight      -reviewed post procedure restrictions  *10 pound lifting restriction week one  *20-30 pound lifting restriction week two and three    *no heavy aerobics for one month  *no driving for minimum of 48 hours  *no contact sports, scuba diving or mayte diving for 3 months  *avoid elective dental work for 6 months; if urgent will need SBE prophylaxis for six months  *follow up echo/bubble study and follow up with Cardiologist in 3 months post closure       2. DAMARIS-treated  He follows with [facility] and wishes  He wants to change to [facility]--I have placed a referral     3. Dyslipidemia  On lipitor 40mg daily  LDL controlled  TG high at 257 but typically  normal     4. No documented evidence of atrial fibrillaiton  Loop recorder in place with no atrial fibrillation but 4-6 second pauses during sleep.  He was recommended to follow up with his sleep physician to make sure his sleep apnea is adequately treated  Continue to monitor     5. Hypothyroidism-treated     6. Dilated aortic root to 4.4cm--on MORGAN  We will follow up    7. Elevated PSA 2020--normal since    8. DJD right knee  He is due for a cortisone injection in one month  I have asked him to alert his [facility] (Dr. Stewart) and alert him to the fact that he is now on DAPT and see if they will inject     It has been a pleasure seeing Rey Andre in follow up.    60 minutes spent on the date of the encounter doing chart review, review of test results, patient visit, documentation and discussion with family   Jone Diaz, MSN, APRN-BC, CNP  Cardiology    Orders Placed This Encounter   Procedures     Follow-Up with Cardiologist     SLEEP EVALUATION & MANAGEMENT REFERRAL - [facility]  [phone] (Age 18 and up)     Echocardiogram Complete     Orders Placed This Encounter   Medications     aspirin (ASA) 81 MG EC tablet     Sig: Take 1 tablet (81 mg) by mouth daily     Dispense:        clopidogrel (PLAVIX) 75 MG tablet     Si pills loading dose on 2021 then one daily     Dispense:  90 tablet     Refill:  3     I do want the 4 pill loading dose--pre procedure     Medications Discontinued During This Encounter   Medication Reason     aspirin (ASA) 325 MG tablet          Encounter Diagnoses   Name Primary?     PFO (patent foramen ovale)      Obstructive sleep apnea on CPAP Yes       CURRENT MEDICATIONS:  Current Outpatient Medications   Medication Sig Dispense Refill     acetaminophen (TYLENOL) 325 MG tablet Take 2 tablets (650 mg) by mouth every 4 hours as needed for mild pain  0     aspirin (ASA) 81 MG EC tablet Take 1 tablet (81 mg) by mouth daily       atorvastatin  (LIPITOR) 40 MG tablet Take 1 tablet (40 mg) by mouth every evening 30 tablet 3     clopidogrel (PLAVIX) 75 MG tablet 4 pills loading dose on 4- then one daily 90 tablet 3     Levothyroxine Sodium (SYNTHROID PO) Take 125 mcg by mouth every evening         ALLERGIES     Allergies   Allergen Reactions     No Known Drug Allergies        PAST MEDICAL HISTORY:  Past Medical History:   Diagnosis Date     Embolic stroke involving left middle cerebral artery (H) 03/2020     Hyperlipidemia      Hypothyroidism      Kidney stone     Recurent stones     DAMARIS on CPAP      Palpitations 3/6/2020     PFO (patent foramen ovale)      Sinus of Valsalva aneurysm        PAST SURGICAL HISTORY:  Past Surgical History:   Procedure Laterality Date     CYSTOSCOPY       CYSTOSCOPY, RETROGRADES, COMBINED  5/24/2014    Procedure: COMBINED CYSTOSCOPY, RETROGRADES;  Surgeon: Francisco J Lerma MD;  Location:  OR     ENT SURGERY      T & A     EP LOOP RECORDER IMPLANT N/A 10/12/2020    Procedure: EP Loop Recorder Implant;  Surgeon: Lefty Ramires MD;  Location:  [facility]     IR CAROTID CEREBRAL ANGIOGRAM LEFT  3/5/2020     KNEE SURGERY Right     arthroscopic     LASER HOLMIUM LITHOTRIPSY URETER(S), INSERT STENT, COMBINED  7/27/2012    Procedure: COMBINED CYSTOSCOPY, URETEROSCOPY, LASER HOLMIUM LITHOTRIPSY URETER(S), INSERT STENT;  Video cystoscopy, Right Retrograde Right Ureteroscopy with Holmium Laser, Stone Extraction,  JJ Stent Placement ;  Surgeon: Francisco J Lerma MD;  Location:  OR     LASER HOLMIUM LITHOTRIPSY URETER(S), INSERT STENT, COMBINED  5/24/2014    Procedure: COMBINED CYSTOSCOPY, URETEROSCOPY, LASER HOLMIUM LITHOTRIPSY URETER(S), INSERT STENT;  Surgeon: Francisco J Lerma MD;  Location:  OR     LASER HOLMIUM LITHOTRIPSY URETER(S), INSERT STENT, COMBINED Right 3/5/2017    Procedure: COMBINED CYSTOSCOPY, URETEROSCOPY, LASER HOLMIUM LITHOTRIPSY URETER(S), INSERT STENT;  Surgeon:  Chris Barrios MD;  Location: [facility] OR     ROTATOR CUFF REPAIR RT/LT         FAMILY HISTORY:  Family History   Problem Relation Age of Onset     Hyperlipidemia Mother         pulm emboli, IUD perforation and developed clot     Other - See Comments Father         Possible Autoimmune disorder , DVT, pulm embolism     Prostate Cancer Father        SOCIAL HISTORY:  Social History     Socioeconomic History     Marital status:      Spouse name: None     Number of children: 3     Years of education: None     Highest education level: None   Occupational History     Occupation: Golf Pro   Social Needs     Financial resource strain: None     Food insecurity     Worry: None     Inability: None     Transportation needs     Medical: None     Non-medical: None   Tobacco Use     Smoking status: Never Smoker     Smokeless tobacco: Never Used   Substance and Sexual Activity     Alcohol use: No     Drug use: No     Sexual activity: None   Lifestyle     Physical activity     Days per week: None     Minutes per session: None     Stress: None   Relationships     Social connections     Talks on phone: None     Gets together: None     Attends Gnosticist service: None     Active member of club or organization: None     Attends meetings of clubs or organizations: None     Relationship status: None     Intimate partner violence     Fear of current or ex partner: None     Emotionally abused: None     Physically abused: None     Forced sexual activity: None   Other Topics Concern     Parent/sibling w/ CABG, MI or angioplasty before 65F 55M? 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 (last updated 3/5/2020)        Review of Systems:  Skin:  Negative       Eyes:  Negative      ENT:  Negative      Respiratory:  Positive for sleep apnea;CPAP     Cardiovascular:  Negative      Gastroenterology: Negative      Genitourinary:  not assessed      Musculoskeletal:   "Positive for arthritis(Hands, knees)    Neurologic:  Positive for headaches    Psychiatric:  Positive for excessive stress    Heme/Lymph/Imm:  Negative      Endocrine:  Positive for thyroid disorder      Physical Exam:  Vitals: /75   Pulse 87   Ht 1.803 m (5' 11\")   Wt 122.9 kg (271 lb)   BMI 37.80 kg/m      Constitutional:  cooperative, alert and oriented, well developed, well nourished, in no acute distress        Skin:  warm and dry to the touch, no apparent skin lesions or masses noted     loop recorder to left of sternum 4th ics    Head:  normocephalic, no masses or lesions        Eyes:  pupils equal and round, conjunctivae and lids unremarkable, sclera white, no xanthalasma, EOMS intact, no nystagmus        Lymph:      ENT:  no pallor or cyanosis, dentition good        Neck:  carotid pulses are full and equal bilaterally, JVP normal, no carotid bruit        Respiratory:  normal breath sounds, clear to auscultation, normal A-P diameter, normal symmetry, normal respiratory excursion, no use of accessory muscles         Cardiac: regular rhythm, normal S1/S2, no S3 or S4, apical impulse not displaced, no murmurs, gallops or rubs                                                    no varicosities; no femoral bruits    GI:  abdomen soft, non-tender, BS normoactive, no mass, no HSM, no bruits obese      Extremities and Muscular Skeletal:  no deformities, clubbing, cyanosis, erythema observed              Neurological:  no gross motor deficits        Psych:  Alert and Oriented x 3      Recent Lab Results:  LIPID RESULTS:  Lab Results   Component Value Date    CHOL 125 10/19/2020    HDL 45 10/19/2020    LDL 29 10/19/2020    TRIG 257 (H) 10/19/2020       LIVER ENZYME RESULTS:  Lab Results   Component Value Date    AST 13 03/05/2020    ALT 48 06/05/2020       CBC RESULTS:  Lab Results   Component Value Date    WBC 6.2 10/12/2020    RBC 4.65 10/12/2020    HGB 14.9 10/12/2020    HCT 43.6 10/12/2020    MCV 94 " 10/12/2020    MCH 32.0 10/12/2020    MCHC 34.2 10/12/2020    RDW 13.5 10/12/2020     10/12/2020       BMP RESULTS:  Lab Results   Component Value Date     10/12/2020    POTASSIUM 4.0 10/12/2020    CHLORIDE 112 (H) 10/12/2020    CO2 26 10/12/2020    ANIONGAP 4 10/12/2020    GLC 89 10/12/2020    BUN 18 10/12/2020    CR 0.93 10/12/2020    GFRESTIMATED >90 10/12/2020    GFRESTBLACK >90 10/12/2020    DAMARIS 8.4 (L) 10/12/2020        A1C RESULTS:  Lab Results   Component Value Date    A1C 5.5 03/05/2020       INR RESULTS:  Lab Results   Component Value Date    INR 1.02 03/05/2020           CC  Chi Alvarenga MD  [id number]  [place] [phone]

## 2021-04-15 ENCOUNTER — OFFICE VISIT (OUTPATIENT)
Dept: CARDIOLOGY | Facility: CLINIC | Age: 56
End: 2021-04-15
Attending: INTERNAL MEDICINE
Payer: COMMERCIAL

## 2021-04-15 VITALS
HEIGHT: 71 IN | HEART RATE: 87 BPM | DIASTOLIC BLOOD PRESSURE: 75 MMHG | BODY MASS INDEX: 37.94 KG/M2 | WEIGHT: 271 LBS | SYSTOLIC BLOOD PRESSURE: 126 MMHG

## 2021-04-15 DIAGNOSIS — G47.33 OBSTRUCTIVE SLEEP APNEA ON CPAP: ICD-10-CM

## 2021-04-15 DIAGNOSIS — Q21.12 PFO (PATENT FORAMEN OVALE): Primary | ICD-10-CM

## 2021-04-15 PROCEDURE — 99215 OFFICE O/P EST HI 40 MIN: CPT | Performed by: NURSE PRACTITIONER

## 2021-04-15 PROCEDURE — 99417 PROLNG OP E/M EACH 15 MIN: CPT | Performed by: NURSE PRACTITIONER

## 2021-04-15 RX ORDER — CLOPIDOGREL BISULFATE 75 MG/1
TABLET ORAL
Qty: 90 TABLET | Refills: 3 | Status: SHIPPED | OUTPATIENT
Start: 2021-04-15 | End: 2021-05-05

## 2021-04-15 ASSESSMENT — MIFFLIN-ST. JEOR: SCORE: 2076.38

## 2021-04-15 NOTE — PATIENT INSTRUCTIONS
On 4- reduce your Aspirin to 81mg daily    On 4- load with Plavix 4 pills=300mg once then 75 mg daily  Take both Aspirin and Plavix with a sip of water the day of your procedure in the am at home    Questions call Jeannine [phone]    Let Dr Stewart know that you will be on two blood thinners as I am not sure he will  Inject your knee    [facility] [place] [phone]

## 2021-04-15 NOTE — LETTER
"4/15/2021    Chava Collado DO  [facility] [address]  [facility] [address]    RE: Rey Andre       Dear Colleague,    I had the pleasure of seeing Rey Andre in the [facility].    History of Present Illness:     Rey \"Phillip\" Thai is a 54 year old gentleman who presents today with his wife for cardiac assessment for upcoming percutaneous PFO closure. He was referred to Dr. Alvarenga by Dr. Sanon for a history of embolic stroke for which he was admitted from 3-5-2020 to 3-7-2020. He suffered right sided hemiparesis. MRI of the brain showed scattered acute infarcts in the left frontal, parietal, and occipital lobes. Carotids were patent but he had partially occlusive thrombus in the left M2 branch of the middle cerebral artery, likely thromboembolic.     He underwent lytic therapy with embolectomy with improvement in his speech and motor deficits and no residual.     He was in sinus rhythm during his admission. He reported a history of palpitations to Dr Sanon lasting 10-15 minutes.Event recorder as an OP showed no arrhythmias.  However his Neurologist sent him to EP and a loop recorder was implanted. He has not had atrial fibrillation identified on there but has had 6-8 second pauses during sleep. Dr. Ramires reviewed and suggested he return to the sleep clinic and have his CPAP assessed which he states he has done. He is looking for an alternate sleep clinic so I have referred him to [facility] in [place].    His transthoracic echocardiogram showed EF 60-65% with no significant valvular disease. Bubble study was reportedly positive.    He went on for MORGAN and aorta measured 44mm. He has a PFO confirmed with adequate rim tissue for closure     I do not see a hypercoag panel post stroke and he was placed on Eliquis early into his  Stroke. His father, who is a urologist, had a pulmonary embolism after venous varicosities and the " patient's mother had a pulmonary embolism after an IUD perforation.  The family was never checked for hypercoagulable state.  I will check hypercoaguable panel upon check in the day of his procedure.     He completed a course of DAPT with Plavix and ASA and then was given Eliquis. When the event recorder as negative, he was transitioned to Aspirin 325mg daily alone.     His Neurologist is .        He has treated dyslipidemia on Atorvastatin 40mg daily. Lipids in October: LDL was 29 HDL 45 ; glucose levels have been normal.     He has no history of dysphagia. He has treated sleep apnea. He has kidney stones and has a residual one in his right kidney-he has occasional pain with this that dose not last long. He follows with Dr. 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    He has a pre-procedure COVID test scheduled which I reviewed today  He denies motion sickness or previous issues with anesthesia.         Impression/Plan:     1. Stroke, likely embolic with PFO  Scheduled for percutaneous closure    -hypercoag panel will be drawn the day of the procedure  On 4- he will load with Plavix 300mg once, then 75mg daily including 4- which is the day of his procedure  On 4-, reduce Aspirin to 81mg daily  Risks of the procedure have been reviewed  covid test pre procedure reviewed  He has had one vaccine  Follow up with me is scheduled after    -we reivewed NPO after midnight      -reviewed post procedure restrictions  *10 pound lifting restriction week one  *20-30 pound lifting restriction week two and three    *no heavy aerobics for one month  *no driving for minimum of 48 hours  *no contact sports, scuba diving or mayte diving for 3 months  *avoid elective dental work for 6 months; if urgent will need SBE prophylaxis for six months  *follow up echo/bubble study and follow up with  Cardiologist in 3 months post closure       2. DAMARIS-treated  He follows with [facility] and wishes  He wants to change to [facility]--I have placed a referral     3. Dyslipidemia  On lipitor 40mg daily  LDL controlled  TG high at 257 but typically normal     4. No documented evidence of atrial fibrillaiton  Loop recorder in place with no atrial fibrillation but 4-6 second pauses during sleep.  He was recommended to follow up with his sleep physician to make sure his sleep apnea is adequately treated  Continue to monitor     5. Hypothyroidism-treated     6. Dilated aortic root to 4.4cm--on MORGAN  We will follow up    7. Elevated PSA 2020--normal since    8. DJD right knee  He is due for a cortisone injection in one month  I have asked him to alert his [facility] (Dr. Stewart) and alert him to the fact that he is now on DAPT and see if they will inject     It has been a pleasure seeing Rey AUBRIE Andre in follow up.    60 minutes spent on the date of the encounter doing chart review, review of test results, patient visit, documentation and discussion with family   Jone Diaz, MSN, APRN-BC, CNP  Cardiology    Orders Placed This Encounter   Procedures     Follow-Up with Cardiologist     [place]LE[place]AL[place]NAG[place] [phone] (Age 18 and up)     Echocardiogram Complete     Orders Placed This Encounter   Medications     aspirin (ASA) 81 MG EC tablet     Sig: Take 1 tablet (81 mg) by mouth daily     Dispense:        clopidogrel (PLAVIX) 75 MG tablet     Si pills loading dose on 2021 then one daily     Dispense:  90 tablet     Refill:  3     I do want the 4 pill loading dose--pre procedure     Medications Discontinued During This Encounter   Medication Reason     aspirin (ASA) 325 MG tablet          Encounter Diagnoses   Name Primary?     PFO (patent foramen ovale)      Obstructive sleep apnea on CPAP Yes       CURRENT MEDICATIONS:  Current  Outpatient Medications   Medication Sig Dispense Refill     acetaminophen (TYLENOL) 325 MG tablet Take 2 tablets (650 mg) by mouth every 4 hours as needed for mild pain  0     aspirin (ASA) 81 MG EC tablet Take 1 tablet (81 mg) by mouth daily       atorvastatin (LIPITOR) 40 MG tablet Take 1 tablet (40 mg) by mouth every evening 30 tablet 3     clopidogrel (PLAVIX) 75 MG tablet 4 pills loading dose on 4- then one daily 90 tablet 3     Levothyroxine Sodium (SYNTHROID PO) Take 125 mcg by mouth every evening         ALLERGIES     Allergies   Allergen Reactions     No Known Drug Allergies        PAST MEDICAL HISTORY:  Past Medical History:   Diagnosis Date     Embolic stroke involving left middle cerebral artery (H) 03/2020     Hyperlipidemia      Hypothyroidism      Kidney stone     Recurent stones     DAMARIS on CPAP      Palpitations 3/6/2020     PFO (patent foramen ovale)      Sinus of Valsalva aneurysm        PAST SURGICAL HISTORY:  Past Surgical History:   Procedure Laterality Date     CYSTOSCOPY       CYSTOSCOPY, RETROGRADES, COMBINED  5/24/2014    Procedure: COMBINED CYSTOSCOPY, RETROGRADES;  Surgeon: Francisco J Lerma MD;  Location:  OR     ENT SURGERY      T & A     EP LOOP RECORDER IMPLANT N/A 10/12/2020    Procedure: EP Loop Recorder Implant;  Surgeon: Lefty Ramires MD;  Location:  HEART CARDIAC CATH LAB     IR CAROTID CEREBRAL ANGIOGRAM LEFT  3/5/2020     KNEE SURGERY Right     arthroscopic     LASER HOLMIUM LITHOTRIPSY URETER(S), INSERT STENT, COMBINED  7/27/2012    Procedure: COMBINED CYSTOSCOPY, URETEROSCOPY, LASER HOLMIUM LITHOTRIPSY URETER(S), INSERT STENT;  Video cystoscopy, Right Retrograde Right Ureteroscopy with Holmium Laser, Stone Extraction,  JJ Stent Placement ;  Surgeon: Francisco J Lerma MD;  Location:  OR     LASER HOLMIUM LITHOTRIPSY URETER(S), INSERT STENT, COMBINED  5/24/2014    Procedure: COMBINED CYSTOSCOPY, URETEROSCOPY, LASER HOLMIUM LITHOTRIPSY URETER(S),  INSERT STENT;  Surgeon: Francisco J Lerma MD;  Location: [facility] OR     LASER HOLMIUM LITHOTRIPSY URETER(S), INSERT STENT, COMBINED Right 3/5/2017    Procedure: COMBINED CYSTOSCOPY, URETEROSCOPY, LASER HOLMIUM LITHOTRIPSY URETER(S), INSERT STENT;  Surgeon: Chris Barrios MD;  Location: [facility] OR     ROTATOR CUFF REPAIR RT/LT         FAMILY HISTORY:  Family History   Problem Relation Age of Onset     Hyperlipidemia Mother         pulm emboli, IUD perforation and developed clot     Other - See Comments Father         Possible Autoimmune disorder , DVT, pulm embolism     Prostate Cancer Father        SOCIAL HISTORY:  Social History     Socioeconomic History     Marital status:      Spouse name: None     Number of children: 3     Years of education: None     Highest education level: None   Occupational History     Occupation: Golf Pro   Social Needs     Financial resource strain: None     Food insecurity     Worry: None     Inability: None     Transportation needs     Medical: None     Non-medical: None   Tobacco Use     Smoking status: Never Smoker     Smokeless tobacco: Never Used   Substance and Sexual Activity     Alcohol use: No     Drug use: No     Sexual activity: None   Lifestyle     Physical activity     Days per week: None     Minutes per session: None     Stress: None   Relationships     Social connections     Talks on phone: None     Gets together: None     Attends Latter day service: None     Active member of club or organization: None     Attends meetings of clubs or organizations: None     Relationship status: None     Intimate partner violence     Fear of current or ex partner: None     Emotionally abused: None     Physically abused: None     Forced sexual activity: None   Other Topics Concern     Parent/sibling w/ CABG, MI or angioplasty before 65F 55M? 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 (last updated  "3/5/2020)        Review of Systems:  Skin:  Negative       Eyes:  Negative      ENT:  Negative      Respiratory:  Positive for sleep apnea;CPAP     Cardiovascular:  Negative      Gastroenterology: Negative      Genitourinary:  not assessed      Musculoskeletal:  Positive for arthritis(Hands, knees)    Neurologic:  Positive for headaches    Psychiatric:  Positive for excessive stress    Heme/Lymph/Imm:  Negative      Endocrine:  Positive for thyroid disorder      Physical Exam:  Vitals: /75   Pulse 87   Ht 1.803 m (5' 11\")   Wt 122.9 kg (271 lb)   BMI 37.80 kg/m      Constitutional:  cooperative, alert and oriented, well developed, well nourished, in no acute distress        Skin:  warm and dry to the touch, no apparent skin lesions or masses noted     loop recorder to left of sternum 4th ics    Head:  normocephalic, no masses or lesions        Eyes:  pupils equal and round, conjunctivae and lids unremarkable, sclera white, no xanthalasma, EOMS intact, no nystagmus        Lymph:      ENT:  no pallor or cyanosis, dentition good        Neck:  carotid pulses are full and equal bilaterally, JVP normal, no carotid bruit        Respiratory:  normal breath sounds, clear to auscultation, normal A-P diameter, normal symmetry, normal respiratory excursion, no use of accessory muscles         Cardiac: regular rhythm, normal S1/S2, no S3 or S4, apical impulse not displaced, no murmurs, gallops or rubs                                                    no varicosities; no femoral bruits    GI:  abdomen soft, non-tender, BS normoactive, no mass, no HSM, no bruits obese      Extremities and Muscular Skeletal:  no deformities, clubbing, cyanosis, erythema observed              Neurological:  no gross motor deficits        Psych:  Alert and Oriented x 3      Recent Lab Results:  LIPID RESULTS:  Lab Results   Component Value Date    CHOL 125 10/19/2020    HDL 45 10/19/2020    LDL 29 10/19/2020    TRIG 257 (H) 10/19/2020 "       LIVER ENZYME RESULTS:  Lab Results   Component Value Date    AST 13 03/05/2020    ALT 48 06/05/2020       CBC RESULTS:  Lab Results   Component Value Date    WBC 6.2 10/12/2020    RBC 4.65 10/12/2020    HGB 14.9 10/12/2020    HCT 43.6 10/12/2020    MCV 94 10/12/2020    MCH 32.0 10/12/2020    MCHC 34.2 10/12/2020    RDW 13.5 10/12/2020     10/12/2020       BMP RESULTS:  Lab Results   Component Value Date     10/12/2020    POTASSIUM 4.0 10/12/2020    CHLORIDE 112 (H) 10/12/2020    CO2 26 10/12/2020    ANIONGAP 4 10/12/2020    GLC 89 10/12/2020    BUN 18 10/12/2020    CR 0.93 10/12/2020    GFRESTIMATED >90 10/12/2020    GFRESTBLACK >90 10/12/2020    DAMARIS 8.4 (L) 10/12/2020        A1C RESULTS:  Lab Results   Component Value Date    A1C 5.5 03/05/2020       INR RESULTS:  Lab Results   Component Value Date    INR 1.02 03/05/2020     Thank you for allowing me to participate in the care of your patient.      Sincerely,     CASSANDRA [facility]og [facility]  cc:   Chi Alvarenga MD  [address]  [place] [address]

## 2021-04-20 ENCOUNTER — ANCILLARY PROCEDURE (OUTPATIENT)
Dept: CARDIOLOGY | Facility: CLINIC | Age: 56
End: 2021-04-20
Attending: INTERNAL MEDICINE
Payer: COMMERCIAL

## 2021-04-20 DIAGNOSIS — Z45.09 ENCOUNTER FOR LOOP RECORDER CHECK: ICD-10-CM

## 2021-04-20 DIAGNOSIS — I63.9 CRYPTOGENIC STROKE (H): ICD-10-CM

## 2021-04-20 PROCEDURE — 93298 REM INTERROG DEV EVAL SCRMS: CPT | Performed by: INTERNAL MEDICINE

## 2021-04-20 PROCEDURE — G2066 INTER DEVC REMOTE 30D: HCPCS | Performed by: INTERNAL MEDICINE

## 2021-04-22 DIAGNOSIS — I63.9 CRYPTOGENIC STROKE (H): Primary | ICD-10-CM

## 2021-04-22 DIAGNOSIS — Q21.12 PFO (PATENT FORAMEN OVALE): ICD-10-CM

## 2021-04-22 RX ORDER — CEFAZOLIN SODIUM IN 0.9 % NACL 3 G/100 ML
3 INTRAVENOUS SOLUTION, PIGGYBACK (ML) INTRAVENOUS
Status: CANCELLED | OUTPATIENT
Start: 2021-04-22

## 2021-04-22 RX ORDER — SODIUM CHLORIDE 9 MG/ML
INJECTION, SOLUTION INTRAVENOUS CONTINUOUS
Status: CANCELLED | OUTPATIENT
Start: 2021-04-22

## 2021-04-22 RX ORDER — LIDOCAINE 40 MG/G
CREAM TOPICAL
Status: CANCELLED | OUTPATIENT
Start: 2021-04-22

## 2021-04-23 DIAGNOSIS — Z11.59 ENCOUNTER FOR SCREENING FOR OTHER VIRAL DISEASES: ICD-10-CM

## 2021-04-23 LAB
SARS-COV-2 RNA RESP QL NAA+PROBE: NORMAL
SPECIMEN SOURCE: NORMAL

## 2021-04-23 PROCEDURE — U0003 INFECTIOUS AGENT DETECTION BY NUCLEIC ACID (DNA OR RNA); SEVERE ACUTE RESPIRATORY SYNDROME CORONAVIRUS 2 (SARS-COV-2) (CORONAVIRUS DISEASE [COVID-19]), AMPLIFIED PROBE TECHNIQUE, MAKING USE OF HIGH THROUGHPUT TECHNOLOGIES AS DESCRIBED BY CMS-2020-01-R: HCPCS | Performed by: INTERNAL MEDICINE

## 2021-04-23 PROCEDURE — U0005 INFEC AGEN DETEC AMPLI PROBE: HCPCS | Performed by: INTERNAL MEDICINE

## 2021-04-23 RX ORDER — IBUPROFEN 200 MG
600 TABLET ORAL 3 TIMES DAILY PRN
COMMUNITY
End: 2021-04-26 | Stop reason: HOSPADM

## 2021-04-23 RX ORDER — SODIUM CHLORIDE 9 MG/ML
INJECTION, SOLUTION INTRAVENOUS CONTINUOUS
Status: CANCELLED | OUTPATIENT
Start: 2021-04-23

## 2021-04-23 NOTE — PROGRESS NOTES
PTA medications updated by Medication Scribe prior to surgery via phone call with patient        Comments:    Medication history sources: Patient, Surescripts and H&P  In the past week, patient estimated taking medication this percent of the time: Greater than 90%  Adherence assessment: N/A Not Observed    Significant changes made to the medication list:  None      Additional medication history information:   None    The information provided in this note is only as accurate as the sources available at the time of update(s)      Prior to Admission medications    Medication Sig Last Dose Taking? Auth Provider   aspirin (ASA) 81 MG EC tablet Take 1 tablet (81 mg) by mouth daily  at AM Yes Shira Diaz, CASSANDRA CNP   atorvastatin (LIPITOR) 40 MG tablet Take 1 tablet (40 mg) by mouth every evening  at PM Yes Baudilio Fuller MD   clopidogrel (PLAVIX) 75 MG tablet 4 pills loading dose on 4- then one daily  at AM Yes Shira Diaz, CASSANDRA CNP   ibuprofen (ADVIL/MOTRIN) 200 MG tablet Take 600 mg by mouth 3 times daily as needed for mild pain (200 mg X 3 = 600 mg)  at PRN Yes Reported, Patient   levothyroxine (SYNTHROID) 150 MCG tablet Take 125 mcg by mouth daily   at AM Yes Unknown, Entered By History

## 2021-04-24 ENCOUNTER — TELEPHONE (OUTPATIENT)
Dept: LAB | Facility: CLINIC | Age: 56
End: 2021-04-24

## 2021-04-24 LAB
LABORATORY COMMENT REPORT: ABNORMAL
SARS-COV-2 RNA RESP QL NAA+PROBE: POSITIVE
SPECIMEN SOURCE: ABNORMAL

## 2021-04-25 ENCOUNTER — ANESTHESIA EVENT (OUTPATIENT)
Dept: CARDIOLOGY | Facility: CLINIC | Age: 56
End: 2021-04-25

## 2021-04-25 NOTE — ANESTHESIA PREPROCEDURE EVALUATION
Anesthesia Pre-Procedure Evaluation    Patient: Rey Andre   MRN: [id number] : 1965        Preoperative Diagnosis: Stroke   Procedure : Procedure(s):  Patent Foramen Ovale Closure     Past Medical History:   Diagnosis Date     Embolic stroke involving left middle cerebral artery (H) 2020     Hyperlipidemia      Hypothyroidism      Kidney stone     Recurent stones     DAMARIS on CPAP      Palpitations 3/6/2020     PFO (patent foramen ovale)      Sinus of Valsalva aneurysm       Past Surgical History:   Procedure Laterality Date     CYSTOSCOPY       CYSTOSCOPY, RETROGRADES, COMBINED  2014    Procedure: COMBINED CYSTOSCOPY, RETROGRADES;  Surgeon: Francisco J Lerma MD;  Location:  OR     ENT SURGERY      T & A     EP LOOP RECORDER IMPLANT N/A 10/12/2020    Procedure: EP Loop Recorder Implant;  Surgeon: Lefty Ramires MD;  Location:  HEART CARDIAC CATH LAB     IR CAROTID CEREBRAL ANGIOGRAM LEFT  3/5/2020     KNEE SURGERY Right     arthroscopic     LASER HOLMIUM LITHOTRIPSY URETER(S), INSERT STENT, COMBINED  2012    Procedure: COMBINED CYSTOSCOPY, URETEROSCOPY, LASER HOLMIUM LITHOTRIPSY URETER(S), INSERT STENT;  Video cystoscopy, Right Retrograde Right Ureteroscopy with Holmium Laser, Stone Extraction,  JJ Stent Placement ;  Surgeon: Francisco J Lerma MD;  Location:  OR     LASER HOLMIUM LITHOTRIPSY URETER(S), INSERT STENT, COMBINED  2014    Procedure: COMBINED CYSTOSCOPY, URETEROSCOPY, LASER HOLMIUM LITHOTRIPSY URETER(S), INSERT STENT;  Surgeon: Francisco J Lerma MD;  Location:  OR     LASER HOLMIUM LITHOTRIPSY URETER(S), INSERT STENT, COMBINED Right 3/5/2017    Procedure: COMBINED CYSTOSCOPY, URETEROSCOPY, LASER HOLMIUM LITHOTRIPSY URETER(S), INSERT STENT;  Surgeon: Chris Barrios MD;  Location:  OR     ROTATOR CUFF REPAIR RT/LT        Allergies   Allergen Reactions     No Known Drug Allergies       Social History     Tobacco Use     Smoking status:  Never Smoker     Smokeless tobacco: Never Used   Substance Use Topics     Alcohol use: No      Wt Readings from Last 1 Encounters:   04/15/21 122.9 kg (271 lb)      Echo 6/5/2020  Interpretation Summary     The atrial septum is aneurysmal.  A contrast injection (Bubble Study) was performed that was mildly positive for  flow across the interatrial septum.  A patent foramen ovale is present.  Adequate PFO rims if percutaneous closure is indicated  Mild aortic root dilatation.  Sinus Valsalva 44mm, normal asc aorta size  _____________________________________________________________________________  __        MORGAN  I determined this patient to be an appropriate candidate for the planned  sedation and procedure and have reassessed the patient immediately prior to  sedation and procedure. Total sedation time: 28 minutes minutes of continuous  bedside 1:1 monitoring. Versed (4.5mg) was given intravenously. Fentanyl  (100mcg) was given intravenously. The transesophageal probe was passed without  difficulty.     Left Ventricle  The left ventricle is normal in structure, function and size. The visual  ejection fraction is estimated at 55-60%. Normal left ventricular wall motion.  There is no thrombus seen in the left ventricle.     Right Ventricle  There is borderline right ventricular hypertrophy. The right ventricular  systolic function is normal.     Atria  Normal left atrial size. Right atrial size is normal. The atrial septum is  aneurysmal. A contrast injection (Bubble Study) was performed that was mildly  positive for flow across the interatrial septum. A patent foramen ovale is  present. Adequate PFO rims if percutaneous closure is indicated. No thrombus  is detected in the left atrial appendage.        Mitral Valve  The mitral valve is normal in structure and function. There is trace mitral  regurgitation.     Tricuspid Valve  Normal tricuspid valve. Right ventricular systolic pressure could not be  approximated due  to inadequate tricuspid regurgitation.     Aortic Valve  Normal tricuspid aortic valve.     Pulmonic Valve  The pulmonic valve is not well seen, but is grossly normal.     Vessels  Mild aortic root dilatation. Sinus Valsalva 44mm, normal asc aorta size.        Pericardial/Pleural  The pericardium appears normal.     Rhythm  Sinus rhythm was noted.  ______________________________________      MRI 3/6/2020  MRI BRAIN WITHOUT AND WITH CONTRAST  3/6/2020 12:23 PM     HISTORY:  MR brain with perfusion.  Timeless.  Follow-up  stroke/thrombectomy.  Before 1pm please. Previous thrombi in the left  middle cerebral artery superior M2 division.     TECHNIQUE:  Multiplanar, multisequence MRI of the brain without and  with 10 mL Gadavist.     COMPARISON: CT angiogram yesterday.     FINDINGS:  Diffusion-weighted images show a few small scattered acute  infarcts in the cortex and subcortical white matter of the left  frontal, parietal and occipital lobes, the insula and the head and  body of the left caudate nucleus.  There is no evidence of hemorrhage  or any other infarct.  There are no gadolinium enhancing lesions.      The facial structures appear normal. The arteries at the base of the  brain and the dural venous sinuses appear patent.                                                                       IMPRESSION:   Scattered recent infarcts in the left middle cerebral  artery territory as described above. No hemorrhage.      Anesthesia Evaluation            ROS/MED HX  ENT/Pulmonary: Comment: Covid + 4/23/2021    (+) sleep apnea, uses CPAP,     Neurologic:     (+) CVA (emobolic stoke MCA distribution treated with lytic therapy), date: 3/2021,     Cardiovascular: Comment: Loop recorder - 4 to 6 sec pauses during sleep    Sinus of valsalva aneurysm     (+) Dyslipidemia -----Irregular Heartbeat/Palpitations, congenital heart disease PFO.     METS/Exercise Tolerance:     Hematologic:       Musculoskeletal:       GI/Hepatic:        Renal/Genitourinary:     (+) renal disease, Nephrolithiasis ,     Endo:     (+) thyroid problem, hypothyroidism,     Psychiatric/Substance Use:       Infectious Disease:       Malignancy:       Other: Comment: Hypercoagulable work up to be drawn the day of procedure              OUTSIDE LABS:  CBC:   Lab Results   Component Value Date    WBC 6.2 10/12/2020    WBC 7.0 03/07/2020    HGB 14.9 10/12/2020    HGB 15.4 03/07/2020    HCT 43.6 10/12/2020    HCT 45.1 03/07/2020     10/12/2020     03/07/2020     BMP:   Lab Results   Component Value Date     10/12/2020     03/07/2020    POTASSIUM 4.0 10/12/2020    POTASSIUM 4.2 03/07/2020    CHLORIDE 112 (H) 10/12/2020    CHLORIDE 109 03/07/2020    CO2 26 10/12/2020    CO2 24 03/07/2020    BUN 18 10/12/2020    BUN 16 03/07/2020    CR 0.93 10/12/2020    CR 0.87 03/07/2020    GLC 89 10/12/2020    GLC 92 03/07/2020     COAGS:   Lab Results   Component Value Date    PTT 27 03/05/2020    INR 1.02 03/05/2020     POC:   Lab Results   Component Value Date    BGM 92 03/06/2020     HEPATIC:   Lab Results   Component Value Date    ALBUMIN 3.6 03/05/2020    PROTTOTAL 6.9 03/05/2020    ALT 48 06/05/2020    AST 13 03/05/2020    ALKPHOS 82 03/05/2020    BILITOTAL 0.3 03/05/2020     OTHER:   Lab Results   Component Value Date    A1C 5.5 03/05/2020    DAMARIS 8.4 (L) 10/12/2020    PHOS 2.6 03/06/2020    MAG 2.1 03/06/2020               Shai Renteria MD

## 2021-04-26 ENCOUNTER — ANESTHESIA (OUTPATIENT)
Dept: CARDIOLOGY | Facility: CLINIC | Age: 56
End: 2021-04-26

## 2021-04-26 ENCOUNTER — TELEPHONE (OUTPATIENT)
Dept: CARDIOLOGY | Facility: CLINIC | Age: 56
End: 2021-04-26

## 2021-04-26 DIAGNOSIS — Q21.12 PFO (PATENT FORAMEN OVALE): Primary | ICD-10-CM

## 2021-04-26 NOTE — TELEPHONE ENCOUNTER
Called and spoke with pt who confirmed that he was COVID positive about 6 weeks ago and was tested at [facility].    Confirmed with pt that he will not need another COVID test prior to being rescheduled based on current [facility] guidelines that are being followed- he verbalized understanding and agrees to reschedule PFO closure to 5/6/21.       ADA Roper 5:08 PM 4/26/2021

## 2021-04-27 NOTE — TELEPHONE ENCOUNTER
Called pt, no answer, confirmed that he is rescheduled for PFO closure with Dr. Alvarenga on 5/6/21 at 8:30am at [facility] with 6:30am check in and one week post-procedure limited echo on 5/13/21 at 1:45pm in [place] on 3rd floor of the clinic and one week post-procedure follow up appointment with MADELYN Adkins on 5/13/21 at 3:10pm in [place] on the 2nd floor of the clinic.     Also reviewed that have not received fax back from [facility] with his postitive COVID test results from ~ 6weeks ago and have also not been able to access his [facility] chart through Care Everywhere, so requested that he send [employer] message with uploaded attachment of his test results if possible. [employer] message sent to pt so that he can respond back to me directly.     ADA Roper 4:31 PM 4/27/2021

## 2021-05-04 LAB
MDC_IDC_EPISODE_DTM: NORMAL
MDC_IDC_EPISODE_DURATION: 3.48 S
MDC_IDC_EPISODE_ID: 4
MDC_IDC_EPISODE_TYPE: NORMAL
MDC_IDC_MSMT_BATTERY_STATUS: NORMAL
MDC_IDC_PG_IMPLANT_DTM: NORMAL
MDC_IDC_PG_MFG: NORMAL
MDC_IDC_PG_MODEL: NORMAL
MDC_IDC_PG_SERIAL: NORMAL
MDC_IDC_PG_TYPE: NORMAL
MDC_IDC_SESS_CLINIC_NAME: NORMAL
MDC_IDC_SESS_DTM: NORMAL
MDC_IDC_SESS_TYPE: NORMAL
MDC_IDC_SET_ZONE_DETECTION_INTERVAL: 2000 MS
MDC_IDC_SET_ZONE_DETECTION_INTERVAL: 3000 MS
MDC_IDC_SET_ZONE_DETECTION_INTERVAL: 340 MS
MDC_IDC_SET_ZONE_TYPE: NORMAL
MDC_IDC_STAT_AT_BURDEN_PERCENT: 0 %
MDC_IDC_STAT_AT_DTM_END: NORMAL
MDC_IDC_STAT_AT_DTM_START: NORMAL
MDC_IDC_STAT_EPISODE_RECENT_COUNT: 0
MDC_IDC_STAT_EPISODE_RECENT_COUNT: 1
MDC_IDC_STAT_EPISODE_RECENT_COUNT_DTM_END: NORMAL
MDC_IDC_STAT_EPISODE_RECENT_COUNT_DTM_START: NORMAL
MDC_IDC_STAT_EPISODE_TOTAL_COUNT: 0
MDC_IDC_STAT_EPISODE_TOTAL_COUNT: 4
MDC_IDC_STAT_EPISODE_TOTAL_COUNT_DTM_END: NORMAL
MDC_IDC_STAT_EPISODE_TOTAL_COUNT_DTM_START: NORMAL
MDC_IDC_STAT_EPISODE_TYPE: NORMAL

## 2021-05-05 ENCOUNTER — VIRTUAL VISIT (OUTPATIENT)
Dept: SLEEP MEDICINE | Facility: CLINIC | Age: 56
End: 2021-05-05
Attending: NURSE PRACTITIONER
Payer: COMMERCIAL

## 2021-05-05 VITALS — WEIGHT: 260 LBS | BODY MASS INDEX: 36.4 KG/M2 | HEIGHT: 71 IN

## 2021-05-05 DIAGNOSIS — G47.33 OBSTRUCTIVE SLEEP APNEA ON CPAP: Primary | ICD-10-CM

## 2021-05-05 PROCEDURE — 99203 OFFICE O/P NEW LOW 30 MIN: CPT | Mod: GT | Performed by: INTERNAL MEDICINE

## 2021-05-05 RX ORDER — CLOPIDOGREL BISULFATE 75 MG/1
300 TABLET ORAL ONCE
Status: ON HOLD | COMMUNITY
Start: 2021-05-05 | End: 2021-05-07

## 2021-05-05 RX ORDER — CLOPIDOGREL BISULFATE 75 MG/1
75 TABLET ORAL DAILY
COMMUNITY
End: 2021-11-30

## 2021-05-05 RX ORDER — ACETAMINOPHEN 325 MG/1
325-650 TABLET ORAL 4 TIMES DAILY PRN
Status: ON HOLD | COMMUNITY
End: 2021-12-08

## 2021-05-05 ASSESSMENT — MIFFLIN-ST. JEOR: SCORE: 2026.48

## 2021-05-05 NOTE — PATIENT INSTRUCTIONS
Your BMI is Body mass index is 36.26 kg/m .  Weight management is a personal decision.  If you are interested in exploring weight loss strategies, the following discussion covers the approaches that may be successful. Body mass index (BMI) is one way to tell whether you are at a healthy weight, overweight, or obese. It measures your weight in relation to your height.  A BMI of 18.5 to 24.9 is in the healthy range. A person with a BMI of 25 to 29.9 is considered overweight, and someone with a BMI of 30 or greater is considered obese. More than two-thirds of American adults are considered overweight or obese.  Being overweight or obese increases the risk for further weight gain. Excess weight may lead to heart disease and diabetes.  Creating and following plans for healthy eating and physical activity may help you improve your health.  Weight control is part of healthy lifestyle and includes exercise, emotional health, and healthy eating habits. Careful eating habits lifelong are the mainstay of weight control. Though there are significant health benefits from weight loss, long-term weight loss with diet alone may be very difficult to achieve- studies show long-term success with dietary management in less than 10% of people. Attaining a healthy weight may be especially difficult to achieve in those with severe obesity. In some cases, medications, devices and surgical management might be considered.  What can you do?  If you are overweight or obese and are interested in methods for weight loss, you should discuss this with your provider.     Consider reducing daily calorie intake by 500 calories.     Keep a food journal.     Avoiding skipping meals, consider cutting portions instead.    Diet combined with exercise helps maintain muscle while optimizing fat loss. Strength training is particularly important for building and maintaining muscle mass. Exercise helps reduce stress, increase energy, and improves fitness.  Increasing exercise without diet control, however, may not burn enough calories to loose weight.       Start walking three days a week 10-20 minutes at a time    Work towards walking thirty minutes five days a week     Eventually, increase the speed of your walking for 1-2 minutes at time    In addition, we recommend that you review healthy lifestyles and methods for weight loss available through the National Institutes of Health patient information sites:  http://win.niddk.nih.gov/publications/index.htm    And look into health and wellness programs that may be available through your health insurance provider, employer, local community center, or kayla club.    Weight management plan: Patient was referred to their PCP to discuss a diet and exercise plan.

## 2021-05-05 NOTE — PROGRESS NOTES
Phillip is a 55 year old who is being evaluated via a billable video visit.      How would you like to obtain your AVS? MyChart  If the video visit is dropped, the invitation should be resent by: Text to cell phone: [phone]  Will anyone else be joining your video visit? No      Ludivina Mcdonnell MA on 5/5/2021 at 9:58 AM    Video-Visit Details    Type of service:  Video Visit  Video Start Time: 1010AM  Video End Time: 1042AM    Originating Location (pt. Location): Home    Distant Location (provider location):  [facility]     Platform used for Video Visit: [facility]  Sleep Consultation Note     Date on this visit: 5/5/2021    Rey Andre is sent by Shira Diaz for a sleep consultation regarding management of previously diagnosed DAMARIS    Primary Physician: Chava Collado     Chief complaint: establish continuity of care for previously diagnosed DAMARIS.    Rey Andre 55 year old with PMH of PFO, s/p CVA (March 2020), hyperlipidemia, hypothyroidism, DAMARIS(being treated with CPAP)who presents to sleep clinic for virtual visit to  establish continuity of care for previously diagnosed DAMARIS. PSG report from [facility] is currently unavailable.    He has had a previous sleep study through [facility].  He has a loop recorder. He is scheduled for PFO closure  tomorrow.    Sleep Disordered Breathing  He is being treated with CPAP fixed pressure setting at 9 cm water. This is his second CPAP device and he got it through [facility]. 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  With CPAP, Rey does not report snoring, choking/gasping for air, witnessed apneas.  He reports weight gain of approx. 20 lbs in last couple of years. The pressure settings feel comfortable, though he feels he could use a little more pressure sometimes. Uses nasal mask. Reports occasional mild air leaks but likes the interface.  He reports occasional dry mouth and occasional   morning headaches.  He reports better sleep quality with CPAP  Reports waking up feeling rested most mornings.   Denies daytime sleepiness/fatigue.    DOWNLOADABLE COMPLIANCE DATA:   From 7/31/2020  through  1/26/2021 reveals that he used the device for 167/180 days with an averag use of 6.21 hours on the days used. There was no significant air leak. Residual AHI was 4.9 per hour. Pressure setting fixed at 9 cm water      Sleep Schedule/Sleep Complaints//Daytime Functioning  During workdays, Rey goes to bed at 11 PM and wakes up at 6/630AM.  It usually takes 10-30 minutes to fall asleep.  On non-work days, He falls asleep at 11 PM and gets up 7 AM.  He wakes up couple times throughout the night.  Night time awakenings occurs due to sore knee/for no apparent reason.  After awakening, He is able to fall back asleep after 5-10 minutes.  Reports waking up feeling rested most mornings.   Denies daytime sleepiness/fatigue.  Patient is a morning person  Patient denies drowsiness while driving.    Rey does not nap.  Patient does use electronics in bed.    SLEEP SCALES:  Patient's [place] Sleepiness score 2/24   Insomnia severity index:13    Restless Legs symptoms  Rey does not complain of restlessness feelings in the legs.    Sleep Behaviors  He denies any cataplexy, sleep paralysis, sleep hallucinations.  He denies any night time behaviors - sleep walking, sleep talking, sleep eating.  He does not complain acting out dreams.       Social History  Rey currently works for [employer], M-Fri 8:30AM-430 PM but ends up working till 730-8PM. , lives with wife and twin boys age 17 and sometimes their daughter sophomore at [employer] is home with them.  He drinks 2 cups coffee and 1 diet pop /day. Last caffeine intake is not within 6 hours of bed time.  He does not drink alcohol. Patient is a never smoker.   Patient does not use drugs. Does not use medical marijuana.    Allergies:    Allergies   Allergen  Reactions     No Known Drug Allergies        Medications:    Current Outpatient Medications   Medication Sig Dispense Refill     aspirin (ASA) 81 MG EC tablet Take 1 tablet (81 mg) by mouth daily (Patient not taking: Reported on 5/5/2021)       atorvastatin (LIPITOR) 40 MG tablet Take 1 tablet (40 mg) by mouth every evening (Patient not taking: Reported on 5/5/2021) 30 tablet 3     clopidogrel (PLAVIX) 75 MG tablet 4 pills loading dose on 4- then one daily (Patient not taking: Reported on 5/5/2021) 90 tablet 3     levothyroxine (SYNTHROID) 150 MCG tablet Take 125 mcg by mouth daily          Problem List:  Patient Active Problem List    Diagnosis Date Noted     PFO (patent foramen ovale) 07/07/2020     Priority: Medium     Added automatically from request for surgery [id number]       Palpitations 03/06/2020     Priority: Medium     Stroke due to embolism of left middle cerebral artery (H) 03/05/2020     Priority: Medium     Nephrolithiasis 07/26/2012     Priority: Medium     Other postprocedural status(V45.89) 02/19/2009     Priority: Medium        Past Medical/Surgical History:  Past Medical History:   Diagnosis Date     Embolic stroke involving left middle cerebral artery (H) 03/2020     Hyperlipidemia      Hypothyroidism      Kidney stone     Recurent stones     DAMARIS on CPAP      Palpitations 3/6/2020     PFO (patent foramen ovale)      Sinus of Valsalva aneurysm      Past Surgical History:   Procedure Laterality Date     CYSTOSCOPY       CYSTOSCOPY, RETROGRADES, COMBINED  5/24/2014    Procedure: COMBINED CYSTOSCOPY, RETROGRADES;  Surgeon: Francisco J Lerma MD;  Location: [facility] OR     ENT SURGERY      T & A     EP LOOP RECORDER IMPLANT N/A 10/12/2020    Procedure: EP Loop Recorder Implant;  Surgeon: Lefty Ramires MD;  Location:  [facility]     IR CAROTID CEREBRAL ANGIOGRAM LEFT  3/5/2020     KNEE SURGERY Right     arthroscopic     LASER HOLMIUM LITHOTRIPSY URETER(S), INSERT STENT,  COMBINED  7/27/2012    Procedure: COMBINED CYSTOSCOPY, URETEROSCOPY, LASER HOLMIUM LITHOTRIPSY URETER(S), INSERT STENT;  Video cystoscopy, Right Retrograde Right Ureteroscopy with Holmium Laser, Stone Extraction,  JJ Stent Placement ;  Surgeon: Francisco J Lerma MD;  Location: [facility] OR     LASER HOLMIUM LITHOTRIPSY URETER(S), INSERT STENT, COMBINED  5/24/2014    Procedure: COMBINED CYSTOSCOPY, URETEROSCOPY, LASER HOLMIUM LITHOTRIPSY URETER(S), INSERT STENT;  Surgeon: Francisco J Lerma MD;  Location: [facility] OR     LASER HOLMIUM LITHOTRIPSY URETER(S), INSERT STENT, COMBINED Right 3/5/2017    Procedure: COMBINED CYSTOSCOPY, URETEROSCOPY, LASER HOLMIUM LITHOTRIPSY URETER(S), INSERT STENT;  Surgeon: Chris Barrios MD;  Location: [facility] OR     ROTATOR CUFF REPAIR RT/LT         Social History:  Social History     Socioeconomic History     Marital status:      Spouse name: Not on file     Number of children: 3     Years of education: Not on file     Highest education level: Not on file   Occupational History     Occupation: Golf [employer]   Social Needs     Financial resource strain: Not on file     Food insecurity     Worry: Not on file     Inability: Not on file     Transportation needs     Medical: Not on file     Non-medical: Not on file   Tobacco Use     Smoking status: Never Smoker     Smokeless tobacco: Never Used   Substance and Sexual Activity     Alcohol use: No     Drug use: No     Sexual activity: Not on file   Lifestyle     Physical activity     Days per week: Not on file     Minutes per session: Not on file     Stress: Not on file   Relationships     Social connections     Talks on phone: Not on file     Gets together: Not on file     Attends Quaker service: Not on file     Active member of club or organization: Not on file     Attends meetings of clubs or organizations: Not on file     Relationship status: Not on file     Intimate partner violence     Fear of current or ex partner: Not on file      Emotionally abused: Not on file     Physically abused: Not on file     Forced sexual activity: Not on file   Other Topics Concern     Parent/sibling w/ CABG, MI or angioplasty before 65F 55M? 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 (last updated 3/5/2020)        Family History:  Family history pertinent to sleep disorders:   Family History   Problem Relation Age of Onset     Hyperlipidemia Mother         pulm emboli, IUD perforation and developed clot     Other - See Comments Father         Possible Autoimmune disorder , DVT, pulm embolism     Prostate Cancer Father        Review of Systems:  A complete review of systems reviewed by me is negative with the exeption of what has been mentioned in the history of present illness,  and symptoms listed below, highlighted in red:  CONSTITUTIONAL: weight gain/loss, fever, chills, sweats or night sweats, drug allergies.  EYES:  changes in vision, blind spots, double vision.  ENT: ear pain, sore throat, sinus pain, post-nasal drip, runny nose, bloody nose  CARDIAC:  fast heartbeats or fluttering in chest, chest pain or pressure, breathlessness when lying flat, swollen legs or swollen feet.  NEUROLOGIC: headaches, weakness or numbness in the arms or legs.  DERMATOLOGIC:  rashes, new moles or change in mole(s)  PULMONARY: SOB at rest, SOB with activity, dry cough, productive cough, coughing up blood, wheezing or whistling when breathing.    GASTROINTESTINAL:  nausea or vomitting, loose or watery stools, fat or grease in stools, constipation, abdominal pain, bowel movements black in color or blood noted.  GENITOURINARY: pain during urination, blood in urine, urinating more frequently than usual  MUSCULOSKELETAL:rt knee pain.  ENDOCRINE: increased thirst or urination, diabetes.  LYMPHATICS: swollen lymph nodes, lumps or bumps in the breasts or nipple discharge.  PSYCHE: depression, anxiety, denies  "suicidal ideations/thoughts of harming others    Physical Examination:  Vitals: Ht 1.803 m (5' 11\")   Wt 117.9 kg (260 lb)   BMI 36.26 kg/m    BMI= Body mass index is 36.26 kg/m .    [place] Total Score 5/5/2021   Total score - [place] 2     General: No apparent distress, appropriately groomed  Head: Normocephalic, atraumatic  Chest: No cough, no audible wheezing, able to talk in full sentences  Skin: no rash  Psych: coherent speech, normal rate and volume, able to articulate logical thoughts, able   to abstract reason, no tangential thoughts, no hallucinations   or delusions  His  affect is normal  Neuro:  Mental status: Alert and  Oriented X 3  Speech: normal   Gait: Normal    No focal neurological deficit    Impression/Plan:    Obstructive sleep apnea: Pt reports adequate compliance with PAP therapy and reports positive benefits with PAP use.   DAMARIS is adequately controlled with CPAP at the current settings per compliance DL.   Recommended revision of pressure settings increasing to 10 cm water, based on the DL and the report of weight gain in last couple of years.  Recommend obtaining an overnight oximetry with CPAP at 10 cm water with parallel compliance DL to check for hypoxemia.  Recommended him  to continue using the CPAP regularly during sleep and instructed  to get  the supplies for the PAP replaced regularly.    Patient instructed to remember to bring PAP with him if hospitalized and if anticipating procedure that requires sedation/surgery to be sure to discuss with the provider/surgeon that he has sleep apnea and uses PAP therapy. He is planing on taking it with him when he goes for the PFO closure tomorrow.    Obesity: We discussed weight management with healthy diet, and exercise.    --Encouraged to follow good sleep hygiene/behavioral techniques.     --Recommended to follow regular sleep schedule and aim at obtaining at least 7 to 8 hours per night.    Patient was strongly advised to avoid driving, " "operating any heavy machinery or other hazardous situations while drowsy or sleepy.  Patient was counseled on the importance of driving while alert, to pull over if drowsy, or nap before getting into the vehicle if sleepy.      Plan is to communicate results of CASANDRA via MyChart. If CASANDRA is normal, he will follow up at sleep clinic in 1 year or sooner if any concerns.    CC: Shira Diaz        The above note was dictated using voice recognition software. Although reviewed after completion, some word and grammatical error may remain . Please contact the author for any clarifications.    \"I spent a total of 40 minutes with Rey Andre during today's video visit, most of this time was spent counseling the patient and  coordinating care regarding  DAMARIS, CPAP therapy,weight management , going over PAP download/sleep study and chart review., including documentation and further activities as noted above.\"     Daylin Henning MD  [facility] [address] [id number] [phone]  Dept: [phone]                      "

## 2021-05-05 NOTE — PROGRESS NOTES
PTA medications updated by Medication Scribe prior to surgery via phone call with patient        Comments:    Medication history sources: Patient, Surescripts and H&P  In the past week, patient estimated taking medication this percent of the time: Greater than 90%  Adherence assessment: N/A Not Observed    Significant changes made to the medication list:  None      Additional medication history information:   None    The information provided in this note is only as accurate as the sources available at the time of update(s)      Prior to Admission medications    Medication Sig Last Dose Taking? Auth Provider   acetaminophen (TYLENOL) 325 MG tablet Take 325-650 mg by mouth 4 times daily as needed for mild pain  at PRN Yes Reported, Patient   aspirin (ASA) 81 MG EC tablet Take 1 tablet (81 mg) by mouth daily  at AM Yes Shira Diaz, APRN CNP   atorvastatin (LIPITOR) 40 MG tablet Take 1 tablet (40 mg) by mouth every evening  at PM Yes Baudilio Fuller MD   clopidogrel (PLAVIX) 75 MG tablet Take 75 mg by mouth daily  Yes Reported, Patient   levothyroxine (SYNTHROID) 150 MCG tablet Take 150 mcg by mouth daily   at AM Yes Unknown, Entered By History   clopidogrel (PLAVIX) 75 MG tablet Take 300 mg by mouth once LOADING DOSE ON 5/5/21 PM (4 x 75mg) then 75mg once daily  at PM  Reported, Patient

## 2021-05-05 NOTE — PROGRESS NOTES
Faxed pt's positive COVID-19 test results from [facility] on 3/19/21 to [facility] department yesterday requesting that they be urgently scanned into pt's chart; however, they have not been scanned in yet.     Called and spoke with Jevon, charge nurse in the PACU and reviewed why pt did not have another COVID test done prior to his PFO closure tomorrow, since pt had a positive COVID-19 test on 3/19/21. She verbalized understanding and states she will relay this information the their pre-procedure caller.     Will copy and paste pt's positive COVID-19 results from 3/19/21 below since they have not been scanned yet:          ADA Roper 3:16 PM 5/5/2021

## 2021-05-06 ENCOUNTER — HOSPITAL ENCOUNTER (OUTPATIENT)
Facility: CLINIC | Age: 56
Discharge: HOME | End: 2021-05-07
Admitting: INTERNAL MEDICINE
Payer: COMMERCIAL

## 2021-05-06 ENCOUNTER — HOSPITAL ENCOUNTER (OUTPATIENT)
Dept: CARDIOLOGY | Facility: CLINIC | Age: 56
Discharge: HOME | End: 2021-05-06
Attending: NURSE PRACTITIONER | Admitting: NURSE PRACTITIONER
Payer: COMMERCIAL

## 2021-05-06 ENCOUNTER — ANESTHESIA EVENT (OUTPATIENT)
Dept: CARDIOLOGY | Facility: CLINIC | Age: 56
End: 2021-05-06
Payer: COMMERCIAL

## 2021-05-06 ENCOUNTER — APPOINTMENT (OUTPATIENT)
Dept: CARDIOLOGY | Facility: CLINIC | Age: 56
End: 2021-05-06
Attending: INTERNAL MEDICINE
Payer: COMMERCIAL

## 2021-05-06 ENCOUNTER — ANESTHESIA (OUTPATIENT)
Dept: CARDIOLOGY | Facility: CLINIC | Age: 56
End: 2021-05-06
Payer: COMMERCIAL

## 2021-05-06 DIAGNOSIS — Q21.12 PFO (PATENT FORAMEN OVALE): Primary | ICD-10-CM

## 2021-05-06 DIAGNOSIS — Q21.12 PFO (PATENT FORAMEN OVALE): ICD-10-CM

## 2021-05-06 LAB
ANION GAP SERPL CALCULATED.3IONS-SCNC: 5 MMOL/L (ref 3–14)
APCR PPP: 3.05 {RATIO}
APTT PPP: 27 SEC (ref 22–37)
AT III ACT/NOR PPP CHRO: 92 % (ref 85–135)
CARDIOLIPIN IGA SERPL-ACNC: 0.8 APL U/ML (ref 0–19.9)
CHLORIDE SERPL-SCNC: 113 MMOL/L (ref 94–109)
CO2 SERPL-SCNC: 25 MMOL/L (ref 20–32)
CREAT SERPL-MCNC: 0.84 MG/DL (ref 0.66–1.25)
ERYTHROCYTE [DISTWIDTH] IN BLOOD BY AUTOMATED COUNT: 13.7 % (ref 10–15)
GFR SERPL CREATININE-BSD FRML MDRD: >90 ML/MIN/{1.73_M2}
HCT VFR BLD AUTO: 43.1 % (ref 40–53)
HGB BLD-MCNC: 14.6 G/DL (ref 13.3–17.7)
INR PPP: 0.95 (ref 0.86–1.14)
KCT BLD-ACNC: 241 SEC (ref 75–150)
KCT BLD-ACNC: 249 SEC (ref 75–150)
MCH RBC QN AUTO: 31.2 PG (ref 26.5–33)
MCHC RBC AUTO-ENTMCNC: 33.9 G/DL (ref 31.5–36.5)
MCV RBC AUTO: 92 FL (ref 78–100)
PLATELET # BLD AUTO: 229 10E9/L (ref 150–450)
POTASSIUM SERPL-SCNC: 3.8 MMOL/L (ref 3.4–5.3)
RBC # BLD AUTO: 4.68 10E12/L (ref 4.4–5.9)
SODIUM SERPL-SCNC: 143 MMOL/L (ref 133–144)
WBC # BLD AUTO: 6.6 10E9/L (ref 4–11)

## 2021-05-06 PROCEDURE — 250N000011 HC RX IP 250 OP 636: Performed by: NURSE PRACTITIONER

## 2021-05-06 PROCEDURE — 82565 ASSAY OF CREATININE: CPT | Performed by: NURSE PRACTITIONER

## 2021-05-06 PROCEDURE — 258N000003 HC RX IP 258 OP 636: Performed by: NURSE ANESTHETIST, CERTIFIED REGISTERED

## 2021-05-06 PROCEDURE — 250N000025 HC SEVOFLURANE, PER MIN: Performed by: INTERNAL MEDICINE

## 2021-05-06 PROCEDURE — 93325 DOPPLER ECHO COLOR FLOW MAPG: CPT | Mod: 26 | Performed by: INTERNAL MEDICINE

## 2021-05-06 PROCEDURE — 36415 COLL VENOUS BLD VENIPUNCTURE: CPT | Performed by: NURSE PRACTITIONER

## 2021-05-06 PROCEDURE — 210N000002 HC R&B HEART CARE

## 2021-05-06 PROCEDURE — 85613 RUSSELL VIPER VENOM DILUTED: CPT | Performed by: NURSE PRACTITIONER

## 2021-05-06 PROCEDURE — 250N000013 HC RX MED GY IP 250 OP 250 PS 637: Performed by: STUDENT IN AN ORGANIZED HEALTH CARE EDUCATION/TRAINING PROGRAM

## 2021-05-06 PROCEDURE — 250N000013 HC RX MED GY IP 250 OP 250 PS 637: Performed by: NURSE PRACTITIONER

## 2021-05-06 PROCEDURE — 93308 TTE F-UP OR LMTD: CPT | Mod: 26 | Performed by: INTERNAL MEDICINE

## 2021-05-06 PROCEDURE — 258N000003 HC RX IP 258 OP 636: Performed by: NURSE PRACTITIONER

## 2021-05-06 PROCEDURE — C1887 CATHETER, GUIDING: HCPCS | Performed by: INTERNAL MEDICINE

## 2021-05-06 PROCEDURE — 85307 ASSAY ACTIVATED PROTEIN C: CPT | Performed by: NURSE PRACTITIONER

## 2021-05-06 PROCEDURE — 81240 F2 GENE: CPT | Performed by: NURSE PRACTITIONER

## 2021-05-06 PROCEDURE — 999N000054 HC STATISTIC EKG NON-CHARGEABLE

## 2021-05-06 PROCEDURE — 93355 ECHO TRANSESOPHAGEAL (TEE): CPT

## 2021-05-06 PROCEDURE — 80051 ELECTROLYTE PANEL: CPT | Performed by: NURSE PRACTITIONER

## 2021-05-06 PROCEDURE — 999N001035 HC STATISTIC THROMBIN TIME NC: Performed by: NURSE PRACTITIONER

## 2021-05-06 PROCEDURE — 93321 DOPPLER ECHO F-UP/LMTD STD: CPT | Mod: 26 | Performed by: INTERNAL MEDICINE

## 2021-05-06 PROCEDURE — 250N000011 HC RX IP 250 OP 636: Performed by: NURSE ANESTHETIST, CERTIFIED REGISTERED

## 2021-05-06 PROCEDURE — 85300 ANTITHROMBIN III ACTIVITY: CPT | Performed by: NURSE PRACTITIONER

## 2021-05-06 PROCEDURE — 93325 DOPPLER ECHO COLOR FLOW MAPG: CPT

## 2021-05-06 PROCEDURE — 278N000051 HC OR IMPLANT GENERAL: Performed by: INTERNAL MEDICINE

## 2021-05-06 PROCEDURE — 250N000009 HC RX 250: Performed by: INTERNAL MEDICINE

## 2021-05-06 PROCEDURE — 85730 THROMBOPLASTIN TIME PARTIAL: CPT | Performed by: NURSE PRACTITIONER

## 2021-05-06 PROCEDURE — 93580 TRANSCATH CLOSURE OF ASD: CPT | Performed by: INTERNAL MEDICINE

## 2021-05-06 PROCEDURE — C1760 CLOSURE DEV, VASC: HCPCS | Performed by: INTERNAL MEDICINE

## 2021-05-06 PROCEDURE — 93355 ECHO TRANSESOPHAGEAL (TEE): CPT | Performed by: INTERNAL MEDICINE

## 2021-05-06 PROCEDURE — 85027 COMPLETE CBC AUTOMATED: CPT | Performed by: NURSE PRACTITIONER

## 2021-05-06 PROCEDURE — 85610 PROTHROMBIN TIME: CPT | Performed by: NURSE PRACTITIONER

## 2021-05-06 PROCEDURE — 85390 FIBRINOLYSINS SCREEN I&R: CPT | Mod: 26 | Performed by: PATHOLOGY

## 2021-05-06 PROCEDURE — 370N000017 HC ANESTHESIA TECHNICAL FEE, PER MIN: Performed by: INTERNAL MEDICINE

## 2021-05-06 PROCEDURE — 85347 COAGULATION TIME ACTIVATED: CPT

## 2021-05-06 PROCEDURE — 93005 ELECTROCARDIOGRAM TRACING: CPT

## 2021-05-06 PROCEDURE — 85306 CLOT INHIBIT PROT S FREE: CPT | Performed by: NURSE PRACTITIONER

## 2021-05-06 PROCEDURE — 83090 ASSAY OF HOMOCYSTEINE: CPT | Performed by: NURSE PRACTITIONER

## 2021-05-06 PROCEDURE — 258N000003 HC RX IP 258 OP 636: Performed by: ANESTHESIOLOGY

## 2021-05-06 PROCEDURE — C1769 GUIDE WIRE: HCPCS | Performed by: INTERNAL MEDICINE

## 2021-05-06 PROCEDURE — 250N000009 HC RX 250: Performed by: NURSE ANESTHETIST, CERTIFIED REGISTERED

## 2021-05-06 PROCEDURE — 86147 CARDIOLIPIN ANTIBODY EA IG: CPT | Performed by: NURSE PRACTITIONER

## 2021-05-06 PROCEDURE — 272N000001 HC OR GENERAL SUPPLY STERILE: Performed by: INTERNAL MEDICINE

## 2021-05-06 PROCEDURE — 85303 CLOT INHIBIT PROT C ACTIVITY: CPT | Performed by: NURSE PRACTITIONER

## 2021-05-06 PROCEDURE — 81241 F5 GENE: CPT | Performed by: NURSE PRACTITIONER

## 2021-05-06 DEVICE — OCCLUDER CARDIOFORM SEPTAL 25MM: Type: IMPLANTABLE DEVICE | Status: FUNCTIONAL

## 2021-05-06 RX ORDER — MEPERIDINE HYDROCHLORIDE 25 MG/ML
12.5 INJECTION INTRAMUSCULAR; INTRAVENOUS; SUBCUTANEOUS EVERY 5 MIN PRN
Status: DISCONTINUED | OUTPATIENT
Start: 2021-05-06 | End: 2021-05-06 | Stop reason: HOSPADM

## 2021-05-06 RX ORDER — ONDANSETRON 2 MG/ML
INJECTION INTRAMUSCULAR; INTRAVENOUS PRN
Status: DISCONTINUED | OUTPATIENT
Start: 2021-05-06 | End: 2021-05-06

## 2021-05-06 RX ORDER — ACETAMINOPHEN 325 MG/1
650 TABLET ORAL EVERY 4 HOURS PRN
Status: DISCONTINUED | OUTPATIENT
Start: 2021-05-06 | End: 2021-05-07 | Stop reason: HOSPADM

## 2021-05-06 RX ORDER — NALOXONE HYDROCHLORIDE 0.4 MG/ML
0.4 INJECTION, SOLUTION INTRAMUSCULAR; INTRAVENOUS; SUBCUTANEOUS
Status: ACTIVE | OUTPATIENT
Start: 2021-05-06 | End: 2021-05-07

## 2021-05-06 RX ORDER — CEPHALEXIN 500 MG/1
500 CAPSULE ORAL 3 TIMES DAILY
Status: DISCONTINUED | OUTPATIENT
Start: 2021-05-06 | End: 2021-05-07 | Stop reason: HOSPADM

## 2021-05-06 RX ORDER — ASPIRIN 81 MG/1
81 TABLET, CHEWABLE ORAL ONCE
Status: DISCONTINUED | OUTPATIENT
Start: 2021-05-06 | End: 2021-05-06 | Stop reason: HOSPADM

## 2021-05-06 RX ORDER — FENTANYL CITRATE 50 UG/ML
INJECTION, SOLUTION INTRAMUSCULAR; INTRAVENOUS PRN
Status: DISCONTINUED | OUTPATIENT
Start: 2021-05-06 | End: 2021-05-06

## 2021-05-06 RX ORDER — ONDANSETRON 2 MG/ML
4 INJECTION INTRAMUSCULAR; INTRAVENOUS EVERY 30 MIN PRN
Status: DISCONTINUED | OUTPATIENT
Start: 2021-05-06 | End: 2021-05-06 | Stop reason: HOSPADM

## 2021-05-06 RX ORDER — DEXAMETHASONE SODIUM PHOSPHATE 4 MG/ML
INJECTION, SOLUTION INTRA-ARTICULAR; INTRALESIONAL; INTRAMUSCULAR; INTRAVENOUS; SOFT TISSUE PRN
Status: DISCONTINUED | OUTPATIENT
Start: 2021-05-06 | End: 2021-05-06

## 2021-05-06 RX ORDER — CEFAZOLIN SODIUM 2 G/100ML
2 INJECTION, SOLUTION INTRAVENOUS
Status: COMPLETED | OUTPATIENT
Start: 2021-05-06 | End: 2021-05-06

## 2021-05-06 RX ORDER — SODIUM CHLORIDE, SODIUM LACTATE, POTASSIUM CHLORIDE, CALCIUM CHLORIDE 600; 310; 30; 20 MG/100ML; MG/100ML; MG/100ML; MG/100ML
INJECTION, SOLUTION INTRAVENOUS CONTINUOUS
Status: DISCONTINUED | OUTPATIENT
Start: 2021-05-06 | End: 2021-05-06 | Stop reason: HOSPADM

## 2021-05-06 RX ORDER — NALOXONE HYDROCHLORIDE 0.4 MG/ML
0.2 INJECTION, SOLUTION INTRAMUSCULAR; INTRAVENOUS; SUBCUTANEOUS
Status: ACTIVE | OUTPATIENT
Start: 2021-05-06 | End: 2021-05-07

## 2021-05-06 RX ORDER — PROPOFOL 10 MG/ML
INJECTION, EMULSION INTRAVENOUS PRN
Status: DISCONTINUED | OUTPATIENT
Start: 2021-05-06 | End: 2021-05-06

## 2021-05-06 RX ORDER — LIDOCAINE HYDROCHLORIDE 20 MG/ML
INJECTION, SOLUTION INFILTRATION; PERINEURAL PRN
Status: DISCONTINUED | OUTPATIENT
Start: 2021-05-06 | End: 2021-05-06

## 2021-05-06 RX ORDER — ONDANSETRON 4 MG/1
4 TABLET, ORALLY DISINTEGRATING ORAL EVERY 30 MIN PRN
Status: DISCONTINUED | OUTPATIENT
Start: 2021-05-06 | End: 2021-05-06 | Stop reason: HOSPADM

## 2021-05-06 RX ORDER — HEPARIN SODIUM 1000 [USP'U]/ML
INJECTION, SOLUTION INTRAVENOUS; SUBCUTANEOUS PRN
Status: DISCONTINUED | OUTPATIENT
Start: 2021-05-06 | End: 2021-05-06

## 2021-05-06 RX ORDER — HYDROMORPHONE HYDROCHLORIDE 1 MG/ML
.3-.5 INJECTION, SOLUTION INTRAMUSCULAR; INTRAVENOUS; SUBCUTANEOUS EVERY 5 MIN PRN
Status: DISCONTINUED | OUTPATIENT
Start: 2021-05-06 | End: 2021-05-06 | Stop reason: HOSPADM

## 2021-05-06 RX ORDER — PROTAMINE SULFATE 10 MG/ML
INJECTION, SOLUTION INTRAVENOUS PRN
Status: DISCONTINUED | OUTPATIENT
Start: 2021-05-06 | End: 2021-05-06

## 2021-05-06 RX ORDER — ASPIRIN 81 MG/1
81 TABLET ORAL DAILY
Status: DISCONTINUED | OUTPATIENT
Start: 2021-05-07 | End: 2021-05-07 | Stop reason: HOSPADM

## 2021-05-06 RX ORDER — FENTANYL CITRATE 50 UG/ML
25-50 INJECTION, SOLUTION INTRAMUSCULAR; INTRAVENOUS
Status: DISCONTINUED | OUTPATIENT
Start: 2021-05-06 | End: 2021-05-06 | Stop reason: HOSPADM

## 2021-05-06 RX ORDER — CLOPIDOGREL BISULFATE 75 MG/1
75 TABLET ORAL DAILY
Status: DISCONTINUED | OUTPATIENT
Start: 2021-05-07 | End: 2021-05-07 | Stop reason: HOSPADM

## 2021-05-06 RX ORDER — LIDOCAINE 40 MG/G
CREAM TOPICAL
Status: DISCONTINUED | OUTPATIENT
Start: 2021-05-06 | End: 2021-05-06 | Stop reason: HOSPADM

## 2021-05-06 RX ORDER — CLOPIDOGREL BISULFATE 75 MG/1
75 TABLET ORAL ONCE
Status: DISCONTINUED | OUTPATIENT
Start: 2021-05-06 | End: 2021-05-06 | Stop reason: HOSPADM

## 2021-05-06 RX ORDER — LIDOCAINE HYDROCHLORIDE 20 MG/ML
5 SOLUTION OROPHARYNGEAL
Status: DISCONTINUED | OUTPATIENT
Start: 2021-05-06 | End: 2021-05-07 | Stop reason: HOSPADM

## 2021-05-06 RX ORDER — ACETAMINOPHEN 325 MG/1
325 TABLET ORAL EVERY 4 HOURS PRN
Status: DISCONTINUED | OUTPATIENT
Start: 2021-05-06 | End: 2021-05-06

## 2021-05-06 RX ORDER — SODIUM CHLORIDE 9 MG/ML
INJECTION, SOLUTION INTRAVENOUS CONTINUOUS
Status: DISCONTINUED | OUTPATIENT
Start: 2021-05-06 | End: 2021-05-06 | Stop reason: HOSPADM

## 2021-05-06 RX ADMIN — SUGAMMADEX 250 MG: 100 INJECTION, SOLUTION INTRAVENOUS at 10:35

## 2021-05-06 RX ADMIN — HEPARIN SODIUM 12000 UNITS: 1000 INJECTION INTRAVENOUS; SUBCUTANEOUS at 10:06

## 2021-05-06 RX ADMIN — ACETAMINOPHEN 325 MG: 325 TABLET, FILM COATED ORAL at 18:23

## 2021-05-06 RX ADMIN — ACETAMINOPHEN 650 MG: 325 TABLET, FILM COATED ORAL at 22:28

## 2021-05-06 RX ADMIN — ROCURONIUM BROMIDE 40 MG: 10 INJECTION INTRAVENOUS at 09:45

## 2021-05-06 RX ADMIN — HEPARIN SODIUM 5000 UNITS: 1000 INJECTION INTRAVENOUS; SUBCUTANEOUS at 10:05

## 2021-05-06 RX ADMIN — PROPOFOL 200 MG: 10 INJECTION, EMULSION INTRAVENOUS at 09:45

## 2021-05-06 RX ADMIN — HEPARIN SODIUM 3000 UNITS: 1000 INJECTION INTRAVENOUS; SUBCUTANEOUS at 10:19

## 2021-05-06 RX ADMIN — CEPHALEXIN 500 MG: 500 CAPSULE ORAL at 15:29

## 2021-05-06 RX ADMIN — PHENYLEPHRINE HYDROCHLORIDE 0.2 MCG/KG/MIN: 10 INJECTION INTRAVENOUS at 09:53

## 2021-05-06 RX ADMIN — CEPHALEXIN 500 MG: 500 CAPSULE ORAL at 22:12

## 2021-05-06 RX ADMIN — PROTAMINE SULFATE 30 MG: 10 INJECTION, SOLUTION INTRAVENOUS at 10:29

## 2021-05-06 RX ADMIN — DEXAMETHASONE SODIUM PHOSPHATE 4 MG: 4 INJECTION, SOLUTION INTRA-ARTICULAR; INTRALESIONAL; INTRAMUSCULAR; INTRAVENOUS; SOFT TISSUE at 09:54

## 2021-05-06 RX ADMIN — MIDAZOLAM 2 MG: 1 INJECTION INTRAMUSCULAR; INTRAVENOUS at 09:16

## 2021-05-06 RX ADMIN — SODIUM CHLORIDE, POTASSIUM CHLORIDE, SODIUM LACTATE AND CALCIUM CHLORIDE: 600; 310; 30; 20 INJECTION, SOLUTION INTRAVENOUS at 10:14

## 2021-05-06 RX ADMIN — FENTANYL CITRATE 100 MCG: 50 INJECTION, SOLUTION INTRAMUSCULAR; INTRAVENOUS at 09:45

## 2021-05-06 RX ADMIN — LIDOCAINE HYDROCHLORIDE 100 MG: 20 INJECTION, SOLUTION INFILTRATION; PERINEURAL at 09:45

## 2021-05-06 RX ADMIN — SODIUM CHLORIDE: 9 INJECTION, SOLUTION INTRAVENOUS at 07:26

## 2021-05-06 RX ADMIN — CEFAZOLIN SODIUM 2 G: 2 INJECTION, SOLUTION INTRAVENOUS at 09:54

## 2021-05-06 RX ADMIN — ONDANSETRON 4 MG: 2 INJECTION INTRAMUSCULAR; INTRAVENOUS at 10:35

## 2021-05-06 ASSESSMENT — ACTIVITIES OF DAILY LIVING (ADL)
ADLS_ACUITY_SCORE: 11
ADLS_ACUITY_SCORE: 11

## 2021-05-06 NOTE — ANESTHESIA CARE TRANSFER NOTE
Patient: Rey Andre    Procedure(s):  Patent Foramen Ovale Closure    Diagnosis: stroke  Diagnosis Additional Information: No value filed.    Anesthesia Type:   General     Note:    Oropharynx: spontaneously breathing  Level of Consciousness: awake  Oxygen Supplementation: face mask  Level of Supplemental Oxygen (L/min / FiO2): 5  Independent Airway: airway patency satisfactory and stable  Dentition: dentition unchanged  Vital Signs Stable: post-procedure vital signs reviewed and stable  Report to RN Given: handoff report given  Patient transferred to: PACU  Comments: Neuromuscular blockade reversed after TOF 4/4, spontaneous respirations, oropharynx suctioned with soft flexible catheter, airway patent after extubation.  Oxygen via facemask at 6 liters per minute to PACU. .     Handoff Report: Identifed the Patient, Identified the Reponsible Provider, Reviewed the pertinent medical history, Discussed the surgical course, Reviewed Intra-OP anesthesia mangement and issues during anesthesia, Set expectations for post-procedure period and Allowed opportunity for questions and acknowledgement of understanding      Vitals: (Last set prior to Anesthesia Care Transfer)  CRNA VITALS  5/6/2021 1019 - 5/6/2021 1055      5/6/2021             Resp Rate (observed): 10    Resp Rate (set):  10        Electronically Signed By: CASSANDRA Maurer CRNA  May 6, 2021  10:55 AM

## 2021-05-06 NOTE — INTERVAL H&P NOTE
I have reviewed the surgical (or preoperative) H&P that is linked to this encounter, and examined the patient. Noted changes include - recent Covid +, greater than 2 weeks ago. Asymptomatic.

## 2021-05-06 NOTE — ANESTHESIA POSTPROCEDURE EVALUATION
Patient: Rey Andre    Procedure(s):  Patent Foramen Ovale Closure    Diagnosis:Paradoxical embolism  Diagnosis Additional Information: No value filed.    Anesthesia Type:  General    Note:  Disposition: Inpatient   Postop Pain Control: Uneventful            Sign Out: Well controlled pain   PONV: No   Neuro/Psych: Uneventful            Sign Out: Acceptable/Baseline neuro status   Airway/Respiratory: Uneventful            Sign Out: Acceptable/Baseline resp. status   CV/Hemodynamics:             Sign Out: Acceptable CV status; No obvious hypovolemia; No obvious fluid overload   Other NRE: NONE   DID A NON-ROUTINE EVENT OCCUR? No    Event details/Postop Comments:  Patient was complaining of chest pressure and shortness of breath. EKG/Echo done and these were evaluated by Dr. Alegria. Symptoms have improved. Please call with questions.           Last vitals:  Vitals:    05/06/21 1245 05/06/21 1300 05/06/21 1315   BP:      Pulse:      Resp:      Temp:      SpO2: 95% 94% 95%       Last vitals prior to Anesthesia Care Transfer:  CRNA VITALS  5/6/2021 1019 - 5/6/2021 1119      5/6/2021             SpO2:  96 %          Electronically Signed By: Shai Huddleston MD  May 6, 2021  1:26 PM

## 2021-05-06 NOTE — PLAN OF CARE
A&O x4. Pt denied pain. VSS, on RA. Up IND. Tele: SR.  Successful PFO closure today. R groin WDL, perclose. Plan for discharge tomorrow. Continue to monitor.

## 2021-05-06 NOTE — ADDENDUM NOTE
Addendum  created 05/06/21 1335 by Shai Huddleston MD    Intraprocedure Event deleted, Intraprocedure Event edited

## 2021-05-06 NOTE — PROGRESS NOTES
Dr. Huddleston at bedside as patient c/o chest pressure and shortness of breath.  Dr. Alegria notified and EKG done.  Will have echo done now as well per Dr. Alegria

## 2021-05-06 NOTE — ANESTHESIA PREPROCEDURE EVALUATION
Anesthesia Pre-Procedure Evaluation    Patient: Rey Andre   MRN: [id number] : 1965        Preoperative Diagnosis: stroke   Procedure : Procedure(s):  Patent Foramen Ovale Closure     Past Medical History:   Diagnosis Date     Embolic stroke involving left middle cerebral artery (H) 2020     Hyperlipidemia      Hypothyroidism      Kidney stone     Recurent stones     DAMARIS on CPAP      Palpitations 3/6/2020     PFO (patent foramen ovale)      Sinus of Valsalva aneurysm       Past Surgical History:   Procedure Laterality Date     CYSTOSCOPY       CYSTOSCOPY, RETROGRADES, COMBINED  2014    Procedure: COMBINED CYSTOSCOPY, RETROGRADES;  Surgeon: Francisco J Lerma MD;  Location:  OR     ENT SURGERY      T & A     EP LOOP RECORDER IMPLANT N/A 10/12/2020    Procedure: EP Loop Recorder Implant;  Surgeon: Lefty Ramires MD;  Location:  [facility]     IR CAROTID CEREBRAL ANGIOGRAM LEFT  3/5/2020     KNEE SURGERY Right     arthroscopic     LASER HOLMIUM LITHOTRIPSY URETER(S), INSERT STENT, COMBINED  2012    Procedure: COMBINED CYSTOSCOPY, URETEROSCOPY, LASER HOLMIUM LITHOTRIPSY URETER(S), INSERT STENT;  Video cystoscopy, Right Retrograde Right Ureteroscopy with Holmium Laser, Stone Extraction,  JJ Stent Placement ;  Surgeon: Francisco J Lerma MD;  Location:  OR     LASER HOLMIUM LITHOTRIPSY URETER(S), INSERT STENT, COMBINED  2014    Procedure: COMBINED CYSTOSCOPY, URETEROSCOPY, LASER HOLMIUM LITHOTRIPSY URETER(S), INSERT STENT;  Surgeon: Francisco J Lerma MD;  Location:  OR     LASER HOLMIUM LITHOTRIPSY URETER(S), INSERT STENT, COMBINED Right 3/5/2017    Procedure: COMBINED CYSTOSCOPY, URETEROSCOPY, LASER HOLMIUM LITHOTRIPSY URETER(S), INSERT STENT;  Surgeon: Chris Barrios MD;  Location:  OR     ROTATOR CUFF REPAIR RT/LT        Allergies   Allergen Reactions     No Known Drug Allergies       Social History     Tobacco Use     Smoking status:  Never Smoker     Smokeless tobacco: Never Used   Substance Use Topics     Alcohol use: No      Wt Readings from Last 1 Encounters:   05/05/21 117.9 kg (260 lb)        Anesthesia Evaluation   Pt has had prior anesthetic.     No history of anesthetic complications       ROS/MED HX  ENT/Pulmonary:     (+) sleep apnea,     Neurologic:     (+) CVA, TIA,     Cardiovascular: Comment: Interpretation Summary     The atrial septum is aneurysmal.  A contrast injection (Bubble Study) was performed that was mildly positive for  flow across the interatrial septum.  A patent foramen ovale is present.  Adequate PFO rims if percutaneous closure is indicated  Mild aortic root dilatation.  Sinus Valsalva 44mm, normal asc aorta size.    Left Ventricle  The left ventricle is normal in structure, function and size. The visual  ejection fraction is estimated at 55-60%. Normal left ventricular wall motion.  There is no thrombus seen in the left ventricle.     Right Ventricle  There is borderline right ventricular hypertrophy. The right ventricular  systolic function is normal.      METS/Exercise Tolerance:     Hematologic:  - neg hematologic  ROS     Musculoskeletal:  - neg musculoskeletal ROS     GI/Hepatic:  - neg GI/hepatic ROS     Renal/Genitourinary: Comment: kidney Stone, nephrolithiasis    (+) renal disease,     Endo:     (+) thyroid problem,     Psychiatric/Substance Use:  - neg psychiatric ROS     Infectious Disease:  - neg infectious disease ROS     Malignancy:       Other:            Physical Exam    Airway  airway exam normal           Respiratory Devices and Support         Dental  no notable dental history         Cardiovascular   cardiovascular exam normal          Pulmonary   pulmonary exam normal                OUTSIDE LABS:  CBC:   Lab Results   Component Value Date    WBC 6.6 05/06/2021    WBC 6.2 10/12/2020    HGB 14.6 05/06/2021    HGB 14.9 10/12/2020    HCT 43.1 05/06/2021    HCT 43.6 10/12/2020     05/06/2021      10/12/2020     BMP:   Lab Results   Component Value Date     05/06/2021     10/12/2020    POTASSIUM 3.8 05/06/2021    POTASSIUM 4.0 10/12/2020    CHLORIDE 113 (H) 05/06/2021    CHLORIDE 112 (H) 10/12/2020    CO2 25 05/06/2021    CO2 26 10/12/2020    BUN 18 10/12/2020    BUN 16 03/07/2020    CR 0.84 05/06/2021    CR 0.93 10/12/2020    GLC 89 10/12/2020    GLC 92 03/07/2020     COAGS:   Lab Results   Component Value Date    PTT 27 05/06/2021    INR 0.95 05/06/2021     POC:   Lab Results   Component Value Date    BGM 92 03/06/2020     HEPATIC:   Lab Results   Component Value Date    ALBUMIN 3.6 03/05/2020    PROTTOTAL 6.9 03/05/2020    ALT 48 06/05/2020    AST 13 03/05/2020    ALKPHOS 82 03/05/2020    BILITOTAL 0.3 03/05/2020     OTHER:   Lab Results   Component Value Date    A1C 5.5 03/05/2020    DAMARIS 8.4 (L) 10/12/2020    PHOS 2.6 03/06/2020    MAG 2.1 03/06/2020       Anesthesia Plan    ASA Status:  3      Anesthesia Type: General.     - Airway: ETT   Induction: Intravenous.   Maintenance: Inhalation.   Techniques and Equipment:     - Airway: Video-Laryngoscope         Consents    Anesthesia Plan(s) and associated risks, benefits, and realistic alternatives discussed. Questions answered and patient/representative(s) expressed understanding.     - Discussed with:  Patient         Postoperative Care    Pain management: IV analgesics, Multi-modal analgesia.   PONV prophylaxis: Ondansetron (or other 5HT-3), Dexamethasone or Solumedrol     Comments:                Shai Huddleston MD

## 2021-05-06 NOTE — ANESTHESIA PROCEDURE NOTES
Airway       Patient location during procedure: OR ([facility] - Operating Room or Procedural Area)       Procedure Start/Stop Times: 5/6/2021 9:49 AM  Staff -        Anesthesiologist:  Shai Huddleston MD       CRNA: Jany Campos APRN CRNA       Performed By: CRNAIndications and Patient Condition       Indications for airway management: carmen-procedural       Induction type:intravenous       Mask difficulty assessment: 3 - difficult mask (inadequate, unstable, or two providers) +/- NMBA    Final Airway Details       Final airway type: endotracheal airway       Successful airway: ETT - single  Endotracheal Airway Details        ETT size (mm): 8.0       Cuffed: yes       Successful intubation technique: direct laryngoscopy and video laryngoscopy       VL Blade Size: Glidescope 4       Grade View of Cords: 1       Adjucts: stylet       Position: Right       Measured from: gums/teeth       Secured at (cm): 232       Bite block used: None    Post intubation assessment        Number of attempts at approach: 1       Number of other approaches attempted: 0       Secured with: pink tape and commercial tube estes       Ease of procedure: easy       Dentition: Intact and Unchanged    Medication(s) Administered   Medication Administration Time: 5/6/2021 9:49 AM

## 2021-05-06 NOTE — PROGRESS NOTES
PFO closed   Dr coker was primary  25mm gore cardioform used  I called wife lis [phone]  VM left that procedure competed (she did not answer)

## 2021-05-06 NOTE — PROGRESS NOTES
Pt visited  Wife present  Had echo limited due to shallow breathing  He has known denis and uses cpap  Currently wide awake   O2 93%  sbp 134  Hr  73    Right fem sight bandaged and looks good  Echo pending  Pt feels well no complaint (except usual sore throat after joe,ett tube)  Instructed pt to hold down groin area when he coughs

## 2021-05-07 ENCOUNTER — APPOINTMENT (OUTPATIENT)
Dept: CARDIOLOGY | Facility: CLINIC | Age: 56
End: 2021-05-07
Attending: PHYSICIAN ASSISTANT
Payer: COMMERCIAL

## 2021-05-07 VITALS
DIASTOLIC BLOOD PRESSURE: 73 MMHG | SYSTOLIC BLOOD PRESSURE: 132 MMHG | BODY MASS INDEX: 37.6 KG/M2 | RESPIRATION RATE: 14 BRPM | HEART RATE: 64 BPM | OXYGEN SATURATION: 95 % | TEMPERATURE: 97.9 F | WEIGHT: 269.6 LBS

## 2021-05-07 LAB
INTERPRETATION ECG - MUSE: NORMAL
INTERPRETATION ECG - MUSE: NORMAL
LA PPP-IMP: NEGATIVE

## 2021-05-07 PROCEDURE — 250N000013 HC RX MED GY IP 250 OP 250 PS 637: Performed by: STUDENT IN AN ORGANIZED HEALTH CARE EDUCATION/TRAINING PROGRAM

## 2021-05-07 PROCEDURE — 93321 DOPPLER ECHO F-UP/LMTD STD: CPT | Mod: 26 | Performed by: INTERNAL MEDICINE

## 2021-05-07 PROCEDURE — 93308 TTE F-UP OR LMTD: CPT | Mod: 26 | Performed by: INTERNAL MEDICINE

## 2021-05-07 PROCEDURE — 250N000013 HC RX MED GY IP 250 OP 250 PS 637: Performed by: NURSE PRACTITIONER

## 2021-05-07 PROCEDURE — 93325 DOPPLER ECHO COLOR FLOW MAPG: CPT | Mod: 26 | Performed by: INTERNAL MEDICINE

## 2021-05-07 PROCEDURE — 93325 DOPPLER ECHO COLOR FLOW MAPG: CPT

## 2021-05-07 PROCEDURE — 999N000157 HC STATISTIC RCP TIME EA 10 MIN

## 2021-05-07 RX ORDER — CEPHALEXIN 500 MG/1
500 CAPSULE ORAL 3 TIMES DAILY
Qty: 6 CAPSULE | Refills: 0 | Status: SHIPPED | OUTPATIENT
Start: 2021-05-07 | End: 2021-06-02

## 2021-05-07 RX ADMIN — ACETAMINOPHEN 650 MG: 325 TABLET, FILM COATED ORAL at 08:48

## 2021-05-07 RX ADMIN — CEPHALEXIN 500 MG: 500 CAPSULE ORAL at 08:49

## 2021-05-07 RX ADMIN — CLOPIDOGREL BISULFATE 75 MG: 75 TABLET ORAL at 08:49

## 2021-05-07 RX ADMIN — ASPIRIN 81 MG: 81 TABLET, DELAYED RELEASE ORAL at 08:49

## 2021-05-07 ASSESSMENT — ACTIVITIES OF DAILY LIVING (ADL)
ADLS_ACUITY_SCORE: 11

## 2021-05-07 NOTE — UTILIZATION REVIEW
"Admission Status; Secondary Review Determination     Admission Date: 5/6/2021  7:02 AM       Under the authority of the Utilization Management Committee, the utilization review process indicated a secondary review on the above patient.  The review outcome is based on review of the medical records, discussions with staff, and applying clinical experience noted on the date of the review.        ()      Inpatient Status Appropriate - This patient's medical care is consistent with medical management for inpatient care and reasonable inpatient medical practice.      () Observation Status Appropriate - This patient does not meet hospital inpatient criteria and is placed in observation status. If this patient's primary payer is Medicare and was admitted as an inpatient, Condition Code 44 should be used and patient status changed to \"observation\".   () Admission Status NOT Appropriate - This patient's medical care is not consistent with medical management for Inpatient or Observation Status.        (x) Outpatient Procedure Status Appropriate - Procedure not on Medicare Inpatient list and no complications at the time of this review       RATIONALE FOR DETERMINATION      Brief clinical presentation, information copied from the chart, abbreviated and edited for relevant content:     Paged the team to change from IP to OP.       Rey Andre is a 55 year old male with history of an embolic stroke in March of 2020 treated with lytic therapy and embolectomy PFO, DAMARIS, dyslipidemia,  who was admitted on 5/6/2021 after elective PFO closure. He is now POD1 s/p percutaneous closure with a 25 mm [place] Cardioform PFO occluder. No complications and discharging after one night. This is OP procedure.     In summary, the severity of illness, intensity of service provided, expected length of stay and risk for adverse outcome make the care complex, high risk and appropriate for hospital admission.        The information on this document is " developed by the utilization review team in order for the business office to ensure compliance.  This only denotes the appropriateness of proper admission status and does not reflect the quality of care rendered.         The definitions of Inpatient Status and Observation Status used in making the determination above are those provided in the CMS Coverage Manual, Chapter 1 and Chapter 6, section 70.4.      Sincerely,      Yas Corral MD   Utilization Review/ Case Management  [facility].

## 2021-05-07 NOTE — DISCHARGE INSTRUCTIONS
Discharge instructions:  -  No driving for 48 hours.  -  Lifting restrictions: no lifting greater than 10 lbs for the first week. No lifting more than 30 lbs until after three weeks.   -  It is common to have some swelling and bruising at the groin site. If you are concerned about this, please call our clinic.  -  You may shower the evening you get home or the following morning.  -  If you develop a temperature of 100.5 F or over, you need to proceed to the emergency department for evaluation.   -  You can take tylenol for groin discomfort and use heat or ice as needed.   -  A sore throat is common for 48 hours after your procedure due to the anesthesia given and the transesophageal echocardiogram done during the procedure. Avoid spicy or hot foods for 48 hours.   -  No tub baths, hot tubs/pools, or swimming in lakes for 1 - 2 weeks to help avoid infection at the groin site.   -  No contact sports like hockey, ice skating, basketball, volleyball, scuba diving, or skydiving for three months.

## 2021-05-07 NOTE — PROGRESS NOTES
[facility]  Cardiology Progress Note/Discharge Summary    Outpatient cardiologist: Dr. Sanon  Date of Service (when I saw the patient): 05/07/2021  Summary: Rey Andre is a 55 year old male with history of an embolic stroke in March of 2020 treated with lytic therapy and embolectomy PFO, DAMARIS, dyslipidemia,  who was admitted on 5/6/2021 after elective PFO closure.   Interval History   Doing well this morning. Denies chest pain or shortness of breath. No palpitations. He had some chest pain and SOB yesterday after the procedure and a stat echo which showed no effusion.  Femoral access site without tenderness or bruising.   Assessment & Plan   1. PFO s/p percutaneous closure with a 25 mm [place] Cardioform PFO occluder.  2. Hx of stroke, likely embolic.  3. DAMARIS. Follow up with sleep clinic.  4. Treated hyperlipidemia.     Plan:  1.  Limited echo this morning. If stable, discharge home.  2.  Aspirin 81 mg indefinitely, plavix 75 mg daily for six months.  3.  Continue prophylactic keflex for 9 doses - Rx sent to his pharmacy.  4.  Restrictions - no driving for 48 hours, no lifting more than 10 lbs for the first week, 30 lbs for 2 - 3 weeks. No contact sports for three months.   5.  Follow up with CASSANDRA Camacho on 5/13 with a limited echo prior as scheduled.    Medina Momin PA-C    Physical Exam   Temp: 98.5  F (36.9  C) Temp src: Oral BP: 125/67 Pulse: 84   Resp: 12 SpO2: 96 % O2 Device: None (Room air) Oxygen Delivery: 2 LPM  Vitals:    05/07/21 0600   Weight: 122.3 kg (269 lb 9.6 oz)     Vital Signs with Ranges  Temp:  [96.4  F (35.8  C)-98.5  F (36.9  C)] 98.5  F (36.9  C)  Pulse:  [55-84] 84  Resp:  [8-19] 12  BP: (113-140)/(67-90) 125/67  SpO2:  [92 %-97 %] 96 %  05/02 0700 - 05/07 0659  In: 1100 [I.V.:1100]  Out: -   Net: 1100  Constitutional: NAD.   Respiratory: CTAB.   Cardiovascular: RRR, s1s2, no murmur. R femoral access is soft, no ecchymosis or bruit.  GI: soft,  BS+  Skin: warm, no rashes  Musculoskeletal: Moving all extremities  Neurologic: Alert, oriented x 3  Neuropsychiatric: Normal affect   Data   Results for orders placed or performed during the hospital encounter of 21 (from the past 24 hour(s))   Activated clotting time POCT   Result Value Ref Range    Activated Clot Time 241 (H) 75 - 150 sec   Activated clotting time POCT   Result Value Ref Range    Activated Clot Time 249 (H) 75 - 150 sec   Cardiac Catheterization    Narrative    Successful percutaneous closure of a patent foramen ovale with a 25mm [place]   Cardioform PFO occluder.    EKG 12-lead, tracing only   Result Value Ref Range    Interpretation ECG Click View Image link to view waveform and result    Echocardiogram Limited    Narrative    [id number]  [id number]  [id number]  [id number]^JOSE^RUBIA     [facility]  Echocardiography Laboratory  [address] [place] [address]     Name: RAFFY MELGAR  MRN: [id number]  : 1965  Study Date: 2021 11:42 AM  Age: 55 yrs  Gender: Male  [place] [facility] [place] For Study: Atrial Septal Defect  Ordering Physician: RUBIA GARCIA  Referring Physician: Chava Collado  Performed By: Kofi Timmons     BSA: 2.4 m2  Height: 71 in  Weight: 270 lb  HR: 64  BP: 113/83 mmHg  ______________________________________________________________________________  Procedure  Limited Portable Echo Adult.  ______________________________________________________________________________  Interpretation Summary     There is no pericardial effusion.  ______________________________________________________________________________  Left Ventricle  The left ventricle is normal in size. Left ventricular systolic function is  normal. The visual ejection fraction is estimated at 60-65%.     Right Ventricle  The right ventricle is normal in structure, function and size.     Atria  Normal left atrial size. Right atrial size is normal. Closure by  device,  secundum atrial septal defect.     Mitral Valve  There is no mitral regurgitation noted.     Aortic Valve  Normal tricuspid aortic valve. No aortic regurgitation is present. No aortic  stenosis is present.     Vessels  The inferior vena cava was normal in size with preserved respiratory  variability.     Pericardium  There is no pericardial effusion.     Rhythm  Sinus rhythm was noted.     ______________________________________________________________________________  Report approved by: Amaris Davenport 05/06/2021 03:12 PM     ______________________________________________________________________________          Tele SR    Medications       aspirin  81 mg Oral Daily     cephALEXin  500 mg Oral TID     clopidogrel  75 mg Oral Daily

## 2021-05-07 NOTE — PLAN OF CARE
Neuro- A4  Tele/Cardiac- SR- distant Heart sounds  Resp- WDL  Activity- IND  Pain- Headache 6/10 relieved with 650 mg PO acetaminophen  Drips- none  Drains/Tubes- PIV  Skin- WDL  GI/- WDL  Aggression Color- green  COVID status- negative  Plan- plan to discharge today after echo

## 2021-05-07 NOTE — PLAN OF CARE
A+Ox4, up indep in room. No pain. R groin soft no hematoma or bruit. Band aide in place. Educated on post procedure restrictions (also printed on AVS). Educated on signs of bleeding and infection and follow up appt and Keflex. Discharging home with wife. Belongings with pt. Rx sent to [facility]. Discharged with wife at 1300, echo results normal.

## 2021-05-10 LAB
COPATH REPORT: NORMAL
PROT C ACT/NOR PPP CHRO: 115 % (ref 70–170)
PROT S FREE AG ACT/NOR PPP IA: 88 % (ref 70–148)

## 2021-05-12 LAB — HCYS SERPL-SCNC: 12.9 UMOL/L (ref 4–12)

## 2021-05-13 ENCOUNTER — OFFICE VISIT (OUTPATIENT)
Dept: CARDIOLOGY | Facility: CLINIC | Age: 56
End: 2021-05-13
Attending: INTERNAL MEDICINE
Payer: COMMERCIAL

## 2021-05-13 ENCOUNTER — HOSPITAL ENCOUNTER (OUTPATIENT)
Dept: CARDIOLOGY | Facility: CLINIC | Age: 56
Discharge: HOME | End: 2021-05-13
Attending: NURSE PRACTITIONER | Admitting: NURSE PRACTITIONER
Payer: COMMERCIAL

## 2021-05-13 ENCOUNTER — DOCUMENTATION ONLY (OUTPATIENT)
Dept: CARDIOLOGY | Facility: CLINIC | Age: 56
End: 2021-05-13

## 2021-05-13 ENCOUNTER — CARE COORDINATION (OUTPATIENT)
Dept: CARDIOLOGY | Facility: CLINIC | Age: 56
End: 2021-05-13

## 2021-05-13 VITALS
DIASTOLIC BLOOD PRESSURE: 77 MMHG | SYSTOLIC BLOOD PRESSURE: 115 MMHG | HEIGHT: 71 IN | WEIGHT: 269.6 LBS | BODY MASS INDEX: 37.74 KG/M2 | HEART RATE: 88 BPM

## 2021-05-13 DIAGNOSIS — Q21.12 PFO (PATENT FORAMEN OVALE): ICD-10-CM

## 2021-05-13 DIAGNOSIS — I63.412 STROKE DUE TO EMBOLISM OF LEFT MIDDLE CEREBRAL ARTERY (H): Primary | ICD-10-CM

## 2021-05-13 DIAGNOSIS — Q21.12 PFO (PATENT FORAMEN OVALE): Primary | ICD-10-CM

## 2021-05-13 DIAGNOSIS — I77.89 ENLARGED AORTA (H): ICD-10-CM

## 2021-05-13 PROCEDURE — 93308 TTE F-UP OR LMTD: CPT | Mod: 26 | Performed by: INTERNAL MEDICINE

## 2021-05-13 PROCEDURE — 93321 DOPPLER ECHO F-UP/LMTD STD: CPT

## 2021-05-13 PROCEDURE — 93325 DOPPLER ECHO COLOR FLOW MAPG: CPT | Mod: 26 | Performed by: INTERNAL MEDICINE

## 2021-05-13 PROCEDURE — 93321 DOPPLER ECHO F-UP/LMTD STD: CPT | Mod: 26 | Performed by: INTERNAL MEDICINE

## 2021-05-13 PROCEDURE — 255N000002 HC RX 255 OP 636: Performed by: NURSE PRACTITIONER

## 2021-05-13 PROCEDURE — 99215 OFFICE O/P EST HI 40 MIN: CPT | Mod: 25 | Performed by: NURSE PRACTITIONER

## 2021-05-13 RX ADMIN — HUMAN ALBUMIN MICROSPHERES AND PERFLUTREN 9 ML: 10; .22 INJECTION, SOLUTION INTRAVENOUS at 14:35

## 2021-05-13 ASSESSMENT — MIFFLIN-ST. JEOR: SCORE: 2070.03

## 2021-05-13 NOTE — PROGRESS NOTES
Post closure echo shows good device placement and no effusion    They could not see the RV well and called it down and a bit dilated    Can you look--he has not had this before

## 2021-05-13 NOTE — PROGRESS NOTES
Post-PFO dental letter mailed to pt's dentist:   Jessi Garzon  [address] [place] [address]    ADA Roper 4:04 PM 5/13/2021

## 2021-05-13 NOTE — LETTER
"5/13/2021    Chava Collado DO  [facility] [address]  [facility] [address]    RE: Rey Andre       Dear Colleague,    I had the pleasure of seeing Rey Andre in the [facility].      History of Present Illness:     Rey \"Phillip\" Thia is a 54 year old gentleman who presents today to follow up on his PFO closure done on 5-6-2021 using a #25mm [place] device without complications. A closure device was placed into the right femoral vein.       He was referred to Dr. Alvarenga by Dr. Sanon for a history of embolic stroke for which he was admitted from 3-5-2020 to 3-7-2020. He suffered right sided hemiparesis. MRI of the brain showed scattered acute infarcts in the left frontal, parietal, and occipital lobes. Carotids were patent but he had partially occlusive thrombus in the left M2 branch of the middle cerebral artery, likely thromboembolic.     He underwent lytic therapy with embolectomy with improvement in his speech and motor deficits and no residual.     He was in sinus rhythm during his admission. He reported a history of palpitations to Dr Sanon lasting 10-15 minutes.Event recorder as an OP showed no arrhythmias.  However his Neurologist sent him to EP and a loop recorder was implanted. He has not had atrial fibrillation identified on there but has had 6-8 second pauses during sleep. Dr. Ramires reviewed and suggested he return to the sleep clinic and have his CPAP assessed which he states he has done. He is looking for an alternate sleep clinic so I have referred him to [facility] in [place].     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    He was placed on Eliquis early into his  Stroke. His father, who is a urologist, had a pulmonary embolism after venous varicosities and the patient's mother had a pulmonary embolism after an IUD perforation.  The family was never checked for " hypercoagulable state. We did a hypercoaguable panel on check in for his procedure and this is unremarkable.     His Neurologist is .       He has treated dyslipidemia on Atorvastatin 40mg daily. Lipids in October: LDL was 29 HDL 45 ; glucose levels have been normal.     He has had COVID this year.    He has gained 30-40 pounds since 2017 and we talked about heart heathtly Mediterranean diet and adding in exercise at whatever amount his right knee will allow him to do. He is having a cortisone right knee injection in the upcoming weeks and he states his Orthopedic surgeon knows he is on DAPT.    He complains of some fatigue and soreness in his groin post procedure; no palpitations.   Right groin site looks mildly ecchymotic with a small area of superficial ecchymosis. A nodularity is present over the closure device but no hum or bruit is present.    Echocardiogram was done today and results discussed with the patient and forwarded to Dr Alvarenga; views were limited  Device is well seated with no effusion  RV may be slightly enlarged with mild reduction in function but image were poor-I have sent this to Dr. Alvarenga for review.      I have reviewed his enlarged, stable aorta and informed him not to lift more than 40-50 pounds repetitevely      Impression/Plan:     1. Stroke, likely embolic with PFO  Successfully closed with #25mm GORE device  He now has the following restrictions:  *20-30 pound lifting restriction week two and three   *no heavy aerobics for one month  *no contact sports, scuba diving or mayte diving for 3 months  *avoid elective dental work for 6 months; if urgent will need SBE prophylaxis for six months  *follow up echo/bubble study and follow up with Cardiologist in 3 months post closure-scheduled        2. DAMARIS-treated  He follows with [facility] and wishes  He wants to change to [facility]--I have placed a referral     3. Dyslipidemia  On lipitor 40mg daily  LDL  controlled  TG high at 257 but typically normal     4. No documented evidence of atrial fibrillaiton  Loop recorder in place with no atrial fibrillation but 4-6 second pauses during sleep.  He was recommended to follow up with his sleep physician to make sure his sleep apnea is adequately treated  Continue to monitor     5. Hypothyroidism-treated     6. Dilated aortic root to 4.4cm--on MORGAN  4.2 on recent MORGAN  We will continue to observe  bp is low  Limit heavy lifting as noted above     7. Elevated PSA March 2020--normal since     8. DJD right knee  due for a cortisone injection   I have asked him to alert his TCO (Dr. Stewart) and alert him to the fact that he is now on DAPT-the patient states he has done so    It has been a pleasure seeing Rey Andre in follow up today; overall he is doing well. He had multiple questions today which I did my best to answer about his cardiac risk factors.    50 minutes spent on the date of the encounter doing chart review, review of test results, patient visit and documentation      5-:Addendum: Dr. Alvarenga reviewed echo's  RV is same as before and mildly abnormal  Patient has been referred to new sleep clinic for reassessment  Called and left him a message and to call back with questions. des Diaz, MSN, APRN-BC, CNP  Cardiology    No orders of the defined types were placed in this encounter.    No orders of the defined types were placed in this encounter.    There are no discontinued medications.      No diagnosis found.    CURRENT MEDICATIONS:  Current Outpatient Medications   Medication Sig Dispense Refill     acetaminophen (TYLENOL) 325 MG tablet Take 325-650 mg by mouth 4 times daily as needed for mild pain       aspirin (ASA) 81 MG EC tablet Take 1 tablet (81 mg) by mouth daily       atorvastatin (LIPITOR) 40 MG tablet Take 1 tablet (40 mg) by mouth every evening 30 tablet 3     cephALEXin (KEFLEX) 500 MG capsule Take 1 capsule (500 mg) by mouth 3  times daily 6 capsule 0     clopidogrel (PLAVIX) 75 MG tablet Take 75 mg by mouth daily       levothyroxine (SYNTHROID) 150 MCG tablet Take 150 mcg by mouth daily          ALLERGIES     Allergies   Allergen Reactions     No Known Drug Allergies        PAST MEDICAL HISTORY:  Past Medical History:   Diagnosis Date     Embolic stroke involving left middle cerebral artery (H) 03/2020     Hyperlipidemia      Hypothyroidism      Kidney stone     Recurent stones     DAMARIS on CPAP      Palpitations 3/6/2020     PFO (patent foramen ovale)      Sinus of Valsalva aneurysm        PAST SURGICAL HISTORY:  Past Surgical History:   Procedure Laterality Date     CV PFO CLOSURE N/A 5/6/2021    Procedure: Patent Foramen Ovale Closure;  Surgeon: Chi Alvarenga MD;  Location: [facility] CARDIAC CATH LAB     CYSTOSCOPY       CYSTOSCOPY, RETROGRADES, COMBINED  5/24/2014    Procedure: COMBINED CYSTOSCOPY, RETROGRADES;  Surgeon: Francisco J Lerma MD;  Location:  OR     ENT SURGERY      T & A     EP LOOP RECORDER IMPLANT N/A 10/12/2020    Procedure: EP Loop Recorder Implant;  Surgeon: Lefty Ramires MD;  Location: [facility] CARDIAC CATH LAB     IR CAROTID CEREBRAL ANGIOGRAM LEFT  3/5/2020     KNEE SURGERY Right     arthroscopic     LASER HOLMIUM LITHOTRIPSY URETER(S), INSERT STENT, COMBINED  7/27/2012    Procedure: COMBINED CYSTOSCOPY, URETEROSCOPY, LASER HOLMIUM LITHOTRIPSY URETER(S), INSERT STENT;  Video cystoscopy, Right Retrograde Right Ureteroscopy with Holmium Laser, Stone Extraction,  JJ Stent Placement ;  Surgeon: Francisco J Lerma MD;  Location:  OR     LASER HOLMIUM LITHOTRIPSY URETER(S), INSERT STENT, COMBINED  5/24/2014    Procedure: COMBINED CYSTOSCOPY, URETEROSCOPY, LASER HOLMIUM LITHOTRIPSY URETER(S), INSERT STENT;  Surgeon: Francisco J Lerma MD;  Location: [facility] OR     LASER HOLMIUM LITHOTRIPSY URETER(S), INSERT STENT, COMBINED Right 3/5/2017    Procedure: COMBINED CYSTOSCOPY, URETEROSCOPY, LASER  HOLMIUM LITHOTRIPSY URETER(S), INSERT STENT;  Surgeon: Chris Barrios MD;  Location: [facility] OR     ROTATOR CUFF REPAIR RT/LT         FAMILY HISTORY:  Family History   Problem Relation Age of Onset     Hyperlipidemia Mother         pulm emboli, IUD perforation and developed clot     Other - See Comments Father         Possible Autoimmune disorder , DVT, pulm embolism     Prostate Cancer Father        SOCIAL HISTORY:  Social History     Socioeconomic History     Marital status:      Spouse name: Not on file     Number of children: 3     Years of education: Not on file     Highest education level: Not on file   Occupational History     Occupation: [employer]   Social Needs     Financial resource strain: Not on file     Food insecurity     Worry: Not on file     Inability: Not on file     Transportation needs     Medical: Not on file     Non-medical: Not on file   Tobacco Use     Smoking status: Never Smoker     Smokeless tobacco: Never Used   Substance and Sexual Activity     Alcohol use: No     Drug use: No     Sexual activity: Not on file   Lifestyle     Physical activity     Days per week: Not on file     Minutes per session: Not on file     Stress: Not on file   Relationships     Social connections     Talks on phone: Not on file     Gets together: Not on file     Attends Advent service: Not on file     Active member of club or organization: Not on file     Attends meetings of clubs or organizations: Not on file     Relationship status: Not on file     Intimate partner violence     Fear of current or ex partner: Not on file     Emotionally abused: Not on file     Physically abused: Not on file     Forced sexual activity: Not on file   Other Topics Concern     Parent/sibling w/ CABG, MI or angioplasty before 65F 55M? 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 (last updated 3/5/2020)        Review of Systems:  Skin:           Eyes:         ENT:         Respiratory:          Cardiovascular:         Gastroenterology:        Genitourinary:         Musculoskeletal:         Neurologic:         Psychiatric:         Heme/Lymph/Imm:         Endocrine:           Physical Exam:  Vitals: There were no vitals taken for this visit.    Constitutional:           Skin:             Head:           Eyes:           Lymph:      ENT:           Neck:           Respiratory:            Cardiac:                                                           GI:           Extremities and Muscular Skeletal:                 Neurological:           Psych:         Recent Lab Results:  LIPID RESULTS:  Lab Results   Component Value Date    CHOL 125 10/19/2020    HDL 45 10/19/2020    LDL 29 10/19/2020    TRIG 257 (H) 10/19/2020       LIVER ENZYME RESULTS:  Lab Results   Component Value Date    AST 13 03/05/2020    ALT 48 06/05/2020       CBC RESULTS:  Lab Results   Component Value Date    WBC 6.6 05/06/2021    RBC 4.68 05/06/2021    HGB 14.6 05/06/2021    HCT 43.1 05/06/2021    MCV 92 05/06/2021    MCH 31.2 05/06/2021    MCHC 33.9 05/06/2021    RDW 13.7 05/06/2021     05/06/2021       BMP RESULTS:  Lab Results   Component Value Date     05/06/2021    POTASSIUM 3.8 05/06/2021    CHLORIDE 113 (H) 05/06/2021    CO2 25 05/06/2021    ANIONGAP 5 05/06/2021    GLC 89 10/12/2020    BUN 18 10/12/2020    CR 0.84 05/06/2021    GFRESTIMATED >90 05/06/2021    GFRESTBLACK >90 05/06/2021    DAMARIS 8.4 (L) 10/12/2020        A1C RESULTS:  Lab Results   Component Value Date    A1C 5.5 03/05/2020       INR RESULTS:  Lab Results   Component Value Date    INR 0.95 05/06/2021    INR 1.02 03/05/2020       Thank you for allowing me to participate in the care of your patient.      Sincerely,     Shira Diaz, APRN [facility]    cc:   Chi Alvarenga MD  [id number]  [place] [address]

## 2021-05-13 NOTE — PATIENT INSTRUCTIONS
*20-30 pound lifting restriction week two and three    *no heavy aerobics for one month  *no driving for minimum of 48 hours  *no contact sports, scuba diving or mayte diving for 3 months  *avoid elective dental work for 6 months; if urgent will need SBE prophylaxis for six months    See Dr. Alvarenga back in August with echo/bubble study

## 2021-05-13 NOTE — PROGRESS NOTES
"  History of Present Illness:     Rey \"Phillip\" Thai is a 54 year old gentleman who presents today to follow up on his PFO closure done on 5-6-2021 using a #25mm [place] device without complications. A closure device was placed into the right femoral vein.       He was referred to Dr. Alvarenga by Dr. Sanon for a history of embolic stroke for which he was admitted from 3-5-2020 to 3-7-2020. He suffered right sided hemiparesis. MRI of the brain showed scattered acute infarcts in the left frontal, parietal, and occipital lobes. Carotids were patent but he had partially occlusive thrombus in the left M2 branch of the middle cerebral artery, likely thromboembolic.     He underwent lytic therapy with embolectomy with improvement in his speech and motor deficits and no residual.     He was in sinus rhythm during his admission. He reported a history of palpitations to Dr Sanon lasting 10-15 minutes.Event recorder as an OP showed no arrhythmias.  However his Neurologist sent him to EP and a loop recorder was implanted. He has not had atrial fibrillation identified on there but has had 6-8 second pauses during sleep. Dr. Ramires reviewed and suggested he return to the sleep clinic and have his CPAP assessed which he states he has done. He is looking for an alternate sleep clinic so I have referred him to [facility] in [place].     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    He was placed on Eliquis early into his  Stroke. His father, who is a urologist, had a pulmonary embolism after venous varicosities and the patient's mother had a pulmonary embolism after an IUD perforation.  The family was never checked for hypercoagulable state. We did a hypercoaguable panel on check in for his procedure and this is unremarkable.     His Neurologist is .       He has treated dyslipidemia on Atorvastatin 40mg daily. Lipids in October: LDL was 29 HDL 45 ; glucose levels have " been normal.     He has had COVID this year.    He has gained 30-40 pounds since 2017 and we talked about heart heathtly Mediterranean diet and adding in exercise at whatever amount his right knee will allow him to do. He is having a cortisone right knee injection in the upcoming weeks and he states his Orthopedic surgeon knows he is on DAPT.    He complains of some fatigue and soreness in his groin post procedure; no palpitations.   Right groin site looks mildly ecchymotic with a small area of superficial ecchymosis. A nodularity is present over the closure device but no hum or bruit is present.    Echocardiogram was done today and results discussed with the patient and forwarded to Dr Alvarenga; views were limited  Device is well seated with no effusion  RV may be slightly enlarged with mild reduction in function but image were poor-I have sent this to Dr. Alvarenga for review.      I have reviewed his enlarged, stable aorta and informed him not to lift more than 40-50 pounds repetitevely      Impression/Plan:     1. Stroke, likely embolic with PFO  Successfully closed with #25mm GORE device  He now has the following restrictions:  *20-30 pound lifting restriction week two and three   *no heavy aerobics for one month  *no contact sports, scuba diving or mayte diving for 3 months  *avoid elective dental work for 6 months; if urgent will need SBE prophylaxis for six months  *follow up echo/bubble study and follow up with Cardiologist in 3 months post closure-scheduled        2. DAMARIS-treated  He follows with [facility] and wishes  He wants to change to [facility]--I have placed a referral     3. Dyslipidemia  On lipitor 40mg daily  LDL controlled  TG high at 257 but typically normal     4. No documented evidence of atrial fibrillaiton  Loop recorder in place with no atrial fibrillation but 4-6 second pauses during sleep.  He was recommended to follow up with his sleep physician to make sure his sleep apnea is  adequately treated  Continue to monitor     5. Hypothyroidism-treated     6. Dilated aortic root to 4.4cm--on MORGAN  4.2 on recent MORGAN  We will continue to observe  bp is low  Limit heavy lifting as noted above     7. Elevated PSA March 2020--normal since     8. DJD right knee  due for a cortisone injection   I have asked him to alert his TCO (Dr. Stewart) and alert him to the fact that he is now on DAPT-the patient states he has done so    It has been a pleasure seeing Rey Andre in follow up today; overall he is doing well. He had multiple questions today which I did my best to answer about his cardiac risk factors.    50 minutes spent on the date of the encounter doing chart review, review of test results, patient visit and documentation      5-:Addendum: Dr. Alvarenga reviewed echo's  RV is same as before and mildly abnormal  Patient has been referred to new sleep clinic for reassessment  Called and left him a message and to call back with questions. des Diaz, MSN, APRN-BC, CNP  Cardiology    No orders of the defined types were placed in this encounter.    No orders of the defined types were placed in this encounter.    There are no discontinued medications.      No diagnosis found.    CURRENT MEDICATIONS:  Current Outpatient Medications   Medication Sig Dispense Refill     acetaminophen (TYLENOL) 325 MG tablet Take 325-650 mg by mouth 4 times daily as needed for mild pain       aspirin (ASA) 81 MG EC tablet Take 1 tablet (81 mg) by mouth daily       atorvastatin (LIPITOR) 40 MG tablet Take 1 tablet (40 mg) by mouth every evening 30 tablet 3     cephALEXin (KEFLEX) 500 MG capsule Take 1 capsule (500 mg) by mouth 3 times daily 6 capsule 0     clopidogrel (PLAVIX) 75 MG tablet Take 75 mg by mouth daily       levothyroxine (SYNTHROID) 150 MCG tablet Take 150 mcg by mouth daily          ALLERGIES     Allergies   Allergen Reactions     No Known Drug Allergies        PAST MEDICAL HISTORY:  Past  Medical History:   Diagnosis Date     Embolic stroke involving left middle cerebral artery (H) 03/2020     Hyperlipidemia      Hypothyroidism      Kidney stone     Recurent stones     DAMARIS on CPAP      Palpitations 3/6/2020     PFO (patent foramen ovale)      Sinus of Valsalva aneurysm        PAST SURGICAL HISTORY:  Past Surgical History:   Procedure Laterality Date     CV PFO CLOSURE N/A 5/6/2021    Procedure: Patent Foramen Ovale Closure;  Surgeon: Chi Alvarenga MD;  Location:  HEART CARDIAC CATH LAB     CYSTOSCOPY       CYSTOSCOPY, RETROGRADES, COMBINED  5/24/2014    Procedure: COMBINED CYSTOSCOPY, RETROGRADES;  Surgeon: Francisco J Lerma MD;  Location:  OR     ENT SURGERY      T & A     EP LOOP RECORDER IMPLANT N/A 10/12/2020    Procedure: EP Loop Recorder Implant;  Surgeon: Lefty Ramires MD;  Location: [facility] CARDIAC CATH LAB     IR CAROTID CEREBRAL ANGIOGRAM LEFT  3/5/2020     KNEE SURGERY Right     arthroscopic     LASER HOLMIUM LITHOTRIPSY URETER(S), INSERT STENT, COMBINED  7/27/2012    Procedure: COMBINED CYSTOSCOPY, URETEROSCOPY, LASER HOLMIUM LITHOTRIPSY URETER(S), INSERT STENT;  Video cystoscopy, Right Retrograde Right Ureteroscopy with Holmium Laser, Stone Extraction,  JJ Stent Placement ;  Surgeon: Francisco J Lerma MD;  Location:  OR     LASER HOLMIUM LITHOTRIPSY URETER(S), INSERT STENT, COMBINED  5/24/2014    Procedure: COMBINED CYSTOSCOPY, URETEROSCOPY, LASER HOLMIUM LITHOTRIPSY URETER(S), INSERT STENT;  Surgeon: Francisco J Lerma MD;  Location:  OR     LASER HOLMIUM LITHOTRIPSY URETER(S), INSERT STENT, COMBINED Right 3/5/2017    Procedure: COMBINED CYSTOSCOPY, URETEROSCOPY, LASER HOLMIUM LITHOTRIPSY URETER(S), INSERT STENT;  Surgeon: Chris Barrios MD;  Location:  OR     ROTATOR CUFF REPAIR RT/LT         FAMILY HISTORY:  Family History   Problem Relation Age of Onset     Hyperlipidemia Mother         pulm emboli, IUD perforation and developed clot      Other - See Comments Father         Possible Autoimmune disorder , DVT, pulm embolism     Prostate Cancer Father        SOCIAL HISTORY:  Social History     Socioeconomic History     Marital status:      Spouse name: Not on file     Number of children: 3     Years of education: Not on file     Highest education level: Not on file   Occupational History     Occupation: Golf [employer]   Social Needs     Financial resource strain: Not on file     Food insecurity     Worry: Not on file     Inability: Not on file     Transportation needs     Medical: Not on file     Non-medical: Not on file   Tobacco Use     Smoking status: Never Smoker     Smokeless tobacco: Never Used   Substance and Sexual Activity     Alcohol use: No     Drug use: No     Sexual activity: Not on file   Lifestyle     Physical activity     Days per week: Not on file     Minutes per session: Not on file     Stress: Not on file   Relationships     Social connections     Talks on phone: Not on file     Gets together: Not on file     Attends Cheondoism service: Not on file     Active member of club or organization: Not on file     Attends meetings of clubs or organizations: Not on file     Relationship status: Not on file     Intimate partner violence     Fear of current or ex partner: Not on file     Emotionally abused: Not on file     Physically abused: Not on file     Forced sexual activity: Not on file   Other Topics Concern     Parent/sibling w/ CABG, MI or angioplasty before 65F 55M? 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 (last updated 3/5/2020)        Review of Systems:  Skin:          Eyes:         ENT:         Respiratory:          Cardiovascular:         Gastroenterology:        Genitourinary:         Musculoskeletal:         Neurologic:         Psychiatric:         Heme/Lymph/Imm:         Endocrine:           Physical Exam:  Vitals: There were no vitals taken  for this visit.    Constitutional:           Skin:             Head:           Eyes:           Lymph:      ENT:           Neck:           Respiratory:            Cardiac:                                                           GI:           Extremities and Muscular Skeletal:                 Neurological:           Psych:         Recent Lab Results:  LIPID RESULTS:  Lab Results   Component Value Date    CHOL 125 10/19/2020    HDL 45 10/19/2020    LDL 29 10/19/2020    TRIG 257 (H) 10/19/2020       LIVER ENZYME RESULTS:  Lab Results   Component Value Date    AST 13 03/05/2020    ALT 48 06/05/2020       CBC RESULTS:  Lab Results   Component Value Date    WBC 6.6 05/06/2021    RBC 4.68 05/06/2021    HGB 14.6 05/06/2021    HCT 43.1 05/06/2021    MCV 92 05/06/2021    MCH 31.2 05/06/2021    MCHC 33.9 05/06/2021    RDW 13.7 05/06/2021     05/06/2021       BMP RESULTS:  Lab Results   Component Value Date     05/06/2021    POTASSIUM 3.8 05/06/2021    CHLORIDE 113 (H) 05/06/2021    CO2 25 05/06/2021    ANIONGAP 5 05/06/2021    GLC 89 10/12/2020    BUN 18 10/12/2020    CR 0.84 05/06/2021    GFRESTIMATED >90 05/06/2021    GFRESTBLACK >90 05/06/2021    DAMARIS 8.4 (L) 10/12/2020        A1C RESULTS:  Lab Results   Component Value Date    A1C 5.5 03/05/2020       INR RESULTS:  Lab Results   Component Value Date    INR 0.95 05/06/2021    INR 1.02 03/05/2020           CC  Chi Alvarenga MD  [address]  [place] [id number]

## 2021-05-14 NOTE — TELEPHONE ENCOUNTER
See the addendum on OV from yesterday  Left pt message to see [facility] as I had put in order before

## 2021-05-14 NOTE — TELEPHONE ENCOUNTER
It is hard to read  Does look a bit big and hints of that on other studies, might be worth seeing if he has sleep apnea otherwise nothing to do different

## 2021-05-26 ENCOUNTER — TELEPHONE (OUTPATIENT)
Dept: SLEEP MEDICINE | Facility: CLINIC | Age: 56
End: 2021-05-26

## 2021-05-26 DIAGNOSIS — G47.33 OSA (OBSTRUCTIVE SLEEP APNEA): Primary | ICD-10-CM

## 2021-05-26 NOTE — TELEPHONE ENCOUNTER
CALLED PT AND EXPLAINED HIS TRANSFER IS COMPLETE AND SEE WHAT SUPPLIES HE IS NEEDING TO ORDER, PT REQUESTED PILLOWS, TUBING, AND R AND D FILTERS. VERIFIED MASK AND PT BELIEVES HE USES MEDIUM PILLOWS.

## 2021-06-02 ENCOUNTER — VIRTUAL VISIT (OUTPATIENT)
Dept: NEUROLOGY | Facility: CLINIC | Age: 56
End: 2021-06-02
Attending: PHYSICIAN ASSISTANT
Payer: COMMERCIAL

## 2021-06-02 DIAGNOSIS — I63.412 STROKE DUE TO EMBOLISM OF LEFT MIDDLE CEREBRAL ARTERY (H): Primary | ICD-10-CM

## 2021-06-02 PROCEDURE — 99214 OFFICE O/P EST MOD 30 MIN: CPT | Mod: GT

## 2021-06-02 NOTE — LETTER
6/2/2021         RE: Rey Andre  [id number]  [facility] [address]        Dear Colleague,    Thank you for referring your patient, Rey Andre, to the [facility]. Please see a copy of my visit note below.    Phillip is a 55 year old who is being evaluated via a billable video visit.      How would you like to obtain your AVS? MyChart  If the video visit is dropped, the invitation should be resent by: Send to e-mail at: [email or web address]  Will anyone else be joining your video visit? No      Video Start Time: 1400  Video-Visit Details    Type of service:  Video Visit    Video End Time:1419    Originating Location (pt. Location): Home    Distant Location (provider location):  [facility]     Platform used for Video Visit: WISHI         __________________________________________________________      [facility]    at [facility] [phone]  __________________________________________________________    Chief Complaint: Patient presents with:  Stroke: Follow up      History of Present Illness: Rey Andre is a 55 year old male presenting for follow-up for stroke.  I last saw him in August 2020.    He is a 54-year-old man who presented to the hospital in March 2020, with a history of obstructive sleep apnea and hypothyroidism who presented with aphasia and right-sided weakness, with significant deficits.  And he was enrolled in the TIMELESS trial (tenecteplase or placebo for patients with large vessel occlusion presenting outside of conventional tPA window) and then underwent successful endovascular thrombectomy for L M1 clot.     Since last visit, he reports that he had his PFO closed 4 weeks ago.  Lifting limitations afterward bother him-- but now he has been released to lift up to 30 lb.  No new stroke symptoms.  Loop recorder was put in about 4-5 months ago and there have been no arrhythmias  "so far as far as he knows.  He was told that he will be on Plavix 6 months following the PFO closure.  Checks BP occasionally--- 115-120 systolic/70-75.  Saw a sleep specialist to follow-up on his DAMARIS and possibly make adjustments, but he has not scheduled a repeat sleep study yet.  It is difficult with his work to have a night of good sleep lost like that by being in the lab.  He was on phentermine prior to his stroke for weight loss and notes that he has gained weight and wants to know if he can go back on it.  His hypercoagulable testing which was done last fall has been reordered by cardiology this spring, seems to all be normal.      Impression:   Problem List Items Addressed This Visit        Nervous and Auditory    Stroke due to embolism of left middle cerebral artery (H) - Primary        55-year-old man with left MCA stroke in 2020 status post left M1 thrombectomy and enrollment in TIMELESS trial.  His only explanation for stroke was PFO which was found and recently closed and he is doing well.    Plan:   -Continue Plavix 75 mg daily and aspirin 81 mg daily.  Once he is done with the Plavix after the 6 months are up after the PFO closure, ideally based on his weight he would continue aspirin 325 mg daily indefinitely from my standpoint  -Continue Lipitor 40 mg daily for goal LDL 40-70  -I discussed with Dr. Caldwell and he recommends not resuming the phentermine as it can slightly increase stroke risk  -Continue checking blood pressure regularly at home and notify providers if greater than 140 systolic, which it sounds like it has not been  - Follow up in 6 months    Stroke Education provided.  He will call us with any questions.  For any acute neurologic deficits he was advised to  go directly to the hospital rather than call the clinic.    Emi Soliz PA-C  Neurology  06/02/2021 2:01 PM  To page me or covering stroke neurology team member, click here: AMCOM  Choose \"On Call\" tab at top, then search " "dropdown box for \"Neurology Adult\" & press Enter, look for Neuro ICU/Stroke    ___________________________________________________________________    Current Medications  Current Outpatient Medications   Medication Sig     acetaminophen (TYLENOL) 325 MG tablet Take 325-650 mg by mouth 4 times daily as needed for mild pain     aspirin (ASA) 81 MG EC tablet Take 1 tablet (81 mg) by mouth daily     atorvastatin (LIPITOR) 40 MG tablet Take 1 tablet (40 mg) by mouth every evening     cephALEXin (KEFLEX) 500 MG capsule Take 1 capsule (500 mg) by mouth 3 times daily (Patient not taking: Reported on 5/13/2021)     clopidogrel (PLAVIX) 75 MG tablet Take 75 mg by mouth daily     levothyroxine (SYNTHROID) 150 MCG tablet Take 150 mcg by mouth daily      No current facility-administered medications for this visit.        Past Medical History  Past Medical History:   Diagnosis Date     Embolic stroke involving left middle cerebral artery (H) 03/2020     Hyperlipidemia      Hypothyroidism      Kidney stone     Recurent stones     DAMARIS on CPAP      Palpitations 3/6/2020     PFO (patent foramen ovale)      Sinus of Valsalva aneurysm        Social History  Social History     Tobacco Use     Smoking status: Never Smoker     Smokeless tobacco: Never Used   Substance Use Topics     Alcohol use: No     Drug use: No       Family History  Family History   Problem Relation Age of Onset     Hyperlipidemia Mother         pulm emboli, IUD perforation and developed clot     Other - See Comments Father         Possible Autoimmune disorder , DVT, pulm embolism     Prostate Cancer Father        Physical Exam    There were no vitals taken for this visit.    General:  no acute distress  HEENT:  normocephalic/atraumatic  Pulmonary:  no respiratory distress    Neurologic  Mental Status:  alert, oriented x 3, follows commands, speech clear and fluent  Cranial Nerves:  EOMI with normal smooth pursuit, facial movements symmetric, hearing not formally " tested but intact to conversation, no dysarthria  Motor:  no abnormal movements  Reflexes:  unable to test (telestroke)  Sensory:  unable to test (telestroke)  Coordination:  deferred  Station/Gait:  unable to test (telestroke)    Neuroimaging: as per HPI. I personally reviewed those images    Labs:    Coagulation studies:  Recent Labs   Lab Test 05/06/21  0740 03/05/20  0731   INR 0.95 1.02        Lipid panel:  Recent Labs   Lab Test 10/19/20  1528 06/05/20  0725   CHOL 125 105   HDL 45 41   LDL 29 43   TRIG 257* 106       HbA1C:  Recent Labs   Lab Test 03/05/20  1444   A1C 5.5       Troponin:  Recent Labs   Lab Test 03/05/20  2149 03/05/20  1444   TROPI <0.015 <0.015         Billing:    I spent a total of 35 minutes on the day of the visit.   Time spent doing chart review, history and exam, documentation and further activities per the note          Again, thank you for allowing me to participate in the care of your patient.        Sincerely,         Neurology Stroke ARY

## 2021-06-02 NOTE — PROGRESS NOTES
Niesha is a 55 year old who is being evaluated via a billable video visit.      How would you like to obtain your AVS? MyCharYbrain  If the video visit is dropped, the invitation should be resent by: Send to e-mail at: [email or web address]  Will anyone else be joining your video visit? No      Video Start Time: 1400  Video-Visit Details    Type of service:  Video Visit    Video End Time:1419    Originating Location (pt. Location): Home    Distant Location (provider location):  [facility] [place]     Platform used for Video Visit: CHEQROOM         __________________________________________________________      [facility]    at Replaced by [facility] [phone]  __________________________________________________________    Chief Complaint: Patient presents with:  Stroke: Follow up      History of Present Illness: Rey Andre is a 55 year old male presenting for follow-up for stroke.  I last saw him in August 2020.    He is a 54-year-old man who presented to the hospital in March 2020, with a history of obstructive sleep apnea and hypothyroidism who presented with aphasia and right-sided weakness, with significant deficits.  And he was enrolled in the TIMELESS trial (tenecteplase or placebo for patients with large vessel occlusion presenting outside of conventional tPA window) and then underwent successful endovascular thrombectomy for L M1 clot.     Since last visit, he reports that he had his PFO closed 4 weeks ago.  Lifting limitations afterward bother him-- but now he has been released to lift up to 30 lb.  No new stroke symptoms.  Loop recorder was put in about 4-5 months ago and there have been no arrhythmias so far as far as he knows.  He was told that he will be on Plavix 6 months following the PFO closure.  Checks BP occasionally--- 115-120 systolic/70-75.  Saw a sleep specialist to follow-up on his DAMARIS and possibly make adjustments, but he has not scheduled a  "repeat sleep study yet.  It is difficult with his work to have a night of good sleep lost like that by being in the lab.  He was on phentermine prior to his stroke for weight loss and notes that he has gained weight and wants to know if he can go back on it.  His hypercoagulable testing which was done last fall has been reordered by cardiology this spring, seems to all be normal.      Impression:   Problem List Items Addressed This Visit        Nervous and Auditory    Stroke due to embolism of left middle cerebral artery (H) - Primary        55-year-old man with left MCA stroke in 2020 status post left M1 thrombectomy and enrollment in TIMELESS trial.  His only explanation for stroke was PFO which was found and recently closed and he is doing well.    Plan:   -Continue Plavix 75 mg daily and aspirin 81 mg daily.  Once he is done with the Plavix after the 6 months are up after the PFO closure, ideally based on his weight he would continue aspirin 325 mg daily indefinitely from my standpoint  -Continue Lipitor 40 mg daily for goal LDL 40-70  -I discussed with Dr. Caldwell and he recommends not resuming the phentermine as it can slightly increase stroke risk  -Continue checking blood pressure regularly at home and notify providers if greater than 140 systolic, which it sounds like it has not been  - Follow up in 6 months    Stroke Education provided.  He will call us with any questions.  For any acute neurologic deficits he was advised to  go directly to the hospital rather than call the clinic.    Emi Soliz PA-C  Neurology  06/02/2021 2:01 PM  To page me or covering stroke neurology team member, click here: AMCOM  Choose \"On Call\" tab at top, then search dropdown box for \"Neurology Adult\" & press Enter, look for Neuro ICU/Stroke    ___________________________________________________________________    Current Medications  Current Outpatient Medications   Medication Sig     acetaminophen (TYLENOL) 325 MG tablet " Take 325-650 mg by mouth 4 times daily as needed for mild pain     aspirin (ASA) 81 MG EC tablet Take 1 tablet (81 mg) by mouth daily     atorvastatin (LIPITOR) 40 MG tablet Take 1 tablet (40 mg) by mouth every evening     cephALEXin (KEFLEX) 500 MG capsule Take 1 capsule (500 mg) by mouth 3 times daily (Patient not taking: Reported on 5/13/2021)     clopidogrel (PLAVIX) 75 MG tablet Take 75 mg by mouth daily     levothyroxine (SYNTHROID) 150 MCG tablet Take 150 mcg by mouth daily      No current facility-administered medications for this visit.        Past Medical History  Past Medical History:   Diagnosis Date     Embolic stroke involving left middle cerebral artery (H) 03/2020     Hyperlipidemia      Hypothyroidism      Kidney stone     Recurent stones     DAMARIS on CPAP      Palpitations 3/6/2020     PFO (patent foramen ovale)      Sinus of Valsalva aneurysm        Social History  Social History     Tobacco Use     Smoking status: Never Smoker     Smokeless tobacco: Never Used   Substance Use Topics     Alcohol use: No     Drug use: No       Family History  Family History   Problem Relation Age of Onset     Hyperlipidemia Mother         pulm emboli, IUD perforation and developed clot     Other - See Comments Father         Possible Autoimmune disorder , DVT, pulm embolism     Prostate Cancer Father        Physical Exam    There were no vitals taken for this visit.    General:  no acute distress  HEENT:  normocephalic/atraumatic  Pulmonary:  no respiratory distress    Neurologic  Mental Status:  alert, oriented x 3, follows commands, speech clear and fluent  Cranial Nerves:  EOMI with normal smooth pursuit, facial movements symmetric, hearing not formally tested but intact to conversation, no dysarthria  Motor:  no abnormal movements  Reflexes:  unable to test (telestroke)  Sensory:  unable to test (telestroke)  Coordination:  deferred  Station/Gait:  unable to test (telestroke)    Neuroimaging: as per HPI. I  personally reviewed those images    Labs:    Coagulation studies:  Recent Labs   Lab Test 05/06/21  0740 03/05/20  0731   INR 0.95 1.02        Lipid panel:  Recent Labs   Lab Test 10/19/20  1528 06/05/20  0725   CHOL 125 105   HDL 45 41   LDL 29 43   TRIG 257* 106       HbA1C:  Recent Labs   Lab Test 03/05/20  1444   A1C 5.5       Troponin:  Recent Labs   Lab Test 03/05/20  2149 03/05/20  1444   TROPI <0.015 <0.015         Billing:    I spent a total of 35 minutes on the day of the visit.   Time spent doing chart review, history and exam, documentation and further activities per the note

## 2021-08-07 ENCOUNTER — ANCILLARY PROCEDURE (OUTPATIENT)
Dept: CARDIOLOGY | Facility: CLINIC | Age: 56
End: 2021-08-07
Attending: INTERNAL MEDICINE
Payer: COMMERCIAL

## 2021-08-07 DIAGNOSIS — I63.9 CRYPTOGENIC STROKE (H): ICD-10-CM

## 2021-08-07 DIAGNOSIS — Z45.09 ENCOUNTER FOR LOOP RECORDER CHECK: ICD-10-CM

## 2021-08-07 PROCEDURE — 93297 REM INTERROG DEV EVAL ICPMS: CPT | Performed by: INTERNAL MEDICINE

## 2021-08-07 PROCEDURE — G2066 INTER DEVC REMOTE 30D: HCPCS | Performed by: INTERNAL MEDICINE

## 2021-08-11 ENCOUNTER — HOSPITAL ENCOUNTER (OUTPATIENT)
Dept: CARDIOLOGY | Facility: CLINIC | Age: 56
Discharge: HOME | End: 2021-08-11
Attending: NURSE PRACTITIONER | Admitting: NURSE PRACTITIONER
Payer: COMMERCIAL

## 2021-08-11 DIAGNOSIS — Q21.12 PFO (PATENT FORAMEN OVALE): ICD-10-CM

## 2021-08-11 LAB — LVEF ECHO: NORMAL

## 2021-08-11 PROCEDURE — 93321 DOPPLER ECHO F-UP/LMTD STD: CPT | Mod: 26 | Performed by: INTERNAL MEDICINE

## 2021-08-11 PROCEDURE — 93325 DOPPLER ECHO COLOR FLOW MAPG: CPT

## 2021-08-11 PROCEDURE — 93325 DOPPLER ECHO COLOR FLOW MAPG: CPT | Mod: 26 | Performed by: INTERNAL MEDICINE

## 2021-08-11 PROCEDURE — 258N000001 HC RX 258: Performed by: NURSE PRACTITIONER

## 2021-08-11 PROCEDURE — 255N000002 HC RX 255 OP 636: Performed by: NURSE PRACTITIONER

## 2021-08-11 PROCEDURE — 93320 DOPPLER ECHO COMPLETE: CPT | Mod: 26 | Performed by: INTERNAL MEDICINE

## 2021-08-11 RX ORDER — ACYCLOVIR 200 MG/1
30 CAPSULE ORAL ONCE
Status: COMPLETED | OUTPATIENT
Start: 2021-08-11 | End: 2021-08-11

## 2021-08-11 RX ADMIN — SODIUM CHLORIDE 30 ML: 9 INJECTION, SOLUTION INTRAMUSCULAR; INTRAVENOUS; SUBCUTANEOUS at 10:21

## 2021-08-11 RX ADMIN — HUMAN ALBUMIN MICROSPHERES AND PERFLUTREN 3 ML: 10; .22 INJECTION, SOLUTION INTRAVENOUS at 10:21

## 2021-08-12 ENCOUNTER — OFFICE VISIT (OUTPATIENT)
Dept: CARDIOLOGY | Facility: CLINIC | Age: 56
End: 2021-08-12
Payer: COMMERCIAL

## 2021-08-12 VITALS
BODY MASS INDEX: 37.94 KG/M2 | WEIGHT: 271 LBS | DIASTOLIC BLOOD PRESSURE: 78 MMHG | HEART RATE: 84 BPM | SYSTOLIC BLOOD PRESSURE: 132 MMHG | HEIGHT: 71 IN

## 2021-08-12 DIAGNOSIS — E78.5 HYPERLIPIDEMIA LDL GOAL <70: Primary | ICD-10-CM

## 2021-08-12 DIAGNOSIS — Q21.12 PFO (PATENT FORAMEN OVALE): ICD-10-CM

## 2021-08-12 DIAGNOSIS — I71.20 THORACIC AORTIC ANEURYSM WITHOUT RUPTURE (H): ICD-10-CM

## 2021-08-12 DIAGNOSIS — I63.412 CEREBROVASCULAR ACCIDENT (CVA) DUE TO EMBOLISM OF LEFT MIDDLE CEREBRAL ARTERY (H): ICD-10-CM

## 2021-08-12 PROCEDURE — 99215 OFFICE O/P EST HI 40 MIN: CPT | Performed by: INTERNAL MEDICINE

## 2021-08-12 RX ORDER — ATORVASTATIN CALCIUM 40 MG/1
20 TABLET, FILM COATED ORAL EVERY EVENING
Qty: 90 TABLET | Refills: 3 | COMMUNITY
Start: 2021-08-12 | End: 2022-09-16

## 2021-08-12 RX ORDER — METOPROLOL SUCCINATE 50 MG/1
50 TABLET, EXTENDED RELEASE ORAL DAILY
Qty: 90 TABLET | Refills: 3 | Status: SHIPPED | OUTPATIENT
Start: 2021-08-12 | End: 2022-09-16

## 2021-08-12 ASSESSMENT — MIFFLIN-ST. JEOR: SCORE: 2076.38

## 2021-08-12 NOTE — PROGRESS NOTES
Service Date: 08/12/2021    HISTORY OF PRESENT ILLNESS:  Rey Andre is a delightful 55-year-old gentleman.  He is accompanied by his wife.  He had a stroke on 03/20/2020.  He ended up having a thrombectomy, etc.  No clear cause was found for the stroke, but of note, he had a patent foramen ovale.  He also had 2 parents, both of which had clots.  No genetic defect was found on limited testing and he had never had a clot before.  We closed the PFO on 05/06/2021 with a Chesaning Cardioform 25 mm device with good results.  We reviewed his echocardiogram today, which is 3 months post-procedure.  The bubble study is completely negative.  The right ventricle, which had been a little bit enlarged, appears to be normal or upper limits of normal.  Bubble study was negative.  We again identified the sinus of Valsalva at 45 mm, the ascending aorta was normal.    We talked today in depth about the dilated sinus of Valsalva.  There is no family history of aortic rupture, etc.  Nevertheless, I am going to put him on metoprolol XL 50 mg a day.  I gave him a note to keep his heart rate above 52-55 and systolic blood pressure above 100-110.  He has a home blood pressure machine. I suggested he have his first-degree relatives notified of this.  Our protocol will be 3 more months of Plavix and at the end of the 3 months stop Plavix and switch aspirin 81 up to 325.  This is per our protocol.  His cholesterol numbers showed LDL was now down to 29.  I am going to decrease atorvastatin from 40 down to 20.  His triglycerides were elevated.  He did gain significant weight during COVID times and because of a knee problem.  So we talked about carbohydrates, alcohol, etc.  He was hypothyroid.  His family doctor raised the Synthroid dose in December, but I do not see a followup, so I will ask him to return to his family doctor; that may partially contribute to his lipids.    He is contemplating knee surgery and I told him to let us know.  We would  want to stop Plavix and aspirin probably 5 days in advance.  We would certainly notify the orthopedic surgeon of the potential for a thrombus problem or hypercoagulable problem even though we do not clearly identify once since it may affect what they prescribe post-surgery.  He has an implantable loop recorder - nothing was found so far.  Previously bradycardia was noted and it was recommended he see a sleep doctor.  The bradycardia occurred during sleep time and because of the borderline right heart enlargement, we suggest he see a sleep doctor to make sure his mask is working correctly.  We obviously encouraged diet, exercise and weight loss.  Our standard is to get another echo, as I said 6 months from now, and then he will probably need echos every few years to watch his aortic root size, but because of the device implanted, the loop recorder, he will have to have yearly followups with that.    Today's visit was 43 minutes.    Chi Alvarenga MD    cc:  Chava Collado, [facility]  [id number] [place] [id number]    Chi Alvarenga MD        D: 2021   T: 2021   MT: al    Name:     RAFFY MELGAR  MRN:      [id number]        Account:      [id number]   :      1965           Service Date: 2021       Document: [id number]

## 2021-08-12 NOTE — LETTER
8/12/2021    Chava Collado DO  [facility] [address]  [facility] [address]    RE: Rey Andre       Dear Colleague,    I had the pleasure of seeing Rey Andre in [facility].    HPI and Plan:   See dictation    Orders Placed This Encounter   Procedures     Lipid Profile     Follow-Up with Cardiac Advanced Practice Provider     Echocardiogram Limited     Orders Placed This Encounter   Medications     atorvastatin (LIPITOR) 20 MG tablet     Sig: Take 1 tablet (20 mg) by mouth every evening     Dispense:  90 tablet     Refill:  3     metoprolol succinate ER (TOPROL-XL) 50 MG 24 hr tablet     Sig: Take 1 tablet (50 mg) by mouth daily     Dispense:  90 tablet     Refill:  3     Medications Discontinued During This Encounter   Medication Reason     atorvastatin (LIPITOR) 40 MG tablet Reorder         Encounter Diagnoses   Name Primary?     PFO (patent foramen ovale)      Cerebrovascular accident (CVA) due to embolism of left middle cerebral artery (H)      Hyperlipidemia LDL goal <70 Yes     Thoracic aortic aneurysm without rupture (H)        CURRENT MEDICATIONS:  Current Outpatient Medications   Medication Sig Dispense Refill     acetaminophen (TYLENOL) 325 MG tablet Take 325-650 mg by mouth 4 times daily as needed for mild pain       aspirin (ASA) 81 MG EC tablet Take 1 tablet (81 mg) by mouth daily       atorvastatin (LIPITOR) 20 MG tablet Take 1 tablet (20 mg) by mouth every evening 90 tablet 3     clopidogrel (PLAVIX) 75 MG tablet Take 75 mg by mouth daily       levothyroxine (SYNTHROID) 150 MCG tablet Take 150 mcg by mouth daily        metoprolol succinate ER (TOPROL-XL) 50 MG 24 hr tablet Take 1 tablet (50 mg) by mouth daily 90 tablet 3       ALLERGIES     Allergies   Allergen Reactions     No Known Drug Allergies        PAST MEDICAL HISTORY:  Past Medical History:   Diagnosis Date     Embolic stroke involving left middle cerebral  artery (H) 03/2020     Hyperlipidemia      Hypothyroidism      Kidney stone     Recurent stones     DAMARSI on CPAP      Palpitations 3/6/2020     PFO (patent foramen ovale)      Sinus of Valsalva aneurysm        PAST SURGICAL HISTORY:  Past Surgical History:   Procedure Laterality Date     CV PFO CLOSURE N/A 5/6/2021    Procedure: Patent Foramen Ovale Closure;  Surgeon: Chi Alvarenga MD;  Location: [facility] CARDIAC CATH LAB     CYSTOSCOPY       CYSTOSCOPY, RETROGRADES, COMBINED  5/24/2014    Procedure: COMBINED CYSTOSCOPY, RETROGRADES;  Surgeon: Francisco J Lerma MD;  Location:  OR     ENT SURGERY      T & A     EP LOOP RECORDER IMPLANT N/A 10/12/2020    Procedure: EP Loop Recorder Implant;  Surgeon: Lefty Ramires MD;  Location: [facility] CARDIAC CATH LAB     IR CAROTID CEREBRAL ANGIOGRAM LEFT  3/5/2020     KNEE SURGERY Right     arthroscopic     LASER HOLMIUM LITHOTRIPSY URETER(S), INSERT STENT, COMBINED  7/27/2012    Procedure: COMBINED CYSTOSCOPY, URETEROSCOPY, LASER HOLMIUM LITHOTRIPSY URETER(S), INSERT STENT;  Video cystoscopy, Right Retrograde Right Ureteroscopy with Holmium Laser, Stone Extraction,  JJ Stent Placement ;  Surgeon: Francisco J Lerma MD;  Location:  OR     LASER HOLMIUM LITHOTRIPSY URETER(S), INSERT STENT, COMBINED  5/24/2014    Procedure: COMBINED CYSTOSCOPY, URETEROSCOPY, LASER HOLMIUM LITHOTRIPSY URETER(S), INSERT STENT;  Surgeon: Francisco J Lerma MD;  Location:  OR     LASER HOLMIUM LITHOTRIPSY URETER(S), INSERT STENT, COMBINED Right 3/5/2017    Procedure: COMBINED CYSTOSCOPY, URETEROSCOPY, LASER HOLMIUM LITHOTRIPSY URETER(S), INSERT STENT;  Surgeon: Chris Barrios MD;  Location:  OR     ROTATOR CUFF REPAIR RT/LT         FAMILY HISTORY:  Family History   Problem Relation Age of Onset     Hyperlipidemia Mother         pulm emboli, IUD perforation and developed clot     Other - See Comments Father         Possible Autoimmune disorder , DVT, pulm  embolism     Prostate Cancer Father        SOCIAL HISTORY:  Social History     Socioeconomic History     Marital status:      Spouse name: None     Number of children: 3     Years of education: None     Highest education level: None   Occupational History     Occupation: Golf Pro   Tobacco Use     Smoking status: Never Smoker     Smokeless tobacco: Never Used   Substance and Sexual Activity     Alcohol use: No     Drug use: No     Sexual activity: None   Other Topics Concern     Parent/sibling w/ CABG, MI or angioplasty before 65F 55M? 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 (last updated 3/5/2020)      Social Determinants of Health     Financial Resource Strain:      Difficulty of Paying Living Expenses:    Food Insecurity:      Worried About Running Out of Food in the Last Year:      Ran Out of Food in the Last Year:    Transportation Needs:      Lack of Transportation (Medical):      Lack of Transportation (Non-Medical):    Physical Activity:      Days of Exercise per Week:      Minutes of Exercise per Session:    Stress:      Feeling of Stress :    Social Connections:      Frequency of Communication with Friends and Family:      Frequency of Social Gatherings with Friends and Family:      Attends Orthodox Services:      Active Member of Clubs or Organizations:      Attends Club or Organization Meetings:      Marital Status:    Intimate Partner Violence:      Fear of Current or Ex-Partner:      Emotionally Abused:      Physically Abused:      Sexually Abused:        Review of Systems:  Skin:  Positive for rash   Eyes:  Negative    ENT:  Negative    Respiratory:  Positive for sleep apnea;CPAP  Cardiovascular:  Negative    Gastroenterology: Negative    Genitourinary:  not assessed    Musculoskeletal:  Positive for    Neurologic:  Positive for headaches  Psychiatric:  Positive for sleep disturbances  Heme/Lymph/Imm:  Negative   "  Endocrine:  Positive for thyroid disorder    Physical Exam:  Vitals: /78   Pulse 84   Ht 1.803 m (5' 11\")   Wt 122.9 kg (271 lb)   BMI 37.80 kg/m      Constitutional:           Skin:             Head:           Eyes:           Lymph:      ENT:           Neck:           Respiratory:            Cardiac:                                                           GI:           Extremities and Muscular Skeletal:                 Neurological:           Psych:         Recent Lab Results:  LIPID RESULTS:  Lab Results   Component Value Date    CHOL 125 10/19/2020    HDL 45 10/19/2020    LDL 29 10/19/2020    TRIG 257 (H) 10/19/2020       LIVER ENZYME RESULTS:  Lab Results   Component Value Date    AST 13 03/05/2020    ALT 48 06/05/2020       CBC RESULTS:  Lab Results   Component Value Date    WBC 6.6 05/06/2021    RBC 4.68 05/06/2021    HGB 14.6 05/06/2021    HCT 43.1 05/06/2021    MCV 92 05/06/2021    MCH 31.2 05/06/2021    MCHC 33.9 05/06/2021    RDW 13.7 05/06/2021     05/06/2021       BMP RESULTS:  Lab Results   Component Value Date     05/06/2021    POTASSIUM 3.8 05/06/2021    CHLORIDE 113 (H) 05/06/2021    CO2 25 05/06/2021    ANIONGAP 5 05/06/2021    GLC 89 10/12/2020    BUN 18 10/12/2020    CR 0.84 05/06/2021    GFRESTIMATED >90 05/06/2021    GFRESTBLACK >90 05/06/2021    DAMARIS 8.4 (L) 10/12/2020        A1C RESULTS:  Lab Results   Component Value Date    A1C 5.5 03/05/2020       INR RESULTS:  Lab Results   Component Value Date    INR 0.95 05/06/2021    INR 1.02 03/05/2020           CC  Shira Diaz, APRN CNP  [address]  [place] [address]      Thank you for allowing me to participate in the care of your patient.      Sincerely,     Chi Alvarenga MD     [facility]  cc:   Shira Diaz, APRN CNP  [address]  [place] [address]        "

## 2021-08-12 NOTE — PROGRESS NOTES
HPI and Plan:   See dictation    Orders Placed This Encounter   Procedures     Lipid Profile     Follow-Up with Cardiac Advanced Practice Provider     Echocardiogram Limited     Orders Placed This Encounter   Medications     atorvastatin (LIPITOR) 20 MG tablet     Sig: Take 1 tablet (20 mg) by mouth every evening     Dispense:  90 tablet     Refill:  3     metoprolol succinate ER (TOPROL-XL) 50 MG 24 hr tablet     Sig: Take 1 tablet (50 mg) by mouth daily     Dispense:  90 tablet     Refill:  3     Medications Discontinued During This Encounter   Medication Reason     atorvastatin (LIPITOR) 40 MG tablet Reorder         Encounter Diagnoses   Name Primary?     PFO (patent foramen ovale)      Cerebrovascular accident (CVA) due to embolism of left middle cerebral artery (H)      Hyperlipidemia LDL goal <70 Yes     Thoracic aortic aneurysm without rupture (H)        CURRENT MEDICATIONS:  Current Outpatient Medications   Medication Sig Dispense Refill     acetaminophen (TYLENOL) 325 MG tablet Take 325-650 mg by mouth 4 times daily as needed for mild pain       aspirin (ASA) 81 MG EC tablet Take 1 tablet (81 mg) by mouth daily       atorvastatin (LIPITOR) 20 MG tablet Take 1 tablet (20 mg) by mouth every evening 90 tablet 3     clopidogrel (PLAVIX) 75 MG tablet Take 75 mg by mouth daily       levothyroxine (SYNTHROID) 150 MCG tablet Take 150 mcg by mouth daily        metoprolol succinate ER (TOPROL-XL) 50 MG 24 hr tablet Take 1 tablet (50 mg) by mouth daily 90 tablet 3       ALLERGIES     Allergies   Allergen Reactions     No Known Drug Allergies        PAST MEDICAL HISTORY:  Past Medical History:   Diagnosis Date     Embolic stroke involving left middle cerebral artery (H) 03/2020     Hyperlipidemia      Hypothyroidism      Kidney stone     Recurent stones     DAMARIS on CPAP      Palpitations 3/6/2020     PFO (patent foramen ovale)      Sinus of Valsalva aneurysm        PAST SURGICAL HISTORY:  Past Surgical History:    Procedure Laterality Date     CV PFO CLOSURE N/A 5/6/2021    Procedure: Patent Foramen Ovale Closure;  Surgeon: Chi Alvarenga MD;  Location:  HEART CARDIAC CATH LAB     CYSTOSCOPY       CYSTOSCOPY, RETROGRADES, COMBINED  5/24/2014    Procedure: COMBINED CYSTOSCOPY, RETROGRADES;  Surgeon: Francisco J Lerma MD;  Location:  OR     ENT SURGERY      T & A     EP LOOP RECORDER IMPLANT N/A 10/12/2020    Procedure: EP Loop Recorder Implant;  Surgeon: Lefty Ramires MD;  Location: [facility] CARDIAC CATH LAB     IR CAROTID CEREBRAL ANGIOGRAM LEFT  3/5/2020     KNEE SURGERY Right     arthroscopic     LASER HOLMIUM LITHOTRIPSY URETER(S), INSERT STENT, COMBINED  7/27/2012    Procedure: COMBINED CYSTOSCOPY, URETEROSCOPY, LASER HOLMIUM LITHOTRIPSY URETER(S), INSERT STENT;  Video cystoscopy, Right Retrograde Right Ureteroscopy with Holmium Laser, Stone Extraction,  JJ Stent Placement ;  Surgeon: Francisco J Lerma MD;  Location:  OR     LASER HOLMIUM LITHOTRIPSY URETER(S), INSERT STENT, COMBINED  5/24/2014    Procedure: COMBINED CYSTOSCOPY, URETEROSCOPY, LASER HOLMIUM LITHOTRIPSY URETER(S), INSERT STENT;  Surgeon: Francisco J Lerma MD;  Location: [facility] OR     LASER HOLMIUM LITHOTRIPSY URETER(S), INSERT STENT, COMBINED Right 3/5/2017    Procedure: COMBINED CYSTOSCOPY, URETEROSCOPY, LASER HOLMIUM LITHOTRIPSY URETER(S), INSERT STENT;  Surgeon: Chris Barrios MD;  Location: [facility] OR     ROTATOR CUFF REPAIR RT/LT         FAMILY HISTORY:  Family History   Problem Relation Age of Onset     Hyperlipidemia Mother         pulm emboli, IUD perforation and developed clot     Other - See Comments Father         Possible Autoimmune disorder , DVT, pulm embolism     Prostate Cancer Father        SOCIAL HISTORY:  Social History     Socioeconomic History     Marital status:      Spouse name: None     Number of children: 3     Years of education: None     Highest education level: None   Occupational  "History     Occupation: Golf Pro   Tobacco Use     Smoking status: Never Smoker     Smokeless tobacco: Never Used   Substance and Sexual Activity     Alcohol use: No     Drug use: No     Sexual activity: None   Other Topics Concern     Parent/sibling w/ CABG, MI or angioplasty before 65F 55M? 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 (last updated 3/5/2020)      Social Determinants of Health     Financial Resource Strain:      Difficulty of Paying Living Expenses:    Food Insecurity:      Worried About Running Out of Food in the Last Year:      Ran Out of Food in the Last Year:    Transportation Needs:      Lack of Transportation (Medical):      Lack of Transportation (Non-Medical):    Physical Activity:      Days of Exercise per Week:      Minutes of Exercise per Session:    Stress:      Feeling of Stress :    Social Connections:      Frequency of Communication with Friends and Family:      Frequency of Social Gatherings with Friends and Family:      Attends Buddhist Services:      Active Member of Clubs or Organizations:      Attends Club or Organization Meetings:      Marital Status:    Intimate Partner Violence:      Fear of Current or Ex-Partner:      Emotionally Abused:      Physically Abused:      Sexually Abused:        Review of Systems:  Skin:  Positive for rash   Eyes:  Negative    ENT:  Negative    Respiratory:  Positive for sleep apnea;CPAP  Cardiovascular:  Negative    Gastroenterology: Negative    Genitourinary:  not assessed    Musculoskeletal:  Positive for    Neurologic:  Positive for headaches  Psychiatric:  Positive for sleep disturbances  Heme/Lymph/Imm:  Negative    Endocrine:  Positive for thyroid disorder    Physical Exam:  Vitals: /78   Pulse 84   Ht 1.803 m (5' 11\")   Wt 122.9 kg (271 lb)   BMI 37.80 kg/m      Constitutional:           Skin:             Head:           Eyes:           Lymph:  "     ENT:           Neck:           Respiratory:            Cardiac:                                                           GI:           Extremities and Muscular Skeletal:                 Neurological:           Psych:         Recent Lab Results:  LIPID RESULTS:  Lab Results   Component Value Date    CHOL 125 10/19/2020    HDL 45 10/19/2020    LDL 29 10/19/2020    TRIG 257 (H) 10/19/2020       LIVER ENZYME RESULTS:  Lab Results   Component Value Date    AST 13 03/05/2020    ALT 48 06/05/2020       CBC RESULTS:  Lab Results   Component Value Date    WBC 6.6 05/06/2021    RBC 4.68 05/06/2021    HGB 14.6 05/06/2021    HCT 43.1 05/06/2021    MCV 92 05/06/2021    MCH 31.2 05/06/2021    MCHC 33.9 05/06/2021    RDW 13.7 05/06/2021     05/06/2021       BMP RESULTS:  Lab Results   Component Value Date     05/06/2021    POTASSIUM 3.8 05/06/2021    CHLORIDE 113 (H) 05/06/2021    CO2 25 05/06/2021    ANIONGAP 5 05/06/2021    GLC 89 10/12/2020    BUN 18 10/12/2020    CR 0.84 05/06/2021    GFRESTIMATED >90 05/06/2021    GFRESTBLACK >90 05/06/2021    DAMARIS 8.4 (L) 10/12/2020        A1C RESULTS:  Lab Results   Component Value Date    A1C 5.5 03/05/2020       INR RESULTS:  Lab Results   Component Value Date    INR 0.95 05/06/2021    INR 1.02 03/05/2020           CC  Shira Diaz, APRN CNP  [address]  [place] [address]

## 2021-08-26 LAB
MDC_IDC_MSMT_BATTERY_STATUS: NORMAL
MDC_IDC_PG_IMPLANT_DTM: NORMAL
MDC_IDC_PG_MFG: NORMAL
MDC_IDC_PG_MODEL: NORMAL
MDC_IDC_PG_SERIAL: NORMAL
MDC_IDC_PG_TYPE: NORMAL
MDC_IDC_SESS_CLINIC_NAME: NORMAL
MDC_IDC_SESS_DTM: NORMAL
MDC_IDC_SESS_TYPE: NORMAL
MDC_IDC_SET_ZONE_DETECTION_INTERVAL: 2000 MS
MDC_IDC_SET_ZONE_DETECTION_INTERVAL: 3000 MS
MDC_IDC_SET_ZONE_DETECTION_INTERVAL: 340 MS
MDC_IDC_SET_ZONE_TYPE: NORMAL
MDC_IDC_STAT_AT_BURDEN_PERCENT: 0 %
MDC_IDC_STAT_AT_DTM_END: NORMAL
MDC_IDC_STAT_AT_DTM_START: NORMAL
MDC_IDC_STAT_EPISODE_RECENT_COUNT: 0
MDC_IDC_STAT_EPISODE_RECENT_COUNT_DTM_END: NORMAL
MDC_IDC_STAT_EPISODE_RECENT_COUNT_DTM_START: NORMAL
MDC_IDC_STAT_EPISODE_TOTAL_COUNT: 0
MDC_IDC_STAT_EPISODE_TOTAL_COUNT: 4
MDC_IDC_STAT_EPISODE_TOTAL_COUNT_DTM_END: NORMAL
MDC_IDC_STAT_EPISODE_TOTAL_COUNT_DTM_START: NORMAL
MDC_IDC_STAT_EPISODE_TYPE: NORMAL

## 2021-09-19 ENCOUNTER — HEALTH MAINTENANCE LETTER (OUTPATIENT)
Age: 56
End: 2021-09-19

## 2021-10-19 ENCOUNTER — TELEPHONE (OUTPATIENT)
Dept: CARE COORDINATION | Facility: CLINIC | Age: 56
End: 2021-10-19
Payer: COMMERCIAL

## 2021-10-21 NOTE — NURSING NOTE
Patient wondering about medications and how long he will be on them for. Also wondering what caused the stroke and if no answer are there any other tests that can be completed to determine the cause.   
Yes

## 2021-10-25 DIAGNOSIS — Z11.59 ENCOUNTER FOR SCREENING FOR OTHER VIRAL DISEASES: ICD-10-CM

## 2021-11-05 NOTE — TELEPHONE ENCOUNTER
RNCC chart review. Pt is anticipated to be done with DAPT in December. Should try to schedule him in December rather than delaying appointment so that we can discuss our medication recommendations after DAPT. Also note, patient is scheduled for knee surgery on 11/15. Looks like cardiology is aware and has their recommendations in their note for his plavix and asa. Please reach out to Dr. Stewart if there are any concerns from a stroke provider aspect. Lipid profile ordered by Dr. Alvarenga for February 2022 likely for pt's next follow-up.    Gwen Alvarado BS, RN, SCRN  RN Stroke Neurology Care Coordinator  [facility] Service Line

## 2021-11-10 ENCOUNTER — ANCILLARY PROCEDURE (OUTPATIENT)
Dept: CARDIOLOGY | Facility: CLINIC | Age: 56
End: 2021-11-10
Attending: INTERNAL MEDICINE
Payer: COMMERCIAL

## 2021-11-10 DIAGNOSIS — I63.9 CRYPTOGENIC STROKE (H): ICD-10-CM

## 2021-11-10 DIAGNOSIS — Z45.09 ENCOUNTER FOR LOOP RECORDER CHECK: ICD-10-CM

## 2021-11-10 PROCEDURE — 93297 REM INTERROG DEV EVAL ICPMS: CPT | Performed by: INTERNAL MEDICINE

## 2021-11-10 PROCEDURE — G2066 INTER DEVC REMOTE 30D: HCPCS | Performed by: INTERNAL MEDICINE

## 2021-11-12 ENCOUNTER — LAB (OUTPATIENT)
Dept: LAB | Facility: CLINIC | Age: 56
End: 2021-11-12
Attending: ORTHOPAEDIC SURGERY
Payer: COMMERCIAL

## 2021-11-12 DIAGNOSIS — Z11.59 ENCOUNTER FOR SCREENING FOR OTHER VIRAL DISEASES: ICD-10-CM

## 2021-11-12 PROCEDURE — U0003 INFECTIOUS AGENT DETECTION BY NUCLEIC ACID (DNA OR RNA); SEVERE ACUTE RESPIRATORY SYNDROME CORONAVIRUS 2 (SARS-COV-2) (CORONAVIRUS DISEASE [COVID-19]), AMPLIFIED PROBE TECHNIQUE, MAKING USE OF HIGH THROUGHPUT TECHNOLOGIES AS DESCRIBED BY CMS-2020-01-R: HCPCS

## 2021-11-13 LAB — SARS-COV-2 RNA RESP QL NAA+PROBE: NEGATIVE

## 2021-11-15 DIAGNOSIS — Z11.59 ENCOUNTER FOR SCREENING FOR OTHER VIRAL DISEASES: ICD-10-CM

## 2021-11-19 ENCOUNTER — IMMUNIZATION (OUTPATIENT)
Dept: NURSING | Facility: CLINIC | Age: 56
End: 2021-11-19
Payer: COMMERCIAL

## 2021-11-19 PROCEDURE — 91300 PR COVID VAC PFIZER DIL RECON 30 MCG/0.3 ML IM: CPT

## 2021-11-19 PROCEDURE — 0004A PR COVID VAC PFIZER DIL RECON 30 MCG/0.3 ML IM: CPT

## 2021-11-30 ENCOUNTER — VIRTUAL VISIT (OUTPATIENT)
Dept: NEUROLOGY | Facility: CLINIC | Age: 56
End: 2021-11-30
Payer: COMMERCIAL

## 2021-11-30 DIAGNOSIS — G47.33 OBSTRUCTIVE SLEEP APNEA SYNDROME: Primary | ICD-10-CM

## 2021-11-30 DIAGNOSIS — I63.412 STROKE DUE TO EMBOLISM OF LEFT MIDDLE CEREBRAL ARTERY (H): ICD-10-CM

## 2021-11-30 PROCEDURE — 99215 OFFICE O/P EST HI 40 MIN: CPT | Mod: GT | Performed by: PHYSICIAN ASSISTANT

## 2021-11-30 NOTE — PROGRESS NOTES
Niesha is a 56 year old who is being evaluated via a billable video visit.      How would you like to obtain your AVS? MyChart  If the video visit is dropped, the invitation should be resent by: Send to e-mail at: [email or web address]  Will anyone else be joining your video visit? No      Video Start Time: 8:00 AM  Video-Visit Details    Type of service:  Video Visit    Video End Time:8:30 AM    Originating Location (pt. Location): Home    Distant Location (provider location):  [facility]     Platform used for Video Visit: [employer]         __________________________________________________________      [facility]    Virtual Clinic - [phone]  __________________________________________________________    Chief Complaint: Patient presents with:  Stroke      History of Present Illness: Rey Andre is a 56 year old male presenting for follow-up for stroke.     He presented to the hospital in March 2020, with a history of obstructive sleep apnea and hypothyroidism who presented with aphasia and right-sided weakness, with significant deficits.  And he was enrolled in the TIMELESS trial (tenecteplase or placebo for patients with large vessel occlusion presenting outside of conventional tPA window) and then underwent successful endovascular thrombectomy for L M1 clot.     He was found to have a PFO that was closed last year. Since last visit with me, he had LDL recheck which came back at 74 on 11/2/21.  Still has ILR in place.  He has DAMARIS and uses CPAP and reports he's due for a recheck. He has HTN but reports BP is well controlled-- BP usually 120/75 for instance.  Has been gaining weight.    He has an upcoming R knee replacement 7 days from today.    Modified [place] Scale  Score: 0-No symptoms    Impression:   Problem List Items Addressed This Visit        Nervous and Auditory    Stroke due to embolism of left middle cerebral artery (H)      Other Visit Diagnoses   "   Obstructive sleep apnea syndrome    -  Primary    Relevant Orders    SLEEP EVALUATION & MANAGEMENT REFERRAL - ADULT -        56M with ESUS (L MCA) in 3/2020, enrolled in TIMELESS trial at that time and underwent successful thrombectomy. Etiology remains ESUS, but could have been PFO-associated stroke, and his PFO is now closed. Ongoing monitoring for afib with ILR.    Plan:   - Continue aspirin 325mg daily based on his weight of >70kg--- resume as soon as ok with surgeon after knee surgery next week  - Continue statin for goal LDL 40-70  - Continue good long term BP control <135/80  - Will refer for sleep specialist- he reports he wants to transfer care of this to [facility]  - Follow up in 12 months      mEi Soliz PA-C  Neurology  11/30/2021 7:57 AM  To page me or covering stroke neurology team member, click here: AMCOM  Choose \"On Call\" tab at top, then search dropdown box for \"Neurology Adult\" & press Enter, look for Neuro ICU/Stroke    ___________________________________________________________________    Current Medications  Current Outpatient Medications   Medication Sig     acetaminophen (TYLENOL) 325 MG tablet Take 325-650 mg by mouth 4 times daily as needed for mild pain     aspirin (ASA) 81 MG EC tablet Take 1 tablet (81 mg) by mouth daily     atorvastatin (LIPITOR) 20 MG tablet Take 1 tablet (20 mg) by mouth every evening     levothyroxine (SYNTHROID) 150 MCG tablet Take 150 mcg by mouth daily      metoprolol succinate ER (TOPROL-XL) 50 MG 24 hr tablet Take 1 tablet (50 mg) by mouth daily     clopidogrel (PLAVIX) 75 MG tablet Take 75 mg by mouth daily (Patient not taking: Reported on 11/30/2021)     No current facility-administered medications for this visit.       Past Medical History  Past Medical History:   Diagnosis Date     Embolic stroke involving left middle cerebral artery (H) 03/2020     Hyperlipidemia      Hypothyroidism      Kidney stone     Recurent stones     Obese      DAMARIS on CPAP      " Palpitations 3/6/2020     Paroxysmal atrial fibrillation (H)      PFO (patent foramen ovale)      Sinus of Valsalva aneurysm        Social History  Social History     Tobacco Use     Smoking status: Never Smoker     Smokeless tobacco: Never Used   Substance Use Topics     Alcohol use: Yes     Comment: occassional     Drug use: No       Family History  Family History   Problem Relation Age of Onset     Hyperlipidemia Mother         pulm emboli, IUD perforation and developed clot     Other - See Comments Father         Possible Autoimmune disorder , DVT, pulm embolism     Prostate Cancer Father        Physical Exam    Vitals - Patient Reported 7/7/2020 11/30/2021   Weight (Patient Reported) 250 lb 275 lb   Systolic (Patient Reported) - 130   Diastolic (Patient Reported) - 76               General:  no acute distress  HEENT:  normocephalic/atraumatic  Pulmonary:  no respiratory distress    Neurologic  Mental Status:  alert, oriented, speech clear and fluent  Cranial Nerves:  EOMI with normal smooth pursuit, facial movements symmetric, hearing not formally tested but intact to conversation, no dysarthria  Motor:  no abnormal movements, not tested in detail today  Reflexes:  unable to test (telestroke)  Sensory:  unable to test (telestroke)  Coordination:  deferred  Station/Gait:  unable to test (telestroke)    Neuroimaging: as per HPI. I personally reviewed those images    Labs:    Coagulation studies:  Recent Labs   Lab Test 05/06/21  0740 03/05/20  0731   INR 0.95 1.02        Lipid panel:  Recent Labs   Lab Test 10/19/20  1528 06/05/20  0725   CHOL 125 105   HDL 45 41   LDL 29 43   TRIG 257* 106       HbA1C:  Recent Labs   Lab Test 03/05/20  1444   A1C 5.5       Troponin:  Recent Labs   Lab Test 03/05/20  2149 03/05/20  1444   TROPI <0.015 <0.015         Billing:    I spent a total of 50 minutes on the day of the visit.   Time spent doing chart review, history and exam, documentation and further activities per the  note

## 2021-11-30 NOTE — LETTER
11/30/2021         RE: Rey Andre  [id number]  [facility] [id number]        Dear Colleague,    Thank you for referring your patient, Rey Andre, to the [facility] NEUROLOGY [facility]. Please see a copy of my visit note below.    Niesha is a 56 year old who is being evaluated via a billable video visit.      How would you like to obtain your AVS? MyChart  If the video visit is dropped, the invitation should be resent by: Send to e-mail at: [email or web address]  Will anyone else be joining your video visit? No      Video Start Time: 8:00 AM  Video-Visit Details    Type of service:  Video Visit    Video End Time:8:30 AM    Originating Location (pt. Location): Home    Distant Location (provider location):  [facility] NEUROLOGY [facility]     Platform used for Video Visit: EveryScape         __________________________________________________________      [facility]    Virtual Clinic - [phone]  __________________________________________________________    Chief Complaint: Patient presents with:  Stroke      History of Present Illness: Rey Andre is a 56 year old male presenting for follow-up for stroke.     He presented to the hospital in March 2020, with a history of obstructive sleep apnea and hypothyroidism who presented with aphasia and right-sided weakness, with significant deficits.  And he was enrolled in the TIMELESS trial (tenecteplase or placebo for patients with large vessel occlusion presenting outside of conventional tPA window) and then underwent successful endovascular thrombectomy for L M1 clot.     He was found to have a PFO that was closed last year. Since last visit with me, he had LDL recheck which came back at 74 on 11/2/21.  Still has ILR in place.  He has DAMARIS and uses CPAP and reports he's due for a recheck. He has HTN but reports BP is well controlled-- BP usually 120/75 for instance.  Has been gaining weight.    He has an upcoming  "R knee replacement 7 days from today.    Modified Ramseur Scale  Score: 0-No symptoms    Impression:   Problem List Items Addressed This Visit        Nervous and Auditory    Stroke due to embolism of left middle cerebral artery (H)      Other Visit Diagnoses     Obstructive sleep apnea syndrome    -  Primary    Relevant Orders    SLEEP EVALUATION & MANAGEMENT REFERRAL - ADULT -        56M with ESUS (L MCA) in 3/2020, enrolled in TIMELESS trial at that time and underwent successful thrombectomy. Etiology remains ESUS, but could have been PFO-associated stroke, and his PFO is now closed. Ongoing monitoring for afib with ILR.    Plan:   - Continue aspirin 325mg daily based on his weight of >70kg--- resume as soon as ok with surgeon after knee surgery next week  - Continue statin for goal LDL 40-70  - Continue good long term BP control <135/80  - Will refer for sleep specialist- he reports he wants to transfer care of this to [facility]  - Follow up in 12 months      Emi Soliz PA-C  Neurology  11/30/2021 7:57 AM  To page me or covering stroke neurology team member, click here: AMCOM  Choose \"On Call\" tab at top, then search dropdown box for \"Neurology Adult\" & press Enter, look for Neuro ICU/Stroke    ___________________________________________________________________    Current Medications  Current Outpatient Medications   Medication Sig     acetaminophen (TYLENOL) 325 MG tablet Take 325-650 mg by mouth 4 times daily as needed for mild pain     aspirin (ASA) 81 MG EC tablet Take 1 tablet (81 mg) by mouth daily     atorvastatin (LIPITOR) 20 MG tablet Take 1 tablet (20 mg) by mouth every evening     levothyroxine (SYNTHROID) 150 MCG tablet Take 150 mcg by mouth daily      metoprolol succinate ER (TOPROL-XL) 50 MG 24 hr tablet Take 1 tablet (50 mg) by mouth daily     clopidogrel (PLAVIX) 75 MG tablet Take 75 mg by mouth daily (Patient not taking: Reported on 11/30/2021)     No current facility-administered medications for " this visit.       Past Medical History  Past Medical History:   Diagnosis Date     Embolic stroke involving left middle cerebral artery (H) 03/2020     Hyperlipidemia      Hypothyroidism      Kidney stone     Recurent stones     Obese      DAMARIS on CPAP      Palpitations 3/6/2020     Paroxysmal atrial fibrillation (H)      PFO (patent foramen ovale)      Sinus of Valsalva aneurysm        Social History  Social History     Tobacco Use     Smoking status: Never Smoker     Smokeless tobacco: Never Used   Substance Use Topics     Alcohol use: Yes     Comment: occassional     Drug use: No       Family History  Family History   Problem Relation Age of Onset     Hyperlipidemia Mother         pulm emboli, IUD perforation and developed clot     Other - See Comments Father         Possible Autoimmune disorder , DVT, pulm embolism     Prostate Cancer Father        Physical Exam    Vitals - Patient Reported 7/7/2020 11/30/2021   Weight (Patient Reported) 250 lb 275 lb   Systolic (Patient Reported) - 130   Diastolic (Patient Reported) - 76               General:  no acute distress  HEENT:  normocephalic/atraumatic  Pulmonary:  no respiratory distress    Neurologic  Mental Status:  alert, oriented, speech clear and fluent  Cranial Nerves:  EOMI with normal smooth pursuit, facial movements symmetric, hearing not formally tested but intact to conversation, no dysarthria  Motor:  no abnormal movements, not tested in detail today  Reflexes:  unable to test (telestroke)  Sensory:  unable to test (telestroke)  Coordination:  deferred  Station/Gait:  unable to test (telestroke)    Neuroimaging: as per HPI. I personally reviewed those images    Labs:    Coagulation studies:  Recent Labs   Lab Test 05/06/21  0740 03/05/20  0731   INR 0.95 1.02        Lipid panel:  Recent Labs   Lab Test 10/19/20  1528 06/05/20  0725   CHOL 125 105   HDL 45 41   LDL 29 43   TRIG 257* 106       HbA1C:  Recent Labs   Lab Test 03/05/20  1444   A1C 5.5        Troponin:  Recent Labs   Lab Test 03/05/20 2149 03/05/20  1444   TROPI <0.015 <0.015         Billing:    I spent a total of 50 minutes on the day of the visit.   Time spent doing chart review, history and exam, documentation and further activities per the note          Again, thank you for allowing me to participate in the care of your patient.        Sincerely,        Emi Soliz PA-C

## 2021-12-03 ENCOUNTER — LAB (OUTPATIENT)
Dept: LAB | Facility: CLINIC | Age: 56
End: 2021-12-03
Attending: ORTHOPAEDIC SURGERY
Payer: COMMERCIAL

## 2021-12-03 DIAGNOSIS — Z11.59 ENCOUNTER FOR SCREENING FOR OTHER VIRAL DISEASES: ICD-10-CM

## 2021-12-03 PROCEDURE — U0003 INFECTIOUS AGENT DETECTION BY NUCLEIC ACID (DNA OR RNA); SEVERE ACUTE RESPIRATORY SYNDROME CORONAVIRUS 2 (SARS-COV-2) (CORONAVIRUS DISEASE [COVID-19]), AMPLIFIED PROBE TECHNIQUE, MAKING USE OF HIGH THROUGHPUT TECHNOLOGIES AS DESCRIBED BY CMS-2020-01-R: HCPCS

## 2021-12-03 PROCEDURE — U0005 INFEC AGEN DETEC AMPLI PROBE: HCPCS

## 2021-12-04 LAB — SARS-COV-2 RNA RESP QL NAA+PROBE: NEGATIVE

## 2021-12-06 NOTE — PROGRESS NOTES
PTA medications updated by Medication Scribe prior to surgery via phone call with patient (last doses completed by Nurse)     Medication history sources: Patient, Surescripts and H&P  In the past week, patient estimated taking medication this percent of the time: Greater than 90%  Adherence assessment: N/A Not Observed    Significant changes made to the medication list:  None      Additional medication history information:   None    Medication reconciliation completed by provider prior to medication history? No    Time spent in this activity: 25 MINUTES    The information provided in this note is only as accurate as the sources available at the time of update(s)      Prior to Admission medications    Medication Sig Last Dose Taking? Auth Provider   acetaminophen (TYLENOL) 325 MG tablet Take 325-650 mg by mouth 4 times daily as needed for mild pain  at PRN Yes Reported, Patient   aspirin (ASA) 81 MG EC tablet Take 1 tablet (81 mg) by mouth daily  at AM Yes Shira Diaz, APRN CNP   atorvastatin (LIPITOR) 40 MG tablet Take 20 mg by mouth every evening (40MG X 0.5 = 20MG)  at PM Yes Chi Alvarenga MD   levothyroxine (SYNTHROID) 150 MCG tablet Take 150 mcg by mouth daily   at AM Yes Unknown, Entered By History   metoprolol succinate ER (TOPROL-XL) 50 MG 24 hr tablet Take 1 tablet (50 mg) by mouth daily  at AM Yes Chi Alvarenga MD

## 2021-12-07 ENCOUNTER — HOSPITAL ENCOUNTER (OUTPATIENT)
Facility: CLINIC | Age: 56
Discharge: HOME | End: 2021-12-08
Attending: ORTHOPAEDIC SURGERY | Admitting: ORTHOPAEDIC SURGERY
Payer: COMMERCIAL

## 2021-12-07 ENCOUNTER — ANESTHESIA EVENT (OUTPATIENT)
Dept: SURGERY | Facility: CLINIC | Age: 56
End: 2021-12-07
Payer: COMMERCIAL

## 2021-12-07 ENCOUNTER — ANESTHESIA (OUTPATIENT)
Dept: SURGERY | Facility: CLINIC | Age: 56
End: 2021-12-07
Payer: COMMERCIAL

## 2021-12-07 DIAGNOSIS — Z96.651 S/P TOTAL KNEE ARTHROPLASTY, RIGHT: Primary | ICD-10-CM

## 2021-12-07 PROBLEM — Z96.659 S/P TOTAL KNEE ARTHROPLASTY: Status: ACTIVE | Noted: 2021-12-07

## 2021-12-07 LAB
HGB BLD-MCNC: 15.2 G/DL (ref 13.3–17.7)
MDC_IDC_MSMT_BATTERY_STATUS: NORMAL
MDC_IDC_PG_IMPLANT_DTM: NORMAL
MDC_IDC_PG_MFG: NORMAL
MDC_IDC_PG_MODEL: NORMAL
MDC_IDC_PG_SERIAL: NORMAL
MDC_IDC_PG_TYPE: NORMAL
MDC_IDC_SESS_CLINIC_NAME: NORMAL
MDC_IDC_SESS_DTM: NORMAL
MDC_IDC_SESS_TYPE: NORMAL
MDC_IDC_SET_ZONE_DETECTION_INTERVAL: 2000 MS
MDC_IDC_SET_ZONE_DETECTION_INTERVAL: 3000 MS
MDC_IDC_SET_ZONE_DETECTION_INTERVAL: 340 MS
MDC_IDC_SET_ZONE_TYPE: NORMAL
MDC_IDC_STAT_AT_BURDEN_PERCENT: 0 %
MDC_IDC_STAT_AT_DTM_END: NORMAL
MDC_IDC_STAT_AT_DTM_START: NORMAL
MDC_IDC_STAT_EPISODE_RECENT_COUNT: 0
MDC_IDC_STAT_EPISODE_RECENT_COUNT_DTM_END: NORMAL
MDC_IDC_STAT_EPISODE_RECENT_COUNT_DTM_START: NORMAL
MDC_IDC_STAT_EPISODE_TOTAL_COUNT: 0
MDC_IDC_STAT_EPISODE_TOTAL_COUNT: 4
MDC_IDC_STAT_EPISODE_TOTAL_COUNT_DTM_END: NORMAL
MDC_IDC_STAT_EPISODE_TOTAL_COUNT_DTM_START: NORMAL
MDC_IDC_STAT_EPISODE_TYPE: NORMAL

## 2021-12-07 PROCEDURE — 710N000010 HC RECOVERY PHASE 1, LEVEL 2, PER MIN: Performed by: ORTHOPAEDIC SURGERY

## 2021-12-07 PROCEDURE — 370N000017 HC ANESTHESIA TECHNICAL FEE, PER MIN: Performed by: ORTHOPAEDIC SURGERY

## 2021-12-07 PROCEDURE — 250N000013 HC RX MED GY IP 250 OP 250 PS 637

## 2021-12-07 PROCEDURE — 36415 COLL VENOUS BLD VENIPUNCTURE: CPT | Performed by: ANESTHESIOLOGY

## 2021-12-07 PROCEDURE — 278N000051 HC OR IMPLANT GENERAL: Performed by: ORTHOPAEDIC SURGERY

## 2021-12-07 PROCEDURE — 999N000141 HC STATISTIC PRE-PROCEDURE NURSING ASSESSMENT: Performed by: ORTHOPAEDIC SURGERY

## 2021-12-07 PROCEDURE — 85018 HEMOGLOBIN: CPT | Performed by: ANESTHESIOLOGY

## 2021-12-07 PROCEDURE — 250N000013 HC RX MED GY IP 250 OP 250 PS 637: Performed by: ANESTHESIOLOGY

## 2021-12-07 PROCEDURE — 258N000003 HC RX IP 258 OP 636: Performed by: ANESTHESIOLOGY

## 2021-12-07 PROCEDURE — 258N000003 HC RX IP 258 OP 636: Performed by: NURSE ANESTHETIST, CERTIFIED REGISTERED

## 2021-12-07 PROCEDURE — 250N000011 HC RX IP 250 OP 636

## 2021-12-07 PROCEDURE — 250N000011 HC RX IP 250 OP 636: Performed by: NURSE ANESTHETIST, CERTIFIED REGISTERED

## 2021-12-07 PROCEDURE — 360N000077 HC SURGERY LEVEL 4, PER MIN: Performed by: ORTHOPAEDIC SURGERY

## 2021-12-07 PROCEDURE — 272N000002 HC OR SUPPLY OTHER OPNP: Performed by: ORTHOPAEDIC SURGERY

## 2021-12-07 PROCEDURE — 250N000009 HC RX 250: Performed by: ANESTHESIOLOGY

## 2021-12-07 PROCEDURE — C1776 JOINT DEVICE (IMPLANTABLE): HCPCS | Performed by: ORTHOPAEDIC SURGERY

## 2021-12-07 PROCEDURE — 250N000011 HC RX IP 250 OP 636: Performed by: ANESTHESIOLOGY

## 2021-12-07 PROCEDURE — 250N000009 HC RX 250: Performed by: NURSE ANESTHETIST, CERTIFIED REGISTERED

## 2021-12-07 PROCEDURE — 250N000009 HC RX 250: Performed by: ORTHOPAEDIC SURGERY

## 2021-12-07 PROCEDURE — 258N000003 HC RX IP 258 OP 636

## 2021-12-07 PROCEDURE — 272N000001 HC OR GENERAL SUPPLY STERILE: Performed by: ORTHOPAEDIC SURGERY

## 2021-12-07 DEVICE — ATTUNE KNEE SYSTEM FEMORAL POSTERIOR STABILIZED SIZE 6 RIGHT CEMENTED
Type: IMPLANTABLE DEVICE | Site: KNEE | Status: FUNCTIONAL
Brand: ATTUNE

## 2021-12-07 DEVICE — ATTUNE KNEE SYSTEM TIBIAL INSERT FIXED BEARING POSTERIOR STABILIZED 6 6MM AOX
Type: IMPLANTABLE DEVICE | Site: KNEE | Status: FUNCTIONAL
Brand: ATTUNE

## 2021-12-07 DEVICE — BONE CEMENT STRK SIMPLEX P SPEEDSET 6192-1-001: Type: IMPLANTABLE DEVICE | Site: KNEE | Status: FUNCTIONAL

## 2021-12-07 DEVICE — ATTUNE KNEE SYSTEM TIBIAL BASE FIXED BEARING SIZE 6 CEMENTED
Type: IMPLANTABLE DEVICE | Site: KNEE | Status: FUNCTIONAL
Brand: ATTUNE

## 2021-12-07 DEVICE — ATTUNE PATELLA MEDIALIZED DOME 41MM CEMENTED AOX
Type: IMPLANTABLE DEVICE | Site: KNEE | Status: FUNCTIONAL
Brand: ATTUNE

## 2021-12-07 RX ORDER — METOPROLOL SUCCINATE 50 MG/1
50 TABLET, EXTENDED RELEASE ORAL DAILY
Status: DISCONTINUED | OUTPATIENT
Start: 2021-12-08 | End: 2021-12-08 | Stop reason: HOSPADM

## 2021-12-07 RX ORDER — LEVOTHYROXINE SODIUM 75 UG/1
150 TABLET ORAL DAILY
Status: DISCONTINUED | OUTPATIENT
Start: 2021-12-08 | End: 2021-12-08 | Stop reason: HOSPADM

## 2021-12-07 RX ORDER — HYDROMORPHONE HCL IN WATER/PF 6 MG/30 ML
0.2 PATIENT CONTROLLED ANALGESIA SYRINGE INTRAVENOUS EVERY 5 MIN PRN
Status: DISCONTINUED | OUTPATIENT
Start: 2021-12-07 | End: 2021-12-07 | Stop reason: HOSPADM

## 2021-12-07 RX ORDER — SODIUM CHLORIDE, SODIUM LACTATE, POTASSIUM CHLORIDE, CALCIUM CHLORIDE 600; 310; 30; 20 MG/100ML; MG/100ML; MG/100ML; MG/100ML
INJECTION, SOLUTION INTRAVENOUS CONTINUOUS
Status: DISCONTINUED | OUTPATIENT
Start: 2021-12-07 | End: 2021-12-07 | Stop reason: HOSPADM

## 2021-12-07 RX ORDER — METOPROLOL SUCCINATE 25 MG/1
25 TABLET, EXTENDED RELEASE ORAL DAILY
Status: DISCONTINUED | OUTPATIENT
Start: 2021-12-07 | End: 2021-12-07 | Stop reason: HOSPADM

## 2021-12-07 RX ORDER — ACETAMINOPHEN 325 MG/1
650 TABLET ORAL EVERY 4 HOURS PRN
Status: DISCONTINUED | OUTPATIENT
Start: 2021-12-10 | End: 2021-12-08 | Stop reason: HOSPADM

## 2021-12-07 RX ORDER — POLYETHYLENE GLYCOL 3350 17 G/17G
17 POWDER, FOR SOLUTION ORAL DAILY
Status: DISCONTINUED | OUTPATIENT
Start: 2021-12-08 | End: 2021-12-08 | Stop reason: HOSPADM

## 2021-12-07 RX ORDER — ONDANSETRON 2 MG/ML
INJECTION INTRAMUSCULAR; INTRAVENOUS PRN
Status: DISCONTINUED | OUTPATIENT
Start: 2021-12-07 | End: 2021-12-07

## 2021-12-07 RX ORDER — OXYCODONE HYDROCHLORIDE 5 MG/1
5 TABLET ORAL EVERY 4 HOURS PRN
Status: DISCONTINUED | OUTPATIENT
Start: 2021-12-07 | End: 2021-12-07 | Stop reason: HOSPADM

## 2021-12-07 RX ORDER — PROPOFOL 10 MG/ML
INJECTION, EMULSION INTRAVENOUS CONTINUOUS PRN
Status: DISCONTINUED | OUTPATIENT
Start: 2021-12-07 | End: 2021-12-07

## 2021-12-07 RX ORDER — ACETAMINOPHEN 325 MG/1
650 TABLET ORAL EVERY 4 HOURS PRN
Qty: 100 TABLET | Refills: 0 | Status: ON HOLD | OUTPATIENT
Start: 2021-12-07 | End: 2023-09-26

## 2021-12-07 RX ORDER — FENTANYL CITRATE 50 UG/ML
25 INJECTION, SOLUTION INTRAMUSCULAR; INTRAVENOUS EVERY 5 MIN PRN
Status: DISCONTINUED | OUTPATIENT
Start: 2021-12-07 | End: 2021-12-07 | Stop reason: HOSPADM

## 2021-12-07 RX ORDER — ACETAMINOPHEN 325 MG/1
975 TABLET ORAL ONCE
Status: COMPLETED | OUTPATIENT
Start: 2021-12-07 | End: 2021-12-07

## 2021-12-07 RX ORDER — SODIUM CHLORIDE, SODIUM LACTATE, POTASSIUM CHLORIDE, CALCIUM CHLORIDE 600; 310; 30; 20 MG/100ML; MG/100ML; MG/100ML; MG/100ML
INJECTION, SOLUTION INTRAVENOUS CONTINUOUS PRN
Status: DISCONTINUED | OUTPATIENT
Start: 2021-12-07 | End: 2021-12-07

## 2021-12-07 RX ORDER — NALOXONE HYDROCHLORIDE 0.4 MG/ML
0.4 INJECTION, SOLUTION INTRAMUSCULAR; INTRAVENOUS; SUBCUTANEOUS
Status: DISCONTINUED | OUTPATIENT
Start: 2021-12-07 | End: 2021-12-08 | Stop reason: HOSPADM

## 2021-12-07 RX ORDER — CEFAZOLIN SODIUM IN 0.9 % NACL 3 G/100 ML
3 INTRAVENOUS SOLUTION, PIGGYBACK (ML) INTRAVENOUS
Status: COMPLETED | OUTPATIENT
Start: 2021-12-07 | End: 2021-12-07

## 2021-12-07 RX ORDER — HYDROXYZINE HYDROCHLORIDE 25 MG/1
25 TABLET, FILM COATED ORAL EVERY 6 HOURS PRN
Qty: 40 TABLET | Refills: 0 | Status: SHIPPED | OUTPATIENT
Start: 2021-12-07 | End: 2022-05-03

## 2021-12-07 RX ORDER — LIDOCAINE HYDROCHLORIDE 20 MG/ML
INJECTION, SOLUTION INFILTRATION; PERINEURAL PRN
Status: DISCONTINUED | OUTPATIENT
Start: 2021-12-07 | End: 2021-12-07

## 2021-12-07 RX ORDER — OXYCODONE HYDROCHLORIDE 5 MG/1
5 TABLET ORAL
Status: DISCONTINUED | OUTPATIENT
Start: 2021-12-07 | End: 2021-12-08 | Stop reason: HOSPADM

## 2021-12-07 RX ORDER — ONDANSETRON 4 MG/1
4 TABLET, ORALLY DISINTEGRATING ORAL EVERY 30 MIN PRN
Status: DISCONTINUED | OUTPATIENT
Start: 2021-12-07 | End: 2021-12-07 | Stop reason: HOSPADM

## 2021-12-07 RX ORDER — PROCHLORPERAZINE MALEATE 10 MG
10 TABLET ORAL EVERY 6 HOURS PRN
Status: DISCONTINUED | OUTPATIENT
Start: 2021-12-07 | End: 2021-12-08 | Stop reason: HOSPADM

## 2021-12-07 RX ORDER — NALOXONE HYDROCHLORIDE 0.4 MG/ML
0.2 INJECTION, SOLUTION INTRAMUSCULAR; INTRAVENOUS; SUBCUTANEOUS
Status: DISCONTINUED | OUTPATIENT
Start: 2021-12-07 | End: 2021-12-08 | Stop reason: HOSPADM

## 2021-12-07 RX ORDER — OXYCODONE HYDROCHLORIDE 5 MG/1
10 TABLET ORAL
Status: DISCONTINUED | OUTPATIENT
Start: 2021-12-07 | End: 2021-12-08 | Stop reason: HOSPADM

## 2021-12-07 RX ORDER — LIDOCAINE 40 MG/G
CREAM TOPICAL
Status: DISCONTINUED | OUTPATIENT
Start: 2021-12-07 | End: 2021-12-08 | Stop reason: HOSPADM

## 2021-12-07 RX ORDER — FENTANYL CITRATE 50 UG/ML
INJECTION, SOLUTION INTRAMUSCULAR; INTRAVENOUS PRN
Status: DISCONTINUED | OUTPATIENT
Start: 2021-12-07 | End: 2021-12-07

## 2021-12-07 RX ORDER — ACETAMINOPHEN 325 MG/1
975 TABLET ORAL EVERY 8 HOURS
Status: DISCONTINUED | OUTPATIENT
Start: 2021-12-07 | End: 2021-12-08 | Stop reason: HOSPADM

## 2021-12-07 RX ORDER — ONDANSETRON 2 MG/ML
4 INJECTION INTRAMUSCULAR; INTRAVENOUS EVERY 30 MIN PRN
Status: DISCONTINUED | OUTPATIENT
Start: 2021-12-07 | End: 2021-12-07 | Stop reason: HOSPADM

## 2021-12-07 RX ORDER — LIDOCAINE 40 MG/G
CREAM TOPICAL
Status: DISCONTINUED | OUTPATIENT
Start: 2021-12-07 | End: 2021-12-07 | Stop reason: HOSPADM

## 2021-12-07 RX ORDER — OXYCODONE HYDROCHLORIDE 5 MG/1
5-10 TABLET ORAL
Qty: 40 TABLET | Refills: 0 | Status: SHIPPED | OUTPATIENT
Start: 2021-12-07 | End: 2022-05-03

## 2021-12-07 RX ORDER — ONDANSETRON 2 MG/ML
4 INJECTION INTRAMUSCULAR; INTRAVENOUS EVERY 6 HOURS PRN
Status: DISCONTINUED | OUTPATIENT
Start: 2021-12-07 | End: 2021-12-08 | Stop reason: HOSPADM

## 2021-12-07 RX ORDER — AMOXICILLIN 250 MG
1 CAPSULE ORAL 2 TIMES DAILY
Status: DISCONTINUED | OUTPATIENT
Start: 2021-12-07 | End: 2021-12-08 | Stop reason: HOSPADM

## 2021-12-07 RX ORDER — HYDROMORPHONE HCL IN WATER/PF 6 MG/30 ML
0.4 PATIENT CONTROLLED ANALGESIA SYRINGE INTRAVENOUS
Status: DISCONTINUED | OUTPATIENT
Start: 2021-12-07 | End: 2021-12-08 | Stop reason: HOSPADM

## 2021-12-07 RX ORDER — CELECOXIB 200 MG/1
200 CAPSULE ORAL DAILY
Qty: 13 CAPSULE | Refills: 0 | Status: SHIPPED | OUTPATIENT
Start: 2021-12-07 | End: 2022-05-03

## 2021-12-07 RX ORDER — CEFAZOLIN SODIUM 2 G/100ML
2 INJECTION, SOLUTION INTRAVENOUS EVERY 8 HOURS
Status: COMPLETED | OUTPATIENT
Start: 2021-12-07 | End: 2021-12-08

## 2021-12-07 RX ORDER — SODIUM CHLORIDE, SODIUM LACTATE, POTASSIUM CHLORIDE, CALCIUM CHLORIDE 600; 310; 30; 20 MG/100ML; MG/100ML; MG/100ML; MG/100ML
INJECTION, SOLUTION INTRAVENOUS CONTINUOUS
Status: DISCONTINUED | OUTPATIENT
Start: 2021-12-07 | End: 2021-12-08 | Stop reason: HOSPADM

## 2021-12-07 RX ORDER — CEFAZOLIN SODIUM IN 0.9 % NACL 3 G/100 ML
3 INTRAVENOUS SOLUTION, PIGGYBACK (ML) INTRAVENOUS SEE ADMIN INSTRUCTIONS
Status: DISCONTINUED | OUTPATIENT
Start: 2021-12-07 | End: 2021-12-07 | Stop reason: HOSPADM

## 2021-12-07 RX ORDER — HYDROMORPHONE HCL IN WATER/PF 6 MG/30 ML
0.2 PATIENT CONTROLLED ANALGESIA SYRINGE INTRAVENOUS
Status: DISCONTINUED | OUTPATIENT
Start: 2021-12-07 | End: 2021-12-08 | Stop reason: HOSPADM

## 2021-12-07 RX ORDER — BISACODYL 10 MG
10 SUPPOSITORY, RECTAL RECTAL DAILY PRN
Status: DISCONTINUED | OUTPATIENT
Start: 2021-12-07 | End: 2021-12-08 | Stop reason: HOSPADM

## 2021-12-07 RX ORDER — AMOXICILLIN 250 MG
1-2 CAPSULE ORAL 2 TIMES DAILY
Qty: 30 TABLET | Refills: 0 | Status: SHIPPED | OUTPATIENT
Start: 2021-12-07 | End: 2022-05-03

## 2021-12-07 RX ORDER — HYDROXYZINE HYDROCHLORIDE 25 MG/1
25 TABLET, FILM COATED ORAL EVERY 6 HOURS PRN
Status: DISCONTINUED | OUTPATIENT
Start: 2021-12-07 | End: 2021-12-08 | Stop reason: HOSPADM

## 2021-12-07 RX ORDER — ONDANSETRON 4 MG/1
4 TABLET, ORALLY DISINTEGRATING ORAL EVERY 6 HOURS PRN
Status: DISCONTINUED | OUTPATIENT
Start: 2021-12-07 | End: 2021-12-08 | Stop reason: HOSPADM

## 2021-12-07 RX ORDER — ATORVASTATIN CALCIUM 20 MG/1
20 TABLET, FILM COATED ORAL EVERY EVENING
Status: DISCONTINUED | OUTPATIENT
Start: 2021-12-07 | End: 2021-12-08 | Stop reason: HOSPADM

## 2021-12-07 RX ADMIN — Medication 3 G: at 14:39

## 2021-12-07 RX ADMIN — HYDROMORPHONE HYDROCHLORIDE 0.4 MG: 0.2 INJECTION, SOLUTION INTRAMUSCULAR; INTRAVENOUS; SUBCUTANEOUS at 18:33

## 2021-12-07 RX ADMIN — ACETAMINOPHEN 975 MG: 325 TABLET, FILM COATED ORAL at 22:07

## 2021-12-07 RX ADMIN — CEFAZOLIN SODIUM 2 G: 2 INJECTION, SOLUTION INTRAVENOUS at 22:10

## 2021-12-07 RX ADMIN — ACETAMINOPHEN 975 MG: 325 TABLET, FILM COATED ORAL at 13:27

## 2021-12-07 RX ADMIN — OXYCODONE HYDROCHLORIDE 10 MG: 5 TABLET ORAL at 22:55

## 2021-12-07 RX ADMIN — ATORVASTATIN CALCIUM 20 MG: 20 TABLET, FILM COATED ORAL at 22:09

## 2021-12-07 RX ADMIN — SENNOSIDES AND DOCUSATE SODIUM 1 TABLET: 8.6; 5 TABLET ORAL at 22:10

## 2021-12-07 RX ADMIN — PROPOFOL 100 MCG/KG/MIN: 10 INJECTION, EMULSION INTRAVENOUS at 14:41

## 2021-12-07 RX ADMIN — MIDAZOLAM HYDROCHLORIDE 2 MG: 1 INJECTION, SOLUTION INTRAMUSCULAR; INTRAVENOUS at 13:51

## 2021-12-07 RX ADMIN — ONDANSETRON 4 MG: 2 INJECTION INTRAMUSCULAR; INTRAVENOUS at 16:14

## 2021-12-07 RX ADMIN — METOPROLOL SUCCINATE 50 MG: 50 TABLET, EXTENDED RELEASE ORAL at 13:30

## 2021-12-07 RX ADMIN — MEPIVACAINE HYDROCHLORIDE 3 ML: 20 INJECTION, SOLUTION EPIDURAL; INFILTRATION at 14:38

## 2021-12-07 RX ADMIN — OXYCODONE HYDROCHLORIDE 5 MG: 5 TABLET ORAL at 19:49

## 2021-12-07 RX ADMIN — LIDOCAINE HYDROCHLORIDE 60 MG: 20 INJECTION, SOLUTION INFILTRATION; PERINEURAL at 14:38

## 2021-12-07 RX ADMIN — SODIUM CHLORIDE, POTASSIUM CHLORIDE, SODIUM LACTATE AND CALCIUM CHLORIDE: 600; 310; 30; 20 INJECTION, SOLUTION INTRAVENOUS at 13:34

## 2021-12-07 RX ADMIN — FENTANYL CITRATE 50 MCG: 50 INJECTION, SOLUTION INTRAMUSCULAR; INTRAVENOUS at 14:41

## 2021-12-07 RX ADMIN — SODIUM CHLORIDE, POTASSIUM CHLORIDE, SODIUM LACTATE AND CALCIUM CHLORIDE: 600; 310; 30; 20 INJECTION, SOLUTION INTRAVENOUS at 14:33

## 2021-12-07 RX ADMIN — BUPIVACAINE HYDROCHLORIDE 15 ML: 5 INJECTION, SOLUTION EPIDURAL; INTRACAUDAL; PERINEURAL at 14:01

## 2021-12-07 RX ADMIN — MIDAZOLAM 2 MG: 1 INJECTION INTRAMUSCULAR; INTRAVENOUS at 14:34

## 2021-12-07 ASSESSMENT — ENCOUNTER SYMPTOMS
DYSRHYTHMIAS: 1
SEIZURES: 0

## 2021-12-07 ASSESSMENT — MIFFLIN-ST. JEOR: SCORE: 2105.17

## 2021-12-07 ASSESSMENT — LIFESTYLE VARIABLES: TOBACCO_USE: 0

## 2021-12-07 NOTE — ANESTHESIA PROCEDURE NOTES
Anemia is improving. Continue to monitor.   Intrathecal Procedure Note    Pre-Procedure   Staff -        Anesthesiologist:  Kanwal Fields MD       Performed By: anesthesiologist       Location: OR       Pre-Anesthestic Checklist: patient identified, IV checked, site marked, risks and benefits discussed, informed consent, monitors and equipment checked, pre-op evaluation and at physician/surgeon's request  Timeout:       Correct Patient: Yes        Correct Procedure: Yes        Correct Site: Yes        Correct Position: Yes   Procedure Documentation  Procedure: intrathecal       Patient Position: sitting       Skin prep: Betadine       Insertion Site: L2-3. (midline approach).       Spinal Needle Type: George tip       Introducer used       Introducer: 20 G      # of attempts: 1 and  # of redirects:     Assessment/Narrative         Paresthesias: No.       CSF fluid: clear.      Opening pressure was cmH2O while  Sitting.      Medication(s) Administered   2% Mepivacaine PF (Intrathecal), 3 mL  Medication Administration Time: 12/7/2021 2:38 PM     Comments:  Patient sitting on edge of OR bed, lower back cleaned and prepped in sterile fashion with betadine. 1% lido used to numb area. Introducer placed, spinal needle through introducer. Appropriate flow of CSF and confirmed with aspiration via syringe. Spinal dose given, 60 mg 2.00% mepivacaine. No complications.

## 2021-12-07 NOTE — ANESTHESIA PROCEDURE NOTES
Adductor canal (targeting saphenous nerve) Procedure Note    Pre-Procedure   Staff -        Anesthesiologist:  Kanwal Fields MD       Performed By: anesthesiologist       Location: pre-op       Pre-Anesthestic Checklist: patient identified, IV checked, site marked, risks and benefits discussed, informed consent, monitors and equipment checked, pre-op evaluation, at physician/surgeon's request and post-op pain management  Timeout:       Correct Patient: Yes        Correct Procedure: Yes        Correct Site: Yes        Correct Position: Yes        Correct Laterality: Yes        Site Marked: Yes  Procedure Documentation  Procedure: Adductor canal (targeting saphenous nerve)       Laterality: right       Patient Position: supine       Skin prep: Chloraprep       Local skin infiltrated with 1 mL of 1% lidocaine.        Needle Type: insulated       Needle Gauge: 21.        Needle Length (millimeters): 100        Ultrasound guided       1. Ultrasound was used to identify targeted nerve, plexus, vascular marker, or fascial plane and place a needle adjacent to it in real-time.       2. Ultrasound was used to visualize the spread of anesthetic in close proximity to the above referenced structure.       3. A permanent image is entered into the patient's record.       4. The visualized anatomic structures appeared normal.       5. There were no apparent abnormal pathologic findings.    Assessment/Narrative         The placement was negative for: blood aspirated, painful injection and site bleeding       Paresthesias: No.     Bolus given via needle..        Secured via.        Insertion/Infusion Method: Single Shot       Complications: none    Medication(s) Administered   Bupivacaine 0.5% w/ 1:400K Epi (Injection), 15 mL  Medication Administration Time: 12/7/2021 2:01 PM     Comments:  Bolus via needle, 15 ml of 0.5% bupivacaine with 1:400,000 epinephrine.  Patient tolerated well, was mildly sedated but communicative  throughout the procedure.   The surgeon has given a verbal order transferring care of this patient to me for the performance of regional analgesia block for post op pain control. It is requested of me because I am uniquely trained and qualified to perform this block and the surgeon is neither trained nor qualified to perform this procedure.

## 2021-12-07 NOTE — ANESTHESIA POSTPROCEDURE EVALUATION
Patient: Rey Andre    Procedure: Procedure(s):  RIGHT TOTAL KNEE ARTHROPLASTY       Diagnosis:Osteoarthritis of right knee [M17.11]  Diagnosis Additional Information: No value filed.    Anesthesia Type:  Spinal    Note:  Disposition: Admission   Postop Pain Control: Uneventful            Sign Out: Well controlled pain   PONV: No   Neuro/Psych: Uneventful            Sign Out: Acceptable/Baseline neuro status   Airway/Respiratory: Uneventful            Sign Out: Acceptable/Baseline resp. status   CV/Hemodynamics: Uneventful            Sign Out: Acceptable CV status; No obvious hypovolemia; No obvious fluid overload   Other NRE: NONE   DID A NON-ROUTINE EVENT OCCUR? No           Last vitals:  Vitals Value Taken Time   /74 12/07/21 1700   Temp     Pulse 80 12/07/21 1710   Resp 37 12/07/21 1710   SpO2 96 % 12/07/21 1710   Vitals shown include unvalidated device data.    Electronically Signed By: Tuyet Rush MD  December 7, 2021  5:11 PM

## 2021-12-07 NOTE — BRIEF OP NOTE
[facility]    Brief Operative Note    Pre-operative diagnosis: Osteoarthritis of right knee [M17.11]  Post-operative diagnosis Same as pre-operative diagnosis    Procedure: Procedure(s):  RIGHT TOTAL KNEE ARTHROPLASTY  Surgeon: Surgeon(s) and Role:     * Ebenezer Stewart MD - Primary     * Chi Garza PA-C - Assisting  Anesthesia: Combined General with Block   Estimated Blood Loss: Less than 50 ml    Drains: None  Specimens: * No specimens in log *  Findings:   None.  Complications: None.  Implants:   Implant Name Type Inv. Item Serial No.  Lot No. LRB No. Used Action   BONE CEMENT STRK SIMPLEX P SPEEDSET 6192-1-001 - [id number] Cement, Bone BONE CEMENT STRK SIMPLEX P SPEEDSET 6192-1-001  [employer] [id number] Right 1 Implanted   IMP INSERT JJ ATTUNE POST STAB FX BR SYS SZ6 6MM [id number] - [id number] Total Joint Component/Insert IMP INSERT JJ ATTUNE POST STAB FX BR SYS SZ6 6MM [id number]  [employer] [id number] Right 1 Implanted   IMP TIB BASE JJ ATTUNE FX BR SYS JAMEE SZ6 [id number] - [id number] Total Joint Component/Insert IMP TIB BASE JJ ATTUNE FX BR SYS JAMEE SZ6 [id number]  [employer] [id number] Right 1 Implanted   IMP COMP FEM JJ ATTUNE POST STAB RT JAMEE SZ6 [id number] - [id number] Total Joint Component/Insert IMP COMP FEM JJ ATTUNE POST STAB RT JAMEE SZ6 [id number]  [employer]- [id number] Right 1 Implanted   IMP PATELLA JJ ATTUNE DOME 41MM [id number] - [id number] Total Joint Component/Insert IMP PATELLA JJ ATTUNE DOME 41MM [id number]  [employer] [id number] Right 1 Implanted

## 2021-12-07 NOTE — ANESTHESIA PREPROCEDURE EVALUATION
Anesthesia Pre-Procedure Evaluation    Patient: Rey Andre   MRN: [id number] : 1965        Preoperative Diagnosis: Osteoarthritis of right knee [M17.11]    Procedure : Procedure(s):  RIGHT TOTAL KNEE ARTHROPLASTY          Past Medical History:   Diagnosis Date     Embolic stroke involving left middle cerebral artery (H) 2020     Hyperlipidemia      Hypothyroidism      Kidney stone     Recurent stones     Sen's neuroma of right foot      Obese      DAMARIS on CPAP      Palpitations 3/6/2020     Paroxysmal atrial fibrillation (H)      PFO (patent foramen ovale)      Sinus of Valsalva aneurysm       Past Surgical History:   Procedure Laterality Date     CV PFO CLOSURE N/A 2021    Procedure: Patent Foramen Ovale Closure; gore cardioform 25mm,  Surgeon: Chi Alvarenga MD;  Location: [facility] CARDIAC CATH LAB     CYSTOSCOPY       CYSTOSCOPY, RETROGRADES, COMBINED  2014    Procedure: COMBINED CYSTOSCOPY, RETROGRADES;  Surgeon: Francisco J Lerma MD;  Location:  OR     ENT SURGERY      T & A     EP LOOP RECORDER IMPLANT N/A 10/12/2020    Procedure: EP Loop Recorder Implant;  Surgeon: Lefty Ramires MD;  Location: [facility] CARDIAC CATH LAB     IR CAROTID CEREBRAL ANGIOGRAM LEFT  2020     KNEE SURGERY Right     arthroscopic     LASER HOLMIUM LITHOTRIPSY URETER(S), INSERT STENT, COMBINED  2012    Procedure: COMBINED CYSTOSCOPY, URETEROSCOPY, LASER HOLMIUM LITHOTRIPSY URETER(S), INSERT STENT;  Video cystoscopy, Right Retrograde Right Ureteroscopy with Holmium Laser, Stone Extraction,  JJ Stent Placement ;  Surgeon: Francisco J Lerma MD;  Location:  OR     LASER HOLMIUM LITHOTRIPSY URETER(S), INSERT STENT, COMBINED  2014    Procedure: COMBINED CYSTOSCOPY, URETEROSCOPY, LASER HOLMIUM LITHOTRIPSY URETER(S), INSERT STENT;  Surgeon: Francisco J Lerma MD;  Location: [facility] OR     LASER HOLMIUM LITHOTRIPSY URETER(S), INSERT STENT, COMBINED Right 2017     Procedure: COMBINED CYSTOSCOPY, URETEROSCOPY, LASER HOLMIUM LITHOTRIPSY URETER(S), INSERT STENT;  Surgeon: Chris Barrios MD;  Location: [facility] OR     ORTHOPEDIC SURGERY Right     Meniscal tear     ROTATOR CUFF REPAIR RT/LT      left shoulder     wisdom teeth        Allergies   Allergen Reactions     No Known Drug Allergies       Social History     Tobacco Use     Smoking status: Never Smoker     Smokeless tobacco: Never Used   Substance Use Topics     Alcohol use: Yes     Comment: occassional      Wt Readings from Last 1 Encounters:   08/12/21 122.9 kg (271 lb)        Allergies   Allergen Reactions     No Known Drug Allergies      Prior to Admission medications    Medication Sig Start Date End Date Taking? Authorizing Provider   acetaminophen (TYLENOL) 325 MG tablet Take 325-650 mg by mouth 4 times daily as needed for mild pain   Yes Reported, Patient   aspirin (ASA) 81 MG EC tablet Take 1 tablet (81 mg) by mouth daily 4/15/21  Yes Shira Diaz, APRN CNP   atorvastatin (LIPITOR) 40 MG tablet Take 20 mg by mouth every evening (40MG X 0.5 = 20MG) 8/12/21  Yes Chi Alvarenga MD   levothyroxine (SYNTHROID) 150 MCG tablet Take 150 mcg by mouth daily    Yes Unknown, Entered By History   metoprolol succinate ER (TOPROL-XL) 50 MG 24 hr tablet Take 1 tablet (50 mg) by mouth daily 8/12/21  Yes Chi Alvarenga MD       ECG: Sinus rhythm Left anterior fascicular block    ECHO: Interpretation Summary     Left ventricular systolic function is normal.The visual ejection fraction is  estimated at 55%.  The right ventricular systolic function is normal.  S/p 25 mm [place] Cardioform Septal Occluder.A contrast injection (Bubble Study)  was performed that was negative for flow across the interatrial septum.  Moderate aortic root dilatation- 4.5 cm.     Left Ventricle  Left ventricular systolic function is normal. The visual ejection fraction is  estimated at 55%. No regional wall motion abnormalities noted.      Right Ventricle  The right ventricle is normal size. The right ventricular systolic function is  normal.     Atria  Normal left atrial size. Right atrial size is normal. There is no color  Doppler evidence of an atrial shunt. A contrast injection (Bubble Study) was  performed that was negative for flow across the interatrial septum.     Tricuspid Valve  There is trace tricuspid regurgitation. Right ventricular systolic pressure  could not be approximated due to inadequate tricuspid regurgitation.     Aortic Valve  The aortic valve is trileaflet. No aortic regurgitation is present. No aortic  stenosis is present.     Pulmonic Valve  The pulmonic valve is not well visualized. There is trace pulmonic valvular  regurgitation.     Vessels  Moderate aortic root dilatation. 4.5 cm. The inferior vena cava was normal in  size with preserved respiratory variability.     Pericardium  There is no pericardial effusion.    Anesthesia Evaluation   Pt has had prior anesthetic. Type: General.    No history of anesthetic complications       ROS/MED HX  ENT/Pulmonary:     (+) sleep apnea,  (-) tobacco use and asthma   Neurologic:     (+) CVA,  (-) no seizures and migraines   Cardiovascular:     (+) Dyslipidemia -----dysrhythmias, a-fib,  (-) hypertension, CAD, AMOR and valvular problems/murmurs   METS/Exercise Tolerance:     Hematologic:    (-) history of blood clots and anemia   Musculoskeletal:    (-) arthritis   GI/Hepatic:    (-) GERD and liver disease   Renal/Genitourinary:     (+) Nephrolithiasis ,  (-) renal disease   Endo:     (+) thyroid problem, hypothyroidism, Obesity,  (-) Type I DM and Type II DM   Psychiatric/Substance Use:    (-) psychiatric history   Infectious Disease:    (-) Recent Fever   Malignancy:       Other:            Physical Exam    Airway        Mallampati: III   TM distance: > 3 FB   Neck ROM: full   Mouth opening: > 3 cm    Respiratory Devices and Support         Dental  no notable dental history          Cardiovascular   cardiovascular exam normal          Pulmonary   pulmonary exam normal                OUTSIDE LABS:  CBC:   Lab Results   Component Value Date    WBC 6.6 05/06/2021    WBC 6.2 10/12/2020    HGB 14.6 05/06/2021    HGB 14.9 10/12/2020    HCT 43.1 05/06/2021    HCT 43.6 10/12/2020     05/06/2021     10/12/2020     BMP:   Lab Results   Component Value Date     05/06/2021     10/12/2020    POTASSIUM 3.8 05/06/2021    POTASSIUM 4.0 10/12/2020    CHLORIDE 113 (H) 05/06/2021    CHLORIDE 112 (H) 10/12/2020    CO2 25 05/06/2021    CO2 26 10/12/2020    BUN 18 10/12/2020    BUN 16 03/07/2020    CR 0.84 05/06/2021    CR 0.93 10/12/2020    GLC 89 10/12/2020    GLC 92 03/07/2020     COAGS:   Lab Results   Component Value Date    PTT 27 05/06/2021    INR 0.95 05/06/2021     POC:   Lab Results   Component Value Date    BGM 92 03/06/2020     HEPATIC:   Lab Results   Component Value Date    ALBUMIN 3.6 03/05/2020    PROTTOTAL 6.9 03/05/2020    ALT 48 06/05/2020    AST 13 03/05/2020    ALKPHOS 82 03/05/2020    BILITOTAL 0.3 03/05/2020     OTHER:   Lab Results   Component Value Date    A1C 5.5 03/05/2020    DAMARIS 8.4 (L) 10/12/2020    PHOS 2.6 03/06/2020    MAG 2.1 03/06/2020       Anesthesia Plan    ASA Status:  3   NPO Status:  NPO Appropriate    Anesthesia Type: Spinal.              Consents    Anesthesia Plan(s) and associated risks, benefits, and realistic alternatives discussed. Questions answered and patient/representative(s) expressed understanding.    - Discussed:     - Discussed with:  Patient         Postoperative Care    Pain management: Peripheral nerve block (Single Shot), Multi-modal analgesia.   PONV prophylaxis: Ondansetron (or other 5HT-3), Dexamethasone or Solumedrol     Comments:                Kanwal Fields MD

## 2021-12-07 NOTE — ANESTHESIA CARE TRANSFER NOTE
Patient: Rey Andre    Procedure: Procedure(s):  RIGHT TOTAL KNEE ARTHROPLASTY       Diagnosis: Osteoarthritis of right knee [M17.11]  Diagnosis Additional Information: No value filed.    Anesthesia Type:   Spinal     Note:    Oropharynx: oropharynx clear of all foreign objects  Level of Consciousness: awake  Oxygen Supplementation: face mask  Level of Supplemental Oxygen (L/min / FiO2): 6  Independent Airway: airway patency satisfactory and stable  Dentition: dentition unchanged  Vital Signs Stable: post-procedure vital signs reviewed and stable  Report to RN Given: handoff report given  Patient transferred to: PACU    Handoff Report: Identifed the Patient, Identified the Reponsible Provider, Reviewed the pertinent medical history, Discussed the surgical course, Reviewed Intra-OP anesthesia mangement and issues during anesthesia, Set expectations for post-procedure period and Allowed opportunity for questions and acknowledgement of understanding      Vitals:  Vitals Value Taken Time   BP     Temp     Pulse     Resp     SpO2         Electronically Signed By: CASSANDRA Ruth CRNA  December 7, 2021  4:42 PM

## 2021-12-08 ENCOUNTER — APPOINTMENT (OUTPATIENT)
Dept: OCCUPATIONAL THERAPY | Facility: CLINIC | Age: 56
End: 2021-12-08
Attending: ORTHOPAEDIC SURGERY
Payer: COMMERCIAL

## 2021-12-08 ENCOUNTER — APPOINTMENT (OUTPATIENT)
Dept: PHYSICAL THERAPY | Facility: CLINIC | Age: 56
End: 2021-12-08
Attending: ORTHOPAEDIC SURGERY
Payer: COMMERCIAL

## 2021-12-08 VITALS
BODY MASS INDEX: 39.23 KG/M2 | RESPIRATION RATE: 16 BRPM | OXYGEN SATURATION: 93 % | HEIGHT: 71 IN | SYSTOLIC BLOOD PRESSURE: 101 MMHG | WEIGHT: 280.2 LBS | HEART RATE: 80 BPM | DIASTOLIC BLOOD PRESSURE: 69 MMHG | TEMPERATURE: 98.1 F

## 2021-12-08 LAB
GLUCOSE BLDC GLUCOMTR-MCNC: 99 MG/DL (ref 70–99)
HGB BLD-MCNC: 12.2 G/DL (ref 13.3–17.7)

## 2021-12-08 PROCEDURE — 97161 PT EVAL LOW COMPLEX 20 MIN: CPT | Mod: GP | Performed by: PHYSICAL THERAPIST

## 2021-12-08 PROCEDURE — 250N000011 HC RX IP 250 OP 636

## 2021-12-08 PROCEDURE — 250N000013 HC RX MED GY IP 250 OP 250 PS 637

## 2021-12-08 PROCEDURE — 258N000003 HC RX IP 258 OP 636

## 2021-12-08 PROCEDURE — 97165 OT EVAL LOW COMPLEX 30 MIN: CPT | Mod: GO | Performed by: OCCUPATIONAL THERAPIST

## 2021-12-08 PROCEDURE — 97535 SELF CARE MNGMENT TRAINING: CPT | Mod: GO,59 | Performed by: OCCUPATIONAL THERAPIST

## 2021-12-08 PROCEDURE — 97116 GAIT TRAINING THERAPY: CPT | Mod: GP | Performed by: PHYSICAL THERAPIST

## 2021-12-08 PROCEDURE — 36415 COLL VENOUS BLD VENIPUNCTURE: CPT

## 2021-12-08 PROCEDURE — 85018 HEMOGLOBIN: CPT

## 2021-12-08 PROCEDURE — 97530 THERAPEUTIC ACTIVITIES: CPT | Mod: GP | Performed by: PHYSICAL THERAPIST

## 2021-12-08 PROCEDURE — 97110 THERAPEUTIC EXERCISES: CPT | Mod: GP | Performed by: PHYSICAL THERAPIST

## 2021-12-08 PROCEDURE — 82962 GLUCOSE BLOOD TEST: CPT

## 2021-12-08 RX ADMIN — HYDROMORPHONE HYDROCHLORIDE 0.4 MG: 0.2 INJECTION, SOLUTION INTRAMUSCULAR; INTRAVENOUS; SUBCUTANEOUS at 10:46

## 2021-12-08 RX ADMIN — POLYETHYLENE GLYCOL 3350 17 G: 17 POWDER, FOR SOLUTION ORAL at 08:13

## 2021-12-08 RX ADMIN — HYDROMORPHONE HYDROCHLORIDE 0.4 MG: 0.2 INJECTION, SOLUTION INTRAMUSCULAR; INTRAVENOUS; SUBCUTANEOUS at 06:13

## 2021-12-08 RX ADMIN — SODIUM CHLORIDE, POTASSIUM CHLORIDE, SODIUM LACTATE AND CALCIUM CHLORIDE: 600; 310; 30; 20 INJECTION, SOLUTION INTRAVENOUS at 00:36

## 2021-12-08 RX ADMIN — LEVOTHYROXINE SODIUM 150 MCG: 75 TABLET ORAL at 08:12

## 2021-12-08 RX ADMIN — OXYCODONE HYDROCHLORIDE 10 MG: 5 TABLET ORAL at 08:12

## 2021-12-08 RX ADMIN — CEFAZOLIN SODIUM 2 G: 2 INJECTION, SOLUTION INTRAVENOUS at 06:13

## 2021-12-08 RX ADMIN — SENNOSIDES AND DOCUSATE SODIUM 1 TABLET: 8.6; 5 TABLET ORAL at 08:12

## 2021-12-08 RX ADMIN — RIVAROXABAN 10 MG: 10 TABLET, FILM COATED ORAL at 08:12

## 2021-12-08 RX ADMIN — HYDROMORPHONE HYDROCHLORIDE 0.4 MG: 0.2 INJECTION, SOLUTION INTRAMUSCULAR; INTRAVENOUS; SUBCUTANEOUS at 00:32

## 2021-12-08 RX ADMIN — OXYCODONE HYDROCHLORIDE 10 MG: 5 TABLET ORAL at 03:08

## 2021-12-08 RX ADMIN — ACETAMINOPHEN 975 MG: 325 TABLET, FILM COATED ORAL at 06:13

## 2021-12-08 NOTE — DISCHARGE SUMMARY
Discharge Summary    Rey Andre MRN# [id number]   YOB: 1965 Age: 56 year old     Date of Admission:  12/7/2021  Date of Discharge:  12/8/2021  Admitting Physician:  Ebenezer Stewart MD  Discharge Physician:  Ebenezer Stewart MD     Primary Provider: Chava Collado G11          Admission Diagnoses:   Osteoarthritis right knee          Discharge Diagnosis:   Same           Surgical Procedure:   right knee replacement           Secondary Diagnosis:              Discharge Disposition:   Discharged to home           Medications Prior to Admission:     Medications Prior to Admission   Medication Sig Dispense Refill Last Dose     atorvastatin (LIPITOR) 40 MG tablet Take 20 mg by mouth every evening (40MG X 0.5 = 20MG) 90 tablet 3 12/6/2021 at PM     levothyroxine (SYNTHROID) 150 MCG tablet Take 150 mcg by mouth daily    12/6/2021 at AM     metoprolol succinate ER (TOPROL-XL) 50 MG 24 hr tablet Take 1 tablet (50 mg) by mouth daily 90 tablet 3 12/6/2021 at AM     [DISCONTINUED] acetaminophen (TYLENOL) 325 MG tablet Take 325-650 mg by mouth 4 times daily as needed for mild pain   Past Month at PRN     [DISCONTINUED] aspirin (ASA) 81 MG EC tablet Take 1 tablet (81 mg) by mouth daily   11/30/2021 at AM             Discharge Medications:     Current Discharge Medication List      START taking these medications    Details   celecoxib (CELEBREX) 200 MG capsule Take 1 capsule (200 mg) by mouth daily for 13 days Do not take within 6 hours of ibuprofen (MOTRIN, ADVIL) or ketorolac (TORADOL) if prescribed.  Qty: 13 capsule, Refills: 0    Associated Diagnoses: S/P total knee arthroplasty, right      hydrOXYzine (ATARAX) 25 MG tablet Take 1 tablet (25 mg) by mouth every 6 hours as needed for itching (with pain, moderate pain)  Qty: 40 tablet, Refills: 0    Associated Diagnoses: S/P total knee arthroplasty, right      oxyCODONE (ROXICODONE) 5 MG tablet Take 1-2 tablets (5-10 mg) by mouth every 3 hours as needed for  pain (Moderate to Severe)  Qty: 40 tablet, Refills: 0    Associated Diagnoses: S/P total knee arthroplasty, right      rivaroxaban ANTICOAGULANT (XARELTO) 10 MG TABS tablet Take 1 tablet (10 mg) by mouth daily  Qty: 30 tablet, Refills: 0    Comments: For DVT Prevention  Associated Diagnoses: S/P total knee arthroplasty, right      senna-docusate (SENOKOT-S/PERICOLACE) 8.6-50 MG tablet Take 1-2 tablets by mouth 2 times daily Take while on oral narcotics to prevent or treat constipation.  Qty: 30 tablet, Refills: 0    Comments: While taking narcotics  Associated Diagnoses: S/P total knee arthroplasty, right         CONTINUE these medications which have CHANGED    Details   acetaminophen (TYLENOL) 325 MG tablet Take 2 tablets (650 mg) by mouth every 4 hours as needed for other (mild pain)  Qty: 100 tablet, Refills: 0    Associated Diagnoses: S/P total knee arthroplasty, right         CONTINUE these medications which have NOT CHANGED    Details   atorvastatin (LIPITOR) 40 MG tablet Take 20 mg by mouth every evening (40MG X 0.5 = 20MG)  Qty: 90 tablet, Refills: 3    Associated Diagnoses: Cerebrovascular accident (CVA) due to embolism of left middle cerebral artery (H)      levothyroxine (SYNTHROID) 150 MCG tablet Take 150 mcg by mouth daily       metoprolol succinate ER (TOPROL-XL) 50 MG 24 hr tablet Take 1 tablet (50 mg) by mouth daily  Qty: 90 tablet, Refills: 3    Associated Diagnoses: PFO (patent foramen ovale); Cerebrovascular accident (CVA) due to embolism of left middle cerebral artery (H); Hyperlipidemia LDL goal <70; Thoracic aortic aneurysm without rupture (H)         STOP taking these medications       aspirin (ASA) 81 MG EC tablet Comments:   Reason for Stopping:                     Consultations:   No consultations were requested during this admission           Hospital Course:   The patient was admitted after the surgical procedure. The patient underwent an uneventfulright knee replacement. Postoperatively,  anticoagulation Xarelto. No transfusion was required. The patient will be discharged to home. Home medications have been reconciled. oxycodone 5 mg. was prescribed for pain. Xarelto  will be prescribed.             Pending Results:   None           Discharge Instructions and Follow-Up:        Discharge activity: Activity as tolerated   Discharge follow-up: Follow up with me in 2 weeks   Outpatient therapy: Already Scheduled per office.    Home Care agency: None    Supplies and equipment: None        Wound care: leave dressing in place   Other instructions: None

## 2021-12-08 NOTE — PLAN OF CARE
Occupational Therapy Discharge Summary    Reason for therapy discharge:    Discharged to home.    Progress towards therapy goal(s). See goals on Care Plan in [facility] electronic health record for goal details.  Goals met    Therapy recommendation(s):    No further therapy is recommended.

## 2021-12-08 NOTE — OP NOTE
Procedure Date: 12/07/2021    PREOPERATIVE DIAGNOSIS:  Right knee degenerative joint disease.    POSTOPERATIVE DIAGNOSIS:  Right knee degenerative joint disease.    PROCEDURE:  Right total knee arthroplasty.    SURGEON:  Ebenezer Stewart MD    FIRST ASSISTANT:  Chi Garza PA-C    ANESTHESIA TYPE:  Spinal with adductor canal block and periarticular injection.    TOURNIQUET TIME:  80 minutes.    IMPLANTS:  Tobias and Tobias Attune  size 6 posterior stabilized femoral component, fixed bearing size 6 tibial component, 6 mm highly cross-linked posterior stabilized fixed bearing poly, 41 mm patellar component.    DESCRIPTION OF PROCEDURE:  After identification of the patient, correct extremity, reviewed informed consent, the patient was brought to the operating room, where general anesthesia was induced.  Perioperative antibiotics were given.  A nonsterile tourniquet was applied to the right lower extremity.  Right lower extremity was then prepped and draped in the usual sterile manner.  After observation of surgical pause, leg was exsanguinated, and tourniquet was inflated.  Standard midline incision was made.  Dissection was taken sharply through subcutaneous tissues.  Medial and lateral skin flaps were elevated.  A medial parapatellar arthrotomy was performed.  Medial release was performed.  Soft tissue cleared from the distal anterior aspect of the femur.  Retropatellar fat pad was excised.  Patella was then everted, and the knee was flexed.  A starting reamer was placed in the femoral canal from distal femoral starting point.  Distal femoral cutting guide set on an 11 mm resection for a 5-degree valgus cut was then placed and pinned.  Distal femur was resected.  Extramedullary tibial guide was then placed in line with the anteromedial border of the tibia and on a 0-degree slope and a 10 mm resection off the lateral side.  Proximal tibia was resected.  Extension gap was then balanced with a 7 mm spacer block.  Knee  was then flexed and sized to a size 6 femoral component.  A 4-in-1 cutting guide was then pinned in 3 degrees of external rotation.  Anterior and posterior femoral cuts were made.  Flexion gap was balanced with a 7 mm spacer block.  The anterior, posterior and chamfer cuts were made.  The 4-in-1 cutting block was removed.  A box cutting guide was placed.  Box cut was made.  Medial and lateral meniscus were excised.  The osteophytes were cleared from the posterior aspect of the medial and lateral femoral condyles with a curved osteotome.  Tibia was then subluxated anteriorly with 2-prong tibial retractor and sized to a size 6 tibial component.  The proximal tibia was then prepared, and a trial tray was left in place.  External rotation was set at junction of medial middle third tibial tubercle.  The trial femoral component was impacted in place.  The 6 mm fixed bearing posterior stabilized trial poly was then placed, and the knee was brought out into full extension.  Knee range of motion was 1 degree of hyperextension back to 130 degrees of flexion.  The patella tracked centrally.  There was no instability with varus and valgus stress at 0-30 degrees of flexion.  The patella was then measured and noted to be 28 mm in width.  A 10 mm resection for a 19 mm remnant was then performed.  A 41 mm patellar component was noted to be appropriate size.  Lug holes were drilled.  Trial component was placed.  Again, the patella tracked centrally.  The trial components were removed.  Cut ends of bone were irrigated with copious normal saline via pulse lavage to remove all marrow elements.  The real size 6 tibial component was then cemented into place.  The real size 6 femoral component was cemented in place.  The real 6 mm highly cross-linked posterior stabilized fixed bearing poly was then placed, and the knee was reduced.  The real 41 mm patellar component was then cemented into place and clamped.  Cement was allowed to harden  in Betadine-diluted solution.  After adequate hardening of the cement, the knee was irrigated and again assessed for range of motion, patellar tracking and stability.  Periarticular injection was performed just prior to placement of the real prosthesis.  The knee was then placed in 90 degrees of flexion.  The arthrotomy was closed with interrupted 3-0 Vicryl suture, and 2-0 Monocryl was placed subcutaneously in incision sites.  Running self-locking absorbable 3-0 intracuticular stitch was placed.  Sterile dressing was applied.  Tourniquet was deflated.  The patient was then recovered briefly in the operating room and transferred to the PACU for further recovery.  The patient tolerated the procedure well.  No known complications.    Chi Garza PA-C, was present for the entire portion of procedure.  His assistance was critical in both patient positioning, prepping, draping, deep wound closure, superficial wound closure, dressing placement and patient transfer.    Ebenezer Stewart MD        D: 2021   T: 2021   MT: ARTUR    Name:     RAFFY MELGAR  MRN:      [id number]        Account:        [id number]   :      1965           Procedure Date: 2021     Document: [id number]

## 2021-12-08 NOTE — PROGRESS NOTES
12/08/21 0937   Quick Adds   Type of Visit Initial Occupational Therapy Evaluation   Living Environment   People in home spouse   Current Living Arrangements house   Home Accessibility stairs to enter home;stairs within home   Number of Stairs, Main Entrance 3   Stair Railings, Main Entrance   (post to hold onto)   Number of Stairs, Within Home, Primary greater than 10 stairs   Stair Railings, Within Home, Primary railing on left side (ascending)   Transportation Anticipated car, drives self;family or friend will provide   Living Environment Comments Pt lives in 2 story home, walk-in shower on top floor with small lip to step over, and small ledge to hold onto. Comfort height toilet with sink vanity to hold onto   Self-Care   Usual Activity Tolerance good   Current Activity Tolerance fair   Equipment Currently Used at Home none   Activity/Exercise/Self-Care Comment Pt reports IND with all I/ADLs and mobility without device   Disability/Function   Walking or Climbing Stairs Difficulty no   Dressing/Bathing Difficulty no   Toileting issues no   Doing Errands Independently Difficulty (such as shopping) no   Fall history within last six months no   General Information   Onset of Illness/Injury or Date of Surgery 12/07/21   Referring Physician Chi Garza PA-C   Additional Occupational Profile Info/Pertinent History of Current Problem POD #1 R TKA   Existing Precautions/Restrictions fall   Cognitive Status Examination   Affect/Mental Status (Cognitive) WNL   Follows Commands WNL   Visual Perception   Visual Impairment/Limitations WFL   Sensory   Sensory Quick Adds No deficits were identified   Pain Assessment   Patient Currently in Pain Yes, see Vital Sign flowsheet   Posture   Posture not impaired   Range of Motion Comprehensive   Comment, General Range of Motion R LE impaired secondary to TKA   Strength Comprehensive (MMT)   Comment, General Manual Muscle Testing (MMT) Assessment R LE impaired secondary  to TKA   Muscle Tone Assessment   Muscle Tone Quick Adds No deficits were identified   Coordination   Upper Extremity Coordination No deficits were identified   Bed Mobility   Comment (Bed Mobility) NT pt seated in recliner on arrival   Transfers   Transfers sit-stand transfer;toilet transfer   Sit-Stand Transfer   Sit-Stand Newton (Transfers) supervision   Assistive Device (Sit-Stand Transfers) walker, front-wheeled   Toilet Transfer   Type (Toilet Transfer) sit-stand;stand-sit   [place] Level (Toilet Transfer) contact guard;verbal cues   Assistive Device (Toilet Transfer) grab bars/safety frame;walker, front-wheeled   Activities of Daily Living   BADL Assessment lower body dressing;toileting   Lower Body Dressing Assessment   [place] Level (Lower Body Dressing) minimum assist (75% patient effort)   Position (Lower Body Dressing) unsupported sitting   Toileting   [place] Level (Toileting) supervision   Clinical Impression   Criteria for Skilled Therapeutic Interventions Met (OT) yes;meets criteria;skilled treatment is necessary   OT Diagnosis Impaired independence with I/ADLs and functional mobility   OT Problem List-Impairments impacting ADL activity tolerance impaired;pain;range of motion (ROM);mobility   Assessment of Occupational Performance 3-5 Performance Deficits   Identified Performance Deficits functional mobility, toileting, bathing, dressing   Planned Therapy Interventions (OT) ADL retraining;transfer training   Clinical Decision Making Complexity (OT) low complexity   Therapy Frequency (OT) 1x eval and treat   Anticipated Equipment Needs Upon Discharge (OT) shower chair   Risk & Benefits of therapy have been explained evaluation/treatment results reviewed;care plan/treatment goals reviewed;risks/benefits reviewed;current/potential barriers reviewed;participants voiced agreement with care plan;participants included;patient;spouse/significant other   Total Evaluation Time (Minutes)    Total Evaluation Time (Minutes) 5

## 2021-12-08 NOTE — PLAN OF CARE
Pt came up to floor with 8/10 R. Knee pain. 0.4 mg dilaudid given. AxO x4. VSS. Capno on. Continue to monitor and with plan of care.

## 2021-12-08 NOTE — PLAN OF CARE
Physical Therapy Discharge Summary    Reason for therapy discharge:    Discharged to home with outpatient therapy.    Progress towards therapy goal(s). See goals on Care Plan in [facility] electronic health record for goal details.  Goals partially met.  Barriers to achieving goals:   discharge from facility.    Therapy recommendation(s):    Continued therapy is recommended.  Rationale/Recommendations:  OP PT per ortho MD/PA protocol.

## 2021-12-08 NOTE — PROGRESS NOTES
12/08/21 0839   Quick Adds   Type of Visit Initial PT Evaluation   Living Environment   People in home spouse   Current Living Arrangements house   Home Accessibility stairs to enter home;stairs within home   Number of Stairs, Main Entrance 3   Stair Railings, Main Entrance   (Post to hold onto)   Number of Stairs, Within Home, Primary   (15)   Stair Railings, Within Home, Primary railing on left side (ascending)   Transportation Anticipated car, drives self;family or friend will provide   Living Environment Comments Pt's spouse present for session and will be able to provide support at discharge.    Self-Care   Usual Activity Tolerance good   Current Activity Tolerance moderate   Equipment Currently Used at Home none   Activity/Exercise/Self-Care Comment Pt owns crutches.    Disability/Function   Fall history within last six months no   General Information   Onset of Illness/Injury or Date of Surgery 12/07/21   Referring Physician Ebenezer Stewart MD   Patient/Family Therapy Goals Statement (PT) Return home.    Pertinent History of Current Problem (include personal factors and/or comorbidities that impact the POC) 57 y/o male POD # 1 R TKA.    Existing Precautions/Restrictions fall   Weight-Bearing Status - RLE weight-bearing as tolerated   General Observations Pt sitting up in recliner upon arrival of therapist.    Cognition   Orientation Status (Cognition) oriented x 3   Affect/Mental Status (Cognition) WFL   Follows Commands (Cognition) WFL   Pain Assessment   Patient Currently in Pain   (R knee pain at rest: 6/10)   Integumentary/Edema   Integumentary/Edema Comments R knee incision covered with dressing.    Range of Motion (ROM)   ROM Comment R knee ROM: 11-63 degrees, otherwise B LEs WFL.    Strength   Strength Comments Pt demonstrates sufficient R LE strength to complete SLR independently, L LE WFL.    Bed Mobility   Comment (Bed Mobility) NT.    Transfers   Transfer Safety Comments Sit <> stand with FWW  and SBA.    Gait/Stairs (Locomotion)   Comment (Gait/Stairs) Pt amb 5' with FWW and CGA.    Balance   Balance Comments Noted good seated and standing balance at FWW.   Sensory Examination   Sensory Perception Comments Pt denies numbness/tingling in B LEs.    Clinical Impression   Criteria for Skilled Therapeutic Intervention yes, treatment indicated   PT Diagnosis (PT) Difficulty with functional mobility.    Influenced by the following impairments Pain, Impaired R knee ROM, Decreased strength, Decreased activity tolerance   Functional limitations due to impairments Limited functional mobility requiring AD and assist.    Clinical Presentation Stable/Uncomplicated   Clinical Presentation Rationale Based on PMH, current presentation, and social support.    Clinical Decision Making (Complexity) low complexity   Therapy Frequency (PT) Daily   Predicted Duration of Therapy Intervention (days/wks) 1 day   Planned Therapy Interventions (PT) bed mobility training;cryotherapy;gait training;stair training;ROM (range of motion);strengthening;transfer training   Anticipated Equipment Needs at Discharge (PT) walker, rolling   Risk & Benefits of therapy have been explained patient   PT Discharge Planning    PT Rationale for DC Rec Pt will require SBA For transfers and ambulation with FWW, recommend CGA and HHA for navigation of stairs with 1 rail   PT Brief overview of current status  Pt is currently requiring SBA for transfers, SBA-CGA for ambulation with use of FWW, CGA and HHA to navigate stairs with 1 rail    Total Evaluation Time   Total Evaluation Time (Minutes) 5

## 2021-12-08 NOTE — PLAN OF CARE
R total knee on 12/7  Neuro: A+O x4  Resp/02: On RA, sats in the mid 90s, on cpap for the night   GI/Diet: Regular  : voiding WNL, adequate output  Skin/Incisions/Sites: R knee incision, covered and CDI  Pulses/CMS: WNL  Edema: non noted   Activity/Falls Risk: A x1 with W and BG  Lines/Drains/IVs: PIV R hand, LR @ 100 ml /hr  VS/Pain: VSS, pain 6-8/10 given oxy and dilaudid

## 2021-12-08 NOTE — PLAN OF CARE
Patient vital signs are at baseline: Yes  Patient able to ambulate as they were prior to admission or with assist devices provided by therapies during their stay:  Yes  Patient MUST void prior to discharge:  Yes  Patient able to tolerate oral intake:  Yes  Pain has adequate pain control using Oral analgesics:  Yes   A/OX4,cms intact.Up with 1/walker.Aquacel dreg CDI.Oxycodone for pain with adequate pain control. All discharge meds and paper explained and given to the pt.Pt discharge home.

## 2022-01-09 ENCOUNTER — HEALTH MAINTENANCE LETTER (OUTPATIENT)
Age: 57
End: 2022-01-09

## 2022-02-20 ENCOUNTER — ANCILLARY PROCEDURE (OUTPATIENT)
Dept: CARDIOLOGY | Facility: CLINIC | Age: 57
End: 2022-02-20
Attending: INTERNAL MEDICINE
Payer: COMMERCIAL

## 2022-02-20 DIAGNOSIS — Z45.09 ENCOUNTER FOR LOOP RECORDER CHECK: Primary | ICD-10-CM

## 2022-02-20 DIAGNOSIS — I63.9 CRYPTOGENIC STROKE (H): ICD-10-CM

## 2022-02-20 PROCEDURE — G2066 INTER DEVC REMOTE 30D: HCPCS | Performed by: INTERNAL MEDICINE

## 2022-02-20 PROCEDURE — 93297 REM INTERROG DEV EVAL ICPMS: CPT | Performed by: INTERNAL MEDICINE

## 2022-03-07 NOTE — TELEPHONE ENCOUNTER
----- Message from Jailene Colon RN sent at 4/26/2021  8:30 AM CDT -----  I was notified by pre-op department this morning at 6am that patient was COVID positive and pt therefore assumed his PFO would need to be cancelled today. I instructed pre-op team to page Dr. Alvarenga to make aware.     Jone and Jeannine - just wanted to loop you in to get patient rescheduled.    Thanks,  Jailene        Patient concerned he has not been getting his regular home trazodone, paxil, and gabapentin. MD aware and orders placed.

## 2022-05-03 ENCOUNTER — LAB (OUTPATIENT)
Dept: LAB | Facility: CLINIC | Age: 57
End: 2022-05-03
Payer: COMMERCIAL

## 2022-05-03 ENCOUNTER — OFFICE VISIT (OUTPATIENT)
Dept: CARDIOLOGY | Facility: CLINIC | Age: 57
End: 2022-05-03
Attending: INTERNAL MEDICINE
Payer: COMMERCIAL

## 2022-05-03 ENCOUNTER — HOSPITAL ENCOUNTER (OUTPATIENT)
Dept: CARDIOLOGY | Facility: CLINIC | Age: 57
Discharge: HOME | End: 2022-05-03
Attending: INTERNAL MEDICINE | Admitting: INTERNAL MEDICINE
Payer: COMMERCIAL

## 2022-05-03 VITALS
WEIGHT: 293 LBS | DIASTOLIC BLOOD PRESSURE: 81 MMHG | HEART RATE: 67 BPM | BODY MASS INDEX: 41.02 KG/M2 | HEIGHT: 71 IN | SYSTOLIC BLOOD PRESSURE: 127 MMHG

## 2022-05-03 DIAGNOSIS — I63.412 CEREBROVASCULAR ACCIDENT (CVA) DUE TO EMBOLISM OF LEFT MIDDLE CEREBRAL ARTERY (H): ICD-10-CM

## 2022-05-03 DIAGNOSIS — E78.5 HYPERLIPIDEMIA LDL GOAL <70: ICD-10-CM

## 2022-05-03 DIAGNOSIS — I71.20 THORACIC AORTIC ANEURYSM WITHOUT RUPTURE (H): ICD-10-CM

## 2022-05-03 DIAGNOSIS — Q21.12 PFO (PATENT FORAMEN OVALE): ICD-10-CM

## 2022-05-03 LAB
CHOLEST SERPL-MCNC: 112 MG/DL
FASTING STATUS PATIENT QL REPORTED: YES
HDLC SERPL-MCNC: 45 MG/DL
LDLC SERPL CALC-MCNC: 41 MG/DL
LVEF ECHO: NORMAL
NONHDLC SERPL-MCNC: 67 MG/DL
TRIGL SERPL-MCNC: 129 MG/DL

## 2022-05-03 PROCEDURE — 93325 DOPPLER ECHO COLOR FLOW MAPG: CPT | Mod: 26 | Performed by: INTERNAL MEDICINE

## 2022-05-03 PROCEDURE — 93321 DOPPLER ECHO F-UP/LMTD STD: CPT | Mod: 26 | Performed by: INTERNAL MEDICINE

## 2022-05-03 PROCEDURE — 93308 TTE F-UP OR LMTD: CPT | Mod: 26 | Performed by: INTERNAL MEDICINE

## 2022-05-03 PROCEDURE — 99214 OFFICE O/P EST MOD 30 MIN: CPT | Mod: 25 | Performed by: INTERNAL MEDICINE

## 2022-05-03 PROCEDURE — 93325 DOPPLER ECHO COLOR FLOW MAPG: CPT

## 2022-05-03 PROCEDURE — 80061 LIPID PANEL: CPT | Performed by: INTERNAL MEDICINE

## 2022-05-03 PROCEDURE — 36415 COLL VENOUS BLD VENIPUNCTURE: CPT | Performed by: INTERNAL MEDICINE

## 2022-05-03 NOTE — PROGRESS NOTES
HPI and Plan:   See dictation    No orders of the defined types were placed in this encounter.    Orders Placed This Encounter   Medications     aspirin (ASA) 325 MG EC tablet     Sig: Take 1 tablet (325 mg) by mouth daily     Dispense:  1 tablet     Refill:  0     Medications Discontinued During This Encounter   Medication Reason     hydrOXYzine (ATARAX) 25 MG tablet Stopped by Patient     oxyCODONE (ROXICODONE) 5 MG tablet Therapy completed     rivaroxaban ANTICOAGULANT (XARELTO) 10 MG TABS tablet Therapy completed     senna-docusate (SENOKOT-S/PERICOLACE) 8.6-50 MG tablet Stopped by Patient     celecoxib (CELEBREX) 200 MG capsule Therapy completed         Encounter Diagnoses   Name Primary?     PFO (patent foramen ovale)      Cerebrovascular accident (CVA) due to embolism of left middle cerebral artery (H)      Hyperlipidemia LDL goal <70      Thoracic aortic aneurysm without rupture (H)        CURRENT MEDICATIONS:  Current Outpatient Medications   Medication Sig Dispense Refill     acetaminophen (TYLENOL) 325 MG tablet Take 2 tablets (650 mg) by mouth every 4 hours as needed for other (mild pain) 100 tablet 0     aspirin (ASA) 325 MG EC tablet Take 1 tablet (325 mg) by mouth daily 1 tablet 0     atorvastatin (LIPITOR) 40 MG tablet Take 20 mg by mouth every evening (40MG X 0.5 = 20MG) 90 tablet 3     levothyroxine (SYNTHROID/LEVOTHROID) 150 MCG tablet Take 150 mcg by mouth daily        metoprolol succinate ER (TOPROL-XL) 50 MG 24 hr tablet Take 1 tablet (50 mg) by mouth daily 90 tablet 3       ALLERGIES     Allergies   Allergen Reactions     No Known Drug Allergies        PAST MEDICAL HISTORY:  Past Medical History:   Diagnosis Date     Embolic stroke involving left middle cerebral artery (H) 03/2020     Hyperlipidemia      Hypothyroidism      Kidney stone     Recurent stones     Sen's neuroma of right foot      Obese      DAMARIS on CPAP      Palpitations 3/6/2020     Paroxysmal atrial fibrillation (H)      PFO  (patent foramen ovale)      Sinus of Valsalva aneurysm        PAST SURGICAL HISTORY:  Past Surgical History:   Procedure Laterality Date     ARTHROPLASTY KNEE Right 12/7/2021    Procedure: RIGHT TOTAL KNEE ARTHROPLASTY;  Surgeon: Ebenezer Stewart MD;  Location:  OR     CV PFO CLOSURE N/A 05/06/2021    Procedure: Patent Foramen Ovale Closure; gore cardioform 25mm,  Surgeon: Chi Alvarenga MD;  Location:  HEART CARDIAC CATH LAB     CYSTOSCOPY       CYSTOSCOPY, RETROGRADES, COMBINED  05/24/2014    Procedure: COMBINED CYSTOSCOPY, RETROGRADES;  Surgeon: Francisco J Lerma MD;  Location:  OR     ENT SURGERY      T & A     EP LOOP RECORDER IMPLANT N/A 10/12/2020    Procedure: EP Loop Recorder Implant;  Surgeon: Lefty Ramires MD;  Location:  HEART CARDIAC CATH LAB     IR CAROTID CEREBRAL ANGIOGRAM LEFT  03/05/2020     KNEE SURGERY Right     arthroscopic     LASER HOLMIUM LITHOTRIPSY URETER(S), INSERT STENT, COMBINED  07/27/2012    Procedure: COMBINED CYSTOSCOPY, URETEROSCOPY, LASER HOLMIUM LITHOTRIPSY URETER(S), INSERT STENT;  Video cystoscopy, Right Retrograde Right Ureteroscopy with Holmium Laser, Stone Extraction,  JJ Stent Placement ;  Surgeon: Francisco J Lerma MD;  Location:  OR     LASER HOLMIUM LITHOTRIPSY URETER(S), INSERT STENT, COMBINED  05/24/2014    Procedure: COMBINED CYSTOSCOPY, URETEROSCOPY, LASER HOLMIUM LITHOTRIPSY URETER(S), INSERT STENT;  Surgeon: Francisco J Lerma MD;  Location:  OR     LASER HOLMIUM LITHOTRIPSY URETER(S), INSERT STENT, COMBINED Right 03/05/2017    Procedure: COMBINED CYSTOSCOPY, URETEROSCOPY, LASER HOLMIUM LITHOTRIPSY URETER(S), INSERT STENT;  Surgeon: Chris Barrios MD;  Location:  OR     ORTHOPEDIC SURGERY Right     Meniscal tear     ROTATOR CUFF REPAIR RT/LT      left shoulder     wisdom teeth         FAMILY HISTORY:  Family History   Problem Relation Age of Onset     Hyperlipidemia Mother         pulm emboli, IUD perforation and  "developed clot     Other - See Comments Father         Possible Autoimmune disorder , DVT, pulm embolism     Prostate Cancer Father        SOCIAL HISTORY:  Social History     Socioeconomic History     Marital status:      Spouse name: None     Number of children: 3     Years of education: None     Highest education level: None   Occupational History     Occupation: Golf [employer]   Tobacco Use     Smoking status: Never Smoker     Smokeless tobacco: Never Used   Substance and Sexual Activity     Alcohol use: Not Currently     Drug use: No   Social History Narrative    , 3 children, works as a golf pro, no advanced directives but is full code. (last updated 3/5/2020)        Review of Systems:  Skin:  Negative     Eyes:  Negative    ENT:  Negative    Respiratory:  Positive for sleep apnea;CPAP  Cardiovascular:  Negative    Gastroenterology: Negative    Genitourinary:  Negative    Musculoskeletal:  Negative    Neurologic:  Positive for stroke  Psychiatric:  Negative    Heme/Lymph/Imm:  Negative    Endocrine:  Positive for thyroid disorder    Physical Exam:  Vitals: /81 (BP Location: Left arm, Cuff Size: Adult Large)   Pulse 67   Ht 1.803 m (5' 11\")   Wt 132.9 kg (293 lb)   BMI 40.87 kg/m      Constitutional:           Skin:             Head:           Eyes:           Lymph:      ENT:           Neck:           Respiratory:            Cardiac:                                                           GI:           Extremities and Muscular Skeletal:                 Neurological:           Psych:         Recent Lab Results:  LIPID RESULTS:  Lab Results   Component Value Date    CHOL 112 05/03/2022    CHOL 125 10/19/2020    HDL 45 05/03/2022    HDL 45 10/19/2020    LDL 41 05/03/2022    LDL 29 10/19/2020    TRIG 129 05/03/2022    TRIG 257 (H) 10/19/2020       LIVER ENZYME RESULTS:  Lab Results   Component Value Date    AST 13 03/05/2020    ALT 48 06/05/2020       CBC RESULTS:  Lab Results   Component Value " Date    WBC 6.6 05/06/2021    RBC 4.68 05/06/2021    HGB 12.2 (L) 12/08/2021    HGB 14.6 05/06/2021    HCT 43.1 05/06/2021    MCV 92 05/06/2021    MCH 31.2 05/06/2021    MCHC 33.9 05/06/2021    RDW 13.7 05/06/2021     05/06/2021       BMP RESULTS:  Lab Results   Component Value Date     05/06/2021    POTASSIUM 3.8 05/06/2021    CHLORIDE 113 (H) 05/06/2021    CO2 25 05/06/2021    ANIONGAP 5 05/06/2021    GLC 99 12/08/2021    GLC 89 10/12/2020    BUN 18 10/12/2020    CR 0.84 05/06/2021    GFRESTIMATED >90 05/06/2021    GFRESTBLACK >90 05/06/2021    DAMARIS 8.4 (L) 10/12/2020        A1C RESULTS:  Lab Results   Component Value Date    A1C 5.5 03/05/2020       INR RESULTS:  Lab Results   Component Value Date    INR 0.95 05/06/2021    INR 1.02 03/05/2020           CC  Chi Alvarenga MD  [address]  [place] [address]

## 2022-05-03 NOTE — PROGRESS NOTES
Service Date: 05/03/2022    CARDIOLOGY OFFICE NOTE    HISTORY OF PRESENT ILLNESS:  Rey Andre returns for followup.  He had a stroke in 2020.  We placed a [place] Cardioform 25 mm device in his PFO approximately 1 year ago.  He has done well since that time with no recurrent stroke.  He had knee surgery since I last saw him, and he was on Xarelto for a short time to prevent clot.  Of note, both his parents had a history of clot.  He had a clot with his stroke.  We did do some of the genetic testing, and no clear abnormality was found to make him hypercoagulable. Somewhere along the line, though, he misunderstood the directions, and after we stopped the Plavix at the 6 month miguelangel, he was supposed to switch to aspirin 325 a day, but he did not do that, so we have alerted him to make sure he does that now.  He also had a cholesterol abnormality, and so we placed him on Lipitor, and we were able to lower the dose to 20 mg.  We reviewed those numbers today, and they are completely at goal.  We also note that he has a history of sleep apnea, and he is not currently using his CPAP.  We recommended he see a pulmonologist regarding that.  His previous echo suggested some right ventricular enlargement.  I reviewed the regular echo today.  The RV size is normal at the most top of normal.  LV and RV functions are normal, and the PFO device is well healed. We also reviewed his loop recorder.  It had been implanted when they were looking for possible atrial fib as a cause of stroke, and there had been no arrhythmias identified.      My plan is I will see him back in 1 year, and we will again review his loop recorder, and I will get a lipid profile.  I would like to turn all of his care over to Chava Collado at some point, but since he still has the loop recorder in place, until the battery goes out and we remove the device, I will continue to see him here.  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  It is relatively stable at 45  mm or so.  He is on metoprolol to keep his blood pressure down and keep the sinus of Valsalva from expanding further.  We will probably do echoes every couple of years or a CAT scan periodically to measure that aorta size, and I discussed all this with him.    Today's visit ended up being a little bit longer.  It was a 35 minute visit.  We discussed the loop recorder results.  We looked at the actual echo and talked about the aortic root being enlarged.  We showed him where the PFO device was.  We talked about sleep apnea and why he should be checked and if he needs to be placed back on it because I told him it can cause heart disease, hypertension, etc.  We talked about his cholesterol profile.    cc:  Chava Collado MD  [facility] [address] [place] [address]    Chi Alvarenga MD        D: 2022   T: 2022   MT: nuria    Name:     RAFFY MELGAR  MRN:      [id number]        Account:      [id number]   :      1965           Service Date: 2022       Document: [id number]

## 2022-05-03 NOTE — LETTER
5/3/2022    Chava Collado DO  [facility] [address]  [facility] [address]    RE: Rey Andre       Dear Colleague,     [facility] the [facility].  HPI and Plan:   See dictation    No orders of the defined types were placed in this encounter.    Orders Placed This Encounter   Medications     aspirin (ASA) 325 MG EC tablet     Sig: Take 1 tablet (325 mg) by mouth daily     Dispense:  1 tablet     Refill:  0     Medications Discontinued During This Encounter   Medication Reason     hydrOXYzine (ATARAX) 25 MG tablet Stopped by Patient     oxyCODONE (ROXICODONE) 5 MG tablet Therapy completed     rivaroxaban ANTICOAGULANT (XARELTO) 10 MG TABS tablet Therapy completed     senna-docusate (SENOKOT-S/PERICOLACE) 8.6-50 MG tablet Stopped by Patient     celecoxib (CELEBREX) 200 MG capsule Therapy completed         Encounter Diagnoses   Name Primary?     PFO (patent foramen ovale)      Cerebrovascular accident (CVA) due to embolism of left middle cerebral artery (H)      Hyperlipidemia LDL goal <70      Thoracic aortic aneurysm without rupture (H)        CURRENT MEDICATIONS:  Current Outpatient Medications   Medication Sig Dispense Refill     acetaminophen (TYLENOL) 325 MG tablet Take 2 tablets (650 mg) by mouth every 4 hours as needed for other (mild pain) 100 tablet 0     aspirin (ASA) 325 MG EC tablet Take 1 tablet (325 mg) by mouth daily 1 tablet 0     atorvastatin (LIPITOR) 40 MG tablet Take 20 mg by mouth every evening (40MG X 0.5 = 20MG) 90 tablet 3     levothyroxine (SYNTHROID/LEVOTHROID) 150 MCG tablet Take 150 mcg by mouth daily        metoprolol succinate ER (TOPROL-XL) 50 MG 24 hr tablet Take 1 tablet (50 mg) by mouth daily 90 tablet 3       ALLERGIES     Allergies   Allergen Reactions     No Known Drug Allergies        PAST MEDICAL HISTORY:  Past Medical History:   Diagnosis Date     Embolic stroke involving left middle cerebral artery (H)  03/2020     Hyperlipidemia      Hypothyroidism      Kidney stone     Recurent stones     Sen's neuroma of right foot      Obese      DAMARIS on CPAP      Palpitations 3/6/2020     Paroxysmal atrial fibrillation (H)      PFO (patent foramen ovale)      Sinus of Valsalva aneurysm        PAST SURGICAL HISTORY:  Past Surgical History:   Procedure Laterality Date     ARTHROPLASTY KNEE Right 12/7/2021    Procedure: RIGHT TOTAL KNEE ARTHROPLASTY;  Surgeon: Ebenezer Stewart MD;  Location:  OR     CV PFO CLOSURE N/A 05/06/2021    Procedure: Patent Foramen Ovale Closure; gore cardioform 25mm,  Surgeon: Chi Alvarenga MD;  Location:  HEART CARDIAC CATH LAB     CYSTOSCOPY       CYSTOSCOPY, RETROGRADES, COMBINED  05/24/2014    Procedure: COMBINED CYSTOSCOPY, RETROGRADES;  Surgeon: Francisco J Lerma MD;  Location:  OR     ENT SURGERY      T & A     EP LOOP RECORDER IMPLANT N/A 10/12/2020    Procedure: EP Loop Recorder Implant;  Surgeon: Lefty Ramires MD;  Location:  HEART CARDIAC CATH LAB     IR CAROTID CEREBRAL ANGIOGRAM LEFT  03/05/2020     KNEE SURGERY Right     arthroscopic     LASER HOLMIUM LITHOTRIPSY URETER(S), INSERT STENT, COMBINED  07/27/2012    Procedure: COMBINED CYSTOSCOPY, URETEROSCOPY, LASER HOLMIUM LITHOTRIPSY URETER(S), INSERT STENT;  Video cystoscopy, Right Retrograde Right Ureteroscopy with Holmium Laser, Stone Extraction,  JJ Stent Placement ;  Surgeon: Francisco J Lemra MD;  Location:  OR     LASER HOLMIUM LITHOTRIPSY URETER(S), INSERT STENT, COMBINED  05/24/2014    Procedure: COMBINED CYSTOSCOPY, URETEROSCOPY, LASER HOLMIUM LITHOTRIPSY URETER(S), INSERT STENT;  Surgeon: Francisco J Lerma MD;  Location:  OR     LASER HOLMIUM LITHOTRIPSY URETER(S), INSERT STENT, COMBINED Right 03/05/2017    Procedure: COMBINED CYSTOSCOPY, URETEROSCOPY, LASER HOLMIUM LITHOTRIPSY URETER(S), INSERT STENT;  Surgeon: Chris Barrios MD;  Location:  OR     ORTHOPEDIC SURGERY Right   "   Meniscal tear     ROTATOR CUFF REPAIR RT/LT      left shoulder     wisdom teeth         FAMILY HISTORY:  Family History   Problem Relation Age of Onset     Hyperlipidemia Mother         pulm emboli, IUD perforation and developed clot     Other - See Comments Father         Possible Autoimmune disorder , DVT, pulm embolism     Prostate Cancer Father        SOCIAL HISTORY:  Social History     Socioeconomic History     Marital status:      Spouse name: None     Number of children: 3     Years of education: None     Highest education level: None   Occupational History     Occupation: Golf [employer]   Tobacco Use     Smoking status: Never Smoker     Smokeless tobacco: Never Used   Substance and Sexual Activity     Alcohol use: Not Currently     Drug use: No   Social History Narrative    , 3 children, works as a golf pro, no advanced directives but is full code. (last updated 3/5/2020)        Review of Systems:  Skin:  Negative     Eyes:  Negative    ENT:  Negative    Respiratory:  Positive for sleep apnea;CPAP  Cardiovascular:  Negative    Gastroenterology: Negative    Genitourinary:  Negative    Musculoskeletal:  Negative    Neurologic:  Positive for stroke  Psychiatric:  Negative    Heme/Lymph/Imm:  Negative    Endocrine:  Positive for thyroid disorder    Physical Exam:  Vitals: /81 (BP Location: Left arm, Cuff Size: Adult Large)   Pulse 67   Ht 1.803 m (5' 11\")   Wt 132.9 kg (293 lb)   BMI 40.87 kg/m      Constitutional:           Skin:             Head:           Eyes:           Lymph:      ENT:           Neck:           Respiratory:            Cardiac:                                                           GI:           Extremities and Muscular Skeletal:                 Neurological:           Psych:         Recent Lab Results:  LIPID RESULTS:  Lab Results   Component Value Date    CHOL 112 05/03/2022    CHOL 125 10/19/2020    HDL 45 05/03/2022    HDL 45 10/19/2020    LDL 41 05/03/2022    LDL " 29 10/19/2020    TRIG 129 05/03/2022    TRIG 257 (H) 10/19/2020       LIVER ENZYME RESULTS:  Lab Results   Component Value Date    AST 13 03/05/2020    ALT 48 06/05/2020       CBC RESULTS:  Lab Results   Component Value Date    WBC 6.6 05/06/2021    RBC 4.68 05/06/2021    HGB 12.2 (L) 12/08/2021    HGB 14.6 05/06/2021    HCT 43.1 05/06/2021    MCV 92 05/06/2021    MCH 31.2 05/06/2021    MCHC 33.9 05/06/2021    RDW 13.7 05/06/2021     05/06/2021       BMP RESULTS:  Lab Results   Component Value Date     05/06/2021    POTASSIUM 3.8 05/06/2021    CHLORIDE 113 (H) 05/06/2021    CO2 25 05/06/2021    ANIONGAP 5 05/06/2021    GLC 99 12/08/2021    GLC 89 10/12/2020    BUN 18 10/12/2020    CR 0.84 05/06/2021    GFRESTIMATED >90 05/06/2021    GFRESTBLACK >90 05/06/2021    DAMARIS 8.4 (L) 10/12/2020        A1C RESULTS:  Lab Results   Component Value Date    A1C 5.5 03/05/2020       INR RESULTS:  Lab Results   Component Value Date    INR 0.95 05/06/2021    INR 1.02 03/05/2020       CC  Chi Alvarenga MD  [address]  [place] [address]      Thank you for allowing me to participate in the care of your patient.      Sincerely,     Chi Alvarenga MD     [facility]

## 2022-06-04 ENCOUNTER — ANCILLARY PROCEDURE (OUTPATIENT)
Dept: CARDIOLOGY | Facility: CLINIC | Age: 57
End: 2022-06-04
Attending: INTERNAL MEDICINE
Payer: COMMERCIAL

## 2022-06-04 DIAGNOSIS — I63.9 CRYPTOGENIC STROKE (H): ICD-10-CM

## 2022-06-04 DIAGNOSIS — Z45.09 ENCOUNTER FOR LOOP RECORDER CHECK: ICD-10-CM

## 2022-06-04 PROCEDURE — 93297 REM INTERROG DEV EVAL ICPMS: CPT | Performed by: INTERNAL MEDICINE

## 2022-06-04 PROCEDURE — G2066 INTER DEVC REMOTE 30D: HCPCS | Performed by: INTERNAL MEDICINE

## 2022-06-09 LAB
MDC_IDC_EPISODE_DTM: NORMAL
MDC_IDC_EPISODE_DTM: NORMAL
MDC_IDC_EPISODE_DURATION: 4.14 S
MDC_IDC_EPISODE_DURATION: 4.95 S
MDC_IDC_EPISODE_ID: 5
MDC_IDC_EPISODE_ID: 6
MDC_IDC_EPISODE_TYPE: NORMAL
MDC_IDC_EPISODE_TYPE: NORMAL
MDC_IDC_MSMT_BATTERY_STATUS: NORMAL
MDC_IDC_PG_IMPLANT_DTM: NORMAL
MDC_IDC_PG_MFG: NORMAL
MDC_IDC_PG_MODEL: NORMAL
MDC_IDC_PG_SERIAL: NORMAL
MDC_IDC_PG_TYPE: NORMAL
MDC_IDC_SESS_CLINIC_NAME: NORMAL
MDC_IDC_SESS_DTM: NORMAL
MDC_IDC_SESS_TYPE: NORMAL
MDC_IDC_SET_ZONE_DETECTION_INTERVAL: 2000 MS
MDC_IDC_SET_ZONE_DETECTION_INTERVAL: 3000 MS
MDC_IDC_SET_ZONE_DETECTION_INTERVAL: 340 MS
MDC_IDC_SET_ZONE_TYPE: NORMAL
MDC_IDC_STAT_AT_BURDEN_PERCENT: 0 %
MDC_IDC_STAT_AT_DTM_END: NORMAL
MDC_IDC_STAT_AT_DTM_START: NORMAL
MDC_IDC_STAT_EPISODE_RECENT_COUNT: 0
MDC_IDC_STAT_EPISODE_RECENT_COUNT: 2
MDC_IDC_STAT_EPISODE_RECENT_COUNT_DTM_END: NORMAL
MDC_IDC_STAT_EPISODE_RECENT_COUNT_DTM_START: NORMAL
MDC_IDC_STAT_EPISODE_TOTAL_COUNT: 0
MDC_IDC_STAT_EPISODE_TOTAL_COUNT: 6
MDC_IDC_STAT_EPISODE_TOTAL_COUNT_DTM_END: NORMAL
MDC_IDC_STAT_EPISODE_TOTAL_COUNT_DTM_START: NORMAL
MDC_IDC_STAT_EPISODE_TYPE: NORMAL

## 2022-09-16 DIAGNOSIS — I63.412 CEREBROVASCULAR ACCIDENT (CVA) DUE TO EMBOLISM OF LEFT MIDDLE CEREBRAL ARTERY (H): ICD-10-CM

## 2022-09-16 DIAGNOSIS — I71.20 THORACIC AORTIC ANEURYSM WITHOUT RUPTURE (H): ICD-10-CM

## 2022-09-16 DIAGNOSIS — Q21.12 PFO (PATENT FORAMEN OVALE): ICD-10-CM

## 2022-09-16 DIAGNOSIS — E78.5 HYPERLIPIDEMIA LDL GOAL <70: ICD-10-CM

## 2022-09-16 RX ORDER — METOPROLOL SUCCINATE 50 MG/1
50 TABLET, EXTENDED RELEASE ORAL DAILY
Qty: 90 TABLET | Refills: 2 | Status: SHIPPED | OUTPATIENT
Start: 2022-09-16 | End: 2023-06-20

## 2022-09-16 RX ORDER — ATORVASTATIN CALCIUM 40 MG/1
20 TABLET, FILM COATED ORAL EVERY EVENING
Qty: 45 TABLET | Refills: 2 | Status: SHIPPED | OUTPATIENT
Start: 2022-09-16 | End: 2024-02-01

## 2022-09-16 NOTE — TELEPHONE ENCOUNTER
Received refill request for:  Metoprolol, Atorvastatin    [facility] Cardiology Refill Guideline reviewed.  Medication meets criteria for refill.    Sanjuanita Joseph RN, BSN  09/16/22 at 1:00 PM

## 2022-11-07 ENCOUNTER — TELEPHONE (OUTPATIENT)
Dept: NEUROLOGY | Facility: CLINIC | Age: 57
End: 2022-11-07

## 2022-11-07 NOTE — TELEPHONE ENCOUNTER
Per recall report due for Virtual Stroke/TIA Neurology follow up in December, 2022 with Nuha Soliz- 30 MIN.    ZHANG BREWER, CMA

## 2022-11-21 ENCOUNTER — HEALTH MAINTENANCE LETTER (OUTPATIENT)
Age: 57
End: 2022-11-21

## 2022-12-05 ENCOUNTER — DOCUMENTATION ONLY (OUTPATIENT)
Dept: NEUROLOGY | Facility: CLINIC | Age: 57
End: 2022-12-05

## 2022-12-05 ENCOUNTER — VIRTUAL VISIT (OUTPATIENT)
Dept: NEUROLOGY | Facility: CLINIC | Age: 57
End: 2022-12-05
Payer: COMMERCIAL

## 2022-12-05 DIAGNOSIS — G47.33 OBSTRUCTIVE SLEEP APNEA SYNDROME: ICD-10-CM

## 2022-12-05 DIAGNOSIS — I63.412 STROKE DUE TO EMBOLISM OF LEFT MIDDLE CEREBRAL ARTERY (H): Primary | ICD-10-CM

## 2022-12-05 PROCEDURE — 99215 OFFICE O/P EST HI 40 MIN: CPT | Mod: GT | Performed by: PHYSICIAN ASSISTANT

## 2022-12-05 NOTE — LETTER
12/5/2022         RE: Rey Andre  [id number]  [facility] [address]        Dear Colleague,    Thank you for referring your patient, Rey Andre, to the [facility] NEUROLOGY [facility]. Please see a copy of my visit note below.    Phillip is a 57 year old who is being evaluated via a billable video visit.      How would you like to obtain your AVS? MyChart  If the video visit is dropped, the invitation should be resent by: Send to e-mail at: [email or web address]  Will anyone else be joining your video visit? No        Video-Visit Details    Video Start Time: 0851    Type of service:  Video Visit    Video End Time:9:05 AM    Originating Location (pt. Location): Home        Distant Location (provider location):  Off-site    Platform used for Video Visit: [facility]         __________________________________________________________      [facility]   [phone]  __________________________________________________________    Chief Complaint: No chief complaint on file.      History of Present Illness: Rey Andre is a 57 year old male presenting for follow-up for stroke.     He presented to the hospital in March 2020 with aphasia and right-sided weakness, with significant deficits, He had a history of obstructive sleep apnea and hypothyroidism.  And he was enrolled in the TIMELESS trial (tenecteplase or placebo for patients with large vessel occlusion presenting outside of conventional tPA window) and then underwent successful endovascular thrombectomy for L M1 clot.   He was found to have a PFO that was closed.    Since his last visit with us a year ago, he had repeat labs-- LDL 41 in 5/2022. Has not been back to sleep specialist.  No lasting stroke symptoms whatsoever.  Loop recorder still getting reports. Has had some pauses reported there. His knee replacement went fine.  He is trying to lose weight    Modified [place] Scale  Score: 0-No symptoms    Impression:  "  Problem List Items Addressed This Visit        Nervous and Auditory    Stroke due to embolism of left middle cerebral artery (H) - Primary    Relevant Orders    Adult Sleep Eval & Management  Referral   Other Visit Diagnoses     Obstructive sleep apnea syndrome        Relevant Orders    Adult Sleep Eval & Management  Referral        57M with ESUS (L MCA) in 3/2020, enrolled in TIMELESS trial at that time and underwent successful thrombectomy. Etiology remains ESUS, but could have been PFO-associated stroke, and his PFO is now closed. Ongoing monitoring for afib with ILR.    Plan:   - Continue aspirin 325mg daily based on his weight of >70kg---   - DAMARIS referral  - continue statin for goal LDL  40-70  - Continue good long term BP control <135/80  - Will refer for sleep specialist- he reports he wants to transfer care of this to [facility]  - Return for with CHUCK Soliz only, as needed.    Stroke Education provided.  He will call us with any questions.  For any acute neurologic deficits he was advised to  go directly to the hospital rather than call the clinic.    Emi Soliz PA-C  Neurology  12/05/2022 8:53 AM  To page me or covering stroke neurology team member, click here: AMCOM  Choose \"On Call\" tab at top, then search dropdown box for \"Neurology Adult\" & press Enter, look for Neuro ICU/Stroke    ___________________________________________________________________    Current Medications  Current Outpatient Medications   Medication Sig     acetaminophen (TYLENOL) 325 MG tablet Take 2 tablets (650 mg) by mouth every 4 hours as needed for other (mild pain)     aspirin (ASA) 325 MG EC tablet Take 1 tablet (325 mg) by mouth daily     atorvastatin (LIPITOR) 40 MG tablet Take 0.5 tablets (20 mg) by mouth every evening (40MG X 0.5 = 20MG)     levothyroxine (SYNTHROID/LEVOTHROID) 150 MCG tablet Take 150 mcg by mouth daily      metoprolol succinate ER (TOPROL XL) 50 MG 24 hr tablet Take 1 tablet (50 mg) by " "mouth daily     No current facility-administered medications for this visit.       Past Medical History  Past Medical History:   Diagnosis Date     Embolic stroke involving left middle cerebral artery (H) 03/2020     Hyperlipidemia      Hypothyroidism      Kidney stone     Recurent stones     Sen's neuroma of right foot      Obese      DAMARIS on CPAP      Palpitations 3/6/2020     Paroxysmal atrial fibrillation (H)      PFO (patent foramen ovale)      Sinus of Valsalva aneurysm        Social History  Social History     Tobacco Use     Smoking status: Never     Passive exposure: Never     Smokeless tobacco: Never   Vaping Use     Vaping Use: Never used   Substance Use Topics     Alcohol use: Not Currently     Drug use: No       Family History  Family History   Problem Relation Age of Onset     Hyperlipidemia Mother         pulm emboli, IUD perforation and developed clot     Other - See Comments Father         Possible Autoimmune disorder , DVT, pulm embolism     Prostate Cancer Father        Physical Exam    Vitals - Patient Reported 7/7/2020 11/30/2021 12/5/2022   Weight (Patient Reported) 250 lb 275 lb 270 lb   Systolic (Patient Reported) - 130 -   Diastolic (Patient Reported) - 76 -             Estimated body mass index is 40.87 kg/m  as calculated from the following:    Height as of 5/3/22: 1.803 m (5' 11\").    Weight as of 5/3/22: 132.9 kg (293 lb).    General:  no acute distress  HEENT:  normocephalic/atraumatic  Pulmonary:  no respiratory distress    Neurologic  Mental Status:  alert, no confusion, speech clear and fluent  Cranial Nerves:  EOMI, facial movements symmetric, hearing not formally tested but intact to conversation, no dysarthria  Motor:  no abnormal movements, but not tested formally  Reflexes:  unable to test (telestroke)  Sensory:  unable to test (telestroke)  Coordination: Deferred  Station/Gait:  unable to test (telestroke)      Neuroimaging: as per HPI. I personally reviewed those " images    Labs:    Coagulation studies:  Recent Labs   Lab Test 05/06/21  0740 03/05/20  0731   INR 0.95 1.02        Lipid panel:  Recent Labs   Lab Test 05/03/22  0835 10/19/20  1528   CHOL 112 125   HDL 45 45   LDL 41 29   TRIG 129 257*       HbA1C:  Recent Labs   Lab Test 03/05/20  1444   A1C 5.5           Billing:    I spent a total of 45 minutes on the day of the visit.   Time spent doing chart review, history and exam, documentation and further activities per the note          Again, thank you for allowing me to participate in the care of your patient.        Sincerely,        Emi Soliz PA-C

## 2022-12-05 NOTE — PROGRESS NOTES
Niesha is a 57 year old who is being evaluated via a billable video visit.      How would you like to obtain your AVS? MyChart  If the video visit is dropped, the invitation should be resent by: Send to e-mail at: [email or web address]  Will anyone else be joining your video visit? No        Video-Visit Details    Video Start Time: 0851    Type of service:  Video Visit    Video End Time:9:05 AM    Originating Location (pt. Location): Home        Distant Location (provider location):  Off-site    Platform used for Video Visit: OpenTable         __________________________________________________________      [facility]   [phone]  __________________________________________________________    Chief Complaint: No chief complaint on file.      History of Present Illness: Rey Andre is a 57 year old male presenting for follow-up for stroke.     He presented to the hospital in March 2020 with aphasia and right-sided weakness, with significant deficits, He had a history of obstructive sleep apnea and hypothyroidism.  And he was enrolled in the TIMELESS trial (tenecteplase or placebo for patients with large vessel occlusion presenting outside of conventional tPA window) and then underwent successful endovascular thrombectomy for L M1 clot.   He was found to have a PFO that was closed.    Since his last visit with us a year ago, he had repeat labs-- LDL 41 in 5/2022. Has not been back to sleep specialist.  No lasting stroke symptoms whatsoever.  Loop recorder still getting reports. Has had some pauses reported there. His knee replacement went fine.  He is trying to lose weight    Modified Capo Scale  Score: 0-No symptoms    Impression:   Problem List Items Addressed This Visit        Nervous and Auditory    Stroke due to embolism of left middle cerebral artery (H) - Primary    Relevant Orders    Adult Sleep Eval & Management  Referral   Other Visit Diagnoses     Obstructive sleep apnea  "syndrome        Relevant Orders    Adult Sleep Eval & Management  Referral        57M with ESUS (L MCA) in 3/2020, enrolled in TIMELESS trial at that time and underwent successful thrombectomy. Etiology remains ESUS, but could have been PFO-associated stroke, and his PFO is now closed. Ongoing monitoring for afib with ILR.    Plan:   - Continue aspirin 325mg daily based on his weight of >70kg---   - DAMARIS referral  - continue statin for goal LDL  40-70  - Continue good long term BP control <135/80  - Will refer for sleep specialist- he reports he wants to transfer care of this to [facility]  - Return for with CUHCK Soliz only, as needed.    Stroke Education provided.  He will call us with any questions.  For any acute neurologic deficits he was advised to  go directly to the hospital rather than call the clinic.    Emi Soliz PA-C  Neurology  12/05/2022 8:53 AM  To page me or covering stroke neurology team member, click here: AMCOM  Choose \"On Call\" tab at top, then search dropdown box for \"Neurology Adult\" & press Enter, look for Neuro ICU/Stroke    ___________________________________________________________________    Current Medications  Current Outpatient Medications   Medication Sig     acetaminophen (TYLENOL) 325 MG tablet Take 2 tablets (650 mg) by mouth every 4 hours as needed for other (mild pain)     aspirin (ASA) 325 MG EC tablet Take 1 tablet (325 mg) by mouth daily     atorvastatin (LIPITOR) 40 MG tablet Take 0.5 tablets (20 mg) by mouth every evening (40MG X 0.5 = 20MG)     levothyroxine (SYNTHROID/LEVOTHROID) 150 MCG tablet Take 150 mcg by mouth daily      metoprolol succinate ER (TOPROL XL) 50 MG 24 hr tablet Take 1 tablet (50 mg) by mouth daily     No current facility-administered medications for this visit.       Past Medical History  Past Medical History:   Diagnosis Date     Embolic stroke involving left middle cerebral artery (H) 03/2020     Hyperlipidemia      Hypothyroidism      Kidney " "stone     Recurent stones     Sen's neuroma of right foot      Obese      DAMARIS on CPAP      Palpitations 3/6/2020     Paroxysmal atrial fibrillation (H)      PFO (patent foramen ovale)      Sinus of Valsalva aneurysm        Social History  Social History     Tobacco Use     Smoking status: Never     Passive exposure: Never     Smokeless tobacco: Never   Vaping Use     Vaping Use: Never used   Substance Use Topics     Alcohol use: Not Currently     Drug use: No       Family History  Family History   Problem Relation Age of Onset     Hyperlipidemia Mother         pulm emboli, IUD perforation and developed clot     Other - See Comments Father         Possible Autoimmune disorder , DVT, pulm embolism     Prostate Cancer Father        Physical Exam    Vitals - Patient Reported 7/7/2020 11/30/2021 12/5/2022   Weight (Patient Reported) 250 lb 275 lb 270 lb   Systolic (Patient Reported) - 130 -   Diastolic (Patient Reported) - 76 -             Estimated body mass index is 40.87 kg/m  as calculated from the following:    Height as of 5/3/22: 1.803 m (5' 11\").    Weight as of 5/3/22: 132.9 kg (293 lb).    General:  no acute distress  HEENT:  normocephalic/atraumatic  Pulmonary:  no respiratory distress    Neurologic  Mental Status:  alert, no confusion, speech clear and fluent  Cranial Nerves:  EOMI, facial movements symmetric, hearing not formally tested but intact to conversation, no dysarthria  Motor:  no abnormal movements, but not tested formally  Reflexes:  unable to test (telestroke)  Sensory:  unable to test (telestroke)  Coordination: Deferred  Station/Gait:  unable to test (telestroke)      Neuroimaging: as per HPI. I personally reviewed those images    Labs:    Coagulation studies:  Recent Labs   Lab Test 05/06/21  0740 03/05/20  0731   INR 0.95 1.02        Lipid panel:  Recent Labs   Lab Test 05/03/22  0835 10/19/20  1528   CHOL 112 125   HDL 45 45   LDL 41 29   TRIG 129 257*       HbA1C:  Recent Labs   Lab Test " 03/05/20  1444   A1C 5.5           Billing:    I spent a total of 45 minutes on the day of the visit.   Time spent doing chart review, history and exam, documentation and further activities per the note

## 2022-12-06 NOTE — PROGRESS NOTES
I removed atrial fibrillation from his problem list today. I'm not sure how it got added, but this is inaccurate. As of 12/2022 no afib has been found on implanted loop recorder or been diagnosed in the past. If you are reading this and have evidence otherwise please let me know. Thank you.

## 2023-01-10 ENCOUNTER — DOCUMENTATION ONLY (OUTPATIENT)
Dept: CARDIOLOGY | Facility: CLINIC | Age: 58
End: 2023-01-10

## 2023-01-10 NOTE — PROGRESS NOTES
Patient missed device check. Missed appointment letter sent 9/19/22. 1 week missed appointment letter sent 9/27/22. 3 month missed appointment letter sent 12/27/22. No response. Patient has been inactivated in our [employer] system. He will have to call the device clinic at [phone] to get back on our schedule.    SAVAGE Meyer  Device Clinic

## 2023-02-01 ENCOUNTER — OFFICE VISIT (OUTPATIENT)
Dept: FAMILY MEDICINE | Facility: CLINIC | Age: 58
End: 2023-02-01
Payer: COMMERCIAL

## 2023-02-01 VITALS
RESPIRATION RATE: 14 BRPM | WEIGHT: 267 LBS | BODY MASS INDEX: 37.38 KG/M2 | HEART RATE: 92 BPM | SYSTOLIC BLOOD PRESSURE: 119 MMHG | DIASTOLIC BLOOD PRESSURE: 74 MMHG | OXYGEN SATURATION: 96 % | HEIGHT: 71 IN | TEMPERATURE: 98 F

## 2023-02-01 DIAGNOSIS — Z00.00 ROUTINE GENERAL MEDICAL EXAMINATION AT A HEALTH CARE FACILITY: Primary | ICD-10-CM

## 2023-02-01 DIAGNOSIS — Z12.11 SCREEN FOR COLON CANCER: ICD-10-CM

## 2023-02-01 DIAGNOSIS — Z12.5 SCREENING FOR PROSTATE CANCER: ICD-10-CM

## 2023-02-01 DIAGNOSIS — L71.9 ROSACEA: ICD-10-CM

## 2023-02-01 DIAGNOSIS — E03.9 HYPOTHYROIDISM, UNSPECIFIED TYPE: ICD-10-CM

## 2023-02-01 PROCEDURE — 90715 TDAP VACCINE 7 YRS/> IM: CPT | Performed by: PHYSICIAN ASSISTANT

## 2023-02-01 PROCEDURE — 99214 OFFICE O/P EST MOD 30 MIN: CPT | Mod: 25 | Performed by: PHYSICIAN ASSISTANT

## 2023-02-01 PROCEDURE — 90471 IMMUNIZATION ADMIN: CPT | Performed by: PHYSICIAN ASSISTANT

## 2023-02-01 PROCEDURE — 91312 COVID-19 VACCINE BIVALENT BOOSTER 12+ (PFIZER): CPT | Performed by: PHYSICIAN ASSISTANT

## 2023-02-01 PROCEDURE — 90746 HEPB VACCINE 3 DOSE ADULT IM: CPT | Performed by: PHYSICIAN ASSISTANT

## 2023-02-01 PROCEDURE — 99386 PREV VISIT NEW AGE 40-64: CPT | Mod: 25 | Performed by: PHYSICIAN ASSISTANT

## 2023-02-01 PROCEDURE — 90677 PCV20 VACCINE IM: CPT | Performed by: PHYSICIAN ASSISTANT

## 2023-02-01 PROCEDURE — 90472 IMMUNIZATION ADMIN EACH ADD: CPT | Performed by: PHYSICIAN ASSISTANT

## 2023-02-01 PROCEDURE — 0124A COVID-19 VACCINE BIVALENT BOOSTER 12+ (PFIZER): CPT | Performed by: PHYSICIAN ASSISTANT

## 2023-02-01 RX ORDER — LEVOTHYROXINE SODIUM 150 UG/1
150 TABLET ORAL DAILY
Qty: 90 TABLET | Refills: 3 | Status: SHIPPED | OUTPATIENT
Start: 2023-02-01 | End: 2024-02-01

## 2023-02-01 RX ORDER — METRONIDAZOLE 7.5 MG/G
GEL TOPICAL 2 TIMES DAILY
Qty: 45 G | Refills: 11 | Status: SHIPPED | OUTPATIENT
Start: 2023-02-01 | End: 2024-02-01

## 2023-02-01 SDOH — ECONOMIC STABILITY: INCOME INSECURITY: IN THE LAST 12 MONTHS, WAS THERE A TIME WHEN YOU WERE NOT ABLE TO PAY THE MORTGAGE OR RENT ON TIME?: NO

## 2023-02-01 SDOH — ECONOMIC STABILITY: TRANSPORTATION INSECURITY
IN THE PAST 12 MONTHS, HAS THE LACK OF TRANSPORTATION KEPT YOU FROM MEDICAL APPOINTMENTS OR FROM GETTING MEDICATIONS?: NO

## 2023-02-01 SDOH — HEALTH STABILITY: PHYSICAL HEALTH: ON AVERAGE, HOW MANY MINUTES DO YOU ENGAGE IN EXERCISE AT THIS LEVEL?: 30 MIN

## 2023-02-01 SDOH — ECONOMIC STABILITY: INCOME INSECURITY: HOW HARD IS IT FOR YOU TO PAY FOR THE VERY BASICS LIKE FOOD, HOUSING, MEDICAL CARE, AND HEATING?: NOT HARD AT ALL

## 2023-02-01 SDOH — ECONOMIC STABILITY: FOOD INSECURITY: WITHIN THE PAST 12 MONTHS, THE FOOD YOU BOUGHT JUST DIDN'T LAST AND YOU DIDN'T HAVE MONEY TO GET MORE.: NEVER TRUE

## 2023-02-01 SDOH — ECONOMIC STABILITY: FOOD INSECURITY: WITHIN THE PAST 12 MONTHS, YOU WORRIED THAT YOUR FOOD WOULD RUN OUT BEFORE YOU GOT MONEY TO BUY MORE.: NEVER TRUE

## 2023-02-01 SDOH — ECONOMIC STABILITY: TRANSPORTATION INSECURITY
IN THE PAST 12 MONTHS, HAS LACK OF TRANSPORTATION KEPT YOU FROM MEETINGS, WORK, OR FROM GETTING THINGS NEEDED FOR DAILY LIVING?: NO

## 2023-02-01 SDOH — HEALTH STABILITY: PHYSICAL HEALTH: ON AVERAGE, HOW MANY DAYS PER WEEK DO YOU ENGAGE IN MODERATE TO STRENUOUS EXERCISE (LIKE A BRISK WALK)?: 1 DAY

## 2023-02-01 ASSESSMENT — ENCOUNTER SYMPTOMS
ARTHRALGIAS: 0
JOINT SWELLING: 1
WEAKNESS: 0
NAUSEA: 0
SHORTNESS OF BREATH: 0
CONSTIPATION: 0
PALPITATIONS: 0
NERVOUS/ANXIOUS: 0
DIARRHEA: 0
CHILLS: 0
FEVER: 0
ABDOMINAL PAIN: 0
HEADACHES: 1
DYSURIA: 0
FREQUENCY: 0
MYALGIAS: 0
COUGH: 0
HEARTBURN: 0
SORE THROAT: 0
EYE PAIN: 0
DIZZINESS: 0
PARESTHESIAS: 0
HEMATURIA: 0
HEMATOCHEZIA: 0

## 2023-02-01 ASSESSMENT — SOCIAL DETERMINANTS OF HEALTH (SDOH)
HOW OFTEN DO YOU ATTEND CHURCH OR RELIGIOUS SERVICES?: MORE THAN 4 TIMES PER YEAR
HOW OFTEN DO YOU GET TOGETHER WITH FRIENDS OR RELATIVES?: ONCE A WEEK
DO YOU BELONG TO ANY CLUBS OR ORGANIZATIONS SUCH AS CHURCH GROUPS UNIONS, FRATERNAL OR ATHLETIC GROUPS, OR SCHOOL GROUPS?: YES
IN A TYPICAL WEEK, HOW MANY TIMES DO YOU TALK ON THE PHONE WITH FAMILY, FRIENDS, OR NEIGHBORS?: THREE TIMES A WEEK

## 2023-02-01 ASSESSMENT — LIFESTYLE VARIABLES
AUDIT-C TOTAL SCORE: 0
HOW OFTEN DO YOU HAVE SIX OR MORE DRINKS ON ONE OCCASION: NEVER
SKIP TO QUESTIONS 9-10: 1
HOW OFTEN DO YOU HAVE A DRINK CONTAINING ALCOHOL: NEVER
HOW MANY STANDARD DRINKS CONTAINING ALCOHOL DO YOU HAVE ON A TYPICAL DAY: PATIENT DOES NOT DRINK

## 2023-02-01 NOTE — PROGRESS NOTES
SUBJECTIVE:   CC: Phillip is an 57 year old who presents for preventative health visit.   Patient has been advised of split billing requirements and indicates understanding: Yes  Healthy Habits:     Getting at least 3 servings of Calcium per day:  Yes    Bi-annual eye exam:  NO    Dental care twice a year:  Yes    Sleep apnea or symptoms of sleep apnea:  Sleep apnea    Diet:  Breakfast skipped    Frequency of exercise:  2-3 days/week    Duration of exercise:  15-30 minutes    Taking medications regularly:  Yes    Medication side effects:  None    PHQ-2 Total Score: 0    Additional concerns today:  Yes          Hypothyroidism Follow-up      Since last visit, patient describes the following symptoms: Weight stable, no hair loss, no skin changes, no constipation, no loose stools    Has not taken synthroid for 1 month. Has noticed troubles losing weight. Goal in next year is 210.     Patient has history of denis. States uses cpap nightly. Past history of mild right ventricular enlargement and aortic root dilation of 4.5 cm. Followed by cardiology annually. Stable with blood pressure control     Past history of pfo resulting in embolic cva. pfo was closed and neurology and cardiology following. Recently discharged from neurology. Stable on lipitor and 325 asa. Continues loop monitoring for a fib. Last check showed no a fib diagnosis.     2 weeks facial redness. No known cause. No history of exposures or alcohol use. No history of similar symptoms. No treatments tried.       Today's PHQ-2 Score:   PHQ-2 ( 1999 [employer]) 2/1/2023   Q1: Little interest or pleasure in doing things 0   Q2: Feeling down, depressed or hopeless 0   PHQ-2 Score 0   PHQ-2 Total Score (12-17 Years)- Positive if 3 or more points; Administer PHQ-A if positive -   Q1: Little interest or pleasure in doing things Not at all   Q2: Feeling down, depressed or hopeless Not at all   PHQ-2 Score 0       Have you ever done Advance Care Planning? (For example, a Health  Directive, POLST, or a discussion with a medical provider or your loved ones about your wishes): No, advance care planning information given to patient to review.  Advanced care planning was discussed at today's visit.    Social History     Tobacco Use     Smoking status: Never     Passive exposure: Never     Smokeless tobacco: Never   Substance Use Topics     Alcohol use: Not Currently     If you drink alcohol do you typically have >3 drinks per day or >7 drinks per week? No    Alcohol Use 2/1/2023   Prescreen: >3 drinks/day or >7 drinks/week? Not Applicable   Prescreen: >3 drinks/day or >7 drinks/week? -   No flowsheet data found.    Last PSA:   PSA   Date Value Ref Range Status   07/01/2020 0.85 0 - 4 ug/L Final     Comment:     Assay Method:  Chemiluminescence using Siemens Vista analyzer       Reviewed orders with patient. Reviewed health maintenance and updated orders accordingly - Yes  BP Readings from Last 3 Encounters:   02/01/23 119/74   05/03/22 127/81   12/08/21 101/69    Wt Readings from Last 3 Encounters:   02/01/23 121.1 kg (267 lb)   05/03/22 132.9 kg (293 lb)   12/07/21 127.1 kg (280 lb 3.2 oz)                  Patient Active Problem List   Diagnosis     Other postprocedural status(V45.89)     Nephrolithiasis     Stroke due to embolism of left middle cerebral artery (H)     Palpitations     PFO (patent foramen ovale)     S/P total knee arthroplasty     Past Surgical History:   Procedure Laterality Date     ARTHROPLASTY KNEE Right 12/7/2021    Procedure: RIGHT TOTAL KNEE ARTHROPLASTY;  Surgeon: Ebenezer Stewart MD;  Location:  OR     CV PFO CLOSURE N/A 05/06/2021    Procedure: Patent Foramen Ovale Closure; gore cardioform 25mm,  Surgeon: Chi Alvarenga MD;  Location:  [facility]     CYSTOSCOPY       CYSTOSCOPY, RETROGRADES, COMBINED  05/24/2014    Procedure: COMBINED CYSTOSCOPY, RETROGRADES;  Surgeon: Francisco J Lerma MD;  Location: [facility] OR     ENT SURGERY      T & A     EP  LOOP RECORDER IMPLANT N/A 10/12/2020    Procedure: EP Loop Recorder Implant;  Surgeon: Lefty Ramires MD;  Location:  [facility]     IR CAROTID CEREBRAL ANGIOGRAM LEFT  03/05/2020     KNEE SURGERY Right     arthroscopic     LASER HOLMIUM LITHOTRIPSY URETER(S), INSERT STENT, COMBINED  07/27/2012    Procedure: COMBINED CYSTOSCOPY, URETEROSCOPY, LASER HOLMIUM LITHOTRIPSY URETER(S), INSERT STENT;  Video cystoscopy, Right Retrograde Right Ureteroscopy with Holmium Laser, Stone Extraction,  JJ Stent Placement ;  Surgeon: Francisco J Lerma MD;  Location: [facility] OR     LASER HOLMIUM LITHOTRIPSY URETER(S), INSERT STENT, COMBINED  05/24/2014    Procedure: COMBINED CYSTOSCOPY, URETEROSCOPY, LASER HOLMIUM LITHOTRIPSY URETER(S), INSERT STENT;  Surgeon: Francisco J Lerma MD;  Location: [facility] OR     LASER HOLMIUM LITHOTRIPSY URETER(S), INSERT STENT, COMBINED Right 03/05/2017    Procedure: COMBINED CYSTOSCOPY, URETEROSCOPY, LASER HOLMIUM LITHOTRIPSY URETER(S), INSERT STENT;  Surgeon: Chris Barrios MD;  Location:  OR     ORTHOPEDIC SURGERY Right     Meniscal tear     ROTATOR CUFF REPAIR RT/LT      left shoulder     wisdom teeth         Social History     Tobacco Use     Smoking status: Never     Passive exposure: Never     Smokeless tobacco: Never   Substance Use Topics     Alcohol use: Not Currently     Family History   Problem Relation Age of Onset     Hyperlipidemia Mother         pulm emboli, IUD perforation and developed clot     Other - See Comments Father         Possible Autoimmune disorder , DVT, pulm embolism     Prostate Cancer Father          Current Outpatient Medications   Medication Sig Dispense Refill     acetaminophen (TYLENOL) 325 MG tablet Take 2 tablets (650 mg) by mouth every 4 hours as needed for other (mild pain) 100 tablet 0     aspirin (ASA) 325 MG EC tablet Take 1 tablet (325 mg) by mouth daily 1 tablet 0     atorvastatin (LIPITOR) 40 MG tablet Take 0.5 tablets (20 mg)  by mouth every evening (40MG X 0.5 = 20MG) 45 tablet 2     levothyroxine (SYNTHROID/LEVOTHROID) 150 MCG tablet Take 1 tablet (150 mcg) by mouth daily 90 tablet 3     metoprolol succinate ER (TOPROL XL) 50 MG 24 hr tablet Take 1 tablet (50 mg) by mouth daily 90 tablet 2     metroNIDAZOLE (METROGEL) 0.75 % external gel Apply topically 2 times daily 45 g 11     Allergies   Allergen Reactions     No Known Drug Allergies      Recent Labs   Lab Test 05/03/22  0835 05/06/21  0740 10/19/20  1528 10/12/20  1209 06/05/20  0725 03/06/20  0525 03/05/20  1444   A1C  --   --   --   --   --   --  5.5   LDL 41  --  29  --  43  --  84   HDL 45  --  45  --  41  --  48   TRIG 129  --  257*  --  106  --  129   ALT  --   --   --   --  48  --  34   CR  --  0.84  --  0.93  --    < >  --    GFRESTIMATED  --  >90  --  >90  --    < >  --    GFRESTBLACK  --  >90  --  >90  --    < >  --    POTASSIUM  --  3.8  --  4.0  --    < >  --     < > = values in this interval not displayed.        Reviewed and updated as needed this visit by clinical staff   Tobacco  Allergies  Meds  Problems  Med Hx  Surg Hx  Fam Hx          Reviewed and updated as needed this visit by Provider   Tobacco  Allergies  Meds  Problems  Med Hx  Surg Hx  Fam Hx         Past Medical History:   Diagnosis Date     Embolic stroke involving left middle cerebral artery (H) 03/2020     Hyperlipidemia      Hypothyroidism      Kidney stone     Recurent stones     Sen's neuroma of right foot      Obese      DAMARIS on CPAP      Palpitations 03/06/2020     PFO (patent foramen ovale)      Sinus of Valsalva aneurysm       Past Surgical History:   Procedure Laterality Date     ARTHROPLASTY KNEE Right 12/7/2021    Procedure: RIGHT TOTAL KNEE ARTHROPLASTY;  Surgeon: Ebenezer Stewart MD;  Location:  OR      PFO CLOSURE N/A 05/06/2021    Procedure: Patent Foramen Ovale Closure; gore cardioform 25mm,  Surgeon: Chi Alvarenga MD;  Location:  [facility] LAB      CYSTOSCOPY       CYSTOSCOPY, RETROGRADES, COMBINED  05/24/2014    Procedure: COMBINED CYSTOSCOPY, RETROGRADES;  Surgeon: Francisco J Lerma MD;  Location:  OR     ENT SURGERY      T & A     EP LOOP RECORDER IMPLANT N/A 10/12/2020    Procedure: EP Loop Recorder Implant;  Surgeon: Lefty Ramires MD;  Location:  [facility] CATH LAB     IR CAROTID CEREBRAL ANGIOGRAM LEFT  03/05/2020     KNEE SURGERY Right     arthroscopic     LASER HOLMIUM LITHOTRIPSY URETER(S), INSERT STENT, COMBINED  07/27/2012    Procedure: COMBINED CYSTOSCOPY, URETEROSCOPY, LASER HOLMIUM LITHOTRIPSY URETER(S), INSERT STENT;  Video cystoscopy, Right Retrograde Right Ureteroscopy with Holmium Laser, Stone Extraction,  JJ Stent Placement ;  Surgeon: Francisco J Lerma MD;  Location:  OR     LASER HOLMIUM LITHOTRIPSY URETER(S), INSERT STENT, COMBINED  05/24/2014    Procedure: COMBINED CYSTOSCOPY, URETEROSCOPY, LASER HOLMIUM LITHOTRIPSY URETER(S), INSERT STENT;  Surgeon: Francisco J Lerma MD;  Location:  OR     LASER HOLMIUM LITHOTRIPSY URETER(S), INSERT STENT, COMBINED Right 03/05/2017    Procedure: COMBINED CYSTOSCOPY, URETEROSCOPY, LASER HOLMIUM LITHOTRIPSY URETER(S), INSERT STENT;  Surgeon: Chris Barrios MD;  Location:  OR     ORTHOPEDIC SURGERY Right     Meniscal tear     ROTATOR CUFF REPAIR RT/LT      left shoulder     wisdom teeth         Review of Systems   Constitutional: Negative for chills and fever.   HENT: Negative for congestion, ear pain, hearing loss and sore throat.    Eyes: Negative for pain and visual disturbance.   Respiratory: Negative for cough and shortness of breath.    Cardiovascular: Negative for chest pain, palpitations and peripheral edema.   Gastrointestinal: Negative for abdominal pain, constipation, diarrhea, heartburn, hematochezia and nausea.   Genitourinary: Negative for dysuria, frequency, genital sores, hematuria, impotence, penile discharge and urgency.   Musculoskeletal:  "Positive for joint swelling. Negative for arthralgias and myalgias.   Skin: Negative for rash.   Neurological: Positive for headaches. Negative for dizziness, weakness and paresthesias.   Psychiatric/Behavioral: Negative for mood changes. The patient is not nervous/anxious.          OBJECTIVE:   /74 (BP Location: Right arm, Patient Position: Chair, Cuff Size: Adult Regular)   Pulse 92   Temp 98  F (36.7  C) (Oral)   Resp 14   Ht 1.791 m (5' 10.5\")   Wt 121.1 kg (267 lb)   SpO2 96%   BMI 37.77 kg/m      Physical Exam  GENERAL: alert, no distress and obese  EYES: Eyes grossly normal to inspection, PERRL and conjunctivae and sclerae normal  HENT: ear canals and TM's normal, nose and mouth without ulcers or lesions  NECK: no adenopathy, no asymmetry, masses, or scars and thyroid normal to palpation  RESP: lungs clear to auscultation - no rales, rhonchi or wheezes  CV: regular rates and rhythm, normal S1 S2, no S3 or S4 and no murmur, click or rub  ABDOMEN: soft, nontender, no hepatosplenomegaly, no masses and bowel sounds normal  MS: no gross musculoskeletal defects noted, no edema  SKIN: erythematous maculopapular rash on bilateral cheeks and forehead. Otherwise,  no suspicious lesions or rashes  NEURO: Normal strength and tone, mentation intact and speech normal  PSYCH: mentation appears normal, affect normal/bright  LYMPH: no cervical adenopathy    Diagnostic Test Results:  none     ASSESSMENT/PLAN:   (Z00.00) Routine general medical examination at a health care facility  (primary encounter diagnosis)  Comment: stable wellness. Not fasting today. Delaying fasting glucose to future lab only. Discussed weight management through diet mostly but also exercise.   Plan: Glucose            (E03.9) Hypothyroidism, unspecified type  Comment: not on medication for 1 month. Will restart and delay testing for 6-8 weeks at lab only. If stable, recheck annually  Plan: levothyroxine (SYNTHROID/LEVOTHROID) 150 MCG       " "  tablet, TSH with free T4 reflex        Medication use and side effects discussed with the patient. Patient is in complete understanding and agreement with plan.       (L71.9) Rosacea  Comment: present on exam. Unknown trigger. No past history. Discussed options for treatment. Topical flagyl to be used. If no improvement in 1 month, alternative options to be considered.   Plan: metroNIDAZOLE (METROGEL) 0.75 % external gel        Medication use and side effects discussed with the patient. Patient is in complete understanding and agreement with plan.       (Z12.11) Screen for colon cancer  Comment: due.   Plan: COLOGUARD([employer])            (Z12.5) Screening for prostate cancer  Comment: father with prostate cancer.  Plan: PSA, screen              Patient has been advised of split billing requirements and indicates understanding: Yes      COUNSELING:   Reviewed preventive health counseling, as reflected in patient instructions       Regular exercise       Healthy diet/nutrition       Immunizations    Vaccinated for: Covid-19, Hepatitis B, Pneumococcal and TDAP             Consider Hep C screening for all patients one time for ages 18-79 years       Colorectal cancer screening       Prostate cancer screening      BMI:   Estimated body mass index is 37.77 kg/m  as calculated from the following:    Height as of this encounter: 1.791 m (5' 10.5\").    Weight as of this encounter: 121.1 kg (267 lb).   Weight management plan: Discussed healthy diet and exercise guidelines      He reports that he has never smoked. He has never been exposed to tobacco smoke. He has never used smokeless tobacco.        Stan Tapia PA-C  [facility]"

## 2023-02-15 ENCOUNTER — LAB (OUTPATIENT)
Dept: FAMILY MEDICINE | Facility: CLINIC | Age: 58
End: 2023-02-15
Payer: COMMERCIAL

## 2023-02-15 DIAGNOSIS — Z12.11 SCREEN FOR COLON CANCER: ICD-10-CM

## 2023-02-21 ENCOUNTER — MYC MEDICAL ADVICE (OUTPATIENT)
Dept: CARDIOLOGY | Facility: CLINIC | Age: 58
End: 2023-02-21
Payer: COMMERCIAL

## 2023-02-21 DIAGNOSIS — I63.9 CRYPTOGENIC STROKE (H): ICD-10-CM

## 2023-02-21 DIAGNOSIS — Z45.09 ENCOUNTER FOR LOOP RECORDER CHECK: ICD-10-CM

## 2023-02-21 DIAGNOSIS — I63.412 CEREBROVASCULAR ACCIDENT (CVA) DUE TO EMBOLISM OF LEFT MIDDLE CEREBRAL ARTERY (H): Primary | ICD-10-CM

## 2023-02-22 ENCOUNTER — TELEPHONE (OUTPATIENT)
Dept: CARDIOLOGY | Facility: CLINIC | Age: 58
End: 2023-02-22
Payer: COMMERCIAL

## 2023-02-22 DIAGNOSIS — Z45.09 ENCOUNTER FOR LOOP RECORDER CHECK: Primary | ICD-10-CM

## 2023-02-22 NOTE — TELEPHONE ENCOUNTER
Talked with staff member with [facility] radiology.  Needing op report for loop recorder prior to MRI of pt's knee. Faxed report to [phone].  Also informed them that we needed to obtain a transmission prior to MRI and then we would coordinate with pt to get a transmission after the MRI.     Provided Staff with device clinic number.  Will await their return call.

## 2023-02-22 NOTE — TELEPHONE ENCOUNTER
[facility] ortho called and stated that they were going to do his MRI check on his knee on 3/1/23.  Scheduled pre and post remote.

## 2023-02-23 ENCOUNTER — ANCILLARY PROCEDURE (OUTPATIENT)
Dept: CARDIOLOGY | Facility: CLINIC | Age: 58
End: 2023-02-23
Attending: INTERNAL MEDICINE
Payer: COMMERCIAL

## 2023-02-23 ENCOUNTER — TELEPHONE (OUTPATIENT)
Dept: CARDIOLOGY | Facility: CLINIC | Age: 58
End: 2023-02-23

## 2023-02-23 DIAGNOSIS — Z45.09 ENCOUNTER FOR LOOP RECORDER CHECK: ICD-10-CM

## 2023-02-23 PROCEDURE — 93297 REM INTERROG DEV EVAL ICPMS: CPT | Performed by: INTERNAL MEDICINE

## 2023-02-23 PROCEDURE — G2066 INTER DEVC REMOTE 30D: HCPCS | Performed by: INTERNAL MEDICINE

## 2023-02-23 NOTE — TELEPHONE ENCOUNTER
Remote loop recorder check completed on patient both as a full loop check (last check was done in 6/2022) and as a pre-MRI check.     On review of arrhythmias, patient had 1 pause episode logged on 12/30/22 at 0701.  EGM shows SB in the 50's with a 4.5 second pause.  See below for EGM.    Device was implanted for cryptogenic stroke.     Called and left message for patient asking if he was awake or had any symptoms with above episode.  Device clinic phone number provided for call back.  Per last device check in 6/2022, patient does have a history of sleep apnea and had not been routinely using his CPAP.  Pt was going to use this regularly and we were to notify Lyle for any recurrence.    When patient calls back, will also notify him of scheduled routine remote on 6/1/23 and ask if he is having another MRI as he has courtesy remote checks scheduled for 2/28/23 and 3/1/23 (or see if MRI today replaces that one).     Patient is due to see Dr. Alvarenga in 5/2023.  Will route update to Dr. Alvarenga after we hear back from patient.     ADA Domínguez       EGM:

## 2023-02-24 ENCOUNTER — ANCILLARY PROCEDURE (OUTPATIENT)
Dept: CARDIOLOGY | Facility: CLINIC | Age: 58
End: 2023-02-24
Attending: INTERNAL MEDICINE
Payer: COMMERCIAL

## 2023-02-24 DIAGNOSIS — Z45.09 ENCOUNTER FOR LOOP RECORDER CHECK: ICD-10-CM

## 2023-02-24 DIAGNOSIS — I63.412 CEREBROVASCULAR ACCIDENT (CVA) DUE TO EMBOLISM OF LEFT MIDDLE CEREBRAL ARTERY (H): ICD-10-CM

## 2023-02-24 DIAGNOSIS — I63.9 CRYPTOGENIC STROKE (H): ICD-10-CM

## 2023-03-03 LAB
MDC_IDC_EPISODE_DTM: NORMAL
MDC_IDC_EPISODE_DURATION: 4.61 S
MDC_IDC_EPISODE_ID: 7
MDC_IDC_EPISODE_TYPE: NORMAL
MDC_IDC_MSMT_BATTERY_STATUS: NORMAL
MDC_IDC_MSMT_BATTERY_STATUS: NORMAL
MDC_IDC_PG_IMPLANT_DTM: NORMAL
MDC_IDC_PG_IMPLANT_DTM: NORMAL
MDC_IDC_PG_MFG: NORMAL
MDC_IDC_PG_MFG: NORMAL
MDC_IDC_PG_MODEL: NORMAL
MDC_IDC_PG_MODEL: NORMAL
MDC_IDC_PG_SERIAL: NORMAL
MDC_IDC_PG_SERIAL: NORMAL
MDC_IDC_PG_TYPE: NORMAL
MDC_IDC_PG_TYPE: NORMAL
MDC_IDC_SESS_CLINIC_NAME: NORMAL
MDC_IDC_SESS_CLINIC_NAME: NORMAL
MDC_IDC_SESS_DTM: NORMAL
MDC_IDC_SESS_DTM: NORMAL
MDC_IDC_SESS_TYPE: NORMAL
MDC_IDC_SESS_TYPE: NORMAL
MDC_IDC_SET_ZONE_DETECTION_INTERVAL: 2000 MS
MDC_IDC_SET_ZONE_DETECTION_INTERVAL: 2000 MS
MDC_IDC_SET_ZONE_DETECTION_INTERVAL: 3000 MS
MDC_IDC_SET_ZONE_DETECTION_INTERVAL: 3000 MS
MDC_IDC_SET_ZONE_DETECTION_INTERVAL: 340 MS
MDC_IDC_SET_ZONE_DETECTION_INTERVAL: 340 MS
MDC_IDC_SET_ZONE_TYPE: NORMAL
MDC_IDC_STAT_AT_BURDEN_PERCENT: 0 %
MDC_IDC_STAT_AT_BURDEN_PERCENT: 0 %
MDC_IDC_STAT_AT_DTM_END: NORMAL
MDC_IDC_STAT_AT_DTM_END: NORMAL
MDC_IDC_STAT_AT_DTM_START: NORMAL
MDC_IDC_STAT_AT_DTM_START: NORMAL
MDC_IDC_STAT_EPISODE_RECENT_COUNT: 0
MDC_IDC_STAT_EPISODE_RECENT_COUNT: 1
MDC_IDC_STAT_EPISODE_RECENT_COUNT_DTM_END: NORMAL
MDC_IDC_STAT_EPISODE_RECENT_COUNT_DTM_START: NORMAL
MDC_IDC_STAT_EPISODE_TOTAL_COUNT: 0
MDC_IDC_STAT_EPISODE_TOTAL_COUNT: 7
MDC_IDC_STAT_EPISODE_TOTAL_COUNT: 7
MDC_IDC_STAT_EPISODE_TOTAL_COUNT_DTM_END: NORMAL
MDC_IDC_STAT_EPISODE_TOTAL_COUNT_DTM_START: NORMAL
MDC_IDC_STAT_EPISODE_TYPE: NORMAL

## 2023-03-09 NOTE — TELEPHONE ENCOUNTER
Called and left second message for patient requesting call back to review if he was awake and/or symptomatic at time of pause episode logged on 12/30/22.  Device clinic phone number provided for call back.     Will route FYI to Dr. Alvarenga.  If patient returns call and was symptomatic, will route update at that time.    ADA Domínguez

## 2023-03-13 NOTE — TELEPHONE ENCOUNTER
Reviewed pause with Pt he denies any dizziness or symptoms. Asked he discuss with sleep DR oneal Jensen/denis. TERESITA Wilson RN

## 2023-03-15 ENCOUNTER — LAB (OUTPATIENT)
Dept: LAB | Facility: CLINIC | Age: 58
End: 2023-03-15
Payer: COMMERCIAL

## 2023-03-15 DIAGNOSIS — Z00.00 ROUTINE GENERAL MEDICAL EXAMINATION AT A HEALTH CARE FACILITY: ICD-10-CM

## 2023-03-15 DIAGNOSIS — Z12.5 SCREENING FOR PROSTATE CANCER: ICD-10-CM

## 2023-03-15 DIAGNOSIS — E03.9 HYPOTHYROIDISM, UNSPECIFIED TYPE: ICD-10-CM

## 2023-03-15 LAB
FASTING STATUS PATIENT QL REPORTED: YES
GLUCOSE SERPL-MCNC: 150 MG/DL (ref 70–99)
PSA SERPL DL<=0.01 NG/ML-MCNC: 0.63 NG/ML (ref 0–3.5)
TSH SERPL DL<=0.005 MIU/L-ACNC: 1.35 UIU/ML (ref 0.3–4.2)

## 2023-03-15 PROCEDURE — 82947 ASSAY GLUCOSE BLOOD QUANT: CPT

## 2023-03-15 PROCEDURE — 36415 COLL VENOUS BLD VENIPUNCTURE: CPT

## 2023-03-15 PROCEDURE — G0103 PSA SCREENING: HCPCS

## 2023-03-15 PROCEDURE — 84443 ASSAY THYROID STIM HORMONE: CPT

## 2023-03-16 DIAGNOSIS — R73.9 HYPERGLYCEMIA: Primary | ICD-10-CM

## 2023-06-01 ENCOUNTER — TELEPHONE (OUTPATIENT)
Dept: FAMILY MEDICINE | Facility: CLINIC | Age: 58
End: 2023-06-01

## 2023-06-02 ENCOUNTER — ANCILLARY PROCEDURE (OUTPATIENT)
Dept: CARDIOLOGY | Facility: CLINIC | Age: 58
End: 2023-06-02
Attending: INTERNAL MEDICINE
Payer: COMMERCIAL

## 2023-06-02 DIAGNOSIS — Z45.09 ENCOUNTER FOR LOOP RECORDER CHECK: ICD-10-CM

## 2023-06-05 LAB
MDC_IDC_MSMT_BATTERY_STATUS: NORMAL
MDC_IDC_PG_IMPLANT_DTM: NORMAL
MDC_IDC_PG_MFG: NORMAL
MDC_IDC_PG_MODEL: NORMAL
MDC_IDC_PG_SERIAL: NORMAL
MDC_IDC_PG_TYPE: NORMAL
MDC_IDC_SESS_CLINIC_NAME: NORMAL
MDC_IDC_SESS_DTM: NORMAL
MDC_IDC_SESS_TYPE: NORMAL
MDC_IDC_SET_ZONE_DETECTION_INTERVAL: 2000 MS
MDC_IDC_SET_ZONE_DETECTION_INTERVAL: 3000 MS
MDC_IDC_SET_ZONE_DETECTION_INTERVAL: 340 MS
MDC_IDC_SET_ZONE_TYPE: NORMAL
MDC_IDC_STAT_AT_BURDEN_PERCENT: 0 %
MDC_IDC_STAT_AT_DTM_END: NORMAL
MDC_IDC_STAT_AT_DTM_START: NORMAL
MDC_IDC_STAT_EPISODE_RECENT_COUNT: 0
MDC_IDC_STAT_EPISODE_RECENT_COUNT_DTM_END: NORMAL
MDC_IDC_STAT_EPISODE_RECENT_COUNT_DTM_START: NORMAL
MDC_IDC_STAT_EPISODE_TOTAL_COUNT: 0
MDC_IDC_STAT_EPISODE_TOTAL_COUNT: 8
MDC_IDC_STAT_EPISODE_TOTAL_COUNT_DTM_END: NORMAL
MDC_IDC_STAT_EPISODE_TOTAL_COUNT_DTM_START: NORMAL
MDC_IDC_STAT_EPISODE_TYPE: NORMAL

## 2023-06-05 PROCEDURE — 93297 REM INTERROG DEV EVAL ICPMS: CPT | Performed by: INTERNAL MEDICINE

## 2023-06-05 PROCEDURE — G2066 INTER DEVC REMOTE 30D: HCPCS | Performed by: INTERNAL MEDICINE

## 2023-06-19 ENCOUNTER — MEDICAL CORRESPONDENCE (OUTPATIENT)
Dept: HEALTH INFORMATION MANAGEMENT | Facility: CLINIC | Age: 58
End: 2023-06-19

## 2023-06-20 ENCOUNTER — TELEPHONE (OUTPATIENT)
Dept: CARDIOLOGY | Facility: CLINIC | Age: 58
End: 2023-06-20
Payer: COMMERCIAL

## 2023-06-20 DIAGNOSIS — E78.5 HYPERLIPIDEMIA LDL GOAL <70: ICD-10-CM

## 2023-06-20 DIAGNOSIS — Q21.12 PFO (PATENT FORAMEN OVALE): ICD-10-CM

## 2023-06-20 DIAGNOSIS — I63.412 CEREBROVASCULAR ACCIDENT (CVA) DUE TO EMBOLISM OF LEFT MIDDLE CEREBRAL ARTERY (H): ICD-10-CM

## 2023-06-20 DIAGNOSIS — I71.20 THORACIC AORTIC ANEURYSM WITHOUT RUPTURE (H): ICD-10-CM

## 2023-06-20 RX ORDER — METOPROLOL SUCCINATE 50 MG/1
50 TABLET, EXTENDED RELEASE ORAL DAILY
Qty: 90 TABLET | Refills: 0 | Status: SHIPPED | OUTPATIENT
Start: 2023-06-20 | End: 2023-09-27

## 2023-09-08 ENCOUNTER — OFFICE VISIT (OUTPATIENT)
Dept: PEDIATRICS | Facility: CLINIC | Age: 58
End: 2023-09-08
Payer: COMMERCIAL

## 2023-09-08 VITALS
TEMPERATURE: 97.3 F | WEIGHT: 282.96 LBS | SYSTOLIC BLOOD PRESSURE: 120 MMHG | HEART RATE: 69 BPM | OXYGEN SATURATION: 96 % | DIASTOLIC BLOOD PRESSURE: 78 MMHG | HEIGHT: 71 IN | BODY MASS INDEX: 39.61 KG/M2 | RESPIRATION RATE: 20 BRPM

## 2023-09-08 DIAGNOSIS — Z01.818 PREOP GENERAL PHYSICAL EXAM: Primary | ICD-10-CM

## 2023-09-08 DIAGNOSIS — Z86.73 HISTORY OF STROKE: ICD-10-CM

## 2023-09-08 DIAGNOSIS — R73.01 IMPAIRED FASTING GLUCOSE: ICD-10-CM

## 2023-09-08 DIAGNOSIS — M17.12 PRIMARY OSTEOARTHRITIS OF LEFT KNEE: ICD-10-CM

## 2023-09-08 LAB
ANION GAP SERPL CALCULATED.3IONS-SCNC: 9 MMOL/L (ref 7–15)
BUN SERPL-MCNC: 13 MG/DL (ref 8–23)
CALCIUM SERPL-MCNC: 9 MG/DL (ref 8.6–10)
CHLORIDE SERPL-SCNC: 108 MMOL/L (ref 98–107)
CREAT SERPL-MCNC: 0.86 MG/DL (ref 0.67–1.17)
DEPRECATED HCO3 PLAS-SCNC: 24 MMOL/L (ref 22–29)
EGFRCR SERPLBLD CKD-EPI 2021: >90 ML/MIN/1.73M2
ERYTHROCYTE [DISTWIDTH] IN BLOOD BY AUTOMATED COUNT: 13 % (ref 10–15)
GLUCOSE SERPL-MCNC: 87 MG/DL (ref 70–99)
HBA1C MFR BLD: 5.7 % (ref 0–5.6)
HCT VFR BLD AUTO: 45.3 % (ref 40–53)
HGB BLD-MCNC: 15.2 G/DL (ref 13.3–17.7)
MCH RBC QN AUTO: 31 PG (ref 26.5–33)
MCHC RBC AUTO-ENTMCNC: 33.6 G/DL (ref 31.5–36.5)
MCV RBC AUTO: 92 FL (ref 78–100)
PLATELET # BLD AUTO: 237 10E3/UL (ref 150–450)
POTASSIUM SERPL-SCNC: 4.3 MMOL/L (ref 3.4–5.3)
RBC # BLD AUTO: 4.9 10E6/UL (ref 4.4–5.9)
SODIUM SERPL-SCNC: 141 MMOL/L (ref 136–145)
WBC # BLD AUTO: 5.8 10E3/UL (ref 4–11)

## 2023-09-08 PROCEDURE — 80048 BASIC METABOLIC PNL TOTAL CA: CPT | Performed by: INTERNAL MEDICINE

## 2023-09-08 PROCEDURE — 99214 OFFICE O/P EST MOD 30 MIN: CPT | Mod: 25 | Performed by: INTERNAL MEDICINE

## 2023-09-08 PROCEDURE — 85027 COMPLETE CBC AUTOMATED: CPT | Performed by: INTERNAL MEDICINE

## 2023-09-08 PROCEDURE — 93000 ELECTROCARDIOGRAM COMPLETE: CPT | Performed by: INTERNAL MEDICINE

## 2023-09-08 PROCEDURE — 83036 HEMOGLOBIN GLYCOSYLATED A1C: CPT | Performed by: INTERNAL MEDICINE

## 2023-09-08 PROCEDURE — 36415 COLL VENOUS BLD VENIPUNCTURE: CPT | Performed by: INTERNAL MEDICINE

## 2023-09-08 ASSESSMENT — PAIN SCALES - GENERAL: PAINLEVEL: NO PAIN (0)

## 2023-09-08 NOTE — PROGRESS NOTES
[facility]  [address]  [address]  [facility] [address]  Phone: [phone]  Fax: [phone]  Primary Provider: Stan Tapia  Pre-op Performing Provider: Sohail Talavera MD       PREOPERATIVE EVALUATION:  Today's date: 9/8/2023    Rey Andre is a 57 year old male who presents for a preoperative evaluation.      9/8/2023    11:14 AM   Additional Questions   Roomed by [facility]         9/8/2023    11:14 AM   Patient Reported Additional Medications   Patient reports taking the following new medications none       Surgical Information:  Surgery/Procedure: Left total knee arthroplasty   Surgery Location: [facility]   Surgeon: Ebenezer Stewart MD   Surgery Date: 09/25/2023  Time of Surgery: 2:00 PM  Where patient plans to recover: At home with family  Fax number for surgical facility: Note does not need to be faxed, will be available electronically in Epic.    Assessment & Plan     The proposed surgical procedure is considered INTERMEDIATE risk.      ICD-10-CM    1. Preop general physical exam  Z01.818 Basic metabolic panel     Hemoglobin A1c     CBC with platelets     EKG 12-lead complete w/read - Clinics     Basic metabolic panel     Hemoglobin A1c     CBC with platelets      2. Primary osteoarthritis of left knee  M17.12 Basic metabolic panel     Hemoglobin A1c     CBC with platelets     EKG 12-lead complete w/read - Clinics     Basic metabolic panel     Hemoglobin A1c     CBC with platelets      3. History of stroke  Z86.73 Basic metabolic panel     CBC with platelets     EKG 12-lead complete w/read - Clinics     Basic metabolic panel     CBC with platelets      4. Impaired fasting glucose  R73.01 Hemoglobin A1c     Hemoglobin A1c         Advised holding aspirin 1 week prior to surgery (unless directed otherwise by his surgeon).       RECOMMENDATION:  APPROVAL GIVEN to proceed with proposed procedure, without further diagnostic  evaluation.    Sohail Talavera MD      Subjective       HPI related to upcoming procedure: Left knee osteoarthritis. Not responding to conservative measures. Plan for knee arthroplasty.         2023     9:24 AM   Preop Questions   1. Have you ever had a heart attack or stroke? YES - L MCA CVA ()   2. Have you ever had surgery on your heart or blood vessels, such as a stent placement, a coronary artery bypass, or surgery on an artery in your head, neck, heart, or legs? YES - PFO closure, implanted loop recorder    3. Do you have chest pain with activity? No   4. Do you have a history of  heart failure? No   5. Do you currently have a cold, bronchitis or symptoms of other infection? No   6. Do you have a cough, shortness of breath, or wheezing? No   7. Do you or anyone in your family have previous history of blood clots? YES - CVA   8. Do you or does anyone in your family have a serious bleeding problem such as prolonged bleeding following surgeries or cuts? No   9. Have you ever had problems with anemia or been told to take iron pills? No   10. Have you had any abnormal blood loss such as black, tarry or bloody stools? No   11. Have you ever had a blood transfusion? No   12. Are you willing to have a blood transfusion if it is medically needed before, during, or after your surgery? Yes   13. Have you or any of your relatives ever had problems with anesthesia? YES - P Uncle -  during anesthesia decades ago   14. Do you have sleep apnea, excessive snoring or daytime drowsiness? YES - DAMARIS, uses CPAP   14a. Do you have a CPAP machine? Yes   15. Do you have any artifical heart valves or other implanted medical devices like a pacemaker, defibrillator, or continuous glucose monitor? No   16. Do you have artificial joints? YES - R knee    17. Are you allergic to latex? No     Reviewed health history.  Has hx of impaired fasting glucose. No dx of DM.     History of stroke. Takes metoprolol and aspirin. No active  neurologic symptoms at this time.     DAMARIS. Uses CPAP. Advised bringing CPAP machine with him.     Patient Active Problem List   Diagnosis    Nephrolithiasis    Stroke due to embolism of left middle cerebral artery (H)    Palpitations    PFO (patent foramen ovale)    S/P total knee arthroplasty         Past Medical History:   Diagnosis Date    Embolic stroke involving left middle cerebral artery (H) 03/2020    Hyperlipidemia     Hypothyroidism     Kidney stone     Recurent stones    Sen's neuroma of right foot     Obese     DAMARIS on CPAP     Palpitations 03/06/2020    PFO (patent foramen ovale)     Sinus of Valsalva aneurysm      Past Surgical History:   Procedure Laterality Date    ARTHROPLASTY KNEE Right 12/7/2021    Procedure: RIGHT TOTAL KNEE ARTHROPLASTY;  Surgeon: Ebenezer Stewart MD;  Location:  OR    CV PFO CLOSURE N/A 05/06/2021    Procedure: Patent Foramen Ovale Closure; gore cardioform 25mm,  Surgeon: Chi Alvarenga MD;  Location: [facility] CARDIAC CATH LAB    CYSTOSCOPY      CYSTOSCOPY, RETROGRADES, COMBINED  05/24/2014    Procedure: COMBINED CYSTOSCOPY, RETROGRADES;  Surgeon: Francisco J Lerma MD;  Location:  OR    ENT SURGERY      T & A    EP LOOP RECORDER IMPLANT N/A 10/12/2020    Procedure: EP Loop Recorder Implant;  Surgeon: Lefty Ramires MD;  Location:  HEART CARDIAC CATH LAB    IR CAROTID CEREBRAL ANGIOGRAM LEFT  03/05/2020    KNEE SURGERY Right     arthroscopic    LASER HOLMIUM LITHOTRIPSY URETER(S), INSERT STENT, COMBINED  07/27/2012    Procedure: COMBINED CYSTOSCOPY, URETEROSCOPY, LASER HOLMIUM LITHOTRIPSY URETER(S), INSERT STENT;  Video cystoscopy, Right Retrograde Right Ureteroscopy with Holmium Laser, Stone Extraction,  JJ Stent Placement ;  Surgeon: Francisco J Lerma MD;  Location:  OR    LASER HOLMIUM LITHOTRIPSY URETER(S), INSERT STENT, COMBINED  05/24/2014    Procedure: COMBINED CYSTOSCOPY, URETEROSCOPY, LASER HOLMIUM LITHOTRIPSY URETER(S), INSERT STENT;   "Surgeon: Francisco J Lerma MD;  Location: [facility] OR    LASER HOLMIUM LITHOTRIPSY URETER(S), INSERT STENT, COMBINED Right 03/05/2017    Procedure: COMBINED CYSTOSCOPY, URETEROSCOPY, LASER HOLMIUM LITHOTRIPSY URETER(S), INSERT STENT;  Surgeon: Chris Barrios MD;  Location: [facility] OR    ORTHOPEDIC SURGERY Right     Meniscal tear    ROTATOR CUFF REPAIR RT/LT      left shoulder    wisdom teeth       Current Outpatient Medications   Medication Sig Dispense Refill    acetaminophen (TYLENOL) 325 MG tablet Take 2 tablets (650 mg) by mouth every 4 hours as needed for other (mild pain) 100 tablet 0    aspirin (ASA) 325 MG EC tablet Take 1 tablet (325 mg) by mouth daily 1 tablet 0    atorvastatin (LIPITOR) 40 MG tablet Take 0.5 tablets (20 mg) by mouth every evening (40MG X 0.5 = 20MG) 45 tablet 2    levothyroxine (SYNTHROID/LEVOTHROID) 150 MCG tablet Take 1 tablet (150 mcg) by mouth daily 90 tablet 3    metoprolol succinate ER (TOPROL XL) 50 MG 24 hr tablet Take 1 tablet (50 mg) by mouth daily Appointment required for further refills 90 tablet 0    metroNIDAZOLE (METROGEL) 0.75 % external gel Apply topically 2 times daily 45 g 11       Allergies   Allergen Reactions    No Known Drug Allergy         Social History     Tobacco Use    Smoking status: Never     Passive exposure: Never    Smokeless tobacco: Never   Substance Use Topics    Alcohol use: Not Currently       History   Drug Use No         Objective     /78 (BP Location: Right arm, Patient Position: Sitting, Cuff Size: Adult Large)   Pulse 69   Temp 97.3  F (36.3  C) (Tympanic)   Resp 20   Ht 1.791 m (5' 10.5\")   Wt 128.3 kg (282 lb 15.4 oz)   SpO2 96%   BMI 40.03 kg/m      Physical Exam  GENERAL: healthy, alert and no distress  EYES: PERRL, EOMI  HENT: ear canals and TM's normal. No nasal discharge. OP moist.  NECK: no adenopathy  RESP: lungs clear to auscultation - no rales, rhonchi or wheezes  CV: regular rate and rhythm, normal S1 S2, no murmur, no " peripheral edema and peripheral pulses strong  ABDOMEN: soft, nontender, bowel sounds normal  MS: no gross musculoskeletal defects noted  SKIN: no suspicious lesions or rashes  NEURO: Normal strength and tone  PSYCH: mentation appears normal, affect normal/bright     Recent Labs   Lab Test 12/08/21  0713 12/07/21  1313   HGB 12.2* 15.2        Diagnostics:  Results for orders placed or performed in visit on 09/08/23   Basic metabolic panel     Status: Abnormal   Result Value Ref Range    Sodium 141 136 - 145 mmol/L    Potassium 4.3 3.4 - 5.3 mmol/L    Chloride 108 (H) 98 - 107 mmol/L    Carbon Dioxide (CO2) 24 22 - 29 mmol/L    Anion Gap 9 7 - 15 mmol/L    Urea Nitrogen 13.0 8.0 - 23.0 mg/dL    Creatinine 0.86 0.67 - 1.17 mg/dL    Calcium 9.0 8.6 - 10.0 mg/dL    Glucose 87 70 - 99 mg/dL    GFR Estimate >90 >60 mL/min/1.73m2   Hemoglobin A1c     Status: Abnormal   Result Value Ref Range    Hemoglobin A1C 5.7 (H) 0.0 - 5.6 %   CBC with platelets     Status: Normal   Result Value Ref Range    WBC Count 5.8 4.0 - 11.0 10e3/uL    RBC Count 4.90 4.40 - 5.90 10e6/uL    Hemoglobin 15.2 13.3 - 17.7 g/dL    Hematocrit 45.3 40.0 - 53.0 %    MCV 92 78 - 100 fL    MCH 31.0 26.5 - 33.0 pg    MCHC 33.6 31.5 - 36.5 g/dL    RDW 13.0 10.0 - 15.0 %    Platelet Count 237 150 - 450 10e3/uL        EKG: appears normal, NSR, normal axis, normal intervals, no acute ST/T changes c/w ischemia, no LVH by voltage criteria, Left Anterior Fascicular Block, unchanged from previous tracings      Signed Electronically by: Sohail Talavera MD

## 2023-09-08 NOTE — H&P (VIEW-ONLY)
[facility]  [address]  [address]  [facility] [address]  Phone: [phone]  Fax: [phone]  Primary Provider: Stan Tapia  Pre-op Performing Provider: Sohail Talavera MD       PREOPERATIVE EVALUATION:  Today's date: 9/8/2023    Rey Andre is a 57 year old male who presents for a preoperative evaluation.      9/8/2023    11:14 AM   Additional Questions   Roomed by [facility]         9/8/2023    11:14 AM   Patient Reported Additional Medications   Patient reports taking the following new medications none       Surgical Information:  Surgery/Procedure: Left total knee arthroplasty   Surgery Location: [facility]   Surgeon: Ebenezer Stewart MD   Surgery Date: 09/25/2023  Time of Surgery: 2:00 PM  Where patient plans to recover: At home with family  Fax number for surgical facility: Note does not need to be faxed, will be available electronically in Epic.    Assessment & Plan     The proposed surgical procedure is considered INTERMEDIATE risk.      ICD-10-CM    1. Preop general physical exam  Z01.818 Basic metabolic panel     Hemoglobin A1c     CBC with platelets     EKG 12-lead complete w/read - Clinics     Basic metabolic panel     Hemoglobin A1c     CBC with platelets      2. Primary osteoarthritis of left knee  M17.12 Basic metabolic panel     Hemoglobin A1c     CBC with platelets     EKG 12-lead complete w/read - Clinics     Basic metabolic panel     Hemoglobin A1c     CBC with platelets      3. History of stroke  Z86.73 Basic metabolic panel     CBC with platelets     EKG 12-lead complete w/read - Clinics     Basic metabolic panel     CBC with platelets      4. Impaired fasting glucose  R73.01 Hemoglobin A1c     Hemoglobin A1c         Advised holding aspirin 1 week prior to surgery (unless directed otherwise by his surgeon).       RECOMMENDATION:  APPROVAL GIVEN to proceed with proposed procedure, without further diagnostic  evaluation.    Sohail Talavera MD      Subjective       HPI related to upcoming procedure: Left knee osteoarthritis. Not responding to conservative measures. Plan for knee arthroplasty.         2023     9:24 AM   Preop Questions   1. Have you ever had a heart attack or stroke? YES - L MCA CVA ()   2. Have you ever had surgery on your heart or blood vessels, such as a stent placement, a coronary artery bypass, or surgery on an artery in your head, neck, heart, or legs? YES - PFO closure, implanted loop recorder    3. Do you have chest pain with activity? No   4. Do you have a history of  heart failure? No   5. Do you currently have a cold, bronchitis or symptoms of other infection? No   6. Do you have a cough, shortness of breath, or wheezing? No   7. Do you or anyone in your family have previous history of blood clots? YES - CVA   8. Do you or does anyone in your family have a serious bleeding problem such as prolonged bleeding following surgeries or cuts? No   9. Have you ever had problems with anemia or been told to take iron pills? No   10. Have you had any abnormal blood loss such as black, tarry or bloody stools? No   11. Have you ever had a blood transfusion? No   12. Are you willing to have a blood transfusion if it is medically needed before, during, or after your surgery? Yes   13. Have you or any of your relatives ever had problems with anesthesia? YES - P Uncle -  during anesthesia decades ago   14. Do you have sleep apnea, excessive snoring or daytime drowsiness? YES - DAMARIS, uses CPAP   14a. Do you have a CPAP machine? Yes   15. Do you have any artifical heart valves or other implanted medical devices like a pacemaker, defibrillator, or continuous glucose monitor? No   16. Do you have artificial joints? YES - R knee    17. Are you allergic to latex? No     Reviewed health history.  Has hx of impaired fasting glucose. No dx of DM.     History of stroke. Takes metoprolol and aspirin. No active  neurologic symptoms at this time.     DAMARIS. Uses CPAP. Advised bringing CPAP machine with him.     Patient Active Problem List   Diagnosis    Nephrolithiasis    Stroke due to embolism of left middle cerebral artery (H)    Palpitations    PFO (patent foramen ovale)    S/P total knee arthroplasty         Past Medical History:   Diagnosis Date    Embolic stroke involving left middle cerebral artery (H) 03/2020    Hyperlipidemia     Hypothyroidism     Kidney stone     Recurent stones    Sen's neuroma of right foot     Obese     DAMARIS on CPAP     Palpitations 03/06/2020    PFO (patent foramen ovale)     Sinus of Valsalva aneurysm      Past Surgical History:   Procedure Laterality Date    ARTHROPLASTY KNEE Right 12/7/2021    Procedure: RIGHT TOTAL KNEE ARTHROPLASTY;  Surgeon: Ebenezer Stewart MD;  Location:  OR    CV PFO CLOSURE N/A 05/06/2021    Procedure: Patent Foramen Ovale Closure; gore cardioform 25mm,  Surgeon: Chi Alvarenga MD;  Location: [facility] CARDIAC CATH LAB    CYSTOSCOPY      CYSTOSCOPY, RETROGRADES, COMBINED  05/24/2014    Procedure: COMBINED CYSTOSCOPY, RETROGRADES;  Surgeon: Francisco J Lerma MD;  Location:  OR    ENT SURGERY      T & A    EP LOOP RECORDER IMPLANT N/A 10/12/2020    Procedure: EP Loop Recorder Implant;  Surgeon: Lefty Ramires MD;  Location:  HEART CARDIAC CATH LAB    IR CAROTID CEREBRAL ANGIOGRAM LEFT  03/05/2020    KNEE SURGERY Right     arthroscopic    LASER HOLMIUM LITHOTRIPSY URETER(S), INSERT STENT, COMBINED  07/27/2012    Procedure: COMBINED CYSTOSCOPY, URETEROSCOPY, LASER HOLMIUM LITHOTRIPSY URETER(S), INSERT STENT;  Video cystoscopy, Right Retrograde Right Ureteroscopy with Holmium Laser, Stone Extraction,  JJ Stent Placement ;  Surgeon: Francisco J Lerma MD;  Location:  OR    LASER HOLMIUM LITHOTRIPSY URETER(S), INSERT STENT, COMBINED  05/24/2014    Procedure: COMBINED CYSTOSCOPY, URETEROSCOPY, LASER HOLMIUM LITHOTRIPSY URETER(S), INSERT STENT;   "Surgeon: Francisco J Lerma MD;  Location: [facility] OR    LASER HOLMIUM LITHOTRIPSY URETER(S), INSERT STENT, COMBINED Right 03/05/2017    Procedure: COMBINED CYSTOSCOPY, URETEROSCOPY, LASER HOLMIUM LITHOTRIPSY URETER(S), INSERT STENT;  Surgeon: Chris Barrios MD;  Location: [facility] OR    ORTHOPEDIC SURGERY Right     Meniscal tear    ROTATOR CUFF REPAIR RT/LT      left shoulder    wisdom teeth       Current Outpatient Medications   Medication Sig Dispense Refill    acetaminophen (TYLENOL) 325 MG tablet Take 2 tablets (650 mg) by mouth every 4 hours as needed for other (mild pain) 100 tablet 0    aspirin (ASA) 325 MG EC tablet Take 1 tablet (325 mg) by mouth daily 1 tablet 0    atorvastatin (LIPITOR) 40 MG tablet Take 0.5 tablets (20 mg) by mouth every evening (40MG X 0.5 = 20MG) 45 tablet 2    levothyroxine (SYNTHROID/LEVOTHROID) 150 MCG tablet Take 1 tablet (150 mcg) by mouth daily 90 tablet 3    metoprolol succinate ER (TOPROL XL) 50 MG 24 hr tablet Take 1 tablet (50 mg) by mouth daily Appointment required for further refills 90 tablet 0    metroNIDAZOLE (METROGEL) 0.75 % external gel Apply topically 2 times daily 45 g 11       Allergies   Allergen Reactions    No Known Drug Allergy         Social History     Tobacco Use    Smoking status: Never     Passive exposure: Never    Smokeless tobacco: Never   Substance Use Topics    Alcohol use: Not Currently       History   Drug Use No         Objective     /78 (BP Location: Right arm, Patient Position: Sitting, Cuff Size: Adult Large)   Pulse 69   Temp 97.3  F (36.3  C) (Tympanic)   Resp 20   Ht 1.791 m (5' 10.5\")   Wt 128.3 kg (282 lb 15.4 oz)   SpO2 96%   BMI 40.03 kg/m      Physical Exam  GENERAL: healthy, alert and no distress  EYES: PERRL, EOMI  HENT: ear canals and TM's normal. No nasal discharge. OP moist.  NECK: no adenopathy  RESP: lungs clear to auscultation - no rales, rhonchi or wheezes  CV: regular rate and rhythm, normal S1 S2, no murmur, no " peripheral edema and peripheral pulses strong  ABDOMEN: soft, nontender, bowel sounds normal  MS: no gross musculoskeletal defects noted  SKIN: no suspicious lesions or rashes  NEURO: Normal strength and tone  PSYCH: mentation appears normal, affect normal/bright     Recent Labs   Lab Test 12/08/21  0713 12/07/21  1313   HGB 12.2* 15.2        Diagnostics:  Results for orders placed or performed in visit on 09/08/23   Basic metabolic panel     Status: Abnormal   Result Value Ref Range    Sodium 141 136 - 145 mmol/L    Potassium 4.3 3.4 - 5.3 mmol/L    Chloride 108 (H) 98 - 107 mmol/L    Carbon Dioxide (CO2) 24 22 - 29 mmol/L    Anion Gap 9 7 - 15 mmol/L    Urea Nitrogen 13.0 8.0 - 23.0 mg/dL    Creatinine 0.86 0.67 - 1.17 mg/dL    Calcium 9.0 8.6 - 10.0 mg/dL    Glucose 87 70 - 99 mg/dL    GFR Estimate >90 >60 mL/min/1.73m2   Hemoglobin A1c     Status: Abnormal   Result Value Ref Range    Hemoglobin A1C 5.7 (H) 0.0 - 5.6 %   CBC with platelets     Status: Normal   Result Value Ref Range    WBC Count 5.8 4.0 - 11.0 10e3/uL    RBC Count 4.90 4.40 - 5.90 10e6/uL    Hemoglobin 15.2 13.3 - 17.7 g/dL    Hematocrit 45.3 40.0 - 53.0 %    MCV 92 78 - 100 fL    MCH 31.0 26.5 - 33.0 pg    MCHC 33.6 31.5 - 36.5 g/dL    RDW 13.0 10.0 - 15.0 %    Platelet Count 237 150 - 450 10e3/uL        EKG: appears normal, NSR, normal axis, normal intervals, no acute ST/T changes c/w ischemia, no LVH by voltage criteria, Left Anterior Fascicular Block, unchanged from previous tracings      Signed Electronically by: Sohail Talavera MD

## 2023-09-22 ASSESSMENT — ACTIVITIES OF DAILY LIVING (ADL)
CONCENTRATING,_REMEMBERING_OR_MAKING_DECISIONS_DIFFICULTY: NO
FALL_HISTORY_WITHIN_LAST_SIX_MONTHS: NO
DIFFICULTY_EATING/SWALLOWING: NO
CHANGE_IN_FUNCTIONAL_STATUS_SINCE_ONSET_OF_CURRENT_ILLNESS/INJURY: NO
WALKING_OR_CLIMBING_STAIRS_DIFFICULTY: NO
WEAR_GLASSES_OR_BLIND: YES
TOILETING_ISSUES: NO
DRESSING/BATHING_DIFFICULTY: NO
DOING_ERRANDS_INDEPENDENTLY_DIFFICULTY: NO

## 2023-09-22 NOTE — PROGRESS NOTES
PTA medications updated by Medication Scribe prior to surgery via phone call with patient (last doses completed by Nurse)     Medication history sources: Patient, Surescripts, and H&P  In the past week, patient estimated taking medication this percent of the time: Greater than 90%      Significant changes made to the medication list:  None      Additional medication history information:   None    Medication reconciliation completed by provider prior to medication history? No    Time spent in this activity: 30 minutes    The information provided in this note is only as accurate as the sources available at the time of update(s)      Prior to Admission medications    Medication Sig Last Dose Taking? Auth Provider Long Term End Date   acetaminophen (TYLENOL) 325 MG tablet Take 2 tablets (650 mg) by mouth every 4 hours as needed for other (mild pain) Unknown at PRN Yes Chi Garza PA-C     aspirin (ASA) 325 MG EC tablet Take 1 tablet (325 mg) by mouth daily > week at AM Yes Chi Alvarenga MD No    atorvastatin (LIPITOR) 40 MG tablet Take 0.5 tablets (20 mg) by mouth every evening (40MG X 0.5 = 20MG)  at PM Yes Chi Alvarenga MD Yes    levothyroxine (SYNTHROID/LEVOTHROID) 150 MCG tablet Take 1 tablet (150 mcg) by mouth daily  at AM Yes Stan Tapia PA-C Yes    metoprolol succinate ER (TOPROL XL) 50 MG 24 hr tablet Take 1 tablet (50 mg) by mouth daily Appointment required for further refills  at AM Yes Chi Alvarenga MD Yes    metroNIDAZOLE (METROGEL) 0.75 % external gel Apply topically 2 times daily Unknown at PRN Yes Stan Tapia PA-C

## 2023-09-25 ENCOUNTER — ANESTHESIA (OUTPATIENT)
Dept: SURGERY | Facility: CLINIC | Age: 58
End: 2023-09-25
Payer: COMMERCIAL

## 2023-09-25 ENCOUNTER — ANESTHESIA EVENT (OUTPATIENT)
Dept: SURGERY | Facility: CLINIC | Age: 58
End: 2023-09-25
Payer: COMMERCIAL

## 2023-09-25 ENCOUNTER — HOSPITAL ENCOUNTER (OUTPATIENT)
Facility: CLINIC | Age: 58
Discharge: HOME | End: 2023-09-26
Attending: ORTHOPAEDIC SURGERY | Admitting: ORTHOPAEDIC SURGERY
Payer: COMMERCIAL

## 2023-09-25 DIAGNOSIS — Z96.652 S/P TOTAL KNEE ARTHROPLASTY, LEFT: Primary | ICD-10-CM

## 2023-09-25 PROCEDURE — C1713 ANCHOR/SCREW BN/BN,TIS/BN: HCPCS | Performed by: ORTHOPAEDIC SURGERY

## 2023-09-25 PROCEDURE — 370N000017 HC ANESTHESIA TECHNICAL FEE, PER MIN: Performed by: ORTHOPAEDIC SURGERY

## 2023-09-25 PROCEDURE — 250N000013 HC RX MED GY IP 250 OP 250 PS 637: Performed by: PHYSICIAN ASSISTANT

## 2023-09-25 PROCEDURE — 258N000003 HC RX IP 258 OP 636: Performed by: STUDENT IN AN ORGANIZED HEALTH CARE EDUCATION/TRAINING PROGRAM

## 2023-09-25 PROCEDURE — 272N000001 HC OR GENERAL SUPPLY STERILE: Performed by: ORTHOPAEDIC SURGERY

## 2023-09-25 PROCEDURE — 250N000009 HC RX 250: Performed by: ANESTHESIOLOGY

## 2023-09-25 PROCEDURE — C1776 JOINT DEVICE (IMPLANTABLE): HCPCS | Performed by: ORTHOPAEDIC SURGERY

## 2023-09-25 PROCEDURE — 250N000011 HC RX IP 250 OP 636: Performed by: ANESTHESIOLOGY

## 2023-09-25 PROCEDURE — 360N000077 HC SURGERY LEVEL 4, PER MIN: Performed by: ORTHOPAEDIC SURGERY

## 2023-09-25 PROCEDURE — 710N000009 HC RECOVERY PHASE 1, LEVEL 1, PER MIN: Performed by: ORTHOPAEDIC SURGERY

## 2023-09-25 PROCEDURE — 250N000013 HC RX MED GY IP 250 OP 250 PS 637: Performed by: ORTHOPAEDIC SURGERY

## 2023-09-25 PROCEDURE — 250N000011 HC RX IP 250 OP 636: Performed by: PHYSICIAN ASSISTANT

## 2023-09-25 PROCEDURE — 258N000003 HC RX IP 258 OP 636: Performed by: PHYSICIAN ASSISTANT

## 2023-09-25 PROCEDURE — 999N000141 HC STATISTIC PRE-PROCEDURE NURSING ASSESSMENT: Performed by: ORTHOPAEDIC SURGERY

## 2023-09-25 PROCEDURE — 250N000011 HC RX IP 250 OP 636: Mod: JZ | Performed by: NURSE ANESTHETIST, CERTIFIED REGISTERED

## 2023-09-25 PROCEDURE — 258N000003 HC RX IP 258 OP 636: Performed by: NURSE ANESTHETIST, CERTIFIED REGISTERED

## 2023-09-25 PROCEDURE — 250N000011 HC RX IP 250 OP 636: Mod: JZ | Performed by: PHYSICIAN ASSISTANT

## 2023-09-25 DEVICE — ATTUNE KNEE SYSTEM TIBIAL BASE FIXED BEARING SIZE 6 CEMENTED
Type: IMPLANTABLE DEVICE | Site: KNEE | Status: FUNCTIONAL
Brand: ATTUNE

## 2023-09-25 DEVICE — SPEEDSET FULL DOSE ANTIBIOTIC BONE CEMENT, 10 PACK CATALOG NUMBER IS 6192-1-010
Type: IMPLANTABLE DEVICE | Site: KNEE | Status: FUNCTIONAL
Brand: SIMPLEX

## 2023-09-25 DEVICE — ATTUNE KNEE SYSTEM FEMORAL POSTERIOR STABILIZED SIZE 6 LEFT CEMENTED
Type: IMPLANTABLE DEVICE | Site: KNEE | Status: FUNCTIONAL
Brand: ATTUNE

## 2023-09-25 DEVICE — ATTUNE PATELLA MEDIALIZED DOME 41MM CEMENTED AOX
Type: IMPLANTABLE DEVICE | Site: KNEE | Status: FUNCTIONAL
Brand: ATTUNE

## 2023-09-25 DEVICE — ATTUNE KNEE SYSTEM TIBIAL INSERT FIXED BEARING POSTERIOR STABILIZED 6 6MM AOX
Type: IMPLANTABLE DEVICE | Site: KNEE | Status: FUNCTIONAL
Brand: ATTUNE

## 2023-09-25 RX ORDER — OXYCODONE HYDROCHLORIDE 5 MG/1
10 TABLET ORAL EVERY 4 HOURS PRN
Status: DISCONTINUED | OUTPATIENT
Start: 2023-09-25 | End: 2023-09-25 | Stop reason: ALTCHOICE

## 2023-09-25 RX ORDER — NALOXONE HYDROCHLORIDE 0.4 MG/ML
0.4 INJECTION, SOLUTION INTRAMUSCULAR; INTRAVENOUS; SUBCUTANEOUS
Status: DISCONTINUED | OUTPATIENT
Start: 2023-09-25 | End: 2023-09-26 | Stop reason: HOSPADM

## 2023-09-25 RX ORDER — CEFAZOLIN SODIUM/WATER 3 G/30 ML
3 SYRINGE (ML) INTRAVENOUS
Status: COMPLETED | OUTPATIENT
Start: 2023-09-25 | End: 2023-09-25

## 2023-09-25 RX ORDER — HYDROMORPHONE HYDROCHLORIDE 2 MG/1
2-4 TABLET ORAL EVERY 4 HOURS PRN
Status: DISCONTINUED | OUTPATIENT
Start: 2023-09-25 | End: 2023-09-26 | Stop reason: HOSPADM

## 2023-09-25 RX ORDER — ONDANSETRON 2 MG/ML
4 INJECTION INTRAMUSCULAR; INTRAVENOUS EVERY 30 MIN PRN
Status: DISCONTINUED | OUTPATIENT
Start: 2023-09-25 | End: 2023-09-25 | Stop reason: HOSPADM

## 2023-09-25 RX ORDER — OXYCODONE HYDROCHLORIDE 5 MG/1
5 TABLET ORAL EVERY 4 HOURS PRN
Status: DISCONTINUED | OUTPATIENT
Start: 2023-09-25 | End: 2023-09-25 | Stop reason: ALTCHOICE

## 2023-09-25 RX ORDER — LIDOCAINE 40 MG/G
CREAM TOPICAL
Status: DISCONTINUED | OUTPATIENT
Start: 2023-09-25 | End: 2023-09-26 | Stop reason: HOSPADM

## 2023-09-25 RX ORDER — SODIUM CHLORIDE, SODIUM LACTATE, POTASSIUM CHLORIDE, CALCIUM CHLORIDE 600; 310; 30; 20 MG/100ML; MG/100ML; MG/100ML; MG/100ML
INJECTION, SOLUTION INTRAVENOUS CONTINUOUS
Status: DISCONTINUED | OUTPATIENT
Start: 2023-09-25 | End: 2023-09-26 | Stop reason: HOSPADM

## 2023-09-25 RX ORDER — CEFAZOLIN SODIUM/WATER 3 G/30 ML
3 SYRINGE (ML) INTRAVENOUS SEE ADMIN INSTRUCTIONS
Status: DISCONTINUED | OUTPATIENT
Start: 2023-09-25 | End: 2023-09-25 | Stop reason: HOSPADM

## 2023-09-25 RX ORDER — CELECOXIB 100 MG/1
100 CAPSULE ORAL 2 TIMES DAILY
Status: DISCONTINUED | OUTPATIENT
Start: 2023-09-25 | End: 2023-09-26 | Stop reason: HOSPADM

## 2023-09-25 RX ORDER — POLYETHYLENE GLYCOL 3350 17 G/17G
17 POWDER, FOR SOLUTION ORAL DAILY
Status: DISCONTINUED | OUTPATIENT
Start: 2023-09-26 | End: 2023-09-26 | Stop reason: HOSPADM

## 2023-09-25 RX ORDER — HYDROMORPHONE HCL IN WATER/PF 6 MG/30 ML
0.4 PATIENT CONTROLLED ANALGESIA SYRINGE INTRAVENOUS EVERY 5 MIN PRN
Status: DISCONTINUED | OUTPATIENT
Start: 2023-09-25 | End: 2023-09-25 | Stop reason: HOSPADM

## 2023-09-25 RX ORDER — TRANEXAMIC ACID 650 MG/1
1950 TABLET ORAL ONCE
Status: COMPLETED | OUTPATIENT
Start: 2023-09-25 | End: 2023-09-25

## 2023-09-25 RX ORDER — ONDANSETRON 2 MG/ML
4 INJECTION INTRAMUSCULAR; INTRAVENOUS EVERY 6 HOURS PRN
Status: DISCONTINUED | OUTPATIENT
Start: 2023-09-25 | End: 2023-09-26 | Stop reason: HOSPADM

## 2023-09-25 RX ORDER — CELECOXIB 200 MG/1
200 CAPSULE ORAL DAILY
Qty: 13 CAPSULE | Refills: 0 | Status: SHIPPED | OUTPATIENT
Start: 2023-09-25 | End: 2024-02-01

## 2023-09-25 RX ORDER — PROPOFOL 10 MG/ML
INJECTION, EMULSION INTRAVENOUS CONTINUOUS PRN
Status: DISCONTINUED | OUTPATIENT
Start: 2023-09-25 | End: 2023-09-25

## 2023-09-25 RX ORDER — HYDROMORPHONE HCL IN WATER/PF 6 MG/30 ML
0.4 PATIENT CONTROLLED ANALGESIA SYRINGE INTRAVENOUS
Status: DISCONTINUED | OUTPATIENT
Start: 2023-09-25 | End: 2023-09-26 | Stop reason: HOSPADM

## 2023-09-25 RX ORDER — FENTANYL CITRATE 0.05 MG/ML
25 INJECTION, SOLUTION INTRAMUSCULAR; INTRAVENOUS EVERY 5 MIN PRN
Status: DISCONTINUED | OUTPATIENT
Start: 2023-09-25 | End: 2023-09-25 | Stop reason: HOSPADM

## 2023-09-25 RX ORDER — SODIUM CHLORIDE, SODIUM LACTATE, POTASSIUM CHLORIDE, CALCIUM CHLORIDE 600; 310; 30; 20 MG/100ML; MG/100ML; MG/100ML; MG/100ML
INJECTION, SOLUTION INTRAVENOUS CONTINUOUS
Status: DISCONTINUED | OUTPATIENT
Start: 2023-09-25 | End: 2023-09-25 | Stop reason: HOSPADM

## 2023-09-25 RX ORDER — ACETAMINOPHEN 325 MG/1
650 TABLET ORAL EVERY 4 HOURS PRN
Status: DISCONTINUED | OUTPATIENT
Start: 2023-09-28 | End: 2023-09-26 | Stop reason: HOSPADM

## 2023-09-25 RX ORDER — NALOXONE HYDROCHLORIDE 0.4 MG/ML
0.4 INJECTION, SOLUTION INTRAMUSCULAR; INTRAVENOUS; SUBCUTANEOUS
Status: DISCONTINUED | OUTPATIENT
Start: 2023-09-25 | End: 2023-09-25 | Stop reason: HOSPADM

## 2023-09-25 RX ORDER — ACETAMINOPHEN 325 MG/1
1000 TABLET ORAL EVERY 8 HOURS PRN
Qty: 60 TABLET | Refills: 0 | Status: SHIPPED | OUTPATIENT
Start: 2023-09-25 | End: 2024-02-01

## 2023-09-25 RX ORDER — ONDANSETRON 4 MG/1
4 TABLET, ORALLY DISINTEGRATING ORAL EVERY 30 MIN PRN
Status: DISCONTINUED | OUTPATIENT
Start: 2023-09-25 | End: 2023-09-25 | Stop reason: HOSPADM

## 2023-09-25 RX ORDER — ONDANSETRON 4 MG/1
4 TABLET, ORALLY DISINTEGRATING ORAL EVERY 6 HOURS PRN
Status: DISCONTINUED | OUTPATIENT
Start: 2023-09-25 | End: 2023-09-26 | Stop reason: HOSPADM

## 2023-09-25 RX ORDER — NALOXONE HYDROCHLORIDE 0.4 MG/ML
0.2 INJECTION, SOLUTION INTRAMUSCULAR; INTRAVENOUS; SUBCUTANEOUS
Status: DISCONTINUED | OUTPATIENT
Start: 2023-09-25 | End: 2023-09-25 | Stop reason: HOSPADM

## 2023-09-25 RX ORDER — FENTANYL CITRATE 0.05 MG/ML
50 INJECTION, SOLUTION INTRAMUSCULAR; INTRAVENOUS EVERY 5 MIN PRN
Status: DISCONTINUED | OUTPATIENT
Start: 2023-09-25 | End: 2023-09-25 | Stop reason: HOSPADM

## 2023-09-25 RX ORDER — HYDROXYZINE HYDROCHLORIDE 25 MG/1
25 TABLET, FILM COATED ORAL EVERY 6 HOURS PRN
Status: DISCONTINUED | OUTPATIENT
Start: 2023-09-25 | End: 2023-09-26 | Stop reason: HOSPADM

## 2023-09-25 RX ORDER — LEVOTHYROXINE SODIUM 150 UG/1
150 TABLET ORAL DAILY
Status: DISCONTINUED | OUTPATIENT
Start: 2023-09-26 | End: 2023-09-26 | Stop reason: HOSPADM

## 2023-09-25 RX ORDER — HYDROXYZINE HYDROCHLORIDE 25 MG/1
25 TABLET, FILM COATED ORAL EVERY 6 HOURS PRN
Qty: 30 TABLET | Refills: 0 | Status: SHIPPED | OUTPATIENT
Start: 2023-09-25 | End: 2024-02-01

## 2023-09-25 RX ORDER — HYDROMORPHONE HCL IN WATER/PF 6 MG/30 ML
0.2 PATIENT CONTROLLED ANALGESIA SYRINGE INTRAVENOUS
Status: DISCONTINUED | OUTPATIENT
Start: 2023-09-25 | End: 2023-09-26 | Stop reason: HOSPADM

## 2023-09-25 RX ORDER — METOPROLOL SUCCINATE 50 MG/1
50 TABLET, EXTENDED RELEASE ORAL DAILY
Status: DISCONTINUED | OUTPATIENT
Start: 2023-09-26 | End: 2023-09-26 | Stop reason: HOSPADM

## 2023-09-25 RX ORDER — CEFAZOLIN SODIUM 2 G/100ML
2 INJECTION, SOLUTION INTRAVENOUS EVERY 8 HOURS
Status: COMPLETED | OUTPATIENT
Start: 2023-09-25 | End: 2023-09-26

## 2023-09-25 RX ORDER — ATORVASTATIN CALCIUM 20 MG/1
20 TABLET, FILM COATED ORAL EVERY EVENING
Status: DISCONTINUED | OUTPATIENT
Start: 2023-09-25 | End: 2023-09-26 | Stop reason: HOSPADM

## 2023-09-25 RX ORDER — NALOXONE HYDROCHLORIDE 0.4 MG/ML
0.2 INJECTION, SOLUTION INTRAMUSCULAR; INTRAVENOUS; SUBCUTANEOUS
Status: DISCONTINUED | OUTPATIENT
Start: 2023-09-25 | End: 2023-09-26 | Stop reason: HOSPADM

## 2023-09-25 RX ORDER — FENTANYL CITRATE 50 UG/ML
INJECTION, SOLUTION INTRAMUSCULAR; INTRAVENOUS PRN
Status: DISCONTINUED | OUTPATIENT
Start: 2023-09-25 | End: 2023-09-25

## 2023-09-25 RX ORDER — DEXAMETHASONE SODIUM PHOSPHATE 4 MG/ML
INJECTION, SOLUTION INTRA-ARTICULAR; INTRALESIONAL; INTRAMUSCULAR; INTRAVENOUS; SOFT TISSUE PRN
Status: DISCONTINUED | OUTPATIENT
Start: 2023-09-25 | End: 2023-09-25

## 2023-09-25 RX ORDER — AMOXICILLIN 250 MG
1-2 CAPSULE ORAL 2 TIMES DAILY
Qty: 30 TABLET | Refills: 0 | Status: SHIPPED | OUTPATIENT
Start: 2023-09-25 | End: 2024-02-01

## 2023-09-25 RX ORDER — PROCHLORPERAZINE MALEATE 10 MG
10 TABLET ORAL EVERY 6 HOURS PRN
Status: DISCONTINUED | OUTPATIENT
Start: 2023-09-25 | End: 2023-09-26 | Stop reason: HOSPADM

## 2023-09-25 RX ORDER — ACETAMINOPHEN 500 MG
1000 TABLET ORAL EVERY 8 HOURS
Status: DISCONTINUED | OUTPATIENT
Start: 2023-09-25 | End: 2023-09-26 | Stop reason: HOSPADM

## 2023-09-25 RX ORDER — HYDROMORPHONE HCL IN WATER/PF 6 MG/30 ML
0.2 PATIENT CONTROLLED ANALGESIA SYRINGE INTRAVENOUS EVERY 5 MIN PRN
Status: DISCONTINUED | OUTPATIENT
Start: 2023-09-25 | End: 2023-09-25 | Stop reason: HOSPADM

## 2023-09-25 RX ORDER — FLUMAZENIL 0.1 MG/ML
0.2 INJECTION, SOLUTION INTRAVENOUS
Status: DISCONTINUED | OUTPATIENT
Start: 2023-09-25 | End: 2023-09-25 | Stop reason: HOSPADM

## 2023-09-25 RX ORDER — AMOXICILLIN 250 MG
1 CAPSULE ORAL 2 TIMES DAILY
Status: DISCONTINUED | OUTPATIENT
Start: 2023-09-25 | End: 2023-09-26 | Stop reason: HOSPADM

## 2023-09-25 RX ORDER — LIDOCAINE 40 MG/G
CREAM TOPICAL
Status: DISCONTINUED | OUTPATIENT
Start: 2023-09-25 | End: 2023-09-25 | Stop reason: HOSPADM

## 2023-09-25 RX ORDER — BISACODYL 10 MG
10 SUPPOSITORY, RECTAL RECTAL DAILY PRN
Status: DISCONTINUED | OUTPATIENT
Start: 2023-09-25 | End: 2023-09-26 | Stop reason: HOSPADM

## 2023-09-25 RX ORDER — OXYCODONE HYDROCHLORIDE 5 MG/1
5-10 TABLET ORAL EVERY 4 HOURS PRN
Qty: 30 TABLET | Refills: 0 | Status: SHIPPED | OUTPATIENT
Start: 2023-09-25 | End: 2023-09-26

## 2023-09-25 RX ADMIN — HYDROMORPHONE HYDROCHLORIDE 4 MG: 2 TABLET ORAL at 22:18

## 2023-09-25 RX ADMIN — PHENYLEPHRINE HYDROCHLORIDE 100 MCG: 10 INJECTION INTRAVENOUS at 16:05

## 2023-09-25 RX ADMIN — PHENYLEPHRINE HYDROCHLORIDE 100 MCG: 10 INJECTION INTRAVENOUS at 15:29

## 2023-09-25 RX ADMIN — HYDROMORPHONE HYDROCHLORIDE 0.4 MG: 0.2 INJECTION, SOLUTION INTRAMUSCULAR; INTRAVENOUS; SUBCUTANEOUS at 18:58

## 2023-09-25 RX ADMIN — SODIUM CHLORIDE, POTASSIUM CHLORIDE, SODIUM LACTATE AND CALCIUM CHLORIDE: 600; 310; 30; 20 INJECTION, SOLUTION INTRAVENOUS at 13:22

## 2023-09-25 RX ADMIN — PHENYLEPHRINE HYDROCHLORIDE 100 MCG: 10 INJECTION INTRAVENOUS at 15:59

## 2023-09-25 RX ADMIN — PHENYLEPHRINE HYDROCHLORIDE 100 MCG: 10 INJECTION INTRAVENOUS at 15:22

## 2023-09-25 RX ADMIN — CEFAZOLIN SODIUM 2 G: 2 INJECTION, SOLUTION INTRAVENOUS at 22:19

## 2023-09-25 RX ADMIN — SENNOSIDES AND DOCUSATE SODIUM 1 TABLET: 50; 8.6 TABLET ORAL at 20:23

## 2023-09-25 RX ADMIN — FENTANYL CITRATE 50 MCG: 50 INJECTION, SOLUTION INTRAMUSCULAR; INTRAVENOUS at 14:48

## 2023-09-25 RX ADMIN — CELECOXIB 100 MG: 100 CAPSULE ORAL at 20:30

## 2023-09-25 RX ADMIN — MEPIVACAINE HYDROCHLORIDE 3 ML: 20 INJECTION, SOLUTION EPIDURAL; INFILTRATION at 14:54

## 2023-09-25 RX ADMIN — PHENYLEPHRINE HYDROCHLORIDE 100 MCG: 10 INJECTION INTRAVENOUS at 15:54

## 2023-09-25 RX ADMIN — SODIUM CHLORIDE, POTASSIUM CHLORIDE, SODIUM LACTATE AND CALCIUM CHLORIDE: 600; 310; 30; 20 INJECTION, SOLUTION INTRAVENOUS at 16:00

## 2023-09-25 RX ADMIN — PROPOFOL 125 MCG/KG/MIN: 10 INJECTION, EMULSION INTRAVENOUS at 15:01

## 2023-09-25 RX ADMIN — PHENYLEPHRINE HYDROCHLORIDE 100 MCG: 10 INJECTION INTRAVENOUS at 16:10

## 2023-09-25 RX ADMIN — TRANEXAMIC ACID 1950 MG: 650 TABLET ORAL at 12:19

## 2023-09-25 RX ADMIN — MIDAZOLAM 2 MG: 1 INJECTION INTRAMUSCULAR; INTRAVENOUS at 13:22

## 2023-09-25 RX ADMIN — DEXAMETHASONE SODIUM PHOSPHATE 10 MG: 4 INJECTION, SOLUTION INTRA-ARTICULAR; INTRALESIONAL; INTRAMUSCULAR; INTRAVENOUS; SOFT TISSUE at 15:00

## 2023-09-25 RX ADMIN — PHENYLEPHRINE HYDROCHLORIDE 100 MCG: 10 INJECTION INTRAVENOUS at 15:47

## 2023-09-25 RX ADMIN — MIDAZOLAM 1 MG: 1 INJECTION INTRAMUSCULAR; INTRAVENOUS at 14:48

## 2023-09-25 RX ADMIN — MIDAZOLAM 1 MG: 1 INJECTION INTRAMUSCULAR; INTRAVENOUS at 14:51

## 2023-09-25 RX ADMIN — ATORVASTATIN CALCIUM 20 MG: 20 TABLET, FILM COATED ORAL at 19:06

## 2023-09-25 RX ADMIN — BUPIVACAINE HYDROCHLORIDE 15 ML: 5 INJECTION, SOLUTION EPIDURAL; INTRACAUDAL at 13:17

## 2023-09-25 RX ADMIN — ACETAMINOPHEN 1000 MG: 500 TABLET, FILM COATED ORAL at 19:05

## 2023-09-25 RX ADMIN — Medication 3 G: at 14:45

## 2023-09-25 RX ADMIN — PHENYLEPHRINE HYDROCHLORIDE 100 MCG: 10 INJECTION INTRAVENOUS at 15:42

## 2023-09-25 ASSESSMENT — ENCOUNTER SYMPTOMS: SEIZURES: 0

## 2023-09-25 ASSESSMENT — ACTIVITIES OF DAILY LIVING (ADL)
ADLS_ACUITY_SCORE: 20
ADLS_ACUITY_SCORE: 20
ADLS_ACUITY_SCORE: 35
ADLS_ACUITY_SCORE: 20

## 2023-09-25 ASSESSMENT — LIFESTYLE VARIABLES: TOBACCO_USE: 0

## 2023-09-25 NOTE — ANESTHESIA PROCEDURE NOTES
"Intrathecal Procedure Note    Pre-Procedure   Staff -        Anesthesiologist:  Hesham Martinez MD       Performed By: anesthesiologist       Location: OR       Procedure Start/Stop Times: 9/25/2023 2:54 PM       Pre-Anesthestic Checklist: patient identified, IV checked, site marked, risks and benefits discussed, informed consent, monitors and equipment checked and pre-op evaluation  Timeout:       Correct Patient: Yes        Correct Procedure: Yes        Correct Site: Yes        Correct Position: Yes   Procedure Documentation  Procedure: intrathecal       Patient Position: sitting       Patient Prep/Sterile Barriers: sterile gloves, mask, patient draped       Skin prep: Chloraprep       Insertion Site: L4-5. (midline approach).       Needle Gauge: 24.        Needle Length (Inches): 3.5        Spinal Needle Type: Suni-Baljit       Introducer used       Introducer: 20 G       # of attempts: 1 and  # of redirects:     Assessment/Narrative         Paresthesias: No.       CSF fluid: clear.       Opening pressure was cmH2O while  Sitting.      Medication(s) Administered   2% Mepivacaine PF (Intrathecal) - Intrathecal   3 mL - 9/25/2023 2:54:00 PM  Medication Administration Time: 9/25/2023 2:54 PM      FOR [employer] ([facility] ONLY:   Pain Team Contact information: please page the Pain Team Via [employer]. Search \"Pain\". During daytime hours, please page the attending first. At night please page the resident first.      "

## 2023-09-25 NOTE — ANESTHESIA POSTPROCEDURE EVALUATION
Patient: Rey Andre    Procedure: Procedure(s):  Left Total Knee Arthroplasty       Anesthesia Type:  Spinal    Note:     Postop Pain Control: Uneventful            Sign Out: Well controlled pain   PONV: No   Neuro/Psych: Uneventful            Sign Out: Acceptable/Baseline neuro status   Airway/Respiratory: Uneventful            Sign Out: Acceptable/Baseline resp. status   CV/Hemodynamics: Uneventful            Sign Out: Acceptable CV status   Other NRE: NONE   DID A NON-ROUTINE EVENT OCCUR?            Last vitals:  Vitals Value Taken Time   /76 09/25/23 1730   Temp 36.2  C (97.2  F) 09/25/23 1730   Pulse 76 09/25/23 1732   Resp 14 09/25/23 1732   SpO2 95 % 09/25/23 1732   Vitals shown include unvalidated device data.    Electronically Signed By: Demetrius Nichols MD  September 25, 2023  6:20 PM

## 2023-09-25 NOTE — ANESTHESIA CARE TRANSFER NOTE
Patient: Rey Andre    Procedure: Procedure(s):  Left Total Knee Arthroplasty       Diagnosis: Degenerative arthritis of left knee [M17.12]  Diagnosis Additional Information: No value filed.    Anesthesia Type:   Spinal     Note:    Oropharynx: oropharynx clear of all foreign objects and spontaneously breathing  Level of Consciousness: drowsy  Oxygen Supplementation: face mask  Level of Supplemental Oxygen (L/min / FiO2): 10  Independent Airway: airway patency satisfactory and stable  Dentition: dentition unchanged  Vital Signs Stable: post-procedure vital signs reviewed and stable  Report to RN Given: handoff report given  Patient transferred to: PACU  Comments: At end of procedure, spontaneous respirations, patient alert to voice, able to follow commands. Oxygen via facemask at 10 liters per minute to PACU. Oxygen tubing connected to wall O2 in PACU, SpO2, NiBP, and EKG monitors and alarms on and functioning, report on patient's clinical status given to PACU RN, RN questions answered.      Handoff Report: Identifed the Patient, Identified the Reponsible Provider, Reviewed the pertinent medical history, Discussed the surgical course, Reviewed Intra-OP anesthesia mangement and issues during anesthesia, Set expectations for post-procedure period and Allowed opportunity for questions and acknowledgement of understanding      Vitals:  Vitals Value Taken Time   /71 09/25/23 1639   Temp     Pulse 87 09/25/23 1640   Resp 16 09/25/23 1640   SpO2 96 % 09/25/23 1640   Vitals shown include unvalidated device data.    Electronically Signed By: CASSANDRA Ruff CRNA  September 25, 2023  4:42 PM

## 2023-09-25 NOTE — PROVIDER NOTIFICATION
Pt requested for PRN PO Dilaudid. stated that Oxycodone don't work best for him. Called on call for  Dr Stewart. Got order for PO Dilaudid from Dr Levar Rodriguez

## 2023-09-25 NOTE — OP NOTE
Procedure Date: 9/25/2023     PREOPERATIVE DIAGNOSIS:   Left knee degenerative joint disease.     POSTOPERATIVE DIAGNOSIS:  Left knee degenerative joint disease.     PROCEDURE:  Left total knee arthroplasty.     SURGEON:  Ebenezer Stewart MD     FIRST ASSISTANT:  Holly Gregory PA-C       ANESTHESIA TYPE:  Spinal with adductor canal and periarticular injection.     TOURNIQUET TIME:  70 minutes.     IMPLANTS:  Tobias and Tobias Attune size 6 posterior stabilized femoral component, size 6 fixed bearing tibial component, 6 mm highly cross-linked posterior stabilized fixed bearing poly, 41 mm patellar component.     DESCRIPTION OF PROCEDURE:  After identification of the patient, correct extremity, review of informed consent, the patient was brought to the operating room where spinal anesthesia was induced.  Perioperative antibiotics were given.  A nonsterile tourniquet was applied to the operative lower extremity.  The operative extremity was then prepped and draped in the usual sterile manner.  After observation of surgical pause, leg was exsanguinated and tourniquet inflated.  Standard midline incision was made.  Dissection was taken sharply through subcutaneous tissues.  Medial and lateral skin flaps were elevated.  Medial release was then performed.  Soft tissue was cleared from the distal anterior aspect of the femur.  Retropatellar fat pad was excised.  Patella was everted, knee was flexed.  A starting reamer was placed in the femoral canal from distal femoral starting point.  Distal femoral cutting guide set on a 5-degree valgus cut and a 11 mm resection was then placed and pinned.  Distal femur was resected.  Extramedullary tibial guide was then placed in line with the anteromedial border of the tibia on a 0-degree slope and a 10 mm resection off the lateral side consistent with preoperative templating.  Proximal tibia was resected.  Extension gap was then balanced with a 6mm spacer block.  Knee was flexed and then  sized to a size 6 femoral component.  A 4-in-1 cutting guide was then pinned in 3 degrees of external rotation.  Anterior and posterior femoral cuts were made.  Flexion gap was balanced with a  6 mm spacer block.  Anterior, posterior and chamfer cuts were made.  A 4-in-1 cutting guide was removed.  Box cutting guide was placed.  Box cut was made.  Medial and lateral meniscus were excised.  Osteophytes were removed from the posterior aspect of the lateral femoral condyles with a curved osteotome.     The tibia was then subluxated anteriorly with a 2-prong tibial retractor, sized to a size 6 tibial component and external rotation was set at the junction of medial and middle third of the tibial tubercle.  Proximal tibia was then prepared.  Trial tray was left in place.  Trial femoral component was impacted into place.  The 6 mm fixed bearing posterior stabilized trial poly was then placed and the knee was reduced.  The knee was noted to have 1 degree of hyperextension, flexion back to 130 degrees.  The patella tracked centrally.  The patella was measured and noted to be 27 mm in thickness.  A 10 mm resection for a 17 mm remnant was then performed.  A 41 mm patellar component was noted to be the appropriate size.  Lug holes were drilled.  Trial component was placed.  Again, the patella tracked centrally.  Trial components were removed.  Periarticular injection was then performed.  Cut ends of bone were irrigated with copious normal saline via pulse lavage.  The real size 6 tibial component was then cemented in place.  Real size 6 femoral component was cemented into place.  The real 6 mm fixed bearing posterior stabilized, highly cross-linked poly was then impacted into place.  The real 41 mm patellar component was then cemented in place and clamped.  Cement was allowed to harden in Betadine diluted solution.  After adequate hardening of the cement, the knee was again assessed for range of motion, stability and patellar  tracking and was as previously noted.  The knee was then placed in 90 degrees of flexion.       The arthrotomy was closed with interrupted 0 Vicryl suture, 2-0 Monocryl was placed subcutaneously in the incision sites.  A running self-locking absorbable 3-0 intracuticular stitch was placed.  Sterile dressing was applied.  Tourniquet was deflated.  The patient was then recovered briefly in the operating room and transferred to the PACU for further recovery.  The patient tolerated the procedure well.  There were no known complications.     Holly Gregory was present the entire portion of procedure.  An assistant was critical in both patient positioning, prepping and draping, deep wound closure, superficial wound closure, dressing placement and patient transfer.

## 2023-09-25 NOTE — ANESTHESIA PROCEDURE NOTES
Adductor canal Procedure Note    Pre-Procedure   Staff -        Anesthesiologist:  Hesham Martinez MD       Performed By: anesthesiologist       Location: pre-op       Procedure Start/Stop Times: 9/25/2023 1:17 PM and 9/25/2023 1:21 PM       Pre-Anesthestic Checklist: patient identified, IV checked, site marked, risks and benefits discussed, informed consent, monitors and equipment checked, pre-op evaluation, at physician/surgeon's request and post-op pain management  Timeout:       Correct Patient: Yes        Correct Procedure: Yes        Correct Site: Yes        Correct Position: Yes        Correct Laterality: Yes   Procedure Documentation  Procedure: Adductor canal       Laterality: left       Patient Position: supine       Patient Prep/Sterile Barriers: sterile gloves, mask       Skin prep: Chloraprep       Local skin infiltrated with mL of 1% lidocaine.        Needle Type: insulated and short bevel       Needle Gauge: 21.        Needle Length (millimeters): 100        Ultrasound guided       1. Ultrasound was used to identify targeted nerve, plexus, vascular marker, or fascial plane and place a needle adjacent to it in real-time.       2. Ultrasound was used to visualize the spread of anesthetic in close proximity to the above referenced structure.       3. A permanent image is entered into the patient's record.       4. The visualized anatomic structures appeared normal.       5. There were no apparent abnormal pathologic findings.    Assessment/Narrative         The placement was negative for: blood aspirated, painful injection and site bleeding       Paresthesias: No.       Bolus given via needle..        Secured via.        Insertion/Infusion Method: Single Shot       Complications: none       Injection made incrementally with aspirations every 5 mL.    Medication(s) Administered   Bupivacaine 0.5% w/ 1:400K Epi (Injection) - Injection   15 mL - 9/25/2023 1:17:00 PM  Medication Administration  "Time: 9/25/2023 1:17 PM     Comments:  Pt tolerated well.    No complications.      The surgeon has given a verbal order transferring care of this patient to me for the performance of a regional analgesia block for post-op pain control. It is requested of me because I am uniquely trained and qualified to perform this block and the surgeon is neither trained nor qualified to perform this procedure.    Hesham Martinez MD   1:37 PM      FOR [facility] ([facility]) ONLY:   Pain Team Contact information: please page the Pain Team Via [employer]. Search \"Pain\". During daytime hours, please page the attending first. At night please page the resident first.      "

## 2023-09-25 NOTE — ANESTHESIA PREPROCEDURE EVALUATION
Anesthesia Pre-Procedure Evaluation    Patient: Rey Andre   MRN: [id number] : 1965        Procedure : Procedure(s):  Left total knee arthroplasty          Past Medical History:   Diagnosis Date    Embolic stroke involving left middle cerebral artery (H) 2020    Hyperlipidemia     Hypothyroidism     Kidney stone     Recurent stones    Sen's neuroma of right foot     Obese     DAMARIS on CPAP     Palpitations 2020    PFO (patent foramen ovale)     Sinus of Valsalva aneurysm       Past Surgical History:   Procedure Laterality Date    ARTHROPLASTY KNEE Right 2021    Procedure: RIGHT TOTAL KNEE ARTHROPLASTY;  Surgeon: Ebenezer Stewart MD;  Location:  OR    CV PFO CLOSURE N/A 2021    Procedure: Patent Foramen Ovale Closure; gore cardioform 25mm,  Surgeon: Chi Alvarenga MD;  Location: [facility] CARDIAC CATH LAB    CYSTOSCOPY      CYSTOSCOPY, RETROGRADES, COMBINED  2014    Procedure: COMBINED CYSTOSCOPY, RETROGRADES;  Surgeon: Francisco J Lerma MD;  Location:  OR    ENT SURGERY      T & A    EP LOOP RECORDER IMPLANT N/A 10/12/2020    Procedure: EP Loop Recorder Implant;  Surgeon: Lefty Ramires MD;  Location:  HEART CARDIAC CATH LAB    IR CAROTID CEREBRAL ANGIOGRAM LEFT  2020    KNEE SURGERY Right     arthroscopic    LASER HOLMIUM LITHOTRIPSY URETER(S), INSERT STENT, COMBINED  2012    Procedure: COMBINED CYSTOSCOPY, URETEROSCOPY, LASER HOLMIUM LITHOTRIPSY URETER(S), INSERT STENT;  Video cystoscopy, Right Retrograde Right Ureteroscopy with Holmium Laser, Stone Extraction,  JJ Stent Placement ;  Surgeon: Francisco J Lerma MD;  Location:  OR    LASER HOLMIUM LITHOTRIPSY URETER(S), INSERT STENT, COMBINED  2014    Procedure: COMBINED CYSTOSCOPY, URETEROSCOPY, LASER HOLMIUM LITHOTRIPSY URETER(S), INSERT STENT;  Surgeon: Francisco J Lerma MD;  Location: [facility] OR    LASER HOLMIUM LITHOTRIPSY URETER(S), INSERT STENT, COMBINED Right  03/05/2017    Procedure: COMBINED CYSTOSCOPY, URETEROSCOPY, LASER HOLMIUM LITHOTRIPSY URETER(S), INSERT STENT;  Surgeon: Chris Barrios MD;  Location: [facility] OR    ORTHOPEDIC SURGERY Right     Meniscal tear    ROTATOR CUFF REPAIR RT/LT      left shoulder    wisdom teeth        Allergies   Allergen Reactions    No Known Drug Allergy       Social History     Tobacco Use    Smoking status: Never     Passive exposure: Never    Smokeless tobacco: Never   Substance Use Topics    Alcohol use: Not Currently      Wt Readings from Last 1 Encounters:   09/25/23 124.7 kg (275 lb)        Anesthesia Evaluation   Pt has had prior anesthetic. Type: General.    No history of anesthetic complications       ROS/MED HX  ENT/Pulmonary:     (+) sleep apnea, uses CPAP,                                  (-) tobacco use and asthma   Neurologic:     (+)          CVA (Left MCA embolic stroke 2020 (hx PFO s/p closure)), date: 2020, without deficits,                 (-) no seizures and migraines   Cardiovascular:     (+) Dyslipidemia - -   -  - -                                   (-) hypertension, CAD, AMOR and valvular problems/murmurs   METS/Exercise Tolerance:     Hematologic:    (-) history of blood clots and anemia   Musculoskeletal:   (+)  arthritis,             GI/Hepatic:    (-) GERD and liver disease   Renal/Genitourinary:     (+)       Nephrolithiasis ,    (-) renal disease   Endo:     (+)          thyroid problem, hypothyroidism,    Obesity,    (-) Type I DM and Type II DM   Psychiatric/Substance Use:    (-) psychiatric history   Infectious Disease:    (-) Recent Fever   Malignancy:       Other:            Physical Exam    Airway        Mallampati: III   TM distance: > 3 FB   Neck ROM: full   Mouth opening: > 3 cm    Respiratory Devices and Support         Dental  no notable dental history     (+) Minor Abnormalities - some fillings, tiny chips      Cardiovascular   cardiovascular exam normal          Pulmonary   pulmonary exam  normal                OUTSIDE LABS:  CBC:   Lab Results   Component Value Date    WBC 5.8 09/08/2023    WBC 6.6 05/06/2021    HGB 15.2 09/08/2023    HGB 12.2 (L) 12/08/2021    HCT 45.3 09/08/2023    HCT 43.1 05/06/2021     09/08/2023     05/06/2021     BMP:   Lab Results   Component Value Date     09/08/2023     05/06/2021    POTASSIUM 4.3 09/08/2023    POTASSIUM 3.8 05/06/2021    CHLORIDE 108 (H) 09/08/2023    CHLORIDE 113 (H) 05/06/2021    CO2 24 09/08/2023    CO2 25 05/06/2021    BUN 13.0 09/08/2023    BUN 18 10/12/2020    CR 0.86 09/08/2023    CR 0.84 05/06/2021    GLC 87 09/08/2023     (H) 03/15/2023     COAGS:   Lab Results   Component Value Date    PTT 27 05/06/2021    INR 0.95 05/06/2021     POC:   Lab Results   Component Value Date    BGM 92 03/06/2020     HEPATIC:   Lab Results   Component Value Date    ALBUMIN 3.6 03/05/2020    PROTTOTAL 6.9 03/05/2020    ALT 48 06/05/2020    AST 13 03/05/2020    ALKPHOS 82 03/05/2020    BILITOTAL 0.3 03/05/2020     OTHER:   Lab Results   Component Value Date    A1C 5.7 (H) 09/08/2023    DAMARIS 9.0 09/08/2023    PHOS 2.6 03/06/2020    MAG 2.1 03/06/2020    TSH 1.35 03/15/2023       Anesthesia Plan    ASA Status:  3    NPO Status:  NPO Appropriate    Anesthesia Type: Spinal.   Induction: N/a.   Maintenance: TIVA.        Consents    Anesthesia Plan(s) and associated risks, benefits, and realistic alternatives discussed. Questions answered and patient/representative(s) expressed understanding.     - Discussed:     - Discussed with:  Patient            Postoperative Care    Pain management: Peripheral nerve block (Single Shot), Multi-modal analgesia.   PONV prophylaxis: Ondansetron (or other 5HT-3)     Comments:                Hesham Martinez MD

## 2023-09-25 NOTE — INTERVAL H&P NOTE
"I have reviewed the surgical (or preoperative) H&P that is linked to this encounter, and examined the patient. There are no significant changes    Clinical Conditions Present on Arrival:  Clinically Significant Risk Factors Present on Admission                 # Drug Induced Platelet Defect: home medication list includes an antiplatelet medication  # Obesity: Estimated body mass index is 38.35 kg/m  as calculated from the following:    Height as of this encounter: 1.803 m (5' 11\").    Weight as of this encounter: 124.7 kg (275 lb).       "

## 2023-09-25 NOTE — BRIEF OP NOTE
[facility]    Brief Operative Note    Pre-operative diagnosis: Degenerative arthritis of left knee [M17.12]  Post-operative diagnosis Same as pre-operative diagnosis    Procedure: Procedure(s):  Left Total Knee Arthroplasty  Surgeon: Surgeon(s) and Role:     * Ebenezer Stewart MD - Primary  Anesthesia: Spinal with Block   Estimated Blood Loss: Less than 50 ml    Drains: None  Specimens:   ID Type Source Tests Collected by Time Destination   A : LEFT KNEE BONE FRAGMENTS Bone Fragments Knee, Left OR DOCUMENTATION ONLY Ebenezer Stewart MD 9/25/2023  3:20 PM      Findings:   None.  Complications: None    .  Implants:   Implant Name Type Inv. Item Serial No.  Lot No. LRB No. Used Action   BONE CEMENT STRK SIMPLEX P SPEEDSET 6192-1-001 - [id number] Cement, Bone BONE CEMENT STRK SIMPLEX P SPEEDSET 6192-1-001  [employer]  Left 1 Implanted   IMP PATELLA JJ ATTUNE DOME 41MM [id number] - [id number] Total Joint Component/Insert IMP PATELLA JJ ATTUNE DOME 41MM [id number]  [employer] [facility]- [id number] Left 1 Implanted   IMP TIB BASE JJ ATTUNE FX BR SYS JAMEE SZ6 [id number] - [id number] Total Joint Component/Insert IMP TIB BASE JJ ATTUNE FX BR SYS JAMEE SZ6 [id number]  [employer]- [id number] Left 1 Implanted   IMP INSERT JJ ATTUNE POST STAB FX BR SYS SZ6 6MM [id number] - [id number] Total Joint Component/Insert IMP INSERT JJ ATTUNE POST STAB FX BR SYS SZ6 6MM [id number]  [employer]- [id number] Left 1 Implanted   IMP COMP FEM JJ ATTUNE POST STAB LT JAMEE SZ6 [id number] - [id number] Total Joint Component/Insert IMP COMP FEM JJ ATTUNE POST STAB LT JAMEE SZ6 [id number]  [employer]- [id number] Left 1 Implanted

## 2023-09-26 ENCOUNTER — APPOINTMENT (OUTPATIENT)
Dept: PHYSICAL THERAPY | Facility: CLINIC | Age: 58
End: 2023-09-26
Attending: ORTHOPAEDIC SURGERY
Payer: COMMERCIAL

## 2023-09-26 VITALS
TEMPERATURE: 97.6 F | OXYGEN SATURATION: 94 % | RESPIRATION RATE: 16 BRPM | DIASTOLIC BLOOD PRESSURE: 66 MMHG | HEART RATE: 70 BPM | HEIGHT: 71 IN | WEIGHT: 275 LBS | SYSTOLIC BLOOD PRESSURE: 130 MMHG | BODY MASS INDEX: 38.5 KG/M2

## 2023-09-26 LAB
CREAT SERPL-MCNC: 0.7 MG/DL (ref 0.67–1.17)
EGFRCR SERPLBLD CKD-EPI 2021: >90 ML/MIN/1.73M2
FASTING STATUS PATIENT QL REPORTED: YES
GLUCOSE SERPL-MCNC: 143 MG/DL (ref 70–99)
HGB BLD-MCNC: 14.9 G/DL (ref 13.3–17.7)

## 2023-09-26 PROCEDURE — 250N000011 HC RX IP 250 OP 636: Mod: JZ | Performed by: PHYSICIAN ASSISTANT

## 2023-09-26 PROCEDURE — 82565 ASSAY OF CREATININE: CPT | Performed by: ORTHOPAEDIC SURGERY

## 2023-09-26 PROCEDURE — 250N000013 HC RX MED GY IP 250 OP 250 PS 637: Performed by: PHYSICIAN ASSISTANT

## 2023-09-26 PROCEDURE — 85018 HEMOGLOBIN: CPT | Performed by: PHYSICIAN ASSISTANT

## 2023-09-26 PROCEDURE — 36415 COLL VENOUS BLD VENIPUNCTURE: CPT | Performed by: PHYSICIAN ASSISTANT

## 2023-09-26 PROCEDURE — 82947 ASSAY GLUCOSE BLOOD QUANT: CPT | Performed by: ORTHOPAEDIC SURGERY

## 2023-09-26 PROCEDURE — 97110 THERAPEUTIC EXERCISES: CPT | Mod: GP | Performed by: PHYSICAL THERAPIST

## 2023-09-26 PROCEDURE — 97161 PT EVAL LOW COMPLEX 20 MIN: CPT | Mod: GP | Performed by: PHYSICAL THERAPIST

## 2023-09-26 PROCEDURE — 250N000013 HC RX MED GY IP 250 OP 250 PS 637: Performed by: ORTHOPAEDIC SURGERY

## 2023-09-26 PROCEDURE — 97116 GAIT TRAINING THERAPY: CPT | Mod: GP | Performed by: PHYSICAL THERAPIST

## 2023-09-26 RX ORDER — HYDROMORPHONE HYDROCHLORIDE 2 MG/1
2-4 TABLET ORAL EVERY 4 HOURS PRN
Qty: 30 TABLET | Refills: 0 | Status: SHIPPED | OUTPATIENT
Start: 2023-09-26 | End: 2023-09-29

## 2023-09-26 RX ADMIN — CELECOXIB 100 MG: 100 CAPSULE ORAL at 08:53

## 2023-09-26 RX ADMIN — HYDROMORPHONE HYDROCHLORIDE 4 MG: 2 TABLET ORAL at 10:19

## 2023-09-26 RX ADMIN — RIVAROXABAN 10 MG: 10 TABLET, FILM COATED ORAL at 01:35

## 2023-09-26 RX ADMIN — ACETAMINOPHEN 1000 MG: 500 TABLET, FILM COATED ORAL at 04:34

## 2023-09-26 RX ADMIN — HYDROMORPHONE HYDROCHLORIDE 4 MG: 2 TABLET ORAL at 06:02

## 2023-09-26 RX ADMIN — CEFAZOLIN SODIUM 2 G: 2 INJECTION, SOLUTION INTRAVENOUS at 06:02

## 2023-09-26 RX ADMIN — HYDROXYZINE HYDROCHLORIDE 25 MG: 25 TABLET, FILM COATED ORAL at 01:35

## 2023-09-26 ASSESSMENT — ACTIVITIES OF DAILY LIVING (ADL)
ADLS_ACUITY_SCORE: 20

## 2023-09-26 NOTE — DISCHARGE SUMMARY
"Discharge Summary    Rey Andre MRN# [id number]   YOB: 1965 Age: 57 year old     Date of Admission: 9/25/2023    Date of Discharge: 9/26/2023    Reason for Admission: Rey Andre is an 57 year old male who was admitted to the hospital following surgery.    Preoperative Diagnosis: Degenerative arthritis of left knee [M17.12]    Postoperative Diagnosis: Degenerative arthritis of left knee [M17.12]    Procedure Completed:  left total knee arthroplasty    Hospital Course:  Mr. Andre was admitted and underwent the above procedure. The patient tolerated the procedure well. There were no complications. Following surgery he was admitted to the floor.  During his stay he did not require any blood transfusions. His pain was controlled with oral pain medication.  During his stay he progressed well in physical therapy and all the therapy goals were met.     Discharge Physical Exam:  /66 (BP Location: Right arm)   Pulse 70   Temp 97.6  F (36.4  C) (Oral)   Resp 16   Ht 1.803 m (5' 11\")   Wt 124.7 kg (275 lb)   SpO2 94%   BMI 38.35 kg/m    Neurovascularly intact, distal pulses present bilaterally.  Calves are negative bilaterally, both soft and nontender.    Assessment: Mr. Andre is stable and doing well status post left total knee arthroplasty.    Plan: We will discharge him home on analgesics and deep venous thrombosis prophylaxis. Patient advised to begin PT in 3-7 days. He will follow-up with me approximately 2 weeks from surgery. This appointment is already scheduled at [facility]. He may call [phone] with additional questions or concerns.    Meds:     Medication List        Started      celecoxib 200 MG capsule  Commonly known as: celeBREX  200 mg, Oral, DAILY, Do not take within 6 hours of ibuprofen (MOTRIN, ADVIL) or ketorolac (TORADOL) if prescribed.     HYDROmorphone 2 MG tablet  Commonly known as: DILAUDID  2-4 mg, Oral, EVERY 4 HOURS PRN     hydrOXYzine 25 MG tablet  Commonly known " as: ATARAX  25 mg, Oral, EVERY 6 HOURS PRN     rivaroxaban ANTICOAGULANT 10 MG Tabs tablet  Commonly known as: XARELTO  10 mg, Oral, DAILY     senna-docusate 8.6-50 MG tablet  Commonly known as: SENOKOT-S/PERICOLACE  1-2 tablets, Oral, 2 TIMES DAILY, Take while on oral narcotics to prevent or treat constipation.            Modified      acetaminophen 325 MG tablet  Commonly known as: TYLENOL  975 mg, Oral, EVERY 8 HOURS PRN  What changed:   how much to take  when to take this            Discontinued      aspirin 325 MG EC tablet  Commonly known as: ASA

## 2023-09-26 NOTE — PROGRESS NOTES
Patient vital signs are at baseline: Yes  Patient able to ambulate as they were prior to admission or with assist devices provided by therapies during their stay:  Yes  Patient MUST void prior to discharge:  Yes  Patient able to tolerate oral intake:  Yes  Pain has adequate pain control using Oral analgesics:  Yes  Does patient have an identified :  Yes  Has goal D/C date and time been discussed with patient:  Yes    A&O x4. VSS on RA. Up with 1 GB and walker. Dressing and Ace wrap CDI. Schedule Tylenol, PRN Dilaudid, and Atarax given for pain management. PIV STUART.

## 2023-09-26 NOTE — PLAN OF CARE
Occupational Therapy: Orders received. Chart reviewed and discussed with care team.? Occupational Therapy not indicated due to PT stating pt is ind in I/ADLs.? Defer discharge recommendations to current care team and ortho team.? Will complete orders.

## 2023-09-26 NOTE — PROGRESS NOTES
09/26/23 0733   Appointment Info   Signing Clinician's Name / Credentials (PT) Lian Huffman DPT   Living Environment   People in Home spouse   Current Living Arrangements house   Home Accessibility stairs to enter home;stairs within home   Number of Stairs, Main Entrance 2   Stair Railings, Main Entrance railing on right side (ascending)   Number of Stairs, Within Home, Primary greater than 10 stairs  (15)   Stair Railings, Within Home, Primary railings on both sides of stairs   Transportation Anticipated car, drives self;family or friend will provide   Living Environment Comments Pt's spouse and adult children will be able to assist at discharge.   Self-Care   Usual Activity Tolerance good   Current Activity Tolerance moderate   Equipment Currently Used at Home none   Fall history within last six months no   Activity/Exercise/Self-Care Comment Pt owns FWW, SEC, crutches.   General Information   Onset of Illness/Injury or Date of Surgery 09/25/23   Referring Physician Ebenezer Stewart MD   Patient/Family Therapy Goals Statement (PT) Return home with OP PT.   Pertinent History of Current Problem (include personal factors and/or comorbidities that impact the POC) 58 y/o male POD # 1 L TKA.   Existing Precautions/Restrictions fall   Weight-Bearing Status - LLE weight-bearing as tolerated   General Observations Pt in supine upon arrival of therapist.   Cognition   Affect/Mental Status (Cognition) WFL   Orientation Status (Cognition) oriented x 3   Follows Commands (Cognition) WFL   Pain Assessment   Patient Currently in Pain   (L knee pain at rest: 6-7/10)   Integumentary/Edema   Integumentary/Edema Comments L knee incision covered with dressing.   Posture    Posture Comments Noted forward head and shoulder posture sitting EOB and standing at FWW.   Range of Motion (ROM)   ROM Comment Limited L knee ROM d/t pain and stiffness, otherwise B LEs WFL.   Strength (Manual Muscle Testing)   Strength Comments Not  formally assessed, pt demonstrates sufficient L LE strength to complete SLR.   Bed Mobility   Comment, (Bed Mobility) Supine-sit with SBA.   Transfers   Comment, (Transfers) Sit <> stand with FWW and SBA.   Gait/Stairs (Locomotion)   Distance in Feet (Gait) 125' x 2   Comment, (Gait/Stairs) Pt amb 10' with FWW and CGA.   Balance   Balance Comments Noted good sitting and standing balance at FWW.   Sensory Examination   Sensory Perception Comments Pt denies numbness/tingling in B LEs.   Clinical Impression   Criteria for Skilled Therapeutic Intervention Yes, treatment indicated   PT Diagnosis (PT) Difficulty with functional mobility.   Influenced by the following impairments Pain, Impaired L knee ROM, Decreased strength, Decreased activity tolerance   Functional limitations due to impairments Limited functional mobility requiring AD and assist.   Clinical Presentation (PT Evaluation Complexity) Stable/Uncomplicated   Clinical Presentation Rationale Based on PMH, current presentation, and social support.   Clinical Decision Making (Complexity) low complexity   Planned Therapy Interventions (PT) bed mobility training;cryotherapy;gait training;ROM (range of motion);stair training;strengthening;transfer training   Risk & Benefits of therapy have been explained patient   PT Total Evaluation Time   PT Eval, Low Complexity Minutes (20847) 10   Plan of Care Review   Plan of Care Reviewed With patient   Physical Therapy Goals   PT Frequency One time eval and treatment only   PT Predicted Duration/Target Date for Goal Attainment 09/26/23   PT Goals Bed Mobility;Transfers;Gait;Stairs   PT: Bed Mobility Supervision/stand-by assist;Supine to/from sit   PT: Transfers Supervision/stand-by assist;Sit to/from stand;Assistive device   PT: Gait Supervision/stand-by assist;Rolling walker;100 feet   PT: Stairs Minimal assist;4 stairs;Rail on left;Assistive device   Interventions   Interventions Quick Adds Gait Training;Therapeutic  Activity;Therapeutic Procedure   Therapeutic Procedure/Exercise   Ther. Procedure: strength, endurance, ROM, flexibillity Minutes (09265) 8   Symptoms Noted During/After Treatment increased pain;fatigue   Treatment Detail/Skilled Intervention PT: Pt performed supine TKA exercises x 10 repetitions with cues for technique: ankle pumps, glut set, quad set, heel slides, SAQ and SLR. Provided pt with HEP handout.   Therapeutic Activity   Therapeutic Activities: dynamic activities to improve functional performance Minutes (91939) 5   Symptoms Noted During/After Treatment Fatigue;Increased pain   Treatment Detail/Skilled Intervention PT: Pt performed sit <> stand with FWW and SBA, cues provided for appropriate hand placement and upright posture upon standing. Pt agreeable to sit up in primitivo at end of session, chair alarm on and needs within reach.   Gait Training   Gait Training Minutes (03938) 12   Symptoms Noted During/After Treatment (Gait Training) fatigue;increased pain   Treatment Detail/Skilled Intervention PT: Gait training of 125' x 2 with FWW and CGA progressing to SBA. Noted step-to gait pattern progressing to partial swing through. Pt navigated 4 stairs with 1 rail and SEC, CGA and cues for sequencing.   PT Discharge Planning   PT Plan Disch   PT Rationale for DC Rec Pt is progressing towards independence. Requiring CGA-SBA for mobility with use of FWW for transfers and ambulation, SEC to navigate stairs   PT Brief overview of current status Assist of 1 with use of FWW, SEC on stairs   Total Session Time   Timed Code Treatment Minutes 25   Total Session Time (sum of timed and untimed services) 35       Physical Therapy Discharge Summary    Reason for therapy discharge:    Discharged to home with outpatient therapy.    Progress towards therapy goal(s). See goals on Care Plan in [facility] electronic [facility] record for goal details.  Goals met    Therapy recommendation(s):    Continued therapy is recommended.   Rationale/Recommendations:  OP PT to progress knee ROM/strength and independence with functional mobility.

## 2023-09-26 NOTE — PLAN OF CARE
Goal Outcome Evaluation:      Plan of Care Reviewed With: patient, spouse    Overall Patient Progress: improvingOverall Patient Progress: improving     Patient vital signs are at baseline: Yes  Patient able to ambulate as they were prior to admission or with assist devices provided by therapies during their stay:  Yes  Patient MUST void prior to discharge:  Yes  Patient able to tolerate oral intake:  Yes  Pain has adequate pain control using Oral analgesics:  Yes  Does patient have an identified :  Yes  Has goal D/C date and time been discussed with patient:  Yes    Discharge instruction went over with pt and family, verbalized understanding. Will discharge home with family.

## 2023-09-26 NOTE — PROGRESS NOTES
[facility]  Orthopedic Progress Note    Chief Complaint:  POD#1 s/p left total knee arthroplasty         Interval History:     Patient is doing well. Endorses pain with ambulation, managed with medications. No cp, sob, n/v/d, or abd pain.              Medications:     Current Facility-Administered Medications Ordered in Epic   Medication Dose Route Frequency Last Rate Last Admin    acetaminophen (TYLENOL) tablet 1,000 mg  1,000 mg Oral Q8H   1,000 mg at 09/26/23 0434    [START ON 9/28/2023] acetaminophen (TYLENOL) tablet 650 mg  650 mg Oral Q4H PRN        atorvastatin (LIPITOR) tablet 20 mg  20 mg Oral QPM   20 mg at 09/25/23 1906    benzocaine-menthol (CHLORASEPTIC) 6-10 MG lozenge 1 lozenge  1 lozenge Buccal Q1H PRN        bisacodyl (DULCOLAX) suppository 10 mg  10 mg Rectal Daily PRN        celecoxib (celeBREX) capsule 100 mg  100 mg Oral BID   100 mg at 09/25/23 2030    HYDROmorphone (DILAUDID) injection 0.2 mg  0.2 mg Intravenous Q2H PRN        Or    HYDROmorphone (DILAUDID) injection 0.4 mg  0.4 mg Intravenous Q2H PRN   0.4 mg at 09/25/23 1858    HYDROmorphone (DILAUDID) tablet 2-4 mg  2-4 mg Oral Q4H PRN   4 mg at 09/26/23 0602    hydrOXYzine (ATARAX) tablet 25 mg  25 mg Oral Q6H PRN   25 mg at 09/26/23 0135    lactated ringers infusion   Intravenous Continuous 100 mL/hr at 09/25/23 1907 Restarted at 09/25/23 1907    levothyroxine (SYNTHROID/LEVOTHROID) tablet 150 mcg  150 mcg Oral Daily        lidocaine (LMX4) cream   Topical Q1H PRN        lidocaine 1 % 0.1-1 mL  0.1-1 mL Other Q1H PRN        magnesium hydroxide (MILK OF MAGNESIA) suspension 30 mL  30 mL Oral Daily PRN        metoprolol succinate ER (TOPROL XL) 24 hr tablet 50 mg  50 mg Oral Daily        naloxone (NARCAN) injection 0.2 mg  0.2 mg Intravenous Q2 Min PRN        Or    naloxone (NARCAN) injection 0.4 mg  0.4 mg Intravenous Q2 Min PRN        Or    naloxone (NARCAN) injection 0.2 mg  0.2 mg Intramuscular Q2 Min PRN        Or     "naloxone (NARCAN) injection 0.4 mg  0.4 mg Intramuscular Q2 Min PRN        ondansetron (ZOFRAN ODT) ODT tab 4 mg  4 mg Oral Q6H PRN        Or    ondansetron (ZOFRAN) injection 4 mg  4 mg Intravenous Q6H PRN        polyethylene glycol (MIRALAX) Packet 17 g  17 g Oral Daily        prochlorperazine (COMPAZINE) injection 10 mg  10 mg Intravenous Q6H PRN        Or    prochlorperazine (COMPAZINE) tablet 10 mg  10 mg Oral Q6H PRN        rivaroxaban ANTICOAGULANT (XARELTO) tablet 10 mg  10 mg Oral Daily with supper   10 mg at 23 0135    senna-docusate (SENOKOT-S/PERICOLACE) 8.6-50 MG per tablet 1 tablet  1 tablet Oral BID   1 tablet at 23 2023    sodium chloride (PF) 0.9% PF flush 3 mL  3 mL Intracatheter Q8H        sodium chloride (PF) 0.9% PF flush 3 mL  3 mL Intracatheter q1 min prn   3 mL at 23 0602     Current Outpatient Medications Ordered in Epic   Medication    acetaminophen (TYLENOL) 325 MG tablet    celecoxib (CELEBREX) 200 MG capsule    hydrOXYzine (ATARAX) 25 MG tablet    oxyCODONE (ROXICODONE) 5 MG tablet    rivaroxaban ANTICOAGULANT (XARELTO) 10 MG TABS tablet    senna-docusate (SENOKOT-S/PERICOLACE) 8.6-50 MG tablet                  Physical Exam:   Blood pressure 130/66, pulse 70, temperature 97.6  F (36.4  C), temperature source Oral, resp. rate 16, height 1.803 m (5' 11\"), weight 124.7 kg (275 lb), SpO2 94 %.      Vital Sign Ranges  Temperature Temp  Av.3  F (36.3  C)  Min: 96.2  F (35.7  C)  Max: 97.9  F (36.6  C)   Blood pressure Systolic (24hrs), Av , Min:97 , Max:156        Diastolic (24hrs), Av, Min:66, Max:90      Pulse Pulse  Av.3  Min: 67  Max: 87   Respirations Resp  Avg: 15  Min: 10  Max: 19   Pulse oximetry SpO2  Av.8 %  Min: 91 %  Max: 97 %         Intake/Output Summary (Last 24 hours) at 2023 0802  Last data filed at 2023 0555  Gross per 24 hour   Intake 1150 ml   Output 1850 ml   Net -700 ml       AA&Ox3, NAD  Non-labored breathing  DP and " PT pulses 2+  Left knee with dressing c/d/i  Calves soft and nontender  EHL and FHL intact  NVI distally, SILT               Data:   Hgb: p           Assessment and Plan:         Rey Andre is a 57 year old male POD#1 s/p left total knee arthroplasty with Dr. Stewart    -- Pain well controlled with current regimen. Will change post-operative narcotic medicine to match current in-hospital medicine (Dilaudid 2-4mg Q4-6hr prn).  -- DVT ppx: xarelto x14 days  -- Bowel ppx in place, regular diet as tolerated  -- PT to continue per protocol  -- Dispo: home today with already scheduled follow up in 2 weeks at [facility]    Holly Gregory PA-C  m:[phone]

## 2023-09-27 ENCOUNTER — MYC MEDICAL ADVICE (OUTPATIENT)
Dept: FAMILY MEDICINE | Facility: CLINIC | Age: 58
End: 2023-09-27
Payer: COMMERCIAL

## 2023-09-27 ENCOUNTER — ANCILLARY PROCEDURE (OUTPATIENT)
Dept: CARDIOLOGY | Facility: CLINIC | Age: 58
End: 2023-09-27
Attending: INTERNAL MEDICINE
Payer: COMMERCIAL

## 2023-09-27 DIAGNOSIS — E78.5 HYPERLIPIDEMIA LDL GOAL <70: ICD-10-CM

## 2023-09-27 DIAGNOSIS — Q21.12 PFO (PATENT FORAMEN OVALE): ICD-10-CM

## 2023-09-27 DIAGNOSIS — L71.9 ROSACEA: ICD-10-CM

## 2023-09-27 DIAGNOSIS — Z45.09 ENCOUNTER FOR LOOP RECORDER CHECK: ICD-10-CM

## 2023-09-27 DIAGNOSIS — I71.20 THORACIC AORTIC ANEURYSM WITHOUT RUPTURE (H): ICD-10-CM

## 2023-09-27 DIAGNOSIS — I63.412 CEREBROVASCULAR ACCIDENT (CVA) DUE TO EMBOLISM OF LEFT MIDDLE CEREBRAL ARTERY (H): ICD-10-CM

## 2023-09-27 PROCEDURE — G2066 INTER DEVC REMOTE 30D: HCPCS | Performed by: INTERNAL MEDICINE

## 2023-09-27 PROCEDURE — 93297 REM INTERROG DEV EVAL ICPMS: CPT | Performed by: INTERNAL MEDICINE

## 2023-09-27 RX ORDER — METOPROLOL SUCCINATE 50 MG/1
50 TABLET, EXTENDED RELEASE ORAL DAILY
Qty: 90 TABLET | Refills: 0 | Status: SHIPPED | OUTPATIENT
Start: 2023-09-27 | End: 2023-12-26

## 2023-09-27 RX ORDER — METOPROLOL SUCCINATE 50 MG/1
50 TABLET, EXTENDED RELEASE ORAL DAILY
Qty: 90 TABLET | Refills: 0 | OUTPATIENT
Start: 2023-09-27

## 2023-09-27 NOTE — TELEPHONE ENCOUNTER
1 refill given to allow for cardiology follow up. No further refills until seen by cardiology     christianne Faulkner

## 2023-09-27 NOTE — TELEPHONE ENCOUNTER
[facility] Cardiology Refill Guideline reviewed.  Medication does not meet criteria for refill due to patient is due for an appointment. Patient was last seen 5/3/22 with Dr. Alvarenga with order for 1 year follow-up. A refill letter was sent on 6/20/23.  Messaged to providers team for further review.

## 2023-10-03 LAB
MDC_IDC_PG_IMPLANT_DTM: NORMAL
MDC_IDC_PG_MFG: NORMAL
MDC_IDC_PG_MODEL: NORMAL
MDC_IDC_PG_SERIAL: NORMAL
MDC_IDC_PG_TYPE: NORMAL
MDC_IDC_SESS_CLINIC_NAME: NORMAL
MDC_IDC_SESS_DTM: NORMAL
MDC_IDC_SESS_TYPE: NORMAL

## 2023-12-19 ENCOUNTER — TELEPHONE (OUTPATIENT)
Dept: CARDIOLOGY | Facility: CLINIC | Age: 58
End: 2023-12-19
Payer: COMMERCIAL

## 2023-12-19 NOTE — TELEPHONE ENCOUNTER
Remote alert received today, pt's ILR has triggered AUGUST.     ILR was implanted in 10/2020 for cryptogenic stroke. No AF episodes were detected for the life of the device. He has had 8 pauses noted on his ILR, 3-6 seconds in duration, all during sleeping hours.     Called pt to notify him about AUGUST status. No answer, left brief VM to call back to device clinic for a non-urgent message.     When pt calls back, will find out if he'd like to have the ILR removed or leave it in place. Will also cancel the future remote and device check orders after speaking with pt.

## 2023-12-21 DIAGNOSIS — Z45.09 ENCOUNTER FOR LOOP RECORDER AT END OF BATTERY LIFE: Primary | ICD-10-CM

## 2023-12-21 NOTE — TELEPHONE ENCOUNTER
Talked with pt and updated him in regards to loop recorder reaching AUGUST. Will send a message to Jailene our . And place orders.

## 2023-12-23 DIAGNOSIS — E78.5 HYPERLIPIDEMIA LDL GOAL <70: ICD-10-CM

## 2023-12-23 DIAGNOSIS — Q21.12 PFO (PATENT FORAMEN OVALE): ICD-10-CM

## 2023-12-23 DIAGNOSIS — I71.20 THORACIC AORTIC ANEURYSM WITHOUT RUPTURE (H): ICD-10-CM

## 2023-12-23 DIAGNOSIS — I63.412 CEREBROVASCULAR ACCIDENT (CVA) DUE TO EMBOLISM OF LEFT MIDDLE CEREBRAL ARTERY (H): ICD-10-CM

## 2023-12-26 RX ORDER — METOPROLOL SUCCINATE 50 MG/1
50 TABLET, EXTENDED RELEASE ORAL DAILY
Qty: 90 TABLET | Refills: 1 | Status: SHIPPED | OUTPATIENT
Start: 2023-12-26 | End: 2024-09-26

## 2024-01-04 ENCOUNTER — PATIENT OUTREACH (OUTPATIENT)
Dept: CARE COORDINATION | Facility: CLINIC | Age: 59
End: 2024-01-04
Payer: COMMERCIAL

## 2024-01-16 DIAGNOSIS — Z45.09 ENCOUNTER FOR LOOP RECORDER AT END OF BATTERY LIFE: Primary | ICD-10-CM

## 2024-01-16 DIAGNOSIS — I63.412 CEREBROVASCULAR ACCIDENT (CVA) DUE TO EMBOLISM OF LEFT MIDDLE CEREBRAL ARTERY (H): ICD-10-CM

## 2024-01-16 RX ORDER — LIDOCAINE 40 MG/G
CREAM TOPICAL
Status: CANCELLED | OUTPATIENT
Start: 2024-01-16

## 2024-01-16 NOTE — PROGRESS NOTES
Pre-procedure instructions for Loop recorder explant 1/23/2024 @ 09:30 Arrive at 08:30 at [facility] registration    Anticoagulation: Xarelto   no need to Hold    Contrast allergy: no  NPO 8 hours prior to arrival time  May have clear liquids 2 hours prior to arrival time    -Shower the morning of the procedure, and then put on a clean shirt in order to help prevent infection.     -Take temperature the morning of the procedure and call [facility] at [phone] if it is above 100.0.  Also call [facility] if pt has any new cold symptoms evening prior or AM of procedure.     -Review arrival time and location.     Pt verbalized understanding of instructions.   Autumn CABALLERO

## 2024-01-18 ENCOUNTER — PATIENT OUTREACH (OUTPATIENT)
Dept: CARE COORDINATION | Facility: CLINIC | Age: 59
End: 2024-01-18
Payer: COMMERCIAL

## 2024-01-22 ENCOUNTER — TELEPHONE (OUTPATIENT)
Dept: CARDIOLOGY | Facility: CLINIC | Age: 59
End: 2024-01-22
Payer: COMMERCIAL

## 2024-01-22 NOTE — TELEPHONE ENCOUNTER
Patient called back. He had been told to show up at 0830 for a 0930 procedure. Clarified that his procedure is scheduled for 0830 so he needs to arrive by 0730 and apologized for the error. He will be here at 0730.  REYES CABALLERO

## 2024-01-22 NOTE — TELEPHONE ENCOUNTER
Message received from ADA Muñoz in the care suites, regarding patient. Patient's loop recorder explant is scheduled for 0830 tomorrow but upon review of chart, she noted he was told his arrival time was 0830. She wanted to clarify the correct arrival time for patient and asked that patient be contacted.    Patient should arrive at 0730. Called patient and LVM letting him know his arrival time is 0730 and NOT 0830. Requested he call back to the device RN line with any questions/concerns.     REYES RN    (No VM available on home phone).

## 2024-01-23 ENCOUNTER — HOSPITAL ENCOUNTER (OUTPATIENT)
Facility: CLINIC | Age: 59
Discharge: HOME | End: 2024-01-23
Admitting: INTERNAL MEDICINE
Payer: COMMERCIAL

## 2024-01-23 VITALS
SYSTOLIC BLOOD PRESSURE: 121 MMHG | DIASTOLIC BLOOD PRESSURE: 84 MMHG | RESPIRATION RATE: 18 BRPM | HEIGHT: 71 IN | TEMPERATURE: 98.2 F | BODY MASS INDEX: 40.88 KG/M2 | WEIGHT: 292 LBS | HEART RATE: 71 BPM | OXYGEN SATURATION: 93 %

## 2024-01-23 DIAGNOSIS — I63.412 CEREBROVASCULAR ACCIDENT (CVA) DUE TO EMBOLISM OF LEFT MIDDLE CEREBRAL ARTERY (H): ICD-10-CM

## 2024-01-23 DIAGNOSIS — Z45.09 ENCOUNTER FOR LOOP RECORDER AT END OF BATTERY LIFE: ICD-10-CM

## 2024-01-23 PROCEDURE — 33286 RMVL SUBQ CAR RHYTHM MNTR: CPT | Performed by: INTERNAL MEDICINE

## 2024-01-23 PROCEDURE — 999N000071 HC STATISTIC HEART CATH LAB OR EP LAB

## 2024-01-23 PROCEDURE — 250N000009 HC RX 250: Performed by: INTERNAL MEDICINE

## 2024-01-23 RX ORDER — NALOXONE HYDROCHLORIDE 0.4 MG/ML
0.2 INJECTION, SOLUTION INTRAMUSCULAR; INTRAVENOUS; SUBCUTANEOUS
Status: DISCONTINUED | OUTPATIENT
Start: 2024-01-23 | End: 2024-01-23 | Stop reason: HOSPADM

## 2024-01-23 RX ORDER — ACETAMINOPHEN 325 MG/1
650 TABLET ORAL EVERY 4 HOURS PRN
Status: DISCONTINUED | OUTPATIENT
Start: 2024-01-23 | End: 2024-01-23 | Stop reason: HOSPADM

## 2024-01-23 RX ORDER — NALOXONE HYDROCHLORIDE 0.4 MG/ML
0.4 INJECTION, SOLUTION INTRAMUSCULAR; INTRAVENOUS; SUBCUTANEOUS
Status: DISCONTINUED | OUTPATIENT
Start: 2024-01-23 | End: 2024-01-23 | Stop reason: HOSPADM

## 2024-01-23 RX ORDER — ASPIRIN 325 MG
325 TABLET, DELAYED RELEASE (ENTERIC COATED) ORAL DAILY
COMMUNITY

## 2024-01-23 RX ORDER — LIDOCAINE 40 MG/G
CREAM TOPICAL
Status: DISCONTINUED | OUTPATIENT
Start: 2024-01-23 | End: 2024-01-23 | Stop reason: HOSPADM

## 2024-01-23 ASSESSMENT — ACTIVITIES OF DAILY LIVING (ADL): ADLS_ACUITY_SCORE: 35

## 2024-01-23 NOTE — PROGRESS NOTES
[facility] Admission Nursing Note    Patient Information  Name: Rey Andre  Age: 58 year old  Reason for admission: Loop Explant  [facility] arrival time: 0745    Visitor Information  Name: NA     Patient Admission/Assessment   Pre-procedure assessment complete: Yes  If abnormal assessment/labs, provider notified: N/A  NPO: N/A  Medications held per instructions/orders: N/A  Consent: obtained  If applicable, pregnancy test status: deferred  Patient oriented to room: Yes  Education/questions answered: Yes  Plan/other: proceed as scheduled    Discharge Planning  Discharge name/phone number: self  Overnight post sedation caregiver: self/lis  Discharge location: home    Koki Lo RN

## 2024-01-23 NOTE — PROGRESS NOTES
[facility] Discharge Nursing Note    Patient Information  Name: Rey Andre  Age: 58 year old    Discharge Education:  Discharge instructions reviewed: Yes  Additional education/resources provided: NA  Patient/patient representative verbalizes understanding: Yes  Patient discharging on new medications: No  Medication education completed: N/A    Discharge Plans:   Discharge location: home  Discharge ride contacted: N/A  Approximate discharge time: 0905    Discharge Criteria:  Discharge criteria met and vital signs stable: Yes    Patient Belongs:  Patient belongings returned to patient: Yes    Koki Lo RN

## 2024-01-23 NOTE — DISCHARGE INSTRUCTIONS
Loop Explant Discharge Instructions    After you go home:    Have an adult stay with you for 6 hours.  You may resume your normal diet.       For 24 hours - due to the sedation you received:  Relax and take it easy.  Do NOT make any important or legal decisions.  Do NOT drive or operate machines at home or at work.  Do NOT drink alcohol.    Care of Chest Incision:    Keep the bandage on for 3 days. You may remove the dressing on Friday. Change it only if it gets loose or soaked. If you need to change it, use 4x4-inch gauze and a large clear bandage.   Leave the strips of tape on. They will fall off on their own, or we will remove them at your first check-up.  Check your wound daily for signs of infection, such as increased redness, severe swelling or draining. Fever may also be a sign of infection. Call us if you see any of these signs.  If there are no signs of infection, you may shower after the bandage comes off in 3 days. If you take a tub bath, keep the wound dry.  No soaking the incision (swimming pool, bathtub, hot tub) for 2 weeks.  You may have mild to medium pain for 3 to 5 days. Take Acetaminophen (Tylenol) or Ibuprofen (Advil) for the pain. If the pain persists or is severe, call us.    Activity:    Avoid strenuous activity until incision well healed.    Bleeding:    If you start bleeding from the incision site, sit down and press firmly on the site for 10 minutes.   Once bleeding stops, call [facility] as soon as you can.       Call 911 right away if you have heavy bleeding or bleeding that does not stop.      Medicines:    Take your medications, including blood thinners, unless your provider tells you not to.  If you have stopped any medicines, check with your provider about when to restart them.    Follow Up Appointments:    Follow up with Device Clinic at [facility] of patient preference in 7-10 days.    Call the clinic if:    You have a large or growing hard lump around the site.  The  site is red, swollen, hot or tender.  Blood or fluid is draining from the site.  You have chills or a fever greater than 101 F (38 C).  You feel dizzy or light-headed.  Questions or concerns.    Telling others about your device:    Before you leave the hospital, you will receive a temporary ID card. A permanent card will be mailed to you about 6 to 8 weeks later. Always carry the ID card with you. It has important details about your device.  You may also get a Medical Alert bracelet or tag that says you have a loop recorder.  Go to [email or web address].   Always tell doctors, dentists and other care providers that you have a device implanted in you.  Let us know before you plan any surgeries. Your care team must take special steps to keep you safe during certain procedures. These steps will depend on the type of device you have. Your provider will need to see your ID card. They may need to call us for instructions.    Device Safety:    Please refer to device  s booklet for further information.        [facility] at [place]:    [phone] UM (7 days a week)

## 2024-01-23 NOTE — PROGRESS NOTES
[facility] Post Procedure Note    Patient Information  Name: Rey Andre  Age: 58 year old    Post Procedure  Time patient returned to Care Suites: 2753  Concerns/abnormal assessment: NA  If abnormal assessment, provider notified: N/A  Plan/Other: discharge    Koki Lo RN

## 2024-01-24 ENCOUNTER — TELEPHONE (OUTPATIENT)
Dept: CARDIOLOGY | Facility: CLINIC | Age: 59
End: 2024-01-24
Payer: COMMERCIAL

## 2024-01-24 DIAGNOSIS — I63.412 CEREBROVASCULAR ACCIDENT (CVA) DUE TO EMBOLISM OF LEFT MIDDLE CEREBRAL ARTERY (H): Primary | ICD-10-CM

## 2024-01-24 DIAGNOSIS — Z45.09 ENCOUNTER FOR LOOP RECORDER CHECK: ICD-10-CM

## 2024-01-24 NOTE — TELEPHONE ENCOUNTER
Pt had ILR explant yesterday.     Post device implant discharge phone call.    Reviewed the following:  - Remove outer dressing 3 days after implant. May shower after outer dressing removed.   - Leave steri-strips in place, will be removed at 1 week device check  - Watch for redness, drainage, warmth, or fever. Call device clinic if any signs of infection.     1 week device check scheduled: pt will send photo of incision next week on 1/30/2024.  Remote appt scheduled 1/30 @ 9am, will call pt at that time.     Pt states understanding of all instructions.

## 2024-01-30 ENCOUNTER — ANCILLARY PROCEDURE (OUTPATIENT)
Dept: CARDIOLOGY | Facility: CLINIC | Age: 59
End: 2024-01-30
Attending: INTERNAL MEDICINE
Payer: COMMERCIAL

## 2024-01-30 ENCOUNTER — DOCUMENTATION ONLY (OUTPATIENT)
Dept: CARDIOLOGY | Facility: CLINIC | Age: 59
End: 2024-01-30

## 2024-01-30 DIAGNOSIS — Z45.09 ENCOUNTER FOR LOOP RECORDER CHECK: ICD-10-CM

## 2024-01-30 DIAGNOSIS — I63.412 CEREBROVASCULAR ACCIDENT (CVA) DUE TO EMBOLISM OF LEFT MIDDLE CEREBRAL ARTERY (H): ICD-10-CM

## 2024-01-30 NOTE — PROGRESS NOTES
Incision site after loop recorder explant.    Spoke with pt on the phone for incision care, concerns, and how/when to call the clinic.  Direct phone number and email to device nurses provided to pt.    ADA Meléndez

## 2024-02-01 ENCOUNTER — OFFICE VISIT (OUTPATIENT)
Dept: FAMILY MEDICINE | Facility: CLINIC | Age: 59
End: 2024-02-01
Attending: PHYSICIAN ASSISTANT
Payer: COMMERCIAL

## 2024-02-01 ENCOUNTER — ORDERS ONLY (AUTO-RELEASED) (OUTPATIENT)
Dept: FAMILY MEDICINE | Facility: CLINIC | Age: 59
End: 2024-02-01

## 2024-02-01 VITALS
DIASTOLIC BLOOD PRESSURE: 84 MMHG | OXYGEN SATURATION: 96 % | RESPIRATION RATE: 12 BRPM | HEART RATE: 76 BPM | TEMPERATURE: 97.8 F | WEIGHT: 291.1 LBS | BODY MASS INDEX: 40.75 KG/M2 | SYSTOLIC BLOOD PRESSURE: 131 MMHG | HEIGHT: 71 IN

## 2024-02-01 DIAGNOSIS — Z00.00 ROUTINE GENERAL MEDICAL EXAMINATION AT A HEALTH CARE FACILITY: ICD-10-CM

## 2024-02-01 DIAGNOSIS — L71.9 ROSACEA: ICD-10-CM

## 2024-02-01 DIAGNOSIS — E66.813 CLASS 3 OBESITY: ICD-10-CM

## 2024-02-01 DIAGNOSIS — Z12.11 SCREEN FOR COLON CANCER: ICD-10-CM

## 2024-02-01 DIAGNOSIS — I63.412 CEREBROVASCULAR ACCIDENT (CVA) DUE TO EMBOLISM OF LEFT MIDDLE CEREBRAL ARTERY (H): ICD-10-CM

## 2024-02-01 DIAGNOSIS — E78.5 HYPERLIPIDEMIA LDL GOAL <70: ICD-10-CM

## 2024-02-01 DIAGNOSIS — Z12.5 SCREENING FOR PROSTATE CANCER: ICD-10-CM

## 2024-02-01 DIAGNOSIS — R73.03 PREDIABETES: ICD-10-CM

## 2024-02-01 DIAGNOSIS — E03.9 HYPOTHYROIDISM, UNSPECIFIED TYPE: ICD-10-CM

## 2024-02-01 DIAGNOSIS — Z12.11 SCREEN FOR COLON CANCER: Primary | ICD-10-CM

## 2024-02-01 DIAGNOSIS — I71.20 THORACIC AORTIC ANEURYSM WITHOUT RUPTURE, UNSPECIFIED PART (H): ICD-10-CM

## 2024-02-01 LAB
ALBUMIN SERPL BCG-MCNC: 4.2 G/DL (ref 3.5–5.2)
ALP SERPL-CCNC: 88 U/L (ref 40–150)
ALT SERPL W P-5'-P-CCNC: 44 U/L (ref 0–70)
ANION GAP SERPL CALCULATED.3IONS-SCNC: 11 MMOL/L (ref 7–15)
APO A-I SERPL-MCNC: <6 MG/DL
AST SERPL W P-5'-P-CCNC: 32 U/L (ref 0–45)
BILIRUB SERPL-MCNC: 0.4 MG/DL
BUN SERPL-MCNC: 14.8 MG/DL (ref 8–23)
CALCIUM SERPL-MCNC: 8.9 MG/DL (ref 8.8–10.2)
CHLORIDE SERPL-SCNC: 109 MMOL/L (ref 98–107)
CHOLEST SERPL-MCNC: 122 MG/DL
CREAT SERPL-MCNC: 0.85 MG/DL (ref 0.67–1.17)
DEPRECATED HCO3 PLAS-SCNC: 22 MMOL/L (ref 22–29)
EGFRCR SERPLBLD CKD-EPI 2021: >90 ML/MIN/1.73M2
FASTING STATUS PATIENT QL REPORTED: YES
GLUCOSE SERPL-MCNC: 97 MG/DL (ref 70–99)
HBA1C MFR BLD: 6.1 % (ref 0–5.6)
HDLC SERPL-MCNC: 44 MG/DL
LDLC SERPL CALC-MCNC: 57 MG/DL
NONHDLC SERPL-MCNC: 78 MG/DL
POTASSIUM SERPL-SCNC: 4.2 MMOL/L (ref 3.4–5.3)
PROT SERPL-MCNC: 6.8 G/DL (ref 6.4–8.3)
PSA SERPL DL<=0.01 NG/ML-MCNC: 0.72 NG/ML (ref 0–4.5)
SODIUM SERPL-SCNC: 142 MMOL/L (ref 135–145)
TRIGL SERPL-MCNC: 103 MG/DL

## 2024-02-01 PROCEDURE — 83036 HEMOGLOBIN GLYCOSYLATED A1C: CPT | Performed by: PHYSICIAN ASSISTANT

## 2024-02-01 PROCEDURE — 80053 COMPREHEN METABOLIC PANEL: CPT | Performed by: PHYSICIAN ASSISTANT

## 2024-02-01 PROCEDURE — 99214 OFFICE O/P EST MOD 30 MIN: CPT | Mod: 25 | Performed by: PHYSICIAN ASSISTANT

## 2024-02-01 PROCEDURE — 99396 PREV VISIT EST AGE 40-64: CPT | Performed by: PHYSICIAN ASSISTANT

## 2024-02-01 PROCEDURE — 80061 LIPID PANEL: CPT | Performed by: PHYSICIAN ASSISTANT

## 2024-02-01 PROCEDURE — 36415 COLL VENOUS BLD VENIPUNCTURE: CPT | Performed by: PHYSICIAN ASSISTANT

## 2024-02-01 PROCEDURE — 83695 ASSAY OF LIPOPROTEIN(A): CPT | Performed by: PHYSICIAN ASSISTANT

## 2024-02-01 PROCEDURE — G0103 PSA SCREENING: HCPCS | Performed by: PHYSICIAN ASSISTANT

## 2024-02-01 RX ORDER — METRONIDAZOLE 7.5 MG/G
GEL TOPICAL 2 TIMES DAILY
Qty: 45 G | Refills: 11 | Status: SHIPPED | OUTPATIENT
Start: 2024-02-01

## 2024-02-01 RX ORDER — ATORVASTATIN CALCIUM 20 MG/1
20 TABLET, FILM COATED ORAL DAILY
Qty: 90 TABLET | Refills: 3 | Status: SHIPPED | OUTPATIENT
Start: 2024-02-01

## 2024-02-01 RX ORDER — LEVOTHYROXINE SODIUM 150 UG/1
150 TABLET ORAL DAILY
Qty: 90 TABLET | Refills: 3 | Status: SHIPPED | OUTPATIENT
Start: 2024-02-01

## 2024-02-01 NOTE — PATIENT INSTRUCTIONS
Preventive Care Advice   This is general advice given by our system to help you stay healthy. However, your care team may have specific advice just for you. Please talk to your care team about your preventive care needs.  Nutrition  Eat 5 or more servings of fruits and vegetables each day.  Try wheat bread, brown rice and whole grain pasta (instead of white bread, rice, and pasta).  Get enough calcium and vitamin D. Check the label on foods and aim for 100% of the RDA (recommended daily allowance).  Lifestyle  Exercise at least 150 minutes each week  (30 minutes a day, 5 days a week).  Do muscle strengthening activities 2 days a week. These help control your weight and prevent disease.  No smoking.  Wear sunscreen to prevent skin cancer.  Have a dental exam and cleaning every 6 months.  Yearly exams  See your health care team every year to talk about:  Any changes in your health.  Any medicines your care team has prescribed.  Preventive care, family planning, and ways to prevent chronic diseases.  Shots (vaccines)   HPV shots (up to age 26), if you've never had them before.  Hepatitis B shots (up to age 59), if you've never had them before.  COVID-19 shot: Get this shot when it's due.  Flu shot: Get a flu shot every year.  Tetanus shot: Get a tetanus shot every 10 years.  Pneumococcal, hepatitis A, and RSV shots: Ask your care team if you need these based on your risk.  Shingles shot (for age 50 and up)  General health tests  Diabetes screening:  Starting at age 35, Get screened for diabetes at least every 3 years.  If you are younger than age 35, ask your care team if you should be screened for diabetes.  Cholesterol test: At age 39, start having a cholesterol test every 5 years, or more often if advised.  Bone density scan (DEXA): At age 50, ask your care team if you should have this scan for osteoporosis (brittle bones).  Hepatitis C: Get tested at least once in your life.  STIs (sexually transmitted  infections)  Before age 24: Ask your care team if you should be screened for STIs.  After age 24: Get screened for STIs if you're at risk. You are at risk for STIs (including HIV) if:  You are sexually active with more than one person.  You don't use condoms every time.  You or a partner was diagnosed with a sexually transmitted infection.  If you are at risk for HIV, ask about PrEP medicine to prevent HIV.  Get tested for HIV at least once in your life, whether you are at risk for HIV or not.  Cancer screening tests  Cervical cancer screening: If you have a cervix, begin getting regular cervical cancer screening tests starting at age 21.  Breast cancer scan (mammogram): If you've ever had breasts, begin having regular mammograms starting at age 40. This is a scan to check for breast cancer.  Colon cancer screening: It is important to start screening for colon cancer at age 45.  Have a colonoscopy test every 10 years (or more often if you're at risk) Or, ask your provider about stool tests like a FIT test every year or Cologuard test every 3 years.  To learn more about your testing options, visit:   https://www.[employer]/373567.pdf.  For help making a decision, visit:   https://bit.ly/zg86816.  Prostate cancer screening test: If you have a prostate, ask your care team if a prostate cancer screening test (PSA) at age 55 is right for you.  Lung cancer screening: If you are a current or former smoker ages 50 to 80, ask your care team if ongoing lung cancer screenings are right for you.  For informational purposes only. Not to replace the advice of your health care provider. Copyright   2023 [employer]. All rights reserved. Clinically reviewed by the [facility] Transitions Program. [employer] 060736 - REV 01/24.

## 2024-02-01 NOTE — PROGRESS NOTES
Preventive Care Visit  [facility]  Stan Tapia PA-C, Family Medicine  Feb 1, 2024      SUBJECTIVE:   Phillip is a 58 year old, presenting for the following:  Thyroid Problem and Physical (Pt thought he scheduled for physical, would like to get done today)        2/1/2024     8:41 AM   Additional Questions   Roomed by Caleb LANG     History of Present Illness       Hypothyroidism:     Since last visit, patient describes the following symptoms::  Weight gain    Weight gain::  16-20 lbs.    Reason for visit:  Annual, Hypothyrodism    He eats 2-3 servings of fruits and vegetables daily.He consumes 1 sweetened beverage(s) daily.He exercises with enough effort to increase his heart rate 30 to 60 minutes per day.  He exercises with enough effort to increase his heart rate 4 days per week.   He is not taking prescribed medications regularly due to None.  Healthy Habits:     Getting at least 3 servings of Calcium per day:  Yes    Bi-annual eye exam:  NO    Dental care twice a year:  Yes    Sleep apnea or symptoms of sleep apnea:  Sleep apnea    Diet:  Diabetic and Regular (no restrictions)    Frequency of exercise:  2-3 days/week    Duration of exercise:  30-45 minutes    Taking medications regularly:  Yes    Barriers to taking medications:  None    Medication side effects:  None    Additional concerns today:  Yes      Hypothyroidism Follow-up    Since last visit, patient describes the following symptoms: weight gain of 16-20 lbs        Past history of cva. Loop monitor removed no afib. Statin stable at 20 mg. No sideeffects. See cardiology in ~1 month. Asa used. No longer needing to see neuro per neuro.           Social History     Tobacco Use    Smoking status: Never     Passive exposure: Never    Smokeless tobacco: Never   Substance Use Topics    Alcohol use: Not Currently             2/1/2023     8:15 AM   Alcohol Use   Prescreen: >3 drinks/day or >7 drinks/week? Not Applicable       Last  PSA:   PSA   Date Value Ref Range Status   07/01/2020 0.85 0 - 4 ug/L Final     Comment:     Assay Method:  Chemiluminescence using Siemens Vista analyzer     Prostate Specific Antigen Screen   Date Value Ref Range Status   03/15/2023 0.63 0.00 - 3.50 ng/mL Final       Reviewed orders with patient. Reviewed health maintenance and updated orders accordingly - Yes  BP Readings from Last 3 Encounters:   02/01/24 131/84   01/23/24 121/84   09/26/23 130/66    Wt Readings from Last 3 Encounters:   02/01/24 132 kg (291 lb 1.6 oz)   01/23/24 132.5 kg (292 lb)   09/25/23 124.7 kg (275 lb)                  Patient Active Problem List   Diagnosis    Nephrolithiasis    Stroke due to embolism of left middle cerebral artery (H)    Palpitations    PFO (patent foramen ovale)    S/P total knee arthroplasty    S/P total knee arthroplasty, left    Thoracic aortic aneurysm without rupture, unspecified part (H24)    Class 3 obesity (H)     Past Surgical History:   Procedure Laterality Date    ARTHROPLASTY KNEE Right 12/7/2021    Procedure: RIGHT TOTAL KNEE ARTHROPLASTY;  Surgeon: Ebenezer Stewart MD;  Location:  OR    ARTHROPLASTY KNEE Left 9/25/2023    Procedure: Left Total Knee Arthroplasty;  Surgeon: Ebenezer Stewart MD;  Location:  OR    CV PFO CLOSURE N/A 05/06/2021    Procedure: Patent Foramen Ovale Closure; gore cardioform 25mm,  Surgeon: Chi Alvarenga MD;  Location:  HEART [facility]ARDI[facility] CATH LAB    CYSTOSCOPY      CYSTOSCOPY, RETROGRADES, COMBINED  05/24/2014    Procedure: COMBINED CYSTOSCOPY, RETROGRADES;  Surgeon: Francisco J Lerma MD;  Location: [facility] OR    ENT SURGERY      T & A    EP LOOP RECORDER EXPLANT N/A 1/23/2024    Procedure: Loop Recorder Removal;  Surgeon: Jarvis Best MD;  Location:  HEART [facility]ARDIAC CATH LAB    EP LOOP RECORDER IMPLANT N/A 10/12/2020    Procedure: EP Loop Recorder Implant;  Surgeon: Lefty Ramires MD;  Location:  HEART CARDIAC CATH LAB    IR CAROTID CEREBRAL ANGIOGRAM LEFT   03/05/2020    KNEE SURGERY Right     arthroscopic    LASER HOLMIUM LITHOTRIPSY URETER(S), INSERT STENT, COMBINED  07/27/2012    Procedure: COMBINED CYSTOSCOPY, URETEROSCOPY, LASER HOLMIUM LITHOTRIPSY URETER(S), INSERT STENT;  Video cystoscopy, Right Retrograde Right Ureteroscopy with Holmium Laser, Stone Extraction,  JJ Stent Placement ;  Surgeon: Francisco J Lerma MD;  Location: [facility] OR    LASER HOLMIUM LITHOTRIPSY URETER(S), INSERT STENT, COMBINED  05/24/2014    Procedure: COMBINED CYSTOSCOPY, URETEROSCOPY, LASER HOLMIUM LITHOTRIPSY URETER(S), INSERT STENT;  Surgeon: Francisco J Lerma MD;  Location: [facility]    LASER HOLMIUM LITHOTRIPSY URETER(S), INSERT STENT, COMBINED Right 03/05/2017    Procedure: COMBINED CYSTOSCOPY, URETEROSCOPY, LASER HOLMIUM LITHOTRIPSY URETER(S), INSERT STENT;  Surgeon: Chris Barrios MD;  Location: [facility] OR    ORTHOPEDIC SURGERY Right     Meniscal tear    ROTATOR CUFF REPAIR RT/LT      left shoulder    wisdom teeth         Social History     Tobacco Use    Smoking status: Never     Passive exposure: Never    Smokeless tobacco: Never   Substance Use Topics    Alcohol use: Not Currently     Family History   Problem Relation Age of Onset    Hyperlipidemia Mother         pulm emboli, IUD perforation and developed clot    Substance Abuse Mother     Other - See Comments Father         Possible Autoimmune disorder , DVT, pulm embolism    Prostate Cancer Father     Depression Father     Obesity Father     Breast Cancer Sister          Current Outpatient Medications   Medication Sig Dispense Refill    aspirin (ASA) 325 MG EC tablet Take 325 mg by mouth daily      atorvastatin (LIPITOR) 20 MG tablet Take 1 tablet (20 mg) by mouth daily 90 tablet 3    levothyroxine (SYNTHROID/LEVOTHROID) 150 MCG tablet Take 1 tablet (150 mcg) by mouth daily 90 tablet 3    metoprolol succinate ER (TOPROL XL) 50 MG 24 hr tablet TAKE 1 TABLET(50 MG) BY MOUTH DAILY 90 tablet 1    metroNIDAZOLE (METROGEL)  "0.75 % external gel Apply topically 2 times daily 45 g 11     Allergies   Allergen Reactions    No Known Drug Allergy      Recent Labs   Lab Test 02/01/24  0952 09/26/23  0810 09/08/23  1216 03/15/23  0749 05/03/22  0835 05/06/21  0740 10/19/20  1528 10/12/20  1209 06/05/20  0725 03/06/20  0525 03/05/20  1444   A1C 6.1*  --  5.7*  --   --   --   --   --   --   --  5.5   LDL  --   --   --   --  41  --  29  --  43  --  84   HDL  --   --   --   --  45  --  45  --  41  --  48   TRIG  --   --   --   --  129  --  257*  --  106  --  129   ALT  --   --   --   --   --   --   --   --  48  --  34   CR  --  0.70 0.86  --   --  0.84  --  0.93  --    < >  --    GFRESTIMATED  --  >90 >90  --   --  >90  --  >90  --    < >  --    GFRESTBLACK  --   --   --   --   --  >90  --  >90  --    < >  --    POTASSIUM  --   --  4.3  --   --  3.8  --  4.0  --    < >  --    TSH  --   --   --  1.35  --   --   --   --   --   --   --     < > = values in this interval not displayed.        Reviewed and updated as needed this visit by clinical staff   Tobacco  Allergies  Meds  Problems  Med Hx  Surg Hx  Fam Hx          Reviewed and updated as needed this visit by Provider   Tobacco  Allergies  Meds  Problems  Med Hx  Surg Hx  Fam Hx              OBJECTIVE:   /84 (BP Location: Right arm, Patient Position: Chair, Cuff Size: Adult Large)   Pulse 76   Temp 97.8  F (36.6  C) (Oral)   Resp 12   Ht 1.803 m (5' 11\")   Wt 132 kg (291 lb 1.6 oz)   SpO2 96%   BMI 40.60 kg/m     Estimated body mass index is 40.6 kg/m  as calculated from the following:    Height as of this encounter: 1.803 m (5' 11\").    Weight as of this encounter: 132 kg (291 lb 1.6 oz).  Physical Exam  GENERAL: alert, no distress, and obese  EYES: Eyes grossly normal to inspection, PERRL and conjunctivae and sclerae normal  HENT: ear canals and TM's normal, nose and mouth without ulcers or lesions  NECK: no adenopathy, no asymmetry, masses, or scars  RESP: lungs clear " to auscultation - no rales, rhonchi or wheezes  CV: regular rate and rhythm, normal S1 S2, no S3 or S4, no murmur, click or rub, no peripheral edema  ABDOMEN: soft, nontender, no hepatosplenomegaly, no masses and bowel sounds normal  MS: no gross musculoskeletal defects noted, no edema  SKIN: no suspicious lesions or rashes  NEURO: Normal strength and tone, mentation intact and speech normal  PSYCH: mentation appears normal, affect normal/bright  LYMPH: no cervical, supraclavicular, axillary, or inguinal adenopathy    Diagnostic Test Results:  none     ASSESSMENT/PLAN:   Screen for colon cancer  Due.   - COLOGUARD([employer]); Future    Hyperlipidemia LDL goal <70  Lipids pending. If stable, maintain 20 mg. Otherwise can increase. Given past history, lipoprotein a to be obtained.   - Lipid panel reflex to direct LDL Non-fasting; Future  - Lipid panel reflex to direct LDL Non-fasting    Routine general medical examination at a health care facility  Stable exam. Discussed weight. Planning on 30 lb weight loss goal with exercise. Recheck in 1 year.   - Lipid panel reflex to direct LDL Non-fasting; Future  - Lipid panel reflex to direct LDL Non-fasting    Rosacea  Stable.   - metroNIDAZOLE (METROGEL) 0.75 % external gel; Apply topically 2 times daily    Hypothyroidism, unspecified type  Stable. Labs pending if stable, recheck annually.   - levothyroxine (SYNTHROID/LEVOTHROID) 150 MCG tablet; Take 1 tablet (150 mcg) by mouth daily  Medication use and side effects discussed with the patient. Patient is in complete understanding and agreement with plan.     Cerebrovascular accident (CVA) due to embolism of left middle cerebral artery (H)  Stable. On statin. Ldl at goal. Lipoprotein a pending. Asa continued.   - atorvastatin (LIPITOR) 20 MG tablet; Take 1 tablet (20 mg) by mouth daily  - Lipoprotein (a); Future  - Comprehensive metabolic panel (BMP + Alb, Alk Phos, ALT, AST, Total. Bili, TP); Future  - Lipoprotein  "(a)  - Comprehensive metabolic panel (BMP + Alb, Alk Phos, ALT, AST, Total. Bili, TP)  Medication use and side effects discussed with the patient. Patient is in complete understanding and agreement with plan.     Prediabetes  Montioring.   - Hemoglobin A1c; Future  - Hemoglobin A1c    Screening for prostate cancer    - PSA, screen; Future  - PSA, screen    Thoracic aortic aneurysm without rupture, unspecified part (H24)  Followed by cardiology.     Class 3 obesity (H)  As above     Patient has been advised of split billing requirements and indicates understanding: Yes      Counseling  Reviewed preventive health counseling, as reflected in patient instructions       Regular exercise       Healthy diet/nutrition       Colorectal cancer screening       Prostate cancer screening      BMI  Estimated body mass index is 40.6 kg/m  as calculated from the following:    Height as of this encounter: 1.803 m (5' 11\").    Weight as of this encounter: 132 kg (291 lb 1.6 oz).   Weight management plan: Discussed healthy diet and exercise guidelines      He reports that he has never smoked. He has never been exposed to tobacco smoke. He has never used smokeless tobacco.          Signed Electronically by: Stan Tapia PA-C  "

## 2024-02-21 LAB — NONINV COLON CA DNA+OCC BLD SCRN STL QL: NEGATIVE

## 2024-02-23 ENCOUNTER — OFFICE VISIT (OUTPATIENT)
Dept: CARDIOLOGY | Facility: CLINIC | Age: 59
End: 2024-02-23
Payer: COMMERCIAL

## 2024-02-23 VITALS
OXYGEN SATURATION: 96 % | SYSTOLIC BLOOD PRESSURE: 126 MMHG | HEART RATE: 71 BPM | HEIGHT: 71 IN | DIASTOLIC BLOOD PRESSURE: 84 MMHG | WEIGHT: 292.6 LBS | BODY MASS INDEX: 40.96 KG/M2

## 2024-02-23 DIAGNOSIS — I71.20 THORACIC AORTIC ANEURYSM WITHOUT RUPTURE, UNSPECIFIED PART (H): Primary | ICD-10-CM

## 2024-02-23 PROCEDURE — 99213 OFFICE O/P EST LOW 20 MIN: CPT | Performed by: INTERNAL MEDICINE

## 2024-02-23 NOTE — PROGRESS NOTES
HPI and Plan:   I the pleasure of seeing Phillip in follow-up he is accompanied by his wife.  He had a PFO cryptogenic stroke.  Of note is family has a history of blood clots but there is been no gene identified and we did check for genes in him and we did not find anything.  He had his implantable loop recorder removed a month or 2 ago check the site is well-healed he is feeling great we do know he has a dilated ascending aorta at least the root at 45 mm that was from a couple years ago so the next year I may do a CAT scan of the entire aorta to make sure that there is no other aneurysm his blood pressure is well-controlled and he is on beta-blocker so nothing more needs to be done in terms of prevention today's visit was 30 minutes mostly all counseling to review the above test including the previous echocardiogram talk about his follow-up study being a CAT scan angiogram reviewing the blood work from his internist which include normal electrolytes liver tests cholesterol numbers slightly elevated hemoglobin A1c PSA test we will see the patient back in 1 year    Orders Placed This Encounter   Procedures    CT Chest (PE) Abdomen Pelvis w Contrast     No orders of the defined types were placed in this encounter.    There are no discontinued medications.      Encounter Diagnosis   Name Primary?    Thoracic aortic aneurysm without rupture, unspecified part (H24) Yes       CURRENT MEDICATIONS:  Current Outpatient Medications   Medication Sig Dispense Refill    aspirin (ASA) 325 MG EC tablet Take 325 mg by mouth daily      atorvastatin (LIPITOR) 20 MG tablet Take 1 tablet (20 mg) by mouth daily 90 tablet 3    levothyroxine (SYNTHROID/LEVOTHROID) 150 MCG tablet Take 1 tablet (150 mcg) by mouth daily 90 tablet 3    metoprolol succinate ER (TOPROL XL) 50 MG 24 hr tablet TAKE 1 TABLET(50 MG) BY MOUTH DAILY 90 tablet 1    metroNIDAZOLE (METROGEL) 0.75 % external gel Apply topically 2 times daily 45 g 11       ALLERGIES      Allergies   Allergen Reactions    No Known Drug Allergy        PAST MEDICAL HISTORY:  Past Medical History:   Diagnosis Date    Embolic stroke involving left middle cerebral artery (H) 03/2020    Hyperlipidemia     Hypothyroidism     Kidney stone     Recurent stones    Sen's neuroma of right foot     Obese     DAMARIS on CPAP     Palpitations 03/06/2020    PFO (patent foramen ovale)     Sinus of Valsalva aneurysm        PAST SURGICAL HISTORY:  Past Surgical History:   Procedure Laterality Date    ARTHROPLASTY KNEE Right 12/7/2021    Procedure: RIGHT TOTAL KNEE ARTHROPLASTY;  Surgeon: Ebenezer Stewart MD;  Location:  OR    ARTHROPLASTY KNEE Left 9/25/2023    Procedure: Left Total Knee Arthroplasty;  Surgeon: Ebenezer Stewart MD;  Location:  OR    CV PFO CLOSURE N/A 05/06/2021    Procedure: Patent Foramen Ovale Closure; gore cardioform 25mm,  Surgeon: Chi Alvarenga MD;  Location:  [facility]ARDI[facility] LAB    CYSTOSCOPY      CYSTOSCOPY, RETROGRADES, COMBINED  05/24/2014    Procedure: COMBINED CYSTOSCOPY, RETROGRADES;  Surgeon: Francisco J Lerma MD;  Location:  OR    ENT SURGERY      T & A    EP LOOP RECORDER EXPLANT N/A 1/23/2024    Procedure: Loop Recorder Removal;  Surgeon: Jarvis Best MD;  Location:  HE[facility]ARD[facility] C[facility] LAB    EP LOOP RECORDER IMPLANT N/A 10/12/2020    Procedure: EP Loop Recorder Implant;  Surgeon: Lefty Ramires MD;  Location:  [facility]ATH LAB    IR CAROTID CEREBRAL ANGIOGRAM LEFT  03/05/2020    KNEE SURGERY Right     arthroscopic    LASER HOLMIUM LITHOTRIPSY URETER(S), INSERT STENT, COMBINED  07/27/2012    Procedure: COMBINED CYSTOSCOPY, URETEROSCOPY, LASER HOLMIUM LITHOTRIPSY URETER(S), INSERT STENT;  Video cystoscopy, Right Retrograde Right Ureteroscopy with Holmium Laser, Stone Extraction,  JJ Stent Placement ;  Surgeon: Francisco J Lerma MD;  Location:  OR    LASER HOLMIUM LITHOTRIPSY URETER(S), INSERT STENT, COMBINED  05/24/2014    Procedure:  COMBINED CYSTOSCOPY, URETEROSCOPY, LASER HOLMIUM LITHOTRIPSY URETER(S), INSERT STENT;  Surgeon: Francisco J Lerma MD;  Location: [facility]    LASER HOLMIUM LITHOTRIPSY URETER(S), INSERT STENT, COMBINED Right 03/05/2017    Procedure: COMBINED CYSTOSCOPY, URETEROSCOPY, LASER HOLMIUM LITHOTRIPSY URETER(S), INSERT STENT;  Surgeon: Chris Barrios MD;  Location: [facility] OR    ORTHOPEDIC SURGERY Right     Meniscal tear    ROTATOR CUFF REPAIR RT/LT      left shoulder    wisdom teeth         FAMILY HISTORY:  Family History   Problem Relation Age of Onset    Hyperlipidemia Mother         pulm emboli, IUD perforation and developed clot    Substance Abuse Mother     Other - See Comments Father         Possible Autoimmune disorder , DVT, pulm embolism    Prostate Cancer Father     Depression Father     Obesity Father     Breast Cancer Sister        SOCIAL HISTORY:  Social History     Socioeconomic History    Marital status:      Spouse name: None    Number of children: 3    Years of education: None    Highest education level: None   Occupational History    Occupation: Golf Pro   Tobacco Use    Smoking status: Never     Passive exposure: Never    Smokeless tobacco: Never   Vaping Use    Vaping Use: Never used   Substance and Sexual Activity    Alcohol use: Not Currently    Drug use: No   Social History Narrative    , 3 children, works as a golf pro, no advanced directives but is full code. (last updated 3/5/2020)      Social Determinants of Health     Financial Resource Strain: Low Risk  (1/25/2024)    Financial Resource Strain     Within the past 12 months, have you or your family members you live with been unable to get utilities (heat, electricity) when it was really needed?: No   Food Insecurity: Low Risk  (1/25/2024)    Food Insecurity     Within the past 12 months, did you worry that your food would run out before you got money to buy more?: No     Within the past 12 months, did the food you bought just  not last and you didn t have money to get more?: No   Transportation Needs: Low Risk  (1/25/2024)    Transportation Needs     Within the past 12 months, has lack of transportation kept you from medical appointments, getting your medicines, non-medical meetings or appointments, work, or from getting things that you need?: No   Physical Activity: Insufficiently Active (2/1/2023)    Exercise Vital Sign     Days of Exercise per Week: 1 day     Minutes of Exercise per Session: 30 min   Stress: No Stress Concern Present (2/1/2023)    [employer] [place] [employer] - Occupational Stress Questionnaire     Feeling of Stress : Only a little   Social Connections: Socially Integrated (2/1/2023)    Social Connection and Isolation Panel [NHANES]     Frequency of Communication with Friends and Family: Three times a week     Frequency of Social Gatherings with Friends and Family: Once a week     Attends [employer] Services: More than 4 times per year     Active Member of Clubs or Organizations: Yes     Marital Status:    Interpersonal Safety: Low Risk  (2/1/2024)    Interpersonal Safety     Do you feel physically and emotionally safe where you currently live?: Yes     Within the past 12 months, have you been hit, slapped, kicked or otherwise physically hurt by someone?: No     Within the past 12 months, have you been humiliated or emotionally abused in other ways by your partner or ex-partner?: No   Housing Stability: Low Risk  (1/25/2024)    Housing Stability     Do you have housing? : Yes     Are you worried about losing your housing?: No       Review of Systems:  Skin:        Eyes:       ENT:       Respiratory:       Cardiovascular:       Gastroenterology:      Genitourinary:       Musculoskeletal:       Neurologic:       Psychiatric:       Heme/Lymph/Imm:       Endocrine:         Physical Exam:  Vitals: /84 (BP Location: Right arm, Patient Position: Sitting, Cuff Size: Adult Large)   Pulse 71   Ht 1.803  "m (5' 11\")   Wt 132.7 kg (292 lb 9.6 oz)   SpO2 96%   BMI 40.81 kg/m   Orthostatic Vitals from 02/21/24 1109 to 02/23/24 1109    Date and Time Orthostatic BP Orthostatic Pulse Patient Position BP   Location Cuff Size   02/23/24 1014 -- -- Sitting Right arm Adult Large         Constitutional:           Skin:             Head:           Eyes:           Lymph:      ENT:           Neck:           Respiratory:            Cardiac:                                                           GI:           Extremities and Muscular Skeletal:                 Neurological:           Psych:         Recent Lab Results:  LIPID RESULTS:  Lab Results   Component Value Date    CHOL 122 02/01/2024    CHOL 125 10/19/2020    HDL 44 02/01/2024    HDL 45 10/19/2020    LDL 57 02/01/2024    LDL 29 10/19/2020    TRIG 103 02/01/2024    TRIG 257 (H) 10/19/2020       LIVER ENZYME RESULTS:  Lab Results   Component Value Date    AST 32 02/01/2024    AST 13 03/05/2020    ALT 44 02/01/2024    ALT 48 06/05/2020       CBC RESULTS:  Lab Results   Component Value Date    WBC 5.8 09/08/2023    WBC 6.6 05/06/2021    RBC 4.90 09/08/2023    RBC 4.68 05/06/2021    HGB 14.9 09/26/2023    HGB 14.6 05/06/2021    HCT 45.3 09/08/2023    HCT 43.1 05/06/2021    MCV 92 09/08/2023    MCV 92 05/06/2021    MCH 31.0 09/08/2023    MCH 31.2 05/06/2021    MCHC 33.6 09/08/2023    MCHC 33.9 05/06/2021    RDW 13.0 09/08/2023    RDW 13.7 05/06/2021     09/08/2023     05/06/2021       BMP RESULTS:  Lab Results   Component Value Date     02/01/2024     05/06/2021    POTASSIUM 4.2 02/01/2024    POTASSIUM 3.8 05/06/2021    CHLORIDE 109 (H) 02/01/2024    CHLORIDE 113 (H) 05/06/2021    CO2 22 02/01/2024    CO2 25 05/06/2021    ANIONGAP 11 02/01/2024    ANIONGAP 5 05/06/2021    GLC 97 02/01/2024    GLC 99 12/08/2021    GLC 89 10/12/2020    BUN 14.8 02/01/2024    BUN 18 10/12/2020    CR 0.85 02/01/2024    CR 0.84 05/06/2021    GFRESTIMATED >90 02/01/2024    " GFRESTIMATED >90 05/06/2021    GFRESTBLACK >90 05/06/2021    DAMARIS 8.9 02/01/2024    DAMARIS 8.4 (L) 10/12/2020        A1C RESULTS:  Lab Results   Component Value Date    A1C 6.1 (H) 02/01/2024    A1C 5.5 03/05/2020       INR RESULTS:  Lab Results   Component Value Date    INR 0.95 05/06/2021    INR 1.02 03/05/2020           CC  No referring provider defined for this encounter.

## 2024-02-23 NOTE — LETTER
2/23/2024    Stan Tapia PA-C  [address] [facility] [address]    RE: Rey ALFRED Thai       Dear Colleague,     I had the pleasure of seeing Rey Andre in the [facility].  HPI and Plan:   I the pleasure of seeing Phillip in follow-up he is accompanied by his wife.  He had a PFO cryptogenic stroke.  Of note is family has a history of blood clots but there is been no gene identified and we did check for genes in him and we did not find anything.  He had his implantable loop recorder removed a month or 2 ago check the site is well-healed he is feeling great we do know he has a dilated ascending aorta at least the root at 45 mm that was from a couple years ago so the next year I may do a CAT scan of the entire aorta to make sure that there is no other aneurysm his blood pressure is well-controlled and he is on beta-blocker so nothing more needs to be done in terms of prevention today's visit was 30 minutes mostly all counseling to review the above test including the previous echocardiogram talk about his follow-up study being a CAT scan angiogram reviewing the blood work from his internist which include normal electrolytes liver tests cholesterol numbers slightly elevated hemoglobin A1c PSA test we will see the patient back in 1 year    Orders Placed This Encounter   Procedures    CT Chest (PE) Abdomen Pelvis w Contrast     No orders of the defined types were placed in this encounter.    There are no discontinued medications.      Encounter Diagnosis   Name Primary?    Thoracic aortic aneurysm without rupture, unspecified part (H24) Yes       CURRENT MEDICATIONS:  Current Outpatient Medications   Medication Sig Dispense Refill    aspirin (ASA) 325 MG EC tablet Take 325 mg by mouth daily      atorvastatin (LIPITOR) 20 MG tablet Take 1 tablet (20 mg) by mouth daily 90 tablet 3    levothyroxine (SYNTHROID/LEVOTHROID) 150 MCG tablet Take 1 tablet (150 mcg) by mouth daily 90 tablet 3     metoprolol succinate ER (TOPROL XL) 50 MG 24 hr tablet TAKE 1 TABLET(50 MG) BY MOUTH DAILY 90 tablet 1    metroNIDAZOLE (METROGEL) 0.75 % external gel Apply topically 2 times daily 45 g 11       ALLERGIES     Allergies   Allergen Reactions    No Known Drug Allergy        PAST MEDICAL HISTORY:  Past Medical History:   Diagnosis Date    Embolic stroke involving left middle cerebral artery (H) 03/2020    Hyperlipidemia     Hypothyroidism     Kidney stone     Recurent stones    Sen's neuroma of right foot     Obese     DAMARIS on CPAP     Palpitations 03/06/2020    PFO (patent foramen ovale)     Sinus of Valsalva aneurysm        PAST SURGICAL HISTORY:  Past Surgical History:   Procedure Laterality Date    ARTHROPLASTY KNEE Right 12/7/2021    Procedure: RIGHT TOTAL KNEE ARTHROPLASTY;  Surgeon: Ebenezer Stewart MD;  Location:  OR    ARTHROPLASTY KNEE Left 9/25/2023    Procedure: Left Total Knee Arthroplasty;  Surgeon: Ebenezer Stewart MD;  Location:  OR    CV PFO CLOSURE N/A 05/06/2021    Procedure: Patent Foramen Ovale Closure; gore cardioform 25mm,  Surgeon: Chi Alvarenga MD;  Location:  HEART CARDIAC CATH LAB    CYSTOSCOPY      CYSTOSCOPY, RETROGRADES, COMBINED  05/24/2014    Procedure: COMBINED CYSTOSCOPY, RETROGRADES;  Surgeon: Francisco J Lerma MD;  Location: [facility] OR    ENT SURGERY      T & A    EP LOOP RECORDER EXPLANT N/A 1/23/2024    Procedure: Loop Recorder Removal;  Surgeon: Jarvis Best MD;  Location:  HEART CARDIAC CATH LAB    EP LOOP RECORDER IMPLANT N/A 10/12/2020    Procedure: EP Loop Recorder Implant;  Surgeon: Lefty Ramires MD;  Location:  HEART CARDIAC CATH LAB    IR CAROTID CEREBRAL ANGIOGRAM LEFT  03/05/2020    KNEE SURGERY Right     arthroscopic    LASER HOLMIUM LITHOTRIPSY URETER(S), INSERT STENT, COMBINED  07/27/2012    Procedure: COMBINED CYSTOSCOPY, URETEROSCOPY, LASER HOLMIUM LITHOTRIPSY URETER(S), INSERT STENT;  Video cystoscopy, Right Retrograde Right  Ureteroscopy with Holmium Laser, Stone Extraction,  JJ Stent Placement ;  Surgeon: Francisco J Lerma MD;  Location: [facility] OR    LASER HOLMIUM LITHOTRIPSY URETER(S), INSERT STENT, COMBINED  05/24/2014    Procedure: COMBINED CYSTOSCOPY, URETEROSCOPY, LASER HOLMIUM LITHOTRIPSY URETER(S), INSERT STENT;  Surgeon: Francisco J Lerma MD;  Location: [facility] OR    LASER HOLMIUM LITHOTRIPSY URETER(S), INSERT STENT, COMBINED Right 03/05/2017    Procedure: COMBINED CYSTOSCOPY, URETEROSCOPY, LASER HOLMIUM LITHOTRIPSY URETER(S), INSERT STENT;  Surgeon: Chris Barrios MD;  Location: [facility] OR    ORTHOPEDIC SURGERY Right     Meniscal tear    ROTATOR CUFF REPAIR RT/LT      left shoulder    wisdom teeth         FAMILY HISTORY:  Family History   Problem Relation Age of Onset    Hyperlipidemia Mother         pulm emboli, IUD perforation and developed clot    Substance Abuse Mother     Other - See Comments Father         Possible Autoimmune disorder , DVT, pulm embolism    Prostate Cancer Father     Depression Father     Obesity Father     Breast Cancer Sister        SOCIAL HISTORY:  Social History     Socioeconomic History    Marital status:      Spouse name: None    Number of children: 3    Years of education: None    Highest education level: None   Occupational History    Occupation: Golf Pro   Tobacco Use    Smoking status: Never     Passive exposure: Never    Smokeless tobacco: Never   Vaping Use    Vaping Use: Never used   Substance and Sexual Activity    Alcohol use: Not Currently    Drug use: No   Social History Narrative    , 3 children, works as a golf pro, no advanced directives but is full code. (last updated 3/5/2020)      Social Determinants of Health     Financial Resource Strain: Low Risk  (1/25/2024)    Financial Resource Strain     Within the past 12 months, have you or your family members you live with been unable to get utilities (heat, electricity) when it was really needed?: No   Food  Insecurity: Low Risk  (1/25/2024)    Food Insecurity     Within the past 12 months, did you worry that your food would run out before you got money to buy more?: No     Within the past 12 months, did the food you bought just not last and you didn t have money to get more?: No   Transportation Needs: Low Risk  (1/25/2024)    Transportation Needs     Within the past 12 months, has lack of transportation kept you from medical appointments, getting your medicines, non-medical meetings or appointments, work, or from getting things that you need?: No   Physical Activity: Insufficiently Active (2/1/2023)    Exercise Vital Sign     Days of Exercise per Week: 1 day     Minutes of Exercise per Session: 30 min   Stress: No Stress Concern Present (2/1/2023)    [employer] [place] [employer] - Occupational Stress Questionnaire     Feeling of Stress : Only a little   Social Connections: Socially Integrated (2/1/2023)    Social Connection and Isolation Panel [NHANES]     Frequency of Communication with Friends and Family: Three times a week     Frequency of Social Gatherings with Friends and Family: Once a week     Attends Nondenominational Services: More than 4 times per year     Active Member of Clubs or Organizations: Yes     Marital Status:    Interpersonal Safety: Low Risk  (2/1/2024)    Interpersonal Safety     Do you feel physically and emotionally safe where you currently live?: Yes     Within the past 12 months, have you been hit, slapped, kicked or otherwise physically hurt by someone?: No     Within the past 12 months, have you been humiliated or emotionally abused in other ways by your partner or ex-partner?: No   Housing Stability: Low Risk  (1/25/2024)    Housing Stability     Do you have housing? : Yes     Are you worried about losing your housing?: No       Review of Systems:  Skin:        Eyes:       ENT:       Respiratory:       Cardiovascular:       Gastroenterology:      Genitourinary:      "  Musculoskeletal:       Neurologic:       Psychiatric:       Heme/Lymph/Imm:       Endocrine:         Physical Exam:  Vitals: /84 (BP Location: Right arm, Patient Position: Sitting, Cuff Size: Adult Large)   Pulse 71   Ht 1.803 m (5' 11\")   Wt 132.7 kg (292 lb 9.6 oz)   SpO2 96%   BMI 40.81 kg/m   Orthostatic Vitals from 02/21/24 1109 to 02/23/24 1109    Date and Time Orthostatic BP Orthostatic Pulse Patient Position BP   Location Cuff Size   02/23/24 1014 -- -- Sitting Right arm Adult Large         Constitutional:           Skin:             Head:           Eyes:           Lymph:      ENT:           Neck:           Respiratory:            Cardiac:                                                           GI:           Extremities and Muscular Skeletal:                 Neurological:           Psych:         Recent Lab Results:  LIPID RESULTS:  Lab Results   Component Value Date    CHOL 122 02/01/2024    CHOL 125 10/19/2020    HDL 44 02/01/2024    HDL 45 10/19/2020    LDL 57 02/01/2024    LDL 29 10/19/2020    TRIG 103 02/01/2024    TRIG 257 (H) 10/19/2020       LIVER ENZYME RESULTS:  Lab Results   Component Value Date    AST 32 02/01/2024    AST 13 03/05/2020    ALT 44 02/01/2024    ALT 48 06/05/2020       CBC RESULTS:  Lab Results   Component Value Date    WBC 5.8 09/08/2023    WBC 6.6 05/06/2021    RBC 4.90 09/08/2023    RBC 4.68 05/06/2021    HGB 14.9 09/26/2023    HGB 14.6 05/06/2021    HCT 45.3 09/08/2023    HCT 43.1 05/06/2021    MCV 92 09/08/2023    MCV 92 05/06/2021    MCH 31.0 09/08/2023    MCH 31.2 05/06/2021    MCHC 33.6 09/08/2023    MCHC 33.9 05/06/2021    RDW 13.0 09/08/2023    RDW 13.7 05/06/2021     09/08/2023     05/06/2021       BMP RESULTS:  Lab Results   Component Value Date     02/01/2024     05/06/2021    POTASSIUM 4.2 02/01/2024    POTASSIUM 3.8 05/06/2021    CHLORIDE 109 (H) 02/01/2024    CHLORIDE 113 (H) 05/06/2021    CO2 22 02/01/2024    CO2 25 " 05/06/2021    ANIONGAP 11 02/01/2024    ANIONGAP 5 05/06/2021    GLC 97 02/01/2024    GLC 99 12/08/2021    GLC 89 10/12/2020    BUN 14.8 02/01/2024    BUN 18 10/12/2020    CR 0.85 02/01/2024    CR 0.84 05/06/2021    GFRESTIMATED >90 02/01/2024    GFRESTIMATED >90 05/06/2021    GFRESTBLACK >90 05/06/2021    DAMARIS 8.9 02/01/2024    DAMARIS 8.4 (L) 10/12/2020        A1C RESULTS:  Lab Results   Component Value Date    A1C 6.1 (H) 02/01/2024    A1C 5.5 03/05/2020       INR RESULTS:  Lab Results   Component Value Date    INR 0.95 05/06/2021    INR 1.02 03/05/2020       CC  No referring provider defined for this encounter.      Thank you for allowing me to participate in the care of your patient.      Sincerely,   Chi Alvarenga MD   [facility]

## 2024-02-29 ENCOUNTER — OFFICE VISIT (OUTPATIENT)
Dept: URGENT CARE | Facility: URGENT CARE | Age: 59
End: 2024-02-29
Payer: COMMERCIAL

## 2024-02-29 VITALS
TEMPERATURE: 98.1 F | HEART RATE: 87 BPM | BODY MASS INDEX: 40.73 KG/M2 | WEIGHT: 292 LBS | RESPIRATION RATE: 14 BRPM | DIASTOLIC BLOOD PRESSURE: 72 MMHG | SYSTOLIC BLOOD PRESSURE: 126 MMHG | OXYGEN SATURATION: 97 %

## 2024-02-29 DIAGNOSIS — J06.9 UPPER RESPIRATORY TRACT INFECTION, UNSPECIFIED TYPE: ICD-10-CM

## 2024-02-29 DIAGNOSIS — R05.1 ACUTE COUGH: Primary | ICD-10-CM

## 2024-02-29 LAB
FLUAV AG SPEC QL IA: NEGATIVE
FLUBV AG SPEC QL IA: NEGATIVE

## 2024-02-29 PROCEDURE — 87804 INFLUENZA ASSAY W/OPTIC: CPT | Performed by: PHYSICIAN ASSISTANT

## 2024-02-29 PROCEDURE — 99213 OFFICE O/P EST LOW 20 MIN: CPT | Performed by: PHYSICIAN ASSISTANT

## 2024-02-29 ASSESSMENT — ENCOUNTER SYMPTOMS
CHEST TIGHTNESS: 0
RHINORRHEA: 1
WHEEZING: 0
SHORTNESS OF BREATH: 0
CHILLS: 0
FEVER: 0
COUGH: 1

## 2024-02-29 NOTE — PATIENT INSTRUCTIONS
Increase fluids with water, Gatorade, or rehydrating beverages. Alternate acetaminophen and Ibuprofen as needed for aches, pains or fever. Follow clear liquid to BRAT diet (bananas, rice, applesauce, toast) as needed for any upset stomach.   For cough I suggest using over the counter medications such as dextromethorphan or guaifenesin.  Follow up in clinic if symptoms persist or worsen. This usually can last 10-14 days.

## 2024-02-29 NOTE — PROGRESS NOTES
Assessment & Plan     Acute cough    -Lungs are clear to auscultation bilaterally  -Viral cough  -Influenza a and B is negative  -Cough suppressants recommended to suggest Delsym  - Influenza A/B antigen    Upper respiratory tract infection, unspecified type    -Supportive care recommended (rest, adequate fluid intake and analgesics such as acetaminophen and ibuprofen)       Results for orders placed or performed in visit on 02/29/24   Influenza A/B antigen     Status: Normal    Specimen: Nose; Swab   Result Value Ref Range    Influenza A antigen Negative Negative    Influenza B antigen Negative Negative    Narrative    Test results must be correlated with clinical data. If necessary, results should be confirmed by a molecular assay or viral culture.       Patient Instructions   Increase fluids with water, Gatorade, or rehydrating beverages. Alternate acetaminophen and Ibuprofen as needed for aches, pains or fever. Follow clear liquid to BRAT diet (bananas, rice, applesauce, toast) as needed for any upset stomach.   For cough I suggest using over the counter medications such as dextromethorphan or guaifenesin.  Follow up in clinic if symptoms persist or worsen. This usually can last 10-14 days.           Return if symptoms worsen or fail to improve, for Follow up.    At the end of the encounter, I discussed results, diagnosis, medications. Discussed red flags for immediate return to clinic/ER, as well as indications for follow up if no improvement. Patient understood and agreed to plan. Patient was stable for discharge.    Gregg Fisher is a 58 year old male who presents to clinic today for the following health issues:  Chief Complaint   Patient presents with    Sick     Dry Cough , congestion x 2 days  -- taking nothing  -- HIS son has FLU     HPI    Patient reports cough, runny nose, congestion for 3 days.  He notes cough is dry.  No treatment tried. Son was diagnosed with influenza. He has a trip planned for  [place], [place] in 2 days and wanted to be checked before the trip. No fever chills.     Review of Systems   Constitutional:  Negative for chills and fever.   HENT:  Positive for congestion and rhinorrhea.    Respiratory:  Positive for cough. Negative for chest tightness, shortness of breath and wheezing.    All other systems reviewed and are negative.      Problem List:  2024-02: Thoracic aortic aneurysm without rupture, unspecified part   (H24)  2024-02: Class 3 obesity (H)  2023-09: S/P total knee arthroplasty, left  2021-12: S/P total knee arthroplasty  2020-07: PFO (patent foramen ovale)  2020-03: Dilated aortic root -- 4.5 cm on Echo 3/5/20  2020-03: Palpitations  2020-03: Stroke due to embolism of left middle cerebral artery (H)  2020-03: DAMARIS on CPAP  2020-03: Stroke (H)  2014-05: Renal colic  2012-07: Nephrolithiasis  2009-02: Knee pain  2009-02: Other postprocedural status(V45.89)      Past Medical History:   Diagnosis Date    Embolic stroke involving left middle cerebral artery (H) 03/2020    Hyperlipidemia     Hypothyroidism     Kidney stone     Recurent stones    Sen's neuroma of right foot     Obese     DAMARIS on CPAP     Palpitations 03/06/2020    PFO (patent foramen ovale)     Sinus of Valsalva aneurysm        Social History     Tobacco Use    Smoking status: Never     Passive exposure: Never    Smokeless tobacco: Never   Substance Use Topics    Alcohol use: Not Currently           Objective    /72 (BP Location: Right arm, Patient Position: Chair, Cuff Size: Adult Large)   Pulse 87   Temp 98.1  F (36.7  C) (Oral)   Resp 14   Wt 132.5 kg (292 lb)   SpO2 97%   BMI 40.73 kg/m    Physical Exam  Constitutional:       Appearance: Normal appearance.   HENT:      Head: Normocephalic.      Mouth/Throat:      Mouth: Mucous membranes are moist.      Pharynx: Oropharynx is clear. Uvula midline. No posterior oropharyngeal erythema.   Cardiovascular:      Rate and Rhythm: Normal rate and regular  rhythm.   Pulmonary:      Effort: Pulmonary effort is normal.      Breath sounds: Normal breath sounds.   Lymphadenopathy:      Head:      Right side of head: No submental, submandibular or tonsillar adenopathy.      Left side of head: No submental, submandibular or tonsillar adenopathy.      Cervical: No cervical adenopathy.      Right cervical: No superficial cervical adenopathy.     Left cervical: No superficial cervical adenopathy.   Skin:     General: Skin is warm and dry.      Findings: No rash.   Neurological:      Mental Status: He is alert.   Psychiatric:         Mood and Affect: Mood normal.         Behavior: Behavior normal.              Hyun Chew PA-C

## 2024-06-04 DIAGNOSIS — Q21.12 PFO (PATENT FORAMEN OVALE): ICD-10-CM

## 2024-06-04 DIAGNOSIS — I63.412 CEREBROVASCULAR ACCIDENT (CVA) DUE TO EMBOLISM OF LEFT MIDDLE CEREBRAL ARTERY (H): ICD-10-CM

## 2024-06-04 DIAGNOSIS — E78.5 HYPERLIPIDEMIA LDL GOAL <70: ICD-10-CM

## 2024-06-04 DIAGNOSIS — I71.20 THORACIC AORTIC ANEURYSM WITHOUT RUPTURE (H): ICD-10-CM

## 2024-06-04 RX ORDER — METOPROLOL SUCCINATE 50 MG/1
50 TABLET, EXTENDED RELEASE ORAL DAILY
Qty: 90 TABLET | Refills: 1 | OUTPATIENT
Start: 2024-06-04

## 2024-06-04 NOTE — TELEPHONE ENCOUNTER
6 months sent 12/26/2023 to local pharmacy. Protocol for local retail pharmacy fill is 2 weeks prior

## 2024-06-05 NOTE — PROGRESS NOTES
Report to Receiving RN:    Report To: Shama  Time Report Called: [id number] (secure message)  Hand-Off Communication: pt stable tolerated tx well, pt took off 2 L of fluid, post /69 HR 80 BS 73  Complications During Treatment: No  Ultrafiltration Treatment: No  Medications Administered During Dialysis: No  Blood Products Administered During Dialysis: No  Labs Sent During Dialysis: No  Heparin Drip Rate Changes: No  Dialysis Catheter Dressing: NA  Last Dressing Change: NA    Electronic Signatures:  Vianey Nguyen RN (Signed )   Authored:    (Signed )   Authored:     Last Updated: 11:49 AM by VIANEY NGUYEN          "   03/05/20 1155   General Information   Onset Date 03/05/20   Start of Care Date 03/05/20   Referring Physician Baudilio Fuller MD   Patient Profile Review/OT: Additional Occupational Profile Info See Profile for full history and prior level of function   Patient/Family Goals Statement did not state   Swallowing Evaluation Bedside swallow evaluation   Behaviorial Observations WFL (within functional limits)  (aphasic)   Mode of current nutrition NPO   Respiratory Status Room air   Comments \"54 year old male with past medical hx of DAMARIS on CPAP who was not able to move right side or able to talk at 6:30 AM today\" Successful thrombectomy with symptoms improving.   Clinical Swallow Evaluation   Oral Musculature anomalies present   Structural Abnormalities none present   Dentition present and adequate   Mucosal Quality good   Mandibular Strength and Mobility intact   Oral Labial Strength and Mobility impaired seal   Lingual Strength and Mobility WFL   Velar Elevation intact   Buccal Strength and Mobility intact   Laryngeal Function Swallow;Voicing initiated   Oral Musculature Comments right droop; WFL   Swallow Eval: Clinical Impressions   Skilled Criteria for Therapy Intervention Skilled criteria met.  Treatment indicated.   Functional Assessment Scale (FAS) 7   Treatment Diagnosis no appreciable dysphagia   Diet texture recommendations Regular diet;Thin liquids   Recommended Feeding/Eating Techniques alternate between small bites and sips of food/liquid;check mouth frequently for oral residue/pocketing   Demonstrates Need for Referral to Another Service physical therapy;occupational therapy   Therapy Frequency 1x/week   Predicted Duration of Therapy Intervention (days/wks) 1 week   Anticipated Discharge Disposition inpatient rehabilitation facility   Risks and Benefits of Treatment have been explained. Yes   Patient, family and/or staff in agreement with Plan of Care Yes   Clinical Impression Comments SLP: Pt " presents with no appreciable dysphagia on clinical swallow evaluation. Okay to sit upright per RN. Pt currently responding yea/okay to all questions, however, wife reported continued improvements. Oral motor exam significant for slight right facial droop. No tongue deviation or reduced ROM. Ice chips, thin liquids by cup and straw, puree solids and regular solids trialed with no oral deficits, no s/sx of aspiration throughout evaluation and no oral residue following. Recommend: regular diet and thin liquids with standard aspiration precautions. Will hold speech/language/cognitive evaluation until tomorrow.    Total Evaluation Time   Total Evaluation Time (Minutes) 20

## 2024-08-16 ENCOUNTER — MYC MEDICAL ADVICE (OUTPATIENT)
Dept: FAMILY MEDICINE | Facility: CLINIC | Age: 59
End: 2024-08-16
Payer: COMMERCIAL

## 2024-08-22 ENCOUNTER — HOSPITAL ENCOUNTER (OUTPATIENT)
Dept: CT IMAGING | Facility: CLINIC | Age: 59
Discharge: HOME | End: 2024-08-22
Attending: INTERNAL MEDICINE | Admitting: INTERNAL MEDICINE
Payer: COMMERCIAL

## 2024-08-22 DIAGNOSIS — I71.20 THORACIC AORTIC ANEURYSM WITHOUT RUPTURE, UNSPECIFIED PART (H): ICD-10-CM

## 2024-08-22 PROCEDURE — 71275 CT ANGIOGRAPHY CHEST: CPT

## 2024-08-22 PROCEDURE — 250N000011 HC RX IP 250 OP 636: Performed by: INTERNAL MEDICINE

## 2024-08-22 RX ORDER — IOPAMIDOL 755 MG/ML
72 INJECTION, SOLUTION INTRAVASCULAR ONCE
Status: COMPLETED | OUTPATIENT
Start: 2024-08-22 | End: 2024-08-22

## 2024-08-22 RX ADMIN — IOPAMIDOL 72 ML: 755 INJECTION, SOLUTION INTRAVENOUS at 07:29

## 2024-08-22 NOTE — PROGRESS NOTES
Chief Complaint: Follow-up stroke    History of Present Illness:   Rey Andre is an 54 year old male with DAMARIS on CPAP, hypothyroidism admitted on 3/5/2020 for evaluation of aphasia and right-sided weakness (he fell out of bed).  Head CT unremarkable, CTA with left M2 occlusion, CTP with significant mismatch.  He was enrolled in TIMELESS and then underwent endovascular thrombectomy with TICI 3 recanalization.  Post procedurally he did very well and his objective systems essentially resolved.  His work-up is summarize d below:    Stroke Evaluation summarized:  MRI/Head CT: Small left frontal scattered infarcts  Intracranial Vascular Imaging: CTA head initially showed partially occlusive left superior division M2 branch thrombus  Cervical Carotid and Vertebral Artery Vascular Imaging: CTA neck: Vessels patent without hemodynamically significant stenosis  Echocardiogram: EF 60%, normal LV, normal left atrium, positive bubble  EKG/Telemetry: SR, +LAFB  LDL: 84  A1c: 5.5  Troponin: <0.015  Other testing: Not Applicable    Interim History  He is currently wearing his 30 day event monitor, and will send it in next week.  He feels that he is doing well, with the exception of increased fatigue.  He reported the sensation of irregular heart beats (up to 10-15 minutes) to his cardiologist and they provided him an Eliquis script which he has filled.  He has not had any complications.  He notes his father also has atrial fibrillation.    Impression:   1. Embolic stroke of undetermined source s/p thrombectomy and enrollment in TIMELESS trial  2. DAMARIS    Plan:  1. Await 30 day event monitor  --if negative, will discontinue Eliquis and start aspirin 325mg daily  --Patient would like to pursue MORGAN and LINQ implant when outpatient Cardiology clinic has re-opened  --will attempt to set up both for the same day with the MORGAN first.  If the MORGAN does not reveal any clear stroke etiology requiring anticoagulation (typical PFO excluded),  The left coronary artery was selectively engaged and injected. Multiple views of the injected vessel were taken. then the patient could get subsequent LINQ implant on the same visit.  Will discuss with Cardiology and place referral  2. Continue Atorvastatin 40mg  --check lipid panel routine  3. BP goal < 140/90, with tighter control associated with lower overall CV risk, if tolerated  4. CPAP for DAMARIS  5. Follow-up in 6-8 weeksg      Stroke Education provided.  He will call us with any questions.  For any acute neurologic deficits he was advised to  go directly to the hospital rather than call the clinic.    Vitor Caldwell MD, MS  Neurology          ___________________________________________________________________    Past Medical History:   Diagnosis Date     Embolic stroke involving left middle cerebral artery (H) 03/2020     Hypothyroidism      Kidney stone     Recurent stones     DAMARIS on CPAP      Palpitations 3/6/2020       Current Outpatient Medications   Medication     acetaminophen (TYLENOL) 325 MG tablet     apixaban ANTICOAGULANT (ELIQUIS) 5 MG tablet     atorvastatin (LIPITOR) 40 MG tablet     Levothyroxine Sodium (SYNTHROID PO)     tolterodine (DETROL LA) 2 MG 24 hr capsule     No current facility-administered medications for this visit.        Social History     Tobacco Use     Smoking status: Never Smoker     Smokeless tobacco: Never Used   Substance Use Topics     Alcohol use: No     Drug use: No       Family History   Problem Relation Age of Onset     Other - See Comments Father         Possible Autoimmune disorder      Prostate Cancer Father        ROS: 10 point relevant ROS neg other than the symptoms noted above in the HPI.    There were no vitals taken for this visit.    General: Alert, cooperative  Lungs: Clear  Cardiac: No carotid bruits, RRR  Abdomen: soft  Integumentary: intact    MENTAL STATUS:  Alert, oriented x3.  Speech fluent with normal naming, repetition, comprehension.  No neglect. Good historian.  Registers 3/3 objects and recalls 3/3 objects after 3 minutes.     CRANIAL NERVES:  Pupils are  "equal, round, reactive to light.  Extraocular movements full.  Visual fields full.  Facial sensation, movement normal.  Palate moves symmetrically.  Tongue midline.  Sternocleidomastoid and trapezius strength intact.  Neck strength was normal.  Motor: 5/5, normal tone  Sensory: intact to light touch, temperature  Reflexes 2/4.  Babinski not present  Coordination: Good finger-nose-finger, fine finger movement, heel-shin maneuver  Gait:  Normal    NIHSS = 0  mRS = 0    Neuro Imaging: as per [facility]      TELEMEDICINE CHARTING   Rey Andre is a 54 year old male who is being evaluated via a billable video visit.      The patient has been notified of following:     \"This video visit will be conducted via a call between you and your physician/provider. We have found that certain health care needs can be provided without the need for an in-person physical exam.  This service lets us provide the care you need with a video conversation.  If a prescription is necessary we can send it directly to your pharmacy.  If lab work is needed we can place an order for that and you can then stop by our lab to have the test done at a later time.    If during the course of the call the physician/provider feels a video visit is not appropriate, you will not be charged for this service.\"     Patient has given verbal consent for Video visit? Yes    Patient would like the video invitation sent by: Send to e-mail at: [email or web address]    Type of service:  Video Visit  Video Start Time: 11:45  Video End Time (time video stopped): 12:20    Originating Location (pt. Location): Home    Distant Location (provider location):  [facility]     Mode of Communication:  Video Conference via [employer]          "

## 2024-08-27 ENCOUNTER — TELEPHONE (OUTPATIENT)
Dept: CARDIOLOGY | Facility: CLINIC | Age: 59
End: 2024-08-27
Payer: COMMERCIAL

## 2024-08-27 NOTE — TELEPHONE ENCOUNTER
No follow up.       CTA    COMPARISON: CT of the abdomen pelvis dated 3/4/2017     FINDINGS:    Vascular exam: The thoracic aorta at the sinuses of Valsalva has a  maximum sinus the sinus diameter of approximately 4.3 cm. The maximum  diameter of the ascending thoracic aorta is approximately 3.2 cm. The  thoracic aorta at the level of the arch and along its descending  portions is of normal caliber. The great vessels arising from the  thoracic aortic arch are patent proximally without significant  stenoses.     The abdominal aorta is of normal caliber without aneurysmal dilatation  or significant stenosis. The celiac trunk, superior mesenteric artery,  renal arteries, and inferior mesenteric artery are patent without  significant stenoses.     The iliac arteries and proximal femoral arteries are patent without  significant stenoses.     Soft tissue exam:     CHEST: There is a patent foramen ovale closure device noted between  the right atrium and left atrium. There is no pathologic mediastinal  lymphadenopathy. The lungs are clear.     Abdomen/pelvis: There are a 9 mm and 2 mm nonobstructing stones at the  interpolar region of the right kidney. The remaining solid organs in  the abdomen are unremarkable.     The bowel is grossly unremarkable.     Again noted is increased density in the root of the mesentery with  small lymph nodes. This is not significantly changed.                                                                      IMPRESSION:  1. Aortic root dilatation measuring 4.3 cm.  2. Right nephrolithiasis.     MD TERESITA JIMENEZ RN

## 2024-08-28 NOTE — TELEPHONE ENCOUNTER
Chi Alvarenga MD  You16 hours ago (6:05 PM)       Let him know the aorta is stable and doesn't need an aorta echo or CT for at least 2 years  Let him know FYI that he has kidney stones, no action needed for that now unless renal colic       Call out to Pt left partial message results stable recheck in 2 years, asked for call for further discussion. TERESITA hernandez RN

## 2024-09-12 ENCOUNTER — OFFICE VISIT (OUTPATIENT)
Dept: FAMILY MEDICINE | Facility: CLINIC | Age: 59
End: 2024-09-12
Payer: COMMERCIAL

## 2024-09-12 VITALS
OXYGEN SATURATION: 96 % | WEIGHT: 297.38 LBS | HEART RATE: 69 BPM | SYSTOLIC BLOOD PRESSURE: 136 MMHG | TEMPERATURE: 99 F | BODY MASS INDEX: 41.63 KG/M2 | DIASTOLIC BLOOD PRESSURE: 73 MMHG | HEIGHT: 71 IN | RESPIRATION RATE: 16 BRPM

## 2024-09-12 DIAGNOSIS — Q21.12 PFO (PATENT FORAMEN OVALE): ICD-10-CM

## 2024-09-12 DIAGNOSIS — R73.03 PREDIABETES: ICD-10-CM

## 2024-09-12 DIAGNOSIS — I71.20 THORACIC AORTIC ANEURYSM WITHOUT RUPTURE, UNSPECIFIED PART (H): ICD-10-CM

## 2024-09-12 DIAGNOSIS — E66.813 CLASS 3 OBESITY: ICD-10-CM

## 2024-09-12 DIAGNOSIS — E78.5 HYPERLIPIDEMIA LDL GOAL <70: ICD-10-CM

## 2024-09-12 DIAGNOSIS — B07.8 COMMON WART: ICD-10-CM

## 2024-09-12 DIAGNOSIS — I63.412 CEREBROVASCULAR ACCIDENT (CVA) DUE TO EMBOLISM OF LEFT MIDDLE CEREBRAL ARTERY (H): ICD-10-CM

## 2024-09-12 DIAGNOSIS — E03.9 HYPOTHYROIDISM, UNSPECIFIED TYPE: Primary | ICD-10-CM

## 2024-09-12 DIAGNOSIS — G47.33 OSA (OBSTRUCTIVE SLEEP APNEA): ICD-10-CM

## 2024-09-12 LAB — HBA1C MFR BLD: 5.7 % (ref 0–5.6)

## 2024-09-12 PROCEDURE — 90472 IMMUNIZATION ADMIN EACH ADD: CPT | Performed by: PHYSICIAN ASSISTANT

## 2024-09-12 PROCEDURE — 36415 COLL VENOUS BLD VENIPUNCTURE: CPT | Performed by: PHYSICIAN ASSISTANT

## 2024-09-12 PROCEDURE — 90471 IMMUNIZATION ADMIN: CPT | Performed by: PHYSICIAN ASSISTANT

## 2024-09-12 PROCEDURE — 90678 RSV VACC PREF BIVALENT IM: CPT | Performed by: PHYSICIAN ASSISTANT

## 2024-09-12 PROCEDURE — 84443 ASSAY THYROID STIM HORMONE: CPT | Performed by: PHYSICIAN ASSISTANT

## 2024-09-12 PROCEDURE — 83036 HEMOGLOBIN GLYCOSYLATED A1C: CPT | Performed by: PHYSICIAN ASSISTANT

## 2024-09-12 PROCEDURE — 99214 OFFICE O/P EST MOD 30 MIN: CPT | Mod: 25 | Performed by: PHYSICIAN ASSISTANT

## 2024-09-12 PROCEDURE — 90673 RIV3 VACCINE NO PRESERV IM: CPT | Performed by: PHYSICIAN ASSISTANT

## 2024-09-12 PROCEDURE — 17110 DESTRUCTION B9 LES UP TO 14: CPT | Performed by: PHYSICIAN ASSISTANT

## 2024-09-12 NOTE — PROGRESS NOTES
Assessment & Plan     Cerebrovascular accident (CVA) due to embolism of left middle cerebral artery (H)  Followed by cardiology stable.    PFO (patent foramen ovale)  As above    Hyperlipidemia LDL goal <70  As above    Thoracic aortic aneurysm without rupture, unspecified part (H24)  As above    Hypothyroidism, unspecified type  Stable on current regimen.  Labs pending.  If within normal limits recheck annually.  - TSH WITH FREE T4 REFLEX; Future  - TSH WITH FREE T4 REFLEX    Prediabetes  Past history see below  - Hemoglobin A1c; Future  - Hemoglobin A1c    Class 3 obesity (H)  Ongoing obesity despite improvements in diet and exercise habits.  Actually, his weight has increased since his last visit with me despite these changes.  His BMI is in the FDA approved range for treatment of GLP-1 agonists.  He also has cardiovascular comorbidities of CVA in the past as well as sleep apnea.  Given trial and failure of 7 months of conservative care changes, I feel it is appropriate to consider the GLP-1 agonists.  Patient would like to attempt this as well.  Discussed Zepbound and will attempt this.  Also discussed concerns of possible insurance coverage.  Patient is unaware if this will be covered or not.  If covered, will increase to 5 mg after 1 month and recheck at a virtual visit weight management check in 4 months from now.  - tirzepatide-Weight Management (ZEPBOUND) 2.5 MG/0.5ML prefilled pen; Inject 0.5 mLs (2.5 mg) subcutaneously every 7 days.  Medication use and side effects discussed with the patient. Patient is in complete understanding and agreement with plan.     DAMARIS (obstructive sleep apnea)  Past history.  Will need to reestablish with sleep medicine.  Referral placed.  - Adult Sleep Eval & Management  Referral; Future    Common wart  Appears as common wart.  Educated on etiology.  Patient would like this manage.    PLAN: The viral etiology and natural history has been discussed.   Various  "treatment methods, side effects and failure rates have been   discussed.  A choice of liquid nitrogen was made, and the expected   blistering or scabbing reaction explained.  Liquid nitrogen was   applied to 1 wart(s);  the patient will return at 2-4 week intervals   for retreatments as needed.     - DESTRUCT BENIGN LESION, UP TO 14          BMI  Estimated body mass index is 41.48 kg/m  as calculated from the following:    Height as of this encounter: 1.803 m (5' 11\").    Weight as of this encounter: 134.9 kg (297 lb 6 oz).   Weight management plan: Discussed healthy diet and exercise guidelines        Subjective   Phillip is a 58 year old, presenting for the following health issues:  No chief complaint on file.      9/12/2024    10:12 AM   Additional Questions   Roomed by Yuridai CRESPO CMA   Accompanied by Self     History of Present Illness       Reason for visit:  Obesity    He eats 2-3 servings of fruits and vegetables daily.He consumes 1 sweetened beverage(s) daily.He exercises with enough effort to increase his heart rate 20 to 29 minutes per day.  He exercises with enough effort to increase his heart rate 3 or less days per week.   He is taking medications regularly.     Attempted reducing calories and healthy diet options with increased walking and exercise since last visit. This was 7 months ago and has not noted any improvement.       Patient is a 58-year-old male with a past history of CVA, PFO, hypothyroidism, sleep apnea, thoracic aortic aneurysm, prediabetes, and obesity.  He presents today to discuss ongoing concerns with his weight despite  Attempted reducing calories and healthy diet options with increased walking and exercise since last visit. This was 7 months ago and has not noted any improvement.     He is interested in possible Wegovy or Zepbound as options given the struggle to improve his weight.      Regards to sleep apnea, he states he has not seen a sleep specialist in some time and is wondering " "if his machine needs to be altered.  He has no known symptoms of concerns of poor control but is unsure if it is stable or not.    In regards to hypothyroidism, stable on current regimen.  He is overdue for his thyroid function recheck.    In regards to thoracic aneurysm as well as PFO and stroke history, he is followed closely by cardiology on this.    Patient also has a lesion that he can feel on his left elbow.  Unsure how long it has been present but would like this managed if possible.      Objective    /73 (BP Location: Left arm, Patient Position: Sitting, Cuff Size: Adult Regular)   Pulse 69   Temp 99  F (37.2  C) (Oral)   Resp 16   Ht 1.803 m (5' 11\")   Wt 134.9 kg (297 lb 6 oz)   SpO2 96%   BMI 41.48 kg/m    Body mass index is 41.48 kg/m .  Physical Exam   GENERAL: alert, no distress, and obese  CV: regular rates and rhythm  SKIN: Common wart noted on left elbow.  PSYCH: mentation appears normal, affect normal/bright          Signed Electronically by: Stan Tapia PA-C    The longitudinal plan of care for the diagnosis(es)/condition(s) as documented were addressed during this visit. Due to the added complexity in care, I will continue to support Phillip in the subsequent management and with ongoing continuity of care.    "

## 2024-09-13 LAB — TSH SERPL DL<=0.005 MIU/L-ACNC: 2.67 UIU/ML (ref 0.3–4.2)

## 2024-09-26 ENCOUNTER — MYC REFILL (OUTPATIENT)
Dept: FAMILY MEDICINE | Facility: CLINIC | Age: 59
End: 2024-09-26
Payer: COMMERCIAL

## 2024-09-26 DIAGNOSIS — E78.5 HYPERLIPIDEMIA LDL GOAL <70: ICD-10-CM

## 2024-09-26 DIAGNOSIS — I71.20 THORACIC AORTIC ANEURYSM WITHOUT RUPTURE (H): ICD-10-CM

## 2024-09-26 DIAGNOSIS — I63.412 CEREBROVASCULAR ACCIDENT (CVA) DUE TO EMBOLISM OF LEFT MIDDLE CEREBRAL ARTERY (H): ICD-10-CM

## 2024-09-26 DIAGNOSIS — E66.813 CLASS 3 OBESITY: ICD-10-CM

## 2024-09-26 DIAGNOSIS — Q21.12 PFO (PATENT FORAMEN OVALE): ICD-10-CM

## 2024-09-26 RX ORDER — METOPROLOL SUCCINATE 50 MG/1
50 TABLET, EXTENDED RELEASE ORAL DAILY
Qty: 90 TABLET | Refills: 1 | Status: SHIPPED | OUTPATIENT
Start: 2024-09-26

## 2024-09-26 NOTE — TELEPHONE ENCOUNTER
Pt attached note from original refill request here asking about increasing script for Zepbound dosing.  Last dose 2.5mg, pended the 5mg dose for review, with 1 refill attached.    Kaila Baumann, ADA  [facility] RN Triage Team

## 2024-11-08 ENCOUNTER — APPOINTMENT (OUTPATIENT)
Dept: CT IMAGING | Facility: CLINIC | Age: 59
End: 2024-11-08
Attending: EMERGENCY MEDICINE
Payer: COMMERCIAL

## 2024-11-08 ENCOUNTER — HOSPITAL ENCOUNTER (EMERGENCY)
Facility: CLINIC | Age: 59
Discharge: HOME | End: 2024-11-08
Attending: EMERGENCY MEDICINE | Admitting: EMERGENCY MEDICINE
Payer: COMMERCIAL

## 2024-11-08 VITALS
RESPIRATION RATE: 16 BRPM | HEIGHT: 71 IN | BODY MASS INDEX: 38.5 KG/M2 | DIASTOLIC BLOOD PRESSURE: 86 MMHG | TEMPERATURE: 97.8 F | WEIGHT: 275 LBS | OXYGEN SATURATION: 95 % | HEART RATE: 78 BPM | SYSTOLIC BLOOD PRESSURE: 118 MMHG

## 2024-11-08 DIAGNOSIS — N20.0 KIDNEY STONE: ICD-10-CM

## 2024-11-08 LAB
ALBUMIN UR-MCNC: 50 MG/DL
APPEARANCE UR: ABNORMAL
BILIRUB UR QL STRIP: NEGATIVE
COLOR UR AUTO: ABNORMAL
GLUCOSE UR STRIP-MCNC: NEGATIVE MG/DL
HGB UR QL STRIP: ABNORMAL
KETONES UR STRIP-MCNC: 10 MG/DL
LEUKOCYTE ESTERASE UR QL STRIP: NEGATIVE
MUCOUS THREADS #/AREA URNS LPF: PRESENT /LPF
NITRATE UR QL: NEGATIVE
PH UR STRIP: 7 [PH] (ref 5–7)
RBC URINE: 156 /HPF
SP GR UR STRIP: 1.02 (ref 1–1.03)
UROBILINOGEN UR STRIP-MCNC: NORMAL MG/DL
WBC URINE: 9 /HPF

## 2024-11-08 PROCEDURE — 250N000013 HC RX MED GY IP 250 OP 250 PS 637: Performed by: EMERGENCY MEDICINE

## 2024-11-08 PROCEDURE — 81001 URINALYSIS AUTO W/SCOPE: CPT | Performed by: EMERGENCY MEDICINE

## 2024-11-08 PROCEDURE — 96375 TX/PRO/DX INJ NEW DRUG ADDON: CPT

## 2024-11-08 PROCEDURE — 96374 THER/PROPH/DIAG INJ IV PUSH: CPT

## 2024-11-08 PROCEDURE — 250N000011 HC RX IP 250 OP 636: Performed by: EMERGENCY MEDICINE

## 2024-11-08 PROCEDURE — 74176 CT ABD & PELVIS W/O CONTRAST: CPT

## 2024-11-08 PROCEDURE — 99285 EMERGENCY DEPT VISIT HI MDM: CPT | Mod: 25

## 2024-11-08 RX ORDER — TAMSULOSIN HYDROCHLORIDE 0.4 MG/1
0.4 CAPSULE ORAL DAILY
Qty: 10 CAPSULE | Refills: 0 | Status: SHIPPED | OUTPATIENT
Start: 2024-11-08 | End: 2024-11-18

## 2024-11-08 RX ORDER — OXYCODONE AND ACETAMINOPHEN 5; 325 MG/1; MG/1
1 TABLET ORAL EVERY 6 HOURS PRN
Qty: 12 TABLET | Refills: 0 | Status: SHIPPED | OUTPATIENT
Start: 2024-11-08 | End: 2024-11-12

## 2024-11-08 RX ORDER — KETOROLAC TROMETHAMINE 15 MG/ML
15 INJECTION, SOLUTION INTRAMUSCULAR; INTRAVENOUS ONCE
Status: COMPLETED | OUTPATIENT
Start: 2024-11-08 | End: 2024-11-08

## 2024-11-08 RX ORDER — OXYCODONE AND ACETAMINOPHEN 5; 325 MG/1; MG/1
1 TABLET ORAL ONCE
Status: COMPLETED | OUTPATIENT
Start: 2024-11-08 | End: 2024-11-08

## 2024-11-08 RX ORDER — ONDANSETRON 4 MG/1
4 TABLET, ORALLY DISINTEGRATING ORAL EVERY 4 HOURS PRN
Qty: 10 TABLET | Refills: 0 | Status: SHIPPED | OUTPATIENT
Start: 2024-11-08 | End: 2024-11-12

## 2024-11-08 RX ORDER — ONDANSETRON 2 MG/ML
4 INJECTION INTRAMUSCULAR; INTRAVENOUS EVERY 30 MIN PRN
Status: DISCONTINUED | OUTPATIENT
Start: 2024-11-08 | End: 2024-11-08 | Stop reason: HOSPADM

## 2024-11-08 RX ORDER — HYDROMORPHONE HYDROCHLORIDE 1 MG/ML
1 INJECTION, SOLUTION INTRAMUSCULAR; INTRAVENOUS; SUBCUTANEOUS ONCE
Status: COMPLETED | OUTPATIENT
Start: 2024-11-08 | End: 2024-11-08

## 2024-11-08 RX ADMIN — HYDROMORPHONE HYDROCHLORIDE 1 MG: 1 INJECTION, SOLUTION INTRAMUSCULAR; INTRAVENOUS; SUBCUTANEOUS at 19:11

## 2024-11-08 RX ADMIN — ONDANSETRON 4 MG: 2 INJECTION, SOLUTION INTRAMUSCULAR; INTRAVENOUS at 19:10

## 2024-11-08 RX ADMIN — KETOROLAC TROMETHAMINE 15 MG: 15 INJECTION, SOLUTION INTRAMUSCULAR; INTRAVENOUS at 19:06

## 2024-11-08 RX ADMIN — OXYCODONE HYDROCHLORIDE AND ACETAMINOPHEN 1 TABLET: 5; 325 TABLET ORAL at 21:11

## 2024-11-08 ASSESSMENT — COLUMBIA-SUICIDE SEVERITY RATING SCALE - C-SSRS
1. IN THE PAST MONTH, HAVE YOU WISHED YOU WERE DEAD OR WISHED YOU COULD GO TO SLEEP AND NOT WAKE UP?: NO
6. HAVE YOU EVER DONE ANYTHING, STARTED TO DO ANYTHING, OR PREPARED TO DO ANYTHING TO END YOUR LIFE?: NO
2. HAVE YOU ACTUALLY HAD ANY THOUGHTS OF KILLING YOURSELF IN THE PAST MONTH?: NO

## 2024-11-08 ASSESSMENT — ACTIVITIES OF DAILY LIVING (ADL)
ADLS_ACUITY_SCORE: 0

## 2024-11-08 NOTE — ED TRIAGE NOTES
Pt having right sided flank and abd pain that started yesterday.  Hx of kidney stones.  Reports starting a new weightloss drug as well.     Triage Assessment (Adult)       Row Name 11/08/24 4857          Triage Assessment    Airway WDL WDL        Respiratory WDL    Respiratory WDL WDL        Skin Circulation/Temperature WDL    Skin Circulation/Temperature WDL WDL        Cardiac WDL    Cardiac WDL WDL        Peripheral/Neurovascular WDL    Peripheral Neurovascular WDL WDL        Cognitive/Neuro/Behavioral WDL    Cognitive/Neuro/Behavioral WDL WDL

## 2024-11-09 NOTE — ED PROVIDER NOTES
"  Emergency Department Note      History of Present Illness     Chief Complaint   Flank Pain      HPI   Rey Andre is a 59 year old male with history of hyperlipidemia and nephrolithiasis who presents to the ED with his wife for evaluation of flank pain. The patient reports that he has been dealing with right-sided flank pain since yesterday at 1 PM. The pain was initially intermittent. However in the past 4-5 hours, it has become constant, significant, and radiated to his scrotum and abdomen. He endorses nausea and vomiting. He states that he ate a yogurt this morning and had half of a salad for lunch. Patient has a history of kidney stones and had laser surgery 5 years ago for his most recent episode of nephrolithiasis. Denies using any pain medications since the onset of the pain. Denies fever, chills, urinary symptoms, fecal issues, or testicular swelling.     Independent Historian   None    Review of External Notes   reviewed urology notes from 2017    Past Medical History     Medical History and Problem List   Embolic stroke involving left middle cerebral artery  Hyperlipidemia  Hypothyroidism  Nephrolithiasis  Obstructive sleep apnea  Sinus of Valsalva aneurysm  Hyperglycemia    Medications   Aspirin 325 mg  Lipitor  Synthroid  Metoprolol    Surgical History   Right and left total knee arthroplasty  Cystoscopy  Tonsillectomy and adenoidectomy  Loop recorder removal  Carotid cerebral angiogram left  Combined cystoscopy, ureteroscopy, laser holmium lithotripsy ureter    Physical Exam     Patient Vitals for the past 24 hrs:   BP Temp Pulse Resp SpO2 Height Weight   11/08/24 2129 118/86 -- -- -- 95 % -- --   11/08/24 1945 -- -- -- -- (!) 89 % -- --   11/08/24 1942 -- -- -- -- 95 % -- --   11/08/24 1922 -- -- -- -- 93 % -- --   11/08/24 1745 (!) 167/99 97.8  F (36.6  C) 78 16 98 % 1.803 m (5' 11\") 124.7 kg (275 lb)     Physical Exam  Constitutional: Heavy-set middle aged white male sititng in chair with no " respiratory distress.   HENT: No signs of trauma.   Eyes: EOM are normal. Pupils are equal, round, and reactive to light.   Neck: Normal range of motion. No JVD present. No cervical adenopathy.  Cardiovascular: Regular rhythm.  Exam reveals no gallop and no friction rub.    No murmur heard.  Pulmonary/Chest: Bilateral breath sounds normal. No wheezes, rhonchi or rales.  Abdominal: Soft. Mild right CVA tenderness. No rebound or guarding.   Musculoskeletal: No edema. No tenderness.   Lymphadenopathy: No lymphadenopathy.   Neurological: Alert and oriented to person, place, and time. Normal strength. Coordination normal.   Skin: Skin is warm and dry. No rash noted. No erythema.      Diagnostics     Lab Results   Labs Ordered and Resulted from Time of ED Arrival to Time of ED Departure   UA MACROSCOPIC WITH REFLEX TO MICRO AND CULTURE - Abnormal       Result Value    Color Urine Light Yellow      Appearance Urine Slightly Cloudy (*)     Glucose Urine Negative      Bilirubin Urine Negative      Ketones Urine 10 (*)     Specific Gravity Urine 1.023      Blood Urine Moderate (*)     pH Urine 7.0      Protein Albumin Urine 50 (*)     Urobilinogen Urine Normal      Nitrite Urine Negative      Leukocyte Esterase Urine Negative      Mucus Urine Present (*)     RBC Urine 156 (*)     WBC Urine 9 (*)        Imaging   Abd/pelvis CT no contrast - Stone Protocol   Final Result   IMPRESSION:       A 7 x 10 mm calculus has migrated from the right lower pole calyx and now resides in the proximal right ureter producing moderately severe right proximal hydroureter and hydronephrosis.                EKG   None    Independent Interpretation   None    ED Course      Medications Administered   Medications   ondansetron (ZOFRAN) injection 4 mg (4 mg Intravenous $Given 11/8/24 1910)   ketorolac (TORADOL) injection 15 mg (15 mg Intravenous $Given 11/8/24 1906)   HYDROmorphone (PF) (DILAUDID) injection 1 mg (1 mg Intravenous $Given 11/8/24 1911)    oxyCODONE-acetaminophen (PERCOCET) 5-325 MG per tablet 1 tablet (1 tablet Oral $Given 11/8/24 2111)       Procedures   None      Discussion of Management   None    ED Course   ED Course as of 11/08/24 2203 Fri Nov 08, 2024   1837 I obtained history and examined the patient as noted above.        Additional Documentation  None    Medical Decision Making / Diagnosis     CMS Diagnoses: None    MIPS   None    MDM   Rey Andre is a 59 year old male with intermittent right-sided flank pain radiating to the scrotum with some nausea. No fever, chills, or urinary symptoms. He does have a history of kidney stones. In 2017, he required cysto-lithotripsy surgery. Patient received IV pain medications and nausea medications. Urine was obtained and CT scan which shows a very large 7x10 mm stone in the proximal right ureter. Fortunately, we are able to get the pain under control. I have contacted urology on-call and they will make sure the patient is contacted on Monday. Patient will be discharged with Flomax, Oxycodone, and Zofran. I have also told him to use Advil and if he has increased pain, fever, chills, or consistent vomiting then he should return to the ED.     Disposition   The patient was discharged.     Diagnosis     ICD-10-CM    1. Kidney stone  N20.0            Discharge Medications   Discharge Medication List as of 11/8/2024  8:56 PM        START taking these medications    Details   ondansetron (ZOFRAN ODT) 4 MG ODT tab Take 1 tablet (4 mg) by mouth every 4 hours as needed for nausea., Disp-10 tablet, R-0, Local Print      oxyCODONE-acetaminophen (PERCOCET) 5-325 MG tablet Take 1 tablet by mouth every 6 hours as needed for pain., Disp-12 tablet, R-0, Local Print      tamsulosin (FLOMAX) 0.4 MG capsule Take 1 capsule (0.4 mg) by mouth daily for 10 doses., Disp-10 capsule, R-0, Local Print               Scribe Disclosure:  Mayank PERDOMO, am serving as a scribe at 6:36 PM on 11/8/2024 to document services  personally performed by Sabas Salcido MD based on my observations and the provider's statements to me.        Sabas Salcido MD  11/09/24 0039

## 2024-11-11 ENCOUNTER — VIRTUAL VISIT (OUTPATIENT)
Dept: UROLOGY | Facility: CLINIC | Age: 59
End: 2024-11-11
Payer: COMMERCIAL

## 2024-11-11 DIAGNOSIS — N20.1 URETERAL STONE: Primary | ICD-10-CM

## 2024-11-11 PROCEDURE — 99204 OFFICE O/P NEW MOD 45 MIN: CPT | Mod: 95 | Performed by: UROLOGY

## 2024-11-11 RX ORDER — OXYCODONE AND ACETAMINOPHEN 5; 325 MG/1; MG/1
1 TABLET ORAL EVERY 6 HOURS PRN
Qty: 12 TABLET | Refills: 0 | Status: SHIPPED | OUTPATIENT
Start: 2024-11-11 | End: 2024-11-14

## 2024-11-11 RX ORDER — ACETAMINOPHEN 650 MG/1
650 SUPPOSITORY RECTAL ONCE
Status: CANCELLED | OUTPATIENT
Start: 2024-11-11

## 2024-11-11 RX ORDER — ACETAMINOPHEN 325 MG/1
975 TABLET ORAL ONCE
Status: CANCELLED | OUTPATIENT
Start: 2024-11-11 | End: 2024-11-11

## 2024-11-11 RX ORDER — CEFAZOLIN SODIUM IN 0.9 % NACL 3 G/100 ML
3 INTRAVENOUS SOLUTION, PIGGYBACK (ML) INTRAVENOUS
Status: CANCELLED | OUTPATIENT
Start: 2024-11-11

## 2024-11-11 RX ORDER — CEFAZOLIN SODIUM IN 0.9 % NACL 3 G/100 ML
3 INTRAVENOUS SOLUTION, PIGGYBACK (ML) INTRAVENOUS SEE ADMIN INSTRUCTIONS
Status: CANCELLED | OUTPATIENT
Start: 2024-11-11

## 2024-11-11 NOTE — PROGRESS NOTES
[facility]  Main Office: [address]  [address]  [place] [address]       CHIEF COMPLAINT:  Right ureteral stone    HISTORY:   This is a 59-year-old gentleman who was in the emergency department this past weekend with a large right ureteral stone.  The stone was diagnosed in August incidentally on a CT scan but he is asymptomatic at that time did not follow-up with urology at that time.  The stone has now been causing right flank pain for about the past week.  He has had no fevers chills nausea or vomiting.  He has required 2 prior ureteroscopy's in the past for ureteral stones.  He says that since leaving the emergency department on Friday he has continued to require Percocet to keep his pain under control.      PAST MEDICAL HISTORY:   Past Medical History:   Diagnosis Date    Embolic stroke involving left middle cerebral artery (H) 03/2020    Hyperlipidemia     Hypothyroidism     Kidney stone     Recurent stones    Sen's neuroma of right foot     Obese     DAMARIS on CPAP     Palpitations 03/06/2020    PFO (patent foramen ovale)     Sinus of Valsalva aneurysm        PAST SURGICAL HISTORY:   Past Surgical History:   Procedure Laterality Date    ARTHROPLASTY KNEE Right 12/7/2021    Procedure: RIGHT TOTAL KNEE ARTHROPLASTY;  Surgeon: Ebenezer Stewart MD;  Location:  OR    ARTHROPLASTY KNEE Left 9/25/2023    Procedure: Left Total Knee Arthroplasty;  Surgeon: Ebenezer Stewart MD;  Location:  OR    CV PFO CLOSURE N/A 05/06/2021    Procedure: Patent Foramen Ovale Closure; gore cardioform 25mm,  Surgeon: Chi Alvarenga MD;  Location: [facility] CARDIAC CATH LAB    CYSTOSCOPY      CYSTOSCOPY, RETROGRADES, COMBINED  05/24/2014    Procedure: COMBINED CYSTOSCOPY, RETROGRADES;  Surgeon: Francisco J Lerma MD;  Location: [facility] OR    ENT SURGERY      T & A    EP LOOP RECORDER EXPLANT N/A 1/23/2024    Procedure: Loop Recorder Removal;  Surgeon: Jarvis Best MD;  Location:  HEART CARDIAC CATH LAB     EP LOOP RECORDER IMPLANT N/A 10/12/2020    Procedure: EP Loop Recorder Implant;  Surgeon: Lefty Ramires MD;  Location:  HEART CARDIAC CATH LAB    IR CAROTID CEREBRAL ANGIOGRAM LEFT  03/05/2020    KNEE SURGERY Right     arthroscopic    LASER HOLMIUM LITHOTRIPSY URETER(S), INSERT STENT, COMBINED  07/27/2012    Procedure: COMBINED CYSTOSCOPY, URETEROSCOPY, LASER HOLMIUM LITHOTRIPSY URETER(S), INSERT STENT;  Video cystoscopy, Right Retrograde Right Ureteroscopy with Holmium Laser, Stone Extraction,  JJ Stent Placement ;  Surgeon: Francisco J Lerma MD;  Location: [facility] OR    LASER HOLMIUM LITHOTRIPSY URETER(S), INSERT STENT, COMBINED  05/24/2014    Procedure: COMBINED CYSTOSCOPY, URETEROSCOPY, LASER HOLMIUM LITHOTRIPSY URETER(S), INSERT STENT;  Surgeon: Francisco J Lerma MD;  Location: [facility] OR    LASER HOLMIUM LITHOTRIPSY URETER(S), INSERT STENT, COMBINED Right 03/05/2017    Procedure: COMBINED CYSTOSCOPY, URETEROSCOPY, LASER HOLMIUM LITHOTRIPSY URETER(S), INSERT STENT;  Surgeon: Chris Barrios MD;  Location:  OR    ORTHOPEDIC SURGERY Right     Meniscal tear    ROTATOR CUFF REPAIR RT/LT      left shoulder    wisdom teeth         FAMILY HISTORY:   Family History   Problem Relation Age of Onset    Hyperlipidemia Mother         pulm emboli, IUD perforation and developed clot    Substance Abuse Mother     Other - See Comments Father         Possible Autoimmune disorder , DVT, pulm embolism    Prostate Cancer Father     Depression Father     Obesity Father     Breast Cancer Sister        SOCIAL HISTORY:   Social History     Tobacco Use    Smoking status: Never     Passive exposure: Never    Smokeless tobacco: Never   Substance Use Topics    Alcohol use: Not Currently          Allergies   Allergen Reactions    No Known Drug Allergy          Current Outpatient Medications:     aspirin (ASA) 325 MG EC tablet, Take 325 mg by mouth daily, Disp: , Rfl:     atorvastatin (LIPITOR) 20 MG tablet, Take 1  tablet (20 mg) by mouth daily, Disp: 90 tablet, Rfl: 3    levothyroxine (SYNTHROID/LEVOTHROID) 150 MCG tablet, Take 1 tablet (150 mcg) by mouth daily, Disp: 90 tablet, Rfl: 3    metoprolol succinate ER (TOPROL XL) 50 MG 24 hr tablet, Take 1 tablet (50 mg) by mouth daily., Disp: 90 tablet, Rfl: 1    metroNIDAZOLE (METROGEL) 0.75 % external gel, Apply topically 2 times daily, Disp: 45 g, Rfl: 11    ondansetron (ZOFRAN ODT) 4 MG ODT tab, Take 1 tablet (4 mg) by mouth every 4 hours as needed for nausea., Disp: 10 tablet, Rfl: 0    oxyCODONE-acetaminophen (PERCOCET) 5-325 MG tablet, Take 1 tablet by mouth every 6 hours as needed for pain., Disp: 12 tablet, Rfl: 0    tamsulosin (FLOMAX) 0.4 MG capsule, Take 1 capsule (0.4 mg) by mouth daily for 10 doses., Disp: 10 capsule, Rfl: 0    tirzepatide-Weight Management (ZEPBOUND) 2.5 MG/0.5ML prefilled pen, Inject 0.5 mLs (2.5 mg) subcutaneously every 7 days., Disp: 2 mL, Rfl: 0    tirzepatide-Weight Management (ZEPBOUND) 5 MG/0.5ML prefilled pen, Inject 0.5 mLs (5 mg) subcutaneously every 7 days., Disp: 6 mL, Rfl: 0    Review Of Systems:  Skin: No rash, pruritis, or skin pigmentation  Eyes: No changes in vision  Ears/Nose/Throat: No changes in hearing, no nosebleeds  Respiratory: No shortness of breath, dyspnea on exertion, cough, or hemoptysis  Cardiovascular: No chest pain or palpitations  Gastrointestinal: No diarrhea or constipation. No abdominal pain. No hematochezia  Genitourinary: see HPI  Musculoskeletal: No pain or swelling of joints, normal range of motion  Neurologic: No weakness or tremors  Psychiatric: No recent changes in memory or mood  Hematologic/Lymphatic/Immunologic: No easy bruising or enlarged lymph nodes  Endocrine: No weight gain or loss      PHYSICAL EXAM:    There were no vitals taken for this visit.  General appearance: In NAD, conversant  HEENT: Normocephalic and atraumatic, anicteric sclera  Cardiovascular: Not examined  Respiratory: normal,  non-labored breathing  Gastrointestinal: negative, Abdomen soft, non-tender, and non-distended.   Musculoskeletal: Not Examined  Peripheral Vascular/extremity: No peripheral edema  Skin: Normal temperature, turgor, and texture. No rash  Psychiatric: Appropriate affect, alert and oriented to person, place, and time    Penis: Not Examined  Scrotal Skin: Not Examined   Testicles: Not Examined  Epididymis: Not Examined  Lymphatic: Not examined  Digital Rectal Exam:     Cystoscopy: Not done      PSA:     UA RESULTS:  Recent Labs   Lab Test 11/08/24  1748   COLOR Light Yellow   APPEARANCE Slightly Cloudy*   URINEGLC Negative   URINEBILI Negative   URINEKETONE 10*   SG 1.023   UBLD Moderate*   URINEPH 7.0   PROTEIN 50*   NITRITE Negative   LEUKEST Negative   RBCU 156*   WBCU 9*       Bladder Scan:     Other Labs:      Imaging Studies: I personally reviewed his CT scan images.  Large 10 mm stone in the proximal right ureter.  No other stones identified.      CLINICAL IMPRESSION:   Right ureteral stone    PLAN:   We discussed his CT scan results.  The stone is large and would not be expected to pass spontaneously.  I recommended treatment with another ureteroscopy.  We discussed cystoscopy with right-sided ureteroscopy, laser lithotripsy stone basketing, right ureteral stent placement in detail today along with its risks and expected recovery.  All of his questions were answered and he wishes to proceed.  Will get him scheduled as an outpatient.  We discussed possibly leaving strings on the stent for his later removal postoperatively as well.      Francisco J Lerma MD      Virtual Visit Details    Type of service:  Video Visit     Originating Location (pt. Location): Home    Distant Location (provider location):  On-site  Platform used for Video Visit: Edmar

## 2024-11-11 NOTE — LETTER
11/11/2024       RE: Rey Andre  [address]  [place] [id number]     Dear Colleague,    Thank you for referring your patient, Rey Andre, to the [facility] [place] [facility]. Please see a copy of my visit note below.    [facility]  Main Office: [address]  [address]  [place] [address]       CHIEF COMPLAINT:  Right ureteral stone    HISTORY:   This is a 59-year-old gentleman who was in the emergency department this past weekend with a large right ureteral stone.  The stone was diagnosed in August incidentally on a CT scan but he is asymptomatic at that time did not follow-up with urology at that time.  The stone has now been causing right flank pain for about the past week.  He has had no fevers chills nausea or vomiting.  He has required 2 prior ureteroscopy's in the past for ureteral stones.  He says that since leaving the emergency department on Friday he has continued to require Percocet to keep his pain under control.      PAST MEDICAL HISTORY:   Past Medical History:   Diagnosis Date     Embolic stroke involving left middle cerebral artery (H) 03/2020     Hyperlipidemia      Hypothyroidism      Kidney stone     Recurent stones     Sen's neuroma of right foot      Obese      DAMARIS on CPAP      Palpitations 03/06/2020     PFO (patent foramen ovale)      Sinus of Valsalva aneurysm        PAST SURGICAL HISTORY:   Past Surgical History:   Procedure Laterality Date     ARTHROPLASTY KNEE Right 12/7/2021    Procedure: RIGHT TOTAL KNEE ARTHROPLASTY;  Surgeon: Ebenezer Stewart MD;  Location:  OR     ARTHROPLASTY KNEE Left 9/25/2023    Procedure: Left Total Knee Arthroplasty;  Surgeon: Ebenezer Stewart MD;  Location:  OR     CV PFO CLOSURE N/A 05/06/2021    Procedure: Patent Foramen Ovale Closure; gore cardioform 25mm,  Surgeon: Chi Alvarenga MD;  Location:  HEART CARDIAC CATH LAB     CYSTOSCOPY        CYSTOSCOPY, RETROGRADES, COMBINED  05/24/2014    Procedure: COMBINED CYSTOSCOPY, RETROGRADES;  Surgeon: Francisco J Lerma MD;  Location:  OR     ENT SURGERY      T & A     EP LOOP RECORDER EXPLANT N/A 1/23/2024    Procedure: Loop Recorder Removal;  Surgeon: Jarvis Best MD;  Location:  HEART CARDIAC CATH LAB     EP LOOP RECORDER IMPLANT N/A 10/12/2020    Procedure: EP Loop Recorder Implant;  Surgeon: Lefty Ramires MD;  Location:  HEART CARDIAC CATH LAB     IR CAROTID CEREBRAL ANGIOGRAM LEFT  03/05/2020     KNEE SURGERY Right     arthroscopic     LASER HOLMIUM LITHOTRIPSY URETER(S), INSERT STENT, COMBINED  07/27/2012    Procedure: COMBINED CYSTOSCOPY, URETEROSCOPY, LASER HOLMIUM LITHOTRIPSY URETER(S), INSERT STENT;  Video cystoscopy, Right Retrograde Right Ureteroscopy with Holmium Laser, Stone Extraction,  JJ Stent Placement ;  Surgeon: Francisco J Lerma MD;  Location:  OR     LASER HOLMIUM LITHOTRIPSY URETER(S), INSERT STENT, COMBINED  05/24/2014    Procedure: COMBINED CYSTOSCOPY, URETEROSCOPY, LASER HOLMIUM LITHOTRIPSY URETER(S), INSERT STENT;  Surgeon: Francisco J Lerma MD;  Location:  OR     LASER HOLMIUM LITHOTRIPSY URETER(S), INSERT STENT, COMBINED Right 03/05/2017    Procedure: COMBINED CYSTOSCOPY, URETEROSCOPY, LASER HOLMIUM LITHOTRIPSY URETER(S), INSERT STENT;  Surgeon: Chris Barrios MD;  Location:  OR     ORTHOPEDIC SURGERY Right     Meniscal tear     ROTATOR CUFF REPAIR RT/LT      left shoulder     wisdom teeth         FAMILY HISTORY:   Family History   Problem Relation Age of Onset     Hyperlipidemia Mother         pulm emboli, IUD perforation and developed clot     Substance Abuse Mother      Other - See Comments Father         Possible Autoimmune disorder , DVT, pulm embolism     Prostate Cancer Father      Depression Father      Obesity Father      Breast Cancer Sister        SOCIAL HISTORY:   Social History     Tobacco Use     Smoking status:  Never     Passive exposure: Never     Smokeless tobacco: Never   Substance Use Topics     Alcohol use: Not Currently          Allergies   Allergen Reactions     No Known Drug Allergy          Current Outpatient Medications:      aspirin (ASA) 325 MG EC tablet, Take 325 mg by mouth daily, Disp: , Rfl:      atorvastatin (LIPITOR) 20 MG tablet, Take 1 tablet (20 mg) by mouth daily, Disp: 90 tablet, Rfl: 3     levothyroxine (SYNTHROID/LEVOTHROID) 150 MCG tablet, Take 1 tablet (150 mcg) by mouth daily, Disp: 90 tablet, Rfl: 3     metoprolol succinate ER (TOPROL XL) 50 MG 24 hr tablet, Take 1 tablet (50 mg) by mouth daily., Disp: 90 tablet, Rfl: 1     metroNIDAZOLE (METROGEL) 0.75 % external gel, Apply topically 2 times daily, Disp: 45 g, Rfl: 11     ondansetron (ZOFRAN ODT) 4 MG ODT tab, Take 1 tablet (4 mg) by mouth every 4 hours as needed for nausea., Disp: 10 tablet, Rfl: 0     oxyCODONE-acetaminophen (PERCOCET) 5-325 MG tablet, Take 1 tablet by mouth every 6 hours as needed for pain., Disp: 12 tablet, Rfl: 0     tamsulosin (FLOMAX) 0.4 MG capsule, Take 1 capsule (0.4 mg) by mouth daily for 10 doses., Disp: 10 capsule, Rfl: 0     tirzepatide-Weight Management (ZEPBOUND) 2.5 MG/0.5ML prefilled pen, Inject 0.5 mLs (2.5 mg) subcutaneously every 7 days., Disp: 2 mL, Rfl: 0     tirzepatide-Weight Management (ZEPBOUND) 5 MG/0.5ML prefilled pen, Inject 0.5 mLs (5 mg) subcutaneously every 7 days., Disp: 6 mL, Rfl: 0    Review Of Systems:  Skin: No rash, pruritis, or skin pigmentation  Eyes: No changes in vision  Ears/Nose/Throat: No changes in hearing, no nosebleeds  Respiratory: No shortness of breath, dyspnea on exertion, cough, or hemoptysis  Cardiovascular: No chest pain or palpitations  Gastrointestinal: No diarrhea or constipation. No abdominal pain. No hematochezia  Genitourinary: see HPI  Musculoskeletal: No pain or swelling of joints, normal range of motion  Neurologic: No weakness or tremors  Psychiatric: No recent  changes in memory or mood  Hematologic/Lymphatic/Immunologic: No easy bruising or enlarged lymph nodes  Endocrine: No weight gain or loss      PHYSICAL EXAM:    There were no vitals taken for this visit.  General appearance: In NAD, conversant  HEENT: Normocephalic and atraumatic, anicteric sclera  Cardiovascular: Not examined  Respiratory: normal, non-labored breathing  Gastrointestinal: negative, Abdomen soft, non-tender, and non-distended.   Musculoskeletal: Not Examined  Peripheral Vascular/extremity: No peripheral edema  Skin: Normal temperature, turgor, and texture. No rash  Psychiatric: Appropriate affect, alert and oriented to person, place, and time    Penis: Not Examined  Scrotal Skin: Not Examined   Testicles: Not Examined  Epididymis: Not Examined  Lymphatic: Not examined  Digital Rectal Exam:     Cystoscopy: Not done      PSA:     UA RESULTS:  Recent Labs   Lab Test 11/08/24  1748   COLOR Light Yellow   APPEARANCE Slightly Cloudy*   URINEGLC Negative   URINEBILI Negative   URINEKETONE 10*   SG 1.023   UBLD Moderate*   URINEPH 7.0   PROTEIN 50*   NITRITE Negative   LEUKEST Negative   RBCU 156*   WBCU 9*       Bladder Scan:     Other Labs:      Imaging Studies: I personally reviewed his CT scan images.  Large 10 mm stone in the proximal right ureter.  No other stones identified.      CLINICAL IMPRESSION:   Right ureteral stone    PLAN:   We discussed his CT scan results.  The stone is large and would not be expected to pass spontaneously.  I recommended treatment with another ureteroscopy.  We discussed cystoscopy with right-sided ureteroscopy, laser lithotripsy stone basketing, right ureteral stent placement in detail today along with its risks and expected recovery.  All of his questions were answered and he wishes to proceed.  Will get him scheduled as an outpatient.  We discussed possibly leaving strings on the stent for his later removal postoperatively as well.      Francisco J Lerma,  MD      Virtual Visit Details    Type of service:  Video Visit     Originating Location (pt. Location): Home    Distant Location (provider location):  On-site  Platform used for Video Visit: Edmar      Again, thank you for allowing me to participate in the care of your patient.      Sincerely,    Francisco J Lerma MD

## 2024-11-11 NOTE — NURSING NOTE
Current patient location: [address] [place] [id number]    Is the patient currently in the state of [place]? YES    Visit mode:VIDEO    If the visit is dropped, the patient can be reconnected by:VIDEO VISIT: Text to cell phone:   Telephone Information:   Mobile [phone]       Will anyone else be joining the visit? NO  (If patient encounters technical issues they should call [phone]    Are changes needed to the allergy or medication list? No and Pt stated no med changes    Are refills needed on medications prescribed by this physician? NO    Rooming Documentation:  Not applicable    Reason for visit: PERICO STALLINGS

## 2024-11-13 ENCOUNTER — ANESTHESIA EVENT (OUTPATIENT)
Dept: SURGERY | Facility: CLINIC | Age: 59
End: 2024-11-13
Payer: COMMERCIAL

## 2024-11-13 ENCOUNTER — APPOINTMENT (OUTPATIENT)
Dept: GENERAL RADIOLOGY | Facility: CLINIC | Age: 59
End: 2024-11-13
Attending: UROLOGY
Payer: COMMERCIAL

## 2024-11-13 ENCOUNTER — HOSPITAL ENCOUNTER (OUTPATIENT)
Facility: CLINIC | Age: 59
Discharge: HOME | End: 2024-11-13
Attending: UROLOGY | Admitting: UROLOGY
Payer: COMMERCIAL

## 2024-11-13 ENCOUNTER — ANESTHESIA (OUTPATIENT)
Dept: SURGERY | Facility: CLINIC | Age: 59
End: 2024-11-13
Payer: COMMERCIAL

## 2024-11-13 VITALS
HEIGHT: 71 IN | BODY MASS INDEX: 39.09 KG/M2 | OXYGEN SATURATION: 95 % | WEIGHT: 279.2 LBS | HEART RATE: 58 BPM | DIASTOLIC BLOOD PRESSURE: 79 MMHG | TEMPERATURE: 97.2 F | SYSTOLIC BLOOD PRESSURE: 119 MMHG | RESPIRATION RATE: 16 BRPM

## 2024-11-13 PROCEDURE — 250N000009 HC RX 250: Performed by: UROLOGY

## 2024-11-13 PROCEDURE — 710N000012 HC RECOVERY PHASE 2, PER MINUTE: Performed by: UROLOGY

## 2024-11-13 PROCEDURE — 250N000011 HC RX IP 250 OP 636: Performed by: ANESTHESIOLOGY

## 2024-11-13 PROCEDURE — 999N000179 XR SURGERY CARM FLUORO LESS THAN 5 MIN W STILLS: Mod: TC

## 2024-11-13 PROCEDURE — 258N000001 HC RX 258: Performed by: UROLOGY

## 2024-11-13 PROCEDURE — 255N000002 HC RX 255 OP 636: Performed by: UROLOGY

## 2024-11-13 PROCEDURE — 710N000009 HC RECOVERY PHASE 1, LEVEL 1, PER MIN: Performed by: UROLOGY

## 2024-11-13 PROCEDURE — 370N000017 HC ANESTHESIA TECHNICAL FEE, PER MIN: Performed by: UROLOGY

## 2024-11-13 PROCEDURE — 250N000011 HC RX IP 250 OP 636: Performed by: UROLOGY

## 2024-11-13 PROCEDURE — 250N000009 HC RX 250: Performed by: NURSE ANESTHETIST, CERTIFIED REGISTERED

## 2024-11-13 PROCEDURE — 250N000013 HC RX MED GY IP 250 OP 250 PS 637: Performed by: UROLOGY

## 2024-11-13 PROCEDURE — 360N000083 HC SURGERY LEVEL 3 W/ FLUORO, PER MIN: Performed by: UROLOGY

## 2024-11-13 PROCEDURE — 250N000011 HC RX IP 250 OP 636: Performed by: NURSE ANESTHETIST, CERTIFIED REGISTERED

## 2024-11-13 PROCEDURE — C2617 STENT, NON-COR, TEM W/O DEL: HCPCS | Performed by: UROLOGY

## 2024-11-13 PROCEDURE — C1769 GUIDE WIRE: HCPCS | Performed by: UROLOGY

## 2024-11-13 PROCEDURE — 999N000141 HC STATISTIC PRE-PROCEDURE NURSING ASSESSMENT: Performed by: UROLOGY

## 2024-11-13 PROCEDURE — 250N000025 HC SEVOFLURANE, PER MIN: Performed by: UROLOGY

## 2024-11-13 PROCEDURE — 258N000003 HC RX IP 258 OP 636: Performed by: NURSE ANESTHETIST, CERTIFIED REGISTERED

## 2024-11-13 PROCEDURE — 272N000001 HC OR GENERAL SUPPLY STERILE: Performed by: UROLOGY

## 2024-11-13 DEVICE — STENT URETERAL POLARIS ULTRA 6FRX28CM M0061921340: Type: IMPLANTABLE DEVICE | Site: URETER | Status: FUNCTIONAL

## 2024-11-13 RX ORDER — NALOXONE HYDROCHLORIDE 0.4 MG/ML
0.1 INJECTION, SOLUTION INTRAMUSCULAR; INTRAVENOUS; SUBCUTANEOUS
Status: DISCONTINUED | OUTPATIENT
Start: 2024-11-13 | End: 2024-11-13 | Stop reason: HOSPADM

## 2024-11-13 RX ORDER — ONDANSETRON 2 MG/ML
INJECTION INTRAMUSCULAR; INTRAVENOUS PRN
Status: DISCONTINUED | OUTPATIENT
Start: 2024-11-13 | End: 2024-11-13

## 2024-11-13 RX ORDER — CEFAZOLIN SODIUM/WATER 3 G/30 ML
3 SYRINGE (ML) INTRAVENOUS SEE ADMIN INSTRUCTIONS
Status: DISCONTINUED | OUTPATIENT
Start: 2024-11-13 | End: 2024-11-13 | Stop reason: HOSPADM

## 2024-11-13 RX ORDER — SODIUM CHLORIDE, SODIUM LACTATE, POTASSIUM CHLORIDE, CALCIUM CHLORIDE 600; 310; 30; 20 MG/100ML; MG/100ML; MG/100ML; MG/100ML
INJECTION, SOLUTION INTRAVENOUS CONTINUOUS
Status: DISCONTINUED | OUTPATIENT
Start: 2024-11-13 | End: 2024-11-13 | Stop reason: HOSPADM

## 2024-11-13 RX ORDER — FENTANYL CITRATE 50 UG/ML
50 INJECTION, SOLUTION INTRAMUSCULAR; INTRAVENOUS EVERY 5 MIN PRN
Status: DISCONTINUED | OUTPATIENT
Start: 2024-11-13 | End: 2024-11-13 | Stop reason: HOSPADM

## 2024-11-13 RX ORDER — ONDANSETRON 4 MG/1
4 TABLET, ORALLY DISINTEGRATING ORAL EVERY 30 MIN PRN
Status: DISCONTINUED | OUTPATIENT
Start: 2024-11-13 | End: 2024-11-13 | Stop reason: HOSPADM

## 2024-11-13 RX ORDER — ACETAMINOPHEN 325 MG/1
975 TABLET ORAL ONCE
Status: DISCONTINUED | OUTPATIENT
Start: 2024-11-13 | End: 2024-11-13 | Stop reason: HOSPADM

## 2024-11-13 RX ORDER — ALBUTEROL SULFATE 0.83 MG/ML
2.5 SOLUTION RESPIRATORY (INHALATION) EVERY 4 HOURS PRN
Status: DISCONTINUED | OUTPATIENT
Start: 2024-11-13 | End: 2024-11-13 | Stop reason: HOSPADM

## 2024-11-13 RX ORDER — LIDOCAINE 40 MG/G
CREAM TOPICAL
Status: DISCONTINUED | OUTPATIENT
Start: 2024-11-13 | End: 2024-11-13 | Stop reason: HOSPADM

## 2024-11-13 RX ORDER — ACETAMINOPHEN 650 MG/1
650 SUPPOSITORY RECTAL ONCE
Status: COMPLETED | OUTPATIENT
Start: 2024-11-13 | End: 2024-11-13

## 2024-11-13 RX ORDER — OXYCODONE HYDROCHLORIDE 5 MG/1
5 TABLET ORAL
Status: DISCONTINUED | OUTPATIENT
Start: 2024-11-13 | End: 2024-11-13 | Stop reason: HOSPADM

## 2024-11-13 RX ORDER — KETOROLAC TROMETHAMINE 30 MG/ML
INJECTION, SOLUTION INTRAMUSCULAR; INTRAVENOUS PRN
Status: DISCONTINUED | OUTPATIENT
Start: 2024-11-13 | End: 2024-11-13

## 2024-11-13 RX ORDER — HYDROMORPHONE HCL IN WATER/PF 6 MG/30 ML
0.4 PATIENT CONTROLLED ANALGESIA SYRINGE INTRAVENOUS EVERY 5 MIN PRN
Status: DISCONTINUED | OUTPATIENT
Start: 2024-11-13 | End: 2024-11-13 | Stop reason: HOSPADM

## 2024-11-13 RX ORDER — ACETAMINOPHEN 325 MG/1
975 TABLET ORAL ONCE
Status: COMPLETED | OUTPATIENT
Start: 2024-11-13 | End: 2024-11-13

## 2024-11-13 RX ORDER — FENTANYL CITRATE 50 UG/ML
25 INJECTION, SOLUTION INTRAMUSCULAR; INTRAVENOUS EVERY 5 MIN PRN
Status: DISCONTINUED | OUTPATIENT
Start: 2024-11-13 | End: 2024-11-13 | Stop reason: HOSPADM

## 2024-11-13 RX ORDER — SODIUM CHLORIDE, SODIUM LACTATE, POTASSIUM CHLORIDE, CALCIUM CHLORIDE 600; 310; 30; 20 MG/100ML; MG/100ML; MG/100ML; MG/100ML
INJECTION, SOLUTION INTRAVENOUS CONTINUOUS PRN
Status: DISCONTINUED | OUTPATIENT
Start: 2024-11-13 | End: 2024-11-13

## 2024-11-13 RX ORDER — CEFAZOLIN SODIUM/WATER 3 G/30 ML
3 SYRINGE (ML) INTRAVENOUS
Status: COMPLETED | OUTPATIENT
Start: 2024-11-13 | End: 2024-11-13

## 2024-11-13 RX ORDER — DEXAMETHASONE SODIUM PHOSPHATE 4 MG/ML
4 INJECTION, SOLUTION INTRA-ARTICULAR; INTRALESIONAL; INTRAMUSCULAR; INTRAVENOUS; SOFT TISSUE
Status: DISCONTINUED | OUTPATIENT
Start: 2024-11-13 | End: 2024-11-13 | Stop reason: HOSPADM

## 2024-11-13 RX ORDER — FENTANYL CITRATE 50 UG/ML
INJECTION, SOLUTION INTRAMUSCULAR; INTRAVENOUS PRN
Status: DISCONTINUED | OUTPATIENT
Start: 2024-11-13 | End: 2024-11-13

## 2024-11-13 RX ORDER — HYDROMORPHONE HCL IN WATER/PF 6 MG/30 ML
0.2 PATIENT CONTROLLED ANALGESIA SYRINGE INTRAVENOUS EVERY 5 MIN PRN
Status: DISCONTINUED | OUTPATIENT
Start: 2024-11-13 | End: 2024-11-13 | Stop reason: HOSPADM

## 2024-11-13 RX ORDER — DIPHENHYDRAMINE HCL 25 MG
25 CAPSULE ORAL EVERY 6 HOURS PRN
Status: DISCONTINUED | OUTPATIENT
Start: 2024-11-13 | End: 2024-11-13 | Stop reason: HOSPADM

## 2024-11-13 RX ORDER — DIAZEPAM 10 MG/2ML
2.5 INJECTION, SOLUTION INTRAMUSCULAR; INTRAVENOUS
Status: DISCONTINUED | OUTPATIENT
Start: 2024-11-13 | End: 2024-11-13 | Stop reason: HOSPADM

## 2024-11-13 RX ORDER — OXYCODONE HYDROCHLORIDE 5 MG/1
10 TABLET ORAL
Status: DISCONTINUED | OUTPATIENT
Start: 2024-11-13 | End: 2024-11-13 | Stop reason: HOSPADM

## 2024-11-13 RX ORDER — DEXAMETHASONE SODIUM PHOSPHATE 4 MG/ML
INJECTION, SOLUTION INTRA-ARTICULAR; INTRALESIONAL; INTRAMUSCULAR; INTRAVENOUS; SOFT TISSUE PRN
Status: DISCONTINUED | OUTPATIENT
Start: 2024-11-13 | End: 2024-11-13

## 2024-11-13 RX ORDER — GLYCOPYRROLATE 0.2 MG/ML
INJECTION, SOLUTION INTRAMUSCULAR; INTRAVENOUS PRN
Status: DISCONTINUED | OUTPATIENT
Start: 2024-11-13 | End: 2024-11-13

## 2024-11-13 RX ORDER — LIDOCAINE HYDROCHLORIDE 20 MG/ML
INJECTION, SOLUTION INFILTRATION; PERINEURAL PRN
Status: DISCONTINUED | OUTPATIENT
Start: 2024-11-13 | End: 2024-11-13

## 2024-11-13 RX ORDER — ONDANSETRON 2 MG/ML
4 INJECTION INTRAMUSCULAR; INTRAVENOUS EVERY 30 MIN PRN
Status: DISCONTINUED | OUTPATIENT
Start: 2024-11-13 | End: 2024-11-13 | Stop reason: HOSPADM

## 2024-11-13 RX ORDER — HYDRALAZINE HYDROCHLORIDE 20 MG/ML
2.5-5 INJECTION INTRAMUSCULAR; INTRAVENOUS EVERY 10 MIN PRN
Status: DISCONTINUED | OUTPATIENT
Start: 2024-11-13 | End: 2024-11-13 | Stop reason: HOSPADM

## 2024-11-13 RX ORDER — PROPOFOL 10 MG/ML
INJECTION, EMULSION INTRAVENOUS PRN
Status: DISCONTINUED | OUTPATIENT
Start: 2024-11-13 | End: 2024-11-13

## 2024-11-13 RX ORDER — LABETALOL HYDROCHLORIDE 5 MG/ML
10 INJECTION, SOLUTION INTRAVENOUS
Status: DISCONTINUED | OUTPATIENT
Start: 2024-11-13 | End: 2024-11-13 | Stop reason: HOSPADM

## 2024-11-13 RX ORDER — DIPHENHYDRAMINE HYDROCHLORIDE 50 MG/ML
25 INJECTION INTRAMUSCULAR; INTRAVENOUS EVERY 6 HOURS PRN
Status: DISCONTINUED | OUTPATIENT
Start: 2024-11-13 | End: 2024-11-13 | Stop reason: HOSPADM

## 2024-11-13 RX ADMIN — DEXAMETHASONE SODIUM PHOSPHATE 8 MG: 4 INJECTION, SOLUTION INTRA-ARTICULAR; INTRALESIONAL; INTRAMUSCULAR; INTRAVENOUS; SOFT TISSUE at 11:18

## 2024-11-13 RX ADMIN — ACETAMINOPHEN 975 MG: 325 TABLET, FILM COATED ORAL at 10:28

## 2024-11-13 RX ADMIN — ONDANSETRON 4 MG: 2 INJECTION INTRAMUSCULAR; INTRAVENOUS at 11:18

## 2024-11-13 RX ADMIN — MIDAZOLAM 2 MG: 1 INJECTION INTRAMUSCULAR; INTRAVENOUS at 11:17

## 2024-11-13 RX ADMIN — LIDOCAINE HYDROCHLORIDE 50 MG: 20 INJECTION, SOLUTION INFILTRATION; PERINEURAL at 11:18

## 2024-11-13 RX ADMIN — PROPOFOL 200 MG: 10 INJECTION, EMULSION INTRAVENOUS at 11:18

## 2024-11-13 RX ADMIN — SODIUM CHLORIDE, POTASSIUM CHLORIDE, SODIUM LACTATE AND CALCIUM CHLORIDE: 600; 310; 30; 20 INJECTION, SOLUTION INTRAVENOUS at 11:17

## 2024-11-13 RX ADMIN — GLYCOPYRROLATE 0.2 MG: 0.2 INJECTION, SOLUTION INTRAMUSCULAR; INTRAVENOUS at 11:18

## 2024-11-13 RX ADMIN — KETOROLAC TROMETHAMINE 30 MG: 30 INJECTION, SOLUTION INTRAMUSCULAR at 11:18

## 2024-11-13 RX ADMIN — Medication 3 G: at 11:18

## 2024-11-13 RX ADMIN — FENTANYL CITRATE 100 MCG: 50 INJECTION INTRAMUSCULAR; INTRAVENOUS at 11:18

## 2024-11-13 ASSESSMENT — ACTIVITIES OF DAILY LIVING (ADL)
ADLS_ACUITY_SCORE: 0

## 2024-11-13 NOTE — DISCHARGE INSTRUCTIONS
CYSTOSCOPY DISCHARGE INSTRUCTIONS  [facility]  [address], [address] [facility]   [phone]    YOU MAY GO BACK TO YOUR NORMAL DIET AND ACTIVITY, UNLESS YOUR DOCTOR TELLS YOU NOT TO.    FOR THE NEXT TWO DAYS, YOU MAY NOTICE:    SOME BLOOD IN YOUR URINE.  SOME BURNING WHEN YOU URINATE.  AN URGE TO URINATE MORE OFTEN.  BLADDER SPASMS.    THESE ARE NORMAL AFTER THE PROCEDURE.  THEY SHOULD GO AWAY AFTER A DAY OR TWO.  TO RELIEVE THESE PROBLEMS:     DRINK 6 TO 8 LARGE GLASSES OF WATER EACH DAY (INCLUDES DRINKS AT MEALS).  THIS WILL HELP CLEAR THE URINE.    TAKE WARM BATHS TO RELIEVE PAIN AND BLADDER SPASMS.  DO NOT ADD ANYTHING TO THE BATH WATER.    YOUR DOCTOR MAY PRESCRIBE PAIN MEDICINE.  YOU MAY ALSO TAKE TYLENOL (ACETAMINOPHEN) FOR PAIN.    CALL YOUR SURGEON IF YOU HAVE:    A FEVER OVER 101 DEGREES.  CHECK YOUR TEMPERATURE UNDER YOUR TONGUE.    CHILLS.    FAILURE TO URINATE (NO URINE COMES OUT WHEN YOU TRY TO USE THE TOILET).  TRY SOAKING IN A BATHTUB FULL OF WARM WATER.  IF STILL NO URINE, CALL YOUR DOCTOR.    A LOT OF BLOOD IN THE URINE, OR BLOOD CLOTS LARGER THAN A NICKEL.      PAIN IN THE BACK OR BELLY AREA (ABDOMEN).    PAIN OR SPASMS THAT ARE NOT RELIEVED BY WARM TUB BATHS AND PAIN MEDICINE.      SEVERE PAIN, BURNING OR OTHER PROBLEMS WHILE PASSING URINE.    PAIN THAT GETS WORSE AFTER TWO DAYS.     Maximum acetaminophen (Tylenol) dose from all sources should not exceed 4 grams (4000 mg) per day. You had Tylenol 975mg around 10:30AM    You received Toradol, an IV form of Ibuprofen (Motrin) at 11:20.  Do not take any Ibuprofen products until 5:20PM

## 2024-11-13 NOTE — ANESTHESIA CARE TRANSFER NOTE
Patient: Rey Andre    Procedure: Procedure(s):  Cystoscopy, right ureteroscopy, right ureteral stent placement.       Diagnosis: Ureteral stone [N20.1]  Diagnosis Additional Information: No value filed.    Anesthesia Type:   General     Note:    Oropharynx: oropharynx clear of all foreign objects  Level of Consciousness: awake  Oxygen Supplementation: face mask    Independent Airway: airway patency satisfactory and stable  Dentition: dentition unchanged  Vital Signs Stable: post-procedure vital signs reviewed and stable  Report to RN Given: handoff report given  Patient transferred to: PACU    Handoff Report: Identifed the Patient, Identified the Reponsible Provider, Reviewed the pertinent medical history, Discussed the surgical course, Reviewed Intra-OP anesthesia mangement and issues during anesthesia, Set expectations for post-procedure period and Allowed opportunity for questions and acknowledgement of understanding  Vitals:  Vitals Value Taken Time   BP     Temp 98.24  F (36.8  C) 11/13/24 1152   Pulse     Resp     SpO2 97 % 11/13/24 1152   Vitals shown include unfiled device data.    Electronically Signed By: CASSANDRA Kennedy CRNA  November 13, 2024  11:53 AM

## 2024-11-13 NOTE — ANESTHESIA PROCEDURE NOTES
Airway    Staff -        Performed By: CRNA    Final Airway Details       Final airway type: supraglottic airway    Supraglottic Airway Details        Type: LMA       Brand: I-Gel       LMA size: 5    Post intubation assessment        Ease of procedure: easy

## 2024-11-13 NOTE — ANESTHESIA PREPROCEDURE EVALUATION
Anesthesia Pre-Procedure Evaluation    Patient: Rey Andre   MRN: [id number] : 1965        Procedure : Procedure(s):  Cystoscopy, right ureteroscopy with laser lithotripsy and stone basketing.  Right ureteral stent placement.          Past Medical History:   Diagnosis Date    Embolic stroke involving left middle cerebral artery (H) 2020    Hyperlipidemia     Hypertension     Hypothyroidism     Kidney stone     Recurent stones    Sen's neuroma of right foot     Obese     DAMARIS on CPAP     Palpitations 2020    PFO (patent foramen ovale)     Sinus of Valsalva aneurysm       Past Surgical History:   Procedure Laterality Date    ARTHROPLASTY KNEE Right 2021    Procedure: RIGHT TOTAL KNEE ARTHROPLASTY;  Surgeon: Ebenezer Stewart MD;  Location:  OR    ARTHROPLASTY KNEE Left 2023    Procedure: Left Total Knee Arthroplasty;  Surgeon: Ebenezer Stewart MD;  Location:  OR    CV PFO CLOSURE N/A 2021    Procedure: Patent Foramen Ovale Closure; gore cardioform 25mm,  Surgeon: Chi Alvarenga MD;  Location:  [facility]    CYSTOSCOPY      CYSTOSCOPY, RETROGRADES, COMBINED  2014    Procedure: COMBINED CYSTOSCOPY, RETROGRADES;  Surgeon: Francisco J Lerma MD;  Location: [facility] OR    ENT SURGERY      T & A    EP LOOP RECORDER EXPLANT N/A 2024    Procedure: Loop Recorder Removal;  Surgeon: Jarvis Best MD;  Location:  [facility]    EP LOOP RECORDER IMPLANT N/A 10/12/2020    Procedure: EP Loop Recorder Implant;  Surgeon: Lefty Ramires MD;  Location:  [facility]    IR CAROTID CEREBRAL ANGIOGRAM LEFT  2020    KNEE SURGERY Right     arthroscopic    LASER HOLMIUM LITHOTRIPSY URETER(S), INSERT STENT, COMBINED  2012    Procedure: COMBINED CYSTOSCOPY, URETEROSCOPY, LASER HOLMIUM LITHOTRIPSY URETER(S), INSERT STENT;  Video cystoscopy, Right Retrograde Right Ureteroscopy with Holmium Laser, Stone Extraction,  JJ Stent  Placement ;  Surgeon: Francisco J Lerma MD;  Location: [facility] OR    LASER HOLMIUM LITHOTRIPSY URETER(S), INSERT STENT, COMBINED  05/24/2014    Procedure: COMBINED CYSTOSCOPY, URETEROSCOPY, LASER HOLMIUM LITHOTRIPSY URETER(S), INSERT STENT;  Surgeon: Francisco J Lerma MD;  Location: [facility]    LASER HOLMIUM LITHOTRIPSY URETER(S), INSERT STENT, COMBINED Right 03/05/2017    Procedure: COMBINED CYSTOSCOPY, URETEROSCOPY, LASER HOLMIUM LITHOTRIPSY URETER(S), INSERT STENT;  Surgeon: Chris Barrios MD;  Location: [facility] OR    ORTHOPEDIC SURGERY Right     Meniscal tear    ROTATOR CUFF REPAIR RT/LT      left shoulder    wisdom teeth        Allergies   Allergen Reactions    No Known Drug Allergy       Social History     Tobacco Use    Smoking status: Never     Passive exposure: Never    Smokeless tobacco: Never   Substance Use Topics    Alcohol use: Not Currently      Wt Readings from Last 1 Encounters:   11/08/24 124.7 kg (275 lb)        Anesthesia Evaluation   Pt has had prior anesthetic. Type: General.    History of anesthetic complications  - .      ROS/MED HX  ENT/Pulmonary:     (+) sleep apnea, moderate, uses CPAP,                                      Neurologic:     (+)          CVA, date: 2020,                     Cardiovascular: Comment: Hx of PFO    (+) Dyslipidemia hypertension- -   -  - -                                      METS/Exercise Tolerance:     Hematologic:  - neg hematologic  ROS     Musculoskeletal:   (+)  arthritis,             GI/Hepatic:  - neg GI/hepatic ROS     Renal/Genitourinary:     (+)       Nephrolithiasis ,       Endo: Comment: Class 2 obesity    (+)          thyroid problem, hypothyroidism,    Obesity,       Psychiatric/Substance Use:  - neg psychiatric ROS     Infectious Disease:  - neg infectious disease ROS     Malignancy:  - neg malignancy ROS     Other:  - neg other ROS          Physical Exam    Airway        Mallampati: II   TM distance: > 3 FB   Neck ROM: full   Mouth  opening: > 3 cm    Respiratory Devices and Support         Dental           Cardiovascular   cardiovascular exam normal       Rhythm and rate: regular and normal     Pulmonary   pulmonary exam normal        breath sounds clear to auscultation       Other findings: Lab Test        09/26/23     09/08/23     12/08/21     12/07/21     05/06/21     10/12/20     03/06/20     03/05/20                       0810          1216          0713          1313          0740          1204          0559          0741          WBC           --          5.8           --           --          6.6          6.2            < >        6.5           HGB          14.9         15.2         12.2*          < >        14.6         14.9           < >        14.4          MCV           --          92            --           --          92           94             < >        92            PLT           --          237           --           --          229          235            < >        188           INR           --           --           --           --          0.95          --           --          1.02           < > = values in this interval not displayed.                  Lab Test        02/01/24     09/26/23     09/08/23     12/08/21     05/06/21     10/12/20                       0952          0810          1216          0557          0740          1209          NA           142           --          141           --          143          142           POTASSIUM    4.2           --          4.3           --          3.8          4.0           CHLORIDE     109*          --          108*          --          113*         112*          CO2          22            --          24            --          25           26            BUN          14.8          --          13.0          --           --          18            CR           0.85         0.70         0.86          --          0.84         0.93          ANIONGAP     11            --           9             --          5            4             DAMARIS          8.9           --          9.0           --           --          8.4*          GLC          97           143*         87             < >         --          89             < > = values in this interval not displayed.                        EKG Interpretation:   Sinus Rhythm   -Left axis.  -Old anterior infarct.    ABNORMAL  Note EP recording showed no arrhythmias    OUTSIDE LABS:  CBC:   Lab Results   Component Value Date    WBC 5.8 09/08/2023    WBC 6.6 05/06/2021    HGB 14.9 09/26/2023    HGB 15.2 09/08/2023    HCT 45.3 09/08/2023    HCT 43.1 05/06/2021     09/08/2023     05/06/2021     BMP:   Lab Results   Component Value Date     02/01/2024     09/08/2023    POTASSIUM 4.2 02/01/2024    POTASSIUM 4.3 09/08/2023    CHLORIDE 109 (H) 02/01/2024    CHLORIDE 108 (H) 09/08/2023    CO2 22 02/01/2024    CO2 24 09/08/2023    BUN 14.8 02/01/2024    BUN 13.0 09/08/2023    CR 0.85 02/01/2024    CR 0.70 09/26/2023    GLC 97 02/01/2024     (H) 09/26/2023     COAGS:   Lab Results   Component Value Date    PTT 27 05/06/2021    INR 0.95 05/06/2021     POC:   Lab Results   Component Value Date    BGM 92 03/06/2020     HEPATIC:   Lab Results   Component Value Date    ALBUMIN 4.2 02/01/2024    PROTTOTAL 6.8 02/01/2024    ALT 44 02/01/2024    AST 32 02/01/2024    ALKPHOS 88 02/01/2024    BILITOTAL 0.4 02/01/2024     OTHER:   Lab Results   Component Value Date    A1C 5.7 (H) 09/12/2024    DAMARIS 8.9 02/01/2024    PHOS 2.6 03/06/2020    MAG 2.1 03/06/2020    TSH 2.67 09/12/2024       Anesthesia Plan    ASA Status:  3    NPO Status:  NPO Appropriate    Anesthesia Type: General.     - Airway: LMA   Induction: Intravenous.   Maintenance: Balanced.        Consents    Anesthesia Plan(s) and associated risks, benefits, and realistic alternatives discussed. Questions answered and patient/representative(s) expressed understanding.     -  "Discussed: Risks, Benefits and Alternatives for BOTH SEDATION and the PROCEDURE were discussed     - Discussed with:  Patient      - Extended Intubation/Ventilatory Support Discussed: No.      - Patient is DNR/DNI Status: No     Use of blood products discussed: No .     Postoperative Care    Pain management: IV analgesics, Oral pain medications, Multi-modal analgesia.   PONV prophylaxis: Ondansetron (or other 5HT-3), Dexamethasone or Solumedrol     Comments:               Rey Head MD    I have reviewed the pertinent notes and labs in the chart from the past 30 days and (re)examined the patient.  Any updates or changes from those notes are reflected in this note.                         # Obesity: Estimated body mass index is 38.35 kg/m  as calculated from the following:    Height as of 11/8/24: 1.803 m (5' 11\").    Weight as of 11/8/24: 124.7 kg (275 lb).             "

## 2024-11-13 NOTE — ANESTHESIA POSTPROCEDURE EVALUATION
Patient: Rey Andre    Procedure: Procedure(s):  Cystoscopy, right ureteroscopy, right ureteral stent placement.       Anesthesia Type:  General    Note:  Disposition: Outpatient   Postop Pain Control: Uneventful            Sign Out: Well controlled pain   PONV: No   Neuro/Psych: Uneventful            Sign Out: Acceptable/Baseline neuro status   Airway/Respiratory: Uneventful            Sign Out: Acceptable/Baseline resp. status   CV/Hemodynamics: Uneventful            Sign Out: Acceptable CV status; No obvious hypovolemia; No obvious fluid overload   Other NRE: NONE   DID A NON-ROUTINE EVENT OCCUR? No           Last vitals:  Vitals Value Taken Time   /79 11/13/24 1200   Temp 97.52  F (36.4  C) 11/13/24 1155   Pulse 81 11/13/24 1155   Resp 12 11/13/24 1200   SpO2 99 % 11/13/24 1200   Vitals shown include unfiled device data.    Electronically Signed By: Rey Head MD  November 13, 2024  12:06 PM

## 2024-11-13 NOTE — OP NOTE
OPERATIVE REPORT    PREOPERATIVE DIAGNOSIS: Right ureteral stone    POSTOPERATIVE DIAGNOSIS: Same    PROCEDURES PERFORMED: Cystoscopy, right retrograde pyelogram, interpretation of fluoroscopic images, right ureteroscopy, placement of right ureteral stent    SURGEON: Francisco J Lerma M.D.    ANESTHESIA: General    COMPLICATIONS: None.     SIGNIFICANT FINDINGS: Ureteral stricture found just below the level of the stone in the proximal to mid ureter.  Stent placed only.      BRIEF OPERATIVE INDICATIONS: Rey Andre is a(n) 59 year old male who presented with a large proximal right ureteral stone who now presents for attempted removal.  After a discussion of all risks, benefits, and alternatives, the patient elected to proceed with definitive stone management. The patient understands the potential need for more than one procedure to eliminate all stone burden.      DESCRIPTION OF PROCEDURE:  After informed consent was obtained, the patient was transported to the operating room & placed supine on the table. After adequate anesthesia was induced, the patient was placed in lithotomy and prepped and draped in the usual sterile fashion. A timeout was taken to confirm correct patient, procedure and laterality. Pre-operative IV antibiotics were administered.     I introduced the 22 U[place]bek rigid cystoscope through the urethra into the bladder and performed cystoscopy.  The bladder was normal throughout.  I cannulated the right ureteral orifice with a ureteral catheter and I performed a retrograde pyelogram.  The stone was easily visible on  film.  The patient had a normal caliber ureter throughout on retrograde pyelogram but there did appear to be an area of narrowing just below the level of the stone.  I then passed a Glidewire into the kidney and backloaded off the ureteral catheter along with the scope.  I clamped the Glidewire to the drape.  Alongside the Glidewire I passed the rigid uteroscope into the right  ureter.  I performed rigid ureteroscopy up to the level about 1 to 2 cm below the stone.  Here I encountered a narrowing of the ureter/ureteral stricture.  I passed a second Glidewire through the stricture but I was not able to navigate the rigid scope.  I backloaded off the rigid scope and then attempted to pass the flexible scope through this strictured area but the flexible scope was also too large to pass.  I decided to place a stent.  I removed the ureteroscope.  Over the remaining Glidewire passed the rigid cystoscope and I could visualize the right ureteral orifice.  I passed a 6 x 28 double-J ureteral stent over the wire.  The wire is pulled back and a good curl could be seen in the renal pelvis with fluoroscopy.  A good curl was seen in the bladder with direct visualization.  The bladder was drained and the procedure was concluded    The patient tolerated the procedure well without complications.  He went to the postanesthetic care unit in good condition.  He will go home from there.  I will recommend at least 2 weeks of stenting before returning to the operating for repeat ureteroscopy.

## 2024-11-13 NOTE — PROGRESS NOTES
Dr. Lerma was notified regarding patient asking for Percocet and Flomax refill. Per Dr. Lerma for patient to call the clinic if his pain gets worse and for patient to continue to take Flomax and Dr. Lerma refilled more. Patient verbalized understanding.

## 2024-11-14 DIAGNOSIS — N20.0 KIDNEY STONE: Primary | ICD-10-CM

## 2024-11-14 RX ORDER — ACETAMINOPHEN 650 MG/1
650 SUPPOSITORY RECTAL ONCE
OUTPATIENT
Start: 2024-11-14

## 2024-11-14 RX ORDER — CEFAZOLIN SODIUM IN 0.9 % NACL 3 G/100 ML
3 INTRAVENOUS SOLUTION, PIGGYBACK (ML) INTRAVENOUS SEE ADMIN INSTRUCTIONS
OUTPATIENT
Start: 2024-11-14

## 2024-11-14 RX ORDER — ACETAMINOPHEN 325 MG/1
975 TABLET ORAL ONCE
OUTPATIENT
Start: 2024-11-14 | End: 2024-11-14

## 2024-11-14 RX ORDER — CEFAZOLIN SODIUM IN 0.9 % NACL 3 G/100 ML
3 INTRAVENOUS SOLUTION, PIGGYBACK (ML) INTRAVENOUS
OUTPATIENT
Start: 2024-11-14

## 2024-11-15 DIAGNOSIS — N20.0 KIDNEY STONE: Primary | ICD-10-CM

## 2024-11-15 RX ORDER — TAMSULOSIN HYDROCHLORIDE 0.4 MG/1
0.4 CAPSULE ORAL DAILY
Qty: 30 CAPSULE | Refills: 11 | Status: SHIPPED | OUTPATIENT
Start: 2024-11-15 | End: 2025-11-15

## 2024-11-15 RX ADMIN — FENTANYL CITRATE 25 MCG: 50 INJECTION, SOLUTION INTRAMUSCULAR; INTRAVENOUS at 12:09

## 2024-11-15 RX ADMIN — FENTANYL CITRATE 25 MCG: 50 INJECTION, SOLUTION INTRAMUSCULAR; INTRAVENOUS at 12:17

## 2024-11-29 ENCOUNTER — APPOINTMENT (OUTPATIENT)
Dept: GENERAL RADIOLOGY | Facility: CLINIC | Age: 59
End: 2024-11-29
Attending: UROLOGY
Payer: COMMERCIAL

## 2024-11-29 ENCOUNTER — HOSPITAL ENCOUNTER (OUTPATIENT)
Facility: CLINIC | Age: 59
Discharge: HOME | End: 2024-11-29
Attending: UROLOGY | Admitting: UROLOGY
Payer: COMMERCIAL

## 2024-11-29 VITALS
RESPIRATION RATE: 16 BRPM | DIASTOLIC BLOOD PRESSURE: 79 MMHG | BODY MASS INDEX: 39.15 KG/M2 | SYSTOLIC BLOOD PRESSURE: 135 MMHG | HEART RATE: 70 BPM | TEMPERATURE: 96.8 F | WEIGHT: 280.6 LBS | OXYGEN SATURATION: 92 %

## 2024-11-29 DIAGNOSIS — N20.0 NEPHROLITHIASIS: Primary | ICD-10-CM

## 2024-11-29 PROCEDURE — 250N000009 HC RX 250: Performed by: UROLOGY

## 2024-11-29 PROCEDURE — 999N000141 HC STATISTIC PRE-PROCEDURE NURSING ASSESSMENT: Performed by: UROLOGY

## 2024-11-29 PROCEDURE — 52356 CYSTO/URETERO W/LITHOTRIPSY: CPT | Mod: 22 | Performed by: UROLOGY

## 2024-11-29 PROCEDURE — 999N000179 XR SURGERY CARM FLUORO LESS THAN 5 MIN W STILLS: Mod: TC

## 2024-11-29 PROCEDURE — 710N000012 HC RECOVERY PHASE 2, PER MINUTE: Performed by: UROLOGY

## 2024-11-29 PROCEDURE — 360N000083 HC SURGERY LEVEL 3 W/ FLUORO, PER MIN: Performed by: UROLOGY

## 2024-11-29 PROCEDURE — 258N000001 HC RX 258: Performed by: UROLOGY

## 2024-11-29 PROCEDURE — C1769 GUIDE WIRE: HCPCS | Performed by: UROLOGY

## 2024-11-29 PROCEDURE — 370N000017 HC ANESTHESIA TECHNICAL FEE, PER MIN: Performed by: UROLOGY

## 2024-11-29 PROCEDURE — 250N000025 HC SEVOFLURANE, PER MIN: Performed by: UROLOGY

## 2024-11-29 PROCEDURE — 272N000001 HC OR GENERAL SUPPLY STERILE: Performed by: UROLOGY

## 2024-11-29 PROCEDURE — 710N000009 HC RECOVERY PHASE 1, LEVEL 1, PER MIN: Performed by: UROLOGY

## 2024-11-29 PROCEDURE — 250N000013 HC RX MED GY IP 250 OP 250 PS 637: Performed by: UROLOGY

## 2024-11-29 PROCEDURE — 250N000011 HC RX IP 250 OP 636: Performed by: ANESTHESIOLOGY

## 2024-11-29 PROCEDURE — 255N000002 HC RX 255 OP 636: Performed by: UROLOGY

## 2024-11-29 PROCEDURE — 258N000003 HC RX IP 258 OP 636: Performed by: ANESTHESIOLOGY

## 2024-11-29 PROCEDURE — 74420 UROGRAPHY RTRGR +-KUB: CPT | Mod: 26 | Performed by: UROLOGY

## 2024-11-29 PROCEDURE — C2617 STENT, NON-COR, TEM W/O DEL: HCPCS | Performed by: UROLOGY

## 2024-11-29 DEVICE — URETERAL STENT
Type: IMPLANTABLE DEVICE | Site: URETER | Status: FUNCTIONAL
Brand: POLARIS™ ULTRA

## 2024-11-29 RX ORDER — ONDANSETRON 4 MG/1
4 TABLET, ORALLY DISINTEGRATING ORAL EVERY 30 MIN PRN
Status: DISCONTINUED | OUTPATIENT
Start: 2024-11-29 | End: 2024-11-29 | Stop reason: HOSPADM

## 2024-11-29 RX ORDER — ONDANSETRON 2 MG/ML
4 INJECTION INTRAMUSCULAR; INTRAVENOUS EVERY 30 MIN PRN
Status: DISCONTINUED | OUTPATIENT
Start: 2024-11-29 | End: 2024-11-29 | Stop reason: HOSPADM

## 2024-11-29 RX ORDER — LIDOCAINE 40 MG/G
CREAM TOPICAL
Status: DISCONTINUED | OUTPATIENT
Start: 2024-11-29 | End: 2024-11-29 | Stop reason: HOSPADM

## 2024-11-29 RX ORDER — OXYCODONE HYDROCHLORIDE 5 MG/1
10 TABLET ORAL EVERY 4 HOURS PRN
Status: DISCONTINUED | OUTPATIENT
Start: 2024-11-29 | End: 2024-11-29 | Stop reason: HOSPADM

## 2024-11-29 RX ORDER — CEFAZOLIN SODIUM/WATER 3 G/30 ML
3 SYRINGE (ML) INTRAVENOUS
Status: DISCONTINUED | OUTPATIENT
Start: 2024-11-29 | End: 2024-11-29 | Stop reason: HOSPADM

## 2024-11-29 RX ORDER — CEFAZOLIN SODIUM/WATER 3 G/30 ML
3 SYRINGE (ML) INTRAVENOUS SEE ADMIN INSTRUCTIONS
Status: DISCONTINUED | OUTPATIENT
Start: 2024-11-29 | End: 2024-11-29 | Stop reason: HOSPADM

## 2024-11-29 RX ORDER — NALOXONE HYDROCHLORIDE 0.4 MG/ML
0.1 INJECTION, SOLUTION INTRAMUSCULAR; INTRAVENOUS; SUBCUTANEOUS
Status: DISCONTINUED | OUTPATIENT
Start: 2024-11-29 | End: 2024-11-29 | Stop reason: HOSPADM

## 2024-11-29 RX ORDER — SODIUM CHLORIDE, SODIUM LACTATE, POTASSIUM CHLORIDE, CALCIUM CHLORIDE 600; 310; 30; 20 MG/100ML; MG/100ML; MG/100ML; MG/100ML
INJECTION, SOLUTION INTRAVENOUS CONTINUOUS
Status: DISCONTINUED | OUTPATIENT
Start: 2024-11-29 | End: 2024-11-29 | Stop reason: HOSPADM

## 2024-11-29 RX ORDER — FENTANYL CITRATE 50 UG/ML
25 INJECTION, SOLUTION INTRAMUSCULAR; INTRAVENOUS EVERY 5 MIN PRN
Status: DISCONTINUED | OUTPATIENT
Start: 2024-11-29 | End: 2024-11-29 | Stop reason: HOSPADM

## 2024-11-29 RX ORDER — DEXAMETHASONE SODIUM PHOSPHATE 4 MG/ML
4 INJECTION, SOLUTION INTRA-ARTICULAR; INTRALESIONAL; INTRAMUSCULAR; INTRAVENOUS; SOFT TISSUE
Status: DISCONTINUED | OUTPATIENT
Start: 2024-11-29 | End: 2024-11-29 | Stop reason: HOSPADM

## 2024-11-29 RX ORDER — HYDROMORPHONE HCL IN WATER/PF 6 MG/30 ML
0.4 PATIENT CONTROLLED ANALGESIA SYRINGE INTRAVENOUS EVERY 5 MIN PRN
Status: DISCONTINUED | OUTPATIENT
Start: 2024-11-29 | End: 2024-11-29 | Stop reason: HOSPADM

## 2024-11-29 RX ORDER — ACETAMINOPHEN 650 MG/1
650 SUPPOSITORY RECTAL ONCE
Status: COMPLETED | OUTPATIENT
Start: 2024-11-29 | End: 2024-11-29

## 2024-11-29 RX ORDER — FENTANYL CITRATE 50 UG/ML
50 INJECTION, SOLUTION INTRAMUSCULAR; INTRAVENOUS EVERY 5 MIN PRN
Status: DISCONTINUED | OUTPATIENT
Start: 2024-11-29 | End: 2024-11-29 | Stop reason: HOSPADM

## 2024-11-29 RX ORDER — ACETAMINOPHEN 325 MG/1
975 TABLET ORAL ONCE
Status: DISCONTINUED | OUTPATIENT
Start: 2024-11-29 | End: 2024-11-29 | Stop reason: HOSPADM

## 2024-11-29 RX ORDER — ACETAMINOPHEN 325 MG/1
975 TABLET ORAL ONCE
Status: COMPLETED | OUTPATIENT
Start: 2024-11-29 | End: 2024-11-29

## 2024-11-29 RX ORDER — ALBUTEROL SULFATE 0.83 MG/ML
2.5 SOLUTION RESPIRATORY (INHALATION) EVERY 4 HOURS PRN
Status: DISCONTINUED | OUTPATIENT
Start: 2024-11-29 | End: 2024-11-29 | Stop reason: HOSPADM

## 2024-11-29 RX ORDER — MEPERIDINE HYDROCHLORIDE 25 MG/ML
12.5 INJECTION INTRAMUSCULAR; INTRAVENOUS; SUBCUTANEOUS EVERY 5 MIN PRN
Status: DISCONTINUED | OUTPATIENT
Start: 2024-11-29 | End: 2024-11-29 | Stop reason: HOSPADM

## 2024-11-29 RX ORDER — DIAZEPAM 10 MG/2ML
2.5 INJECTION, SOLUTION INTRAMUSCULAR; INTRAVENOUS
Status: DISCONTINUED | OUTPATIENT
Start: 2024-11-29 | End: 2024-11-29 | Stop reason: HOSPADM

## 2024-11-29 RX ORDER — FENTANYL CITRATE 50 UG/ML
25 INJECTION, SOLUTION INTRAMUSCULAR; INTRAVENOUS
Status: DISCONTINUED | OUTPATIENT
Start: 2024-11-29 | End: 2024-11-29 | Stop reason: HOSPADM

## 2024-11-29 RX ORDER — LABETALOL HYDROCHLORIDE 5 MG/ML
10 INJECTION, SOLUTION INTRAVENOUS EVERY 10 MIN PRN
Status: DISCONTINUED | OUTPATIENT
Start: 2024-11-29 | End: 2024-11-29 | Stop reason: HOSPADM

## 2024-11-29 RX ORDER — HYDROCODONE BITARTRATE AND ACETAMINOPHEN 5; 325 MG/1; MG/1
1 TABLET ORAL EVERY 6 HOURS PRN
Qty: 5 TABLET | Refills: 0 | Status: SHIPPED | OUTPATIENT
Start: 2024-11-29

## 2024-11-29 RX ORDER — OXYCODONE HYDROCHLORIDE 5 MG/1
5 TABLET ORAL EVERY 4 HOURS PRN
Status: DISCONTINUED | OUTPATIENT
Start: 2024-11-29 | End: 2024-11-29 | Stop reason: HOSPADM

## 2024-11-29 RX ADMIN — SODIUM CHLORIDE, POTASSIUM CHLORIDE, SODIUM LACTATE AND CALCIUM CHLORIDE: 600; 310; 30; 20 INJECTION, SOLUTION INTRAVENOUS at 07:02

## 2024-11-29 RX ADMIN — FENTANYL CITRATE 50 MCG: 50 INJECTION, SOLUTION INTRAMUSCULAR; INTRAVENOUS at 09:05

## 2024-11-29 RX ADMIN — FENTANYL CITRATE 25 MCG: 50 INJECTION INTRAMUSCULAR; INTRAVENOUS at 08:43

## 2024-11-29 RX ADMIN — ACETAMINOPHEN 975 MG: 325 TABLET, FILM COATED ORAL at 07:01

## 2024-11-29 RX ADMIN — FENTANYL CITRATE 25 MCG: 50 INJECTION INTRAMUSCULAR; INTRAVENOUS at 08:57

## 2024-11-29 RX ADMIN — FENTANYL CITRATE 25 MCG: 50 INJECTION INTRAMUSCULAR; INTRAVENOUS at 09:17

## 2024-11-29 ASSESSMENT — ACTIVITIES OF DAILY LIVING (ADL)
ADLS_ACUITY_SCORE: 52
ADLS_ACUITY_SCORE: 50
ADLS_ACUITY_SCORE: 50
ADLS_ACUITY_SCORE: 48
ADLS_ACUITY_SCORE: 50

## 2024-11-29 NOTE — OP NOTE
OPERATIVE REPORT    PREOPERATIVE DIAGNOSIS: Right ureteral stone    POSTOPERATIVE DIAGNOSIS: Same    PROCEDURES PERFORMED: Cystoscopy, right ureteral stent exchange, right retrograde pyelogram, interpretation of fluoroscopic images, right ureteroscopy with thulium laser lithotripsy.  22 modifier for difficult lengthy case.    SURGEON: Francisco J Lerma M.D.    ANESTHESIA: General    COMPLICATIONS: None.     SIGNIFICANT FINDINGS: Narrowing of the ureter below the level of the stone.  Stone was dusted only.  This case was made difficult because of this narrowing/stricture of the ureter.  It was difficult to place the Glidewire in the first place.  I was not able to remove any stone fragments so the stone was dusted and this part of the case took a very long time.      BRIEF OPERATIVE INDICATIONS: Rey Andre is a(n) 59 year old male who presented with a large right ureteral stone and a narrowing of the ureter just below the level of the stone.  He previously had a stent placed.  He now presents for stone extraction.  After a discussion of all risks, benefits, and alternatives, the patient elected to proceed with definitive stone management. The patient understands the potential need for more than one procedure to eliminate all stone burden.      DESCRIPTION OF PROCEDURE:  After informed consent was obtained, the patient was transported to the operating room & placed supine on the table. After adequate anesthesia was induced, the patient was placed in lithotomy and prepped and draped in the usual sterile fashion. A timeout was taken to confirm correct patient, procedure and laterality. Pre-operative IV antibiotics were administered.     I introduced the 22 [place]an rigid cystoscope through the urethra into the bladder and performed cystoscopy.  The bladder was normal throughout.  I grasped the stent and pulled it to the level of urethral meatus.  I cannulated the stent with a Glidewire and at first the Glidewire  would not go past the strictured area in the ureter.  I eventually left the Glidewire just below the strictured area and removed the scope and clamped the Glidewire to the drape.  I then passed the rigid ureteroscope alongside the Glidewire up to the strictured area of the ureter.  I was able to passed a Glidewire under direct visualization through the narrow ureter into the right kidney.  I backloaded off the scope leaving this properly placed Glidewire in place.  I then clamped this Glidewire to the drape and passed the rigid ureteroscope along this Glidewire up the ureter.  I needed the help of a second Glidewire to get through the narrow area in the ureter and it was hard to gain access above this area.  I came upon the stone.  I used the 200  m thulium laser fiber trach up the stone is multiple fragments.  I tried to basket out 1 very small fragment but I was not able to get out the small fragment below the strictured area in the ureter.  I therefore used the dusting setting to dust the stone completely.  The stone appeared to dust well but visualization was difficult.  I pulled back the scope a small amount and injected contrast for retrograde pyelogram.  The ureter was dilated throughout besides the strictured area at which it was quite narrow.  The stricture was quite short length.  I removed uteroscope.  I reloaded the cystoscope over the Glidewire until the right ureter orifice was visualized.  Passed a 6 x 28 double-J ureteral stent over the wire.  The wire is pulled back and a good curl can be seen in the upper pole the right kidney with fluoroscopy.  A good curl was seen in the bladder with direct visualization.  The bladder was drained and the procedure was concluded.    The patient tolerated the procedure well without complications.  He went to the postanesthetic care unit in good condition.  He will go home from there.  I spoke with his wife postoperatively.  He will have a KUB before his scheduled  stent removal next week.  Even if he does pass all of his dust and stone fragments I would recommend close follow-up with another CT scan 1 month later to make certain he is not forming a stricture.

## 2024-11-29 NOTE — PROGRESS NOTES
"Patient reported \"very red\" urine when he peed post operatively. I explained to the patient that this is normal. He said he has had this \"procedure\" before but this color concerned him. Patient reported 6/10 pain and asked for pain medication. Dr. Lerma consulted and obliged. Patient wanted to know how much he got and I told him I wasn't sure. I spoke with Dr. Lerma about the color and he was not concerned as this procedure was \"more invasive\" than his last procedure.    Patient opted to go home and take rx at home.    Divine Mendoza RN on 11/29/2024 at 10:26 AM    "

## 2024-11-29 NOTE — DISCHARGE INSTRUCTIONS
Maximum acetaminophen (Tylenol) dose from all sources should not exceed 4 grams (4000 mg) per day. Last dose given at 0700. Next dose due at 1pm.    DR. ZIGGY SERNA M.D.  CLINIC PHONE NUMBER:  [phone]  [facility]

## 2024-12-03 DIAGNOSIS — N20.0 CALCULUS OF KIDNEY: Primary | ICD-10-CM

## 2024-12-05 ENCOUNTER — ANCILLARY PROCEDURE (OUTPATIENT)
Dept: GENERAL RADIOLOGY | Facility: CLINIC | Age: 59
End: 2024-12-05
Attending: UROLOGY
Payer: COMMERCIAL

## 2024-12-05 DIAGNOSIS — N20.0 CALCULUS OF KIDNEY: ICD-10-CM

## 2024-12-06 ENCOUNTER — APPOINTMENT (OUTPATIENT)
Dept: GENERAL RADIOLOGY | Facility: CLINIC | Age: 59
End: 2024-12-06
Attending: STUDENT IN AN ORGANIZED HEALTH CARE EDUCATION/TRAINING PROGRAM
Payer: COMMERCIAL

## 2024-12-06 ENCOUNTER — ANESTHESIA EVENT (OUTPATIENT)
Dept: SURGERY | Facility: CLINIC | Age: 59
End: 2024-12-06
Payer: COMMERCIAL

## 2024-12-06 ENCOUNTER — ANESTHESIA (OUTPATIENT)
Dept: SURGERY | Facility: CLINIC | Age: 59
End: 2024-12-06
Payer: COMMERCIAL

## 2024-12-06 ENCOUNTER — APPOINTMENT (OUTPATIENT)
Dept: CT IMAGING | Facility: CLINIC | Age: 59
End: 2024-12-06
Attending: EMERGENCY MEDICINE
Payer: COMMERCIAL

## 2024-12-06 ENCOUNTER — HOSPITAL ENCOUNTER (OUTPATIENT)
Facility: CLINIC | Age: 59
Setting detail: OBSERVATION
Discharge: HOME | End: 2024-12-07
Attending: EMERGENCY MEDICINE | Admitting: INTERNAL MEDICINE
Payer: COMMERCIAL

## 2024-12-06 DIAGNOSIS — N20.1 RIGHT URETERAL STONE: ICD-10-CM

## 2024-12-06 DIAGNOSIS — N20.0 KIDNEY STONE: ICD-10-CM

## 2024-12-06 DIAGNOSIS — N13.30 HYDRONEPHROSIS OF RIGHT KIDNEY: Primary | ICD-10-CM

## 2024-12-06 DIAGNOSIS — Q62.10 URETERAL STENOSIS: ICD-10-CM

## 2024-12-06 LAB
ALBUMIN UR-MCNC: 30 MG/DL
ANION GAP SERPL CALCULATED.3IONS-SCNC: 14 MMOL/L (ref 7–15)
APPEARANCE UR: ABNORMAL
BACTERIA #/AREA URNS HPF: ABNORMAL /HPF
BASOPHILS # BLD AUTO: 0 10E3/UL (ref 0–0.2)
BASOPHILS NFR BLD AUTO: 0 %
BILIRUB UR QL STRIP: NEGATIVE
BUN SERPL-MCNC: 20.6 MG/DL (ref 8–23)
CALCIUM SERPL-MCNC: 8.6 MG/DL (ref 8.8–10.4)
CHLORIDE SERPL-SCNC: 107 MMOL/L (ref 98–107)
COLOR UR AUTO: YELLOW
CREAT SERPL-MCNC: 1.03 MG/DL (ref 0.67–1.17)
EGFRCR SERPLBLD CKD-EPI 2021: 83 ML/MIN/1.73M2
EOSINOPHIL # BLD AUTO: 0.2 10E3/UL (ref 0–0.7)
EOSINOPHIL NFR BLD AUTO: 2 %
ERYTHROCYTE [DISTWIDTH] IN BLOOD BY AUTOMATED COUNT: 13.4 % (ref 10–15)
GLUCOSE SERPL-MCNC: 98 MG/DL (ref 70–99)
GLUCOSE UR STRIP-MCNC: NEGATIVE MG/DL
HCO3 SERPL-SCNC: 21 MMOL/L (ref 22–29)
HCT VFR BLD AUTO: 45.1 % (ref 40–53)
HGB BLD-MCNC: 15.4 G/DL (ref 13.3–17.7)
HGB UR QL STRIP: ABNORMAL
HOLD SPECIMEN: NORMAL
HOLD SPECIMEN: NORMAL
IMM GRANULOCYTES # BLD: 0.1 10E3/UL
IMM GRANULOCYTES NFR BLD: 1 %
KETONES UR STRIP-MCNC: NEGATIVE MG/DL
LEUKOCYTE ESTERASE UR QL STRIP: ABNORMAL
LYMPHOCYTES # BLD AUTO: 1.7 10E3/UL (ref 0.8–5.3)
LYMPHOCYTES NFR BLD AUTO: 15 %
MCH RBC QN AUTO: 31.1 PG (ref 26.5–33)
MCHC RBC AUTO-ENTMCNC: 34.1 G/DL (ref 31.5–36.5)
MCV RBC AUTO: 91 FL (ref 78–100)
MONOCYTES # BLD AUTO: 1.2 10E3/UL (ref 0–1.3)
MONOCYTES NFR BLD AUTO: 10 %
MUCOUS THREADS #/AREA URNS LPF: PRESENT /LPF
NEUTROPHILS # BLD AUTO: 8.2 10E3/UL (ref 1.6–8.3)
NEUTROPHILS NFR BLD AUTO: 72 %
NITRATE UR QL: NEGATIVE
NRBC # BLD AUTO: 0 10E3/UL
NRBC BLD AUTO-RTO: 0 /100
PH UR STRIP: 7 [PH] (ref 5–7)
PLATELET # BLD AUTO: 226 10E3/UL (ref 150–450)
POTASSIUM SERPL-SCNC: 4.2 MMOL/L (ref 3.4–5.3)
RBC # BLD AUTO: 4.95 10E6/UL (ref 4.4–5.9)
RBC URINE: 80 /HPF
SODIUM SERPL-SCNC: 142 MMOL/L (ref 135–145)
SP GR UR STRIP: 1.03 (ref 1–1.03)
UROBILINOGEN UR STRIP-MCNC: NORMAL MG/DL
WBC # BLD AUTO: 11.4 10E3/UL (ref 4–11)
WBC URINE: 43 /HPF

## 2024-12-06 PROCEDURE — 250N000011 HC RX IP 250 OP 636: Performed by: EMERGENCY MEDICINE

## 2024-12-06 PROCEDURE — 99223 1ST HOSP IP/OBS HIGH 75: CPT | Performed by: INTERNAL MEDICINE

## 2024-12-06 PROCEDURE — 272N000001 HC OR GENERAL SUPPLY STERILE: Performed by: STUDENT IN AN ORGANIZED HEALTH CARE EDUCATION/TRAINING PROGRAM

## 2024-12-06 PROCEDURE — 82374 ASSAY BLOOD CARBON DIOXIDE: CPT | Performed by: EMERGENCY MEDICINE

## 2024-12-06 PROCEDURE — 74176 CT ABD & PELVIS W/O CONTRAST: CPT

## 2024-12-06 PROCEDURE — 52332 CYSTOSCOPY AND TREATMENT: CPT | Mod: RT | Performed by: STUDENT IN AN ORGANIZED HEALTH CARE EDUCATION/TRAINING PROGRAM

## 2024-12-06 PROCEDURE — 258N000001 HC RX 258: Performed by: STUDENT IN AN ORGANIZED HEALTH CARE EDUCATION/TRAINING PROGRAM

## 2024-12-06 PROCEDURE — C1769 GUIDE WIRE: HCPCS | Performed by: STUDENT IN AN ORGANIZED HEALTH CARE EDUCATION/TRAINING PROGRAM

## 2024-12-06 PROCEDURE — 258N000003 HC RX IP 258 OP 636: Performed by: ANESTHESIOLOGY

## 2024-12-06 PROCEDURE — 250N000009 HC RX 250: Performed by: STUDENT IN AN ORGANIZED HEALTH CARE EDUCATION/TRAINING PROGRAM

## 2024-12-06 PROCEDURE — 710N000009 HC RECOVERY PHASE 1, LEVEL 1, PER MIN: Performed by: STUDENT IN AN ORGANIZED HEALTH CARE EDUCATION/TRAINING PROGRAM

## 2024-12-06 PROCEDURE — 250N000011 HC RX IP 250 OP 636: Performed by: STUDENT IN AN ORGANIZED HEALTH CARE EDUCATION/TRAINING PROGRAM

## 2024-12-06 PROCEDURE — 99215 OFFICE O/P EST HI 40 MIN: CPT | Mod: 25 | Performed by: STUDENT IN AN ORGANIZED HEALTH CARE EDUCATION/TRAINING PROGRAM

## 2024-12-06 PROCEDURE — 96375 TX/PRO/DX INJ NEW DRUG ADDON: CPT | Mod: 59

## 2024-12-06 PROCEDURE — 99285 EMERGENCY DEPT VISIT HI MDM: CPT | Mod: 25

## 2024-12-06 PROCEDURE — 250N000011 HC RX IP 250 OP 636: Performed by: NURSE ANESTHETIST, CERTIFIED REGISTERED

## 2024-12-06 PROCEDURE — 258N000003 HC RX IP 258 OP 636: Performed by: NURSE ANESTHETIST, CERTIFIED REGISTERED

## 2024-12-06 PROCEDURE — 96374 THER/PROPH/DIAG INJ IV PUSH: CPT

## 2024-12-06 PROCEDURE — 85025 COMPLETE CBC W/AUTO DIFF WBC: CPT | Performed by: EMERGENCY MEDICINE

## 2024-12-06 PROCEDURE — 255N000002 HC RX 255 OP 636: Performed by: STUDENT IN AN ORGANIZED HEALTH CARE EDUCATION/TRAINING PROGRAM

## 2024-12-06 PROCEDURE — 258N000003 HC RX IP 258 OP 636: Performed by: EMERGENCY MEDICINE

## 2024-12-06 PROCEDURE — C2617 STENT, NON-COR, TEM W/O DEL: HCPCS | Performed by: STUDENT IN AN ORGANIZED HEALTH CARE EDUCATION/TRAINING PROGRAM

## 2024-12-06 PROCEDURE — 250N000011 HC RX IP 250 OP 636: Performed by: ANESTHESIOLOGY

## 2024-12-06 PROCEDURE — 999N000179 XR SURGERY CARM FLUORO LESS THAN 5 MIN W STILLS: Mod: TC

## 2024-12-06 PROCEDURE — 999N000141 HC STATISTIC PRE-PROCEDURE NURSING ASSESSMENT: Performed by: STUDENT IN AN ORGANIZED HEALTH CARE EDUCATION/TRAINING PROGRAM

## 2024-12-06 PROCEDURE — 250N000025 HC SEVOFLURANE, PER MIN: Performed by: STUDENT IN AN ORGANIZED HEALTH CARE EDUCATION/TRAINING PROGRAM

## 2024-12-06 PROCEDURE — 87086 URINE CULTURE/COLONY COUNT: CPT | Performed by: EMERGENCY MEDICINE

## 2024-12-06 PROCEDURE — 80048 BASIC METABOLIC PNL TOTAL CA: CPT | Performed by: EMERGENCY MEDICINE

## 2024-12-06 PROCEDURE — C1758 CATHETER, URETERAL: HCPCS | Performed by: STUDENT IN AN ORGANIZED HEALTH CARE EDUCATION/TRAINING PROGRAM

## 2024-12-06 PROCEDURE — 81001 URINALYSIS AUTO W/SCOPE: CPT | Performed by: EMERGENCY MEDICINE

## 2024-12-06 PROCEDURE — 36415 COLL VENOUS BLD VENIPUNCTURE: CPT | Performed by: EMERGENCY MEDICINE

## 2024-12-06 PROCEDURE — 250N000009 HC RX 250: Performed by: NURSE ANESTHETIST, CERTIFIED REGISTERED

## 2024-12-06 PROCEDURE — 250N000013 HC RX MED GY IP 250 OP 250 PS 637: Performed by: STUDENT IN AN ORGANIZED HEALTH CARE EDUCATION/TRAINING PROGRAM

## 2024-12-06 PROCEDURE — 360N000083 HC SURGERY LEVEL 3 W/ FLUORO, PER MIN: Performed by: STUDENT IN AN ORGANIZED HEALTH CARE EDUCATION/TRAINING PROGRAM

## 2024-12-06 PROCEDURE — 370N000017 HC ANESTHESIA TECHNICAL FEE, PER MIN: Performed by: STUDENT IN AN ORGANIZED HEALTH CARE EDUCATION/TRAINING PROGRAM

## 2024-12-06 PROCEDURE — 74420 UROGRAPHY RTRGR +-KUB: CPT | Mod: 26 | Performed by: STUDENT IN AN ORGANIZED HEALTH CARE EDUCATION/TRAINING PROGRAM

## 2024-12-06 DEVICE — URETERAL STENT
Type: IMPLANTABLE DEVICE | Site: URETER | Status: FUNCTIONAL
Brand: POLARIS™ ULTRA

## 2024-12-06 RX ORDER — ONDANSETRON 2 MG/ML
4 INJECTION INTRAMUSCULAR; INTRAVENOUS EVERY 30 MIN PRN
Status: DISCONTINUED | OUTPATIENT
Start: 2024-12-06 | End: 2024-12-07

## 2024-12-06 RX ORDER — CEFAZOLIN SODIUM/WATER 3 G/30 ML
3 SYRINGE (ML) INTRAVENOUS
Status: COMPLETED | OUTPATIENT
Start: 2024-12-06 | End: 2024-12-06

## 2024-12-06 RX ORDER — ACETAMINOPHEN 650 MG/1
650 SUPPOSITORY RECTAL ONCE
Status: COMPLETED | OUTPATIENT
Start: 2024-12-06 | End: 2024-12-06

## 2024-12-06 RX ORDER — ACETAMINOPHEN 325 MG/1
975 TABLET ORAL ONCE
Status: COMPLETED | OUTPATIENT
Start: 2024-12-06 | End: 2024-12-06

## 2024-12-06 RX ORDER — NALOXONE HYDROCHLORIDE 0.4 MG/ML
0.1 INJECTION, SOLUTION INTRAMUSCULAR; INTRAVENOUS; SUBCUTANEOUS
Status: DISCONTINUED | OUTPATIENT
Start: 2024-12-06 | End: 2024-12-07 | Stop reason: HOSPADM

## 2024-12-06 RX ORDER — LIDOCAINE HYDROCHLORIDE 20 MG/ML
INJECTION, SOLUTION INFILTRATION; PERINEURAL PRN
Status: DISCONTINUED | OUTPATIENT
Start: 2024-12-06 | End: 2024-12-06

## 2024-12-06 RX ORDER — ONDANSETRON 2 MG/ML
4 INJECTION INTRAMUSCULAR; INTRAVENOUS
Status: DISCONTINUED | OUTPATIENT
Start: 2024-12-06 | End: 2024-12-07 | Stop reason: HOSPADM

## 2024-12-06 RX ORDER — HYDROMORPHONE HYDROCHLORIDE 1 MG/ML
0.5 INJECTION, SOLUTION INTRAMUSCULAR; INTRAVENOUS; SUBCUTANEOUS
Status: COMPLETED | OUTPATIENT
Start: 2024-12-06 | End: 2024-12-07

## 2024-12-06 RX ORDER — LIDOCAINE 40 MG/G
CREAM TOPICAL
Status: DISCONTINUED | OUTPATIENT
Start: 2024-12-06 | End: 2024-12-06 | Stop reason: HOSPADM

## 2024-12-06 RX ORDER — FENTANYL CITRATE 50 UG/ML
INJECTION, SOLUTION INTRAMUSCULAR; INTRAVENOUS PRN
Status: DISCONTINUED | OUTPATIENT
Start: 2024-12-06 | End: 2024-12-06

## 2024-12-06 RX ORDER — CEFAZOLIN SODIUM/WATER 3 G/30 ML
3 SYRINGE (ML) INTRAVENOUS SEE ADMIN INSTRUCTIONS
Status: DISCONTINUED | OUTPATIENT
Start: 2024-12-06 | End: 2024-12-06 | Stop reason: HOSPADM

## 2024-12-06 RX ORDER — SODIUM CHLORIDE, SODIUM LACTATE, POTASSIUM CHLORIDE, CALCIUM CHLORIDE 600; 310; 30; 20 MG/100ML; MG/100ML; MG/100ML; MG/100ML
INJECTION, SOLUTION INTRAVENOUS CONTINUOUS
Status: DISCONTINUED | OUTPATIENT
Start: 2024-12-06 | End: 2024-12-06 | Stop reason: HOSPADM

## 2024-12-06 RX ORDER — HYDRALAZINE HYDROCHLORIDE 20 MG/ML
2.5-5 INJECTION INTRAMUSCULAR; INTRAVENOUS EVERY 10 MIN PRN
Status: DISCONTINUED | OUTPATIENT
Start: 2024-12-06 | End: 2024-12-07 | Stop reason: HOSPADM

## 2024-12-06 RX ORDER — HYDROMORPHONE HYDROCHLORIDE 1 MG/ML
0.5 INJECTION, SOLUTION INTRAMUSCULAR; INTRAVENOUS; SUBCUTANEOUS ONCE
Status: DISCONTINUED | OUTPATIENT
Start: 2024-12-06 | End: 2024-12-06

## 2024-12-06 RX ORDER — KETOROLAC TROMETHAMINE 15 MG/ML
15 INJECTION, SOLUTION INTRAMUSCULAR; INTRAVENOUS ONCE
Status: COMPLETED | OUTPATIENT
Start: 2024-12-06 | End: 2024-12-06

## 2024-12-06 RX ORDER — EPHEDRINE SULFATE 50 MG/ML
INJECTION, SOLUTION INTRAMUSCULAR; INTRAVENOUS; SUBCUTANEOUS PRN
Status: DISCONTINUED | OUTPATIENT
Start: 2024-12-06 | End: 2024-12-06

## 2024-12-06 RX ORDER — ONDANSETRON 2 MG/ML
INJECTION INTRAMUSCULAR; INTRAVENOUS PRN
Status: DISCONTINUED | OUTPATIENT
Start: 2024-12-06 | End: 2024-12-06

## 2024-12-06 RX ORDER — FENTANYL CITRATE 50 UG/ML
50 INJECTION, SOLUTION INTRAMUSCULAR; INTRAVENOUS EVERY 5 MIN PRN
Status: DISCONTINUED | OUTPATIENT
Start: 2024-12-06 | End: 2024-12-07 | Stop reason: HOSPADM

## 2024-12-06 RX ORDER — ACETAMINOPHEN 325 MG/1
975 TABLET ORAL ONCE
Status: DISCONTINUED | OUTPATIENT
Start: 2024-12-07 | End: 2024-12-07 | Stop reason: HOSPADM

## 2024-12-06 RX ORDER — ONDANSETRON 4 MG/1
4 TABLET, ORALLY DISINTEGRATING ORAL EVERY 30 MIN PRN
Status: DISCONTINUED | OUTPATIENT
Start: 2024-12-06 | End: 2024-12-07

## 2024-12-06 RX ORDER — SODIUM CHLORIDE, SODIUM LACTATE, POTASSIUM CHLORIDE, CALCIUM CHLORIDE 600; 310; 30; 20 MG/100ML; MG/100ML; MG/100ML; MG/100ML
INJECTION, SOLUTION INTRAVENOUS CONTINUOUS
Status: DISCONTINUED | OUTPATIENT
Start: 2024-12-07 | End: 2024-12-07 | Stop reason: HOSPADM

## 2024-12-06 RX ORDER — METOPROLOL TARTRATE 1 MG/ML
1-2 INJECTION, SOLUTION INTRAVENOUS EVERY 5 MIN PRN
Status: DISCONTINUED | OUTPATIENT
Start: 2024-12-06 | End: 2024-12-07 | Stop reason: HOSPADM

## 2024-12-06 RX ORDER — FENTANYL CITRATE 50 UG/ML
25 INJECTION, SOLUTION INTRAMUSCULAR; INTRAVENOUS EVERY 5 MIN PRN
Status: DISCONTINUED | OUTPATIENT
Start: 2024-12-06 | End: 2024-12-07 | Stop reason: HOSPADM

## 2024-12-06 RX ORDER — KETOROLAC TROMETHAMINE 15 MG/ML
15 INJECTION, SOLUTION INTRAMUSCULAR; INTRAVENOUS
Status: DISCONTINUED | OUTPATIENT
Start: 2024-12-06 | End: 2024-12-07 | Stop reason: HOSPADM

## 2024-12-06 RX ORDER — KETOROLAC TROMETHAMINE 15 MG/ML
15 INJECTION, SOLUTION INTRAMUSCULAR; INTRAVENOUS ONCE
Status: DISCONTINUED | OUTPATIENT
Start: 2024-12-06 | End: 2024-12-06

## 2024-12-06 RX ORDER — DEXAMETHASONE SODIUM PHOSPHATE 4 MG/ML
INJECTION, SOLUTION INTRA-ARTICULAR; INTRALESIONAL; INTRAMUSCULAR; INTRAVENOUS; SOFT TISSUE PRN
Status: DISCONTINUED | OUTPATIENT
Start: 2024-12-06 | End: 2024-12-06

## 2024-12-06 RX ORDER — ONDANSETRON 4 MG/1
4 TABLET, ORALLY DISINTEGRATING ORAL ONCE
Status: COMPLETED | OUTPATIENT
Start: 2024-12-06 | End: 2024-12-06

## 2024-12-06 RX ORDER — DEXAMETHASONE SODIUM PHOSPHATE 4 MG/ML
4 INJECTION, SOLUTION INTRA-ARTICULAR; INTRALESIONAL; INTRAMUSCULAR; INTRAVENOUS; SOFT TISSUE
Status: DISCONTINUED | OUTPATIENT
Start: 2024-12-06 | End: 2024-12-07 | Stop reason: HOSPADM

## 2024-12-06 RX ORDER — PROPOFOL 10 MG/ML
INJECTION, EMULSION INTRAVENOUS PRN
Status: DISCONTINUED | OUTPATIENT
Start: 2024-12-06 | End: 2024-12-06

## 2024-12-06 RX ADMIN — HYDROMORPHONE HYDROCHLORIDE 0.5 MG: 1 INJECTION, SOLUTION INTRAMUSCULAR; INTRAVENOUS; SUBCUTANEOUS at 21:13

## 2024-12-06 RX ADMIN — FENTANYL CITRATE 100 MCG: 50 INJECTION INTRAMUSCULAR; INTRAVENOUS at 23:09

## 2024-12-06 RX ADMIN — SODIUM CHLORIDE 1000 ML: 9 INJECTION, SOLUTION INTRAVENOUS at 21:12

## 2024-12-06 RX ADMIN — PHENYLEPHRINE HYDROCHLORIDE 200 MCG: 10 INJECTION INTRAVENOUS at 23:17

## 2024-12-06 RX ADMIN — FENTANYL CITRATE 50 MCG: 50 INJECTION, SOLUTION INTRAMUSCULAR; INTRAVENOUS at 23:42

## 2024-12-06 RX ADMIN — LIDOCAINE HYDROCHLORIDE 50 MG: 20 INJECTION, SOLUTION INFILTRATION; PERINEURAL at 23:10

## 2024-12-06 RX ADMIN — PHENYLEPHRINE HYDROCHLORIDE 200 MCG: 10 INJECTION INTRAVENOUS at 23:14

## 2024-12-06 RX ADMIN — DEXAMETHASONE SODIUM PHOSPHATE 4 MG: 4 INJECTION, SOLUTION INTRA-ARTICULAR; INTRALESIONAL; INTRAMUSCULAR; INTRAVENOUS; SOFT TISSUE at 23:13

## 2024-12-06 RX ADMIN — Medication 3 G: at 22:50

## 2024-12-06 RX ADMIN — EPHEDRINE SULFATE 10 MG: 5 INJECTION INTRAVENOUS at 23:21

## 2024-12-06 RX ADMIN — PROPOFOL 200 MG: 10 INJECTION, EMULSION INTRAVENOUS at 23:10

## 2024-12-06 RX ADMIN — ONDANSETRON 4 MG: 4 TABLET, ORALLY DISINTEGRATING ORAL at 20:45

## 2024-12-06 RX ADMIN — ONDANSETRON 4 MG: 2 INJECTION INTRAMUSCULAR; INTRAVENOUS at 23:16

## 2024-12-06 RX ADMIN — ACETAMINOPHEN 975 MG: 325 TABLET, FILM COATED ORAL at 22:58

## 2024-12-06 RX ADMIN — EPHEDRINE SULFATE 5 MG: 5 INJECTION INTRAVENOUS at 23:26

## 2024-12-06 RX ADMIN — SODIUM CHLORIDE, POTASSIUM CHLORIDE, SODIUM LACTATE AND CALCIUM CHLORIDE: 600; 310; 30; 20 INJECTION, SOLUTION INTRAVENOUS at 22:56

## 2024-12-06 RX ADMIN — PHENYLEPHRINE HYDROCHLORIDE 100 MCG: 10 INJECTION INTRAVENOUS at 23:26

## 2024-12-06 RX ADMIN — KETOROLAC TROMETHAMINE 15 MG: 15 INJECTION, SOLUTION INTRAMUSCULAR; INTRAVENOUS at 21:11

## 2024-12-06 ASSESSMENT — ACTIVITIES OF DAILY LIVING (ADL)
ADLS_ACUITY_SCORE: 54

## 2024-12-06 ASSESSMENT — COLUMBIA-SUICIDE SEVERITY RATING SCALE - C-SSRS
6. HAVE YOU EVER DONE ANYTHING, STARTED TO DO ANYTHING, OR PREPARED TO DO ANYTHING TO END YOUR LIFE?: NO
2. HAVE YOU ACTUALLY HAD ANY THOUGHTS OF KILLING YOURSELF IN THE PAST MONTH?: NO
1. IN THE PAST MONTH, HAVE YOU WISHED YOU WERE DEAD OR WISHED YOU COULD GO TO SLEEP AND NOT WAKE UP?: NO

## 2024-12-07 VITALS
HEART RATE: 78 BPM | OXYGEN SATURATION: 95 % | TEMPERATURE: 97.5 F | HEIGHT: 71 IN | RESPIRATION RATE: 20 BRPM | DIASTOLIC BLOOD PRESSURE: 71 MMHG | BODY MASS INDEX: 38.92 KG/M2 | WEIGHT: 278 LBS | SYSTOLIC BLOOD PRESSURE: 106 MMHG

## 2024-12-07 LAB
ANION GAP SERPL CALCULATED.3IONS-SCNC: 14 MMOL/L (ref 7–15)
BUN SERPL-MCNC: 17.6 MG/DL (ref 8–23)
CALCIUM SERPL-MCNC: 8.6 MG/DL (ref 8.8–10.4)
CHLORIDE SERPL-SCNC: 104 MMOL/L (ref 98–107)
CREAT SERPL-MCNC: 0.84 MG/DL (ref 0.67–1.17)
EGFRCR SERPLBLD CKD-EPI 2021: >90 ML/MIN/1.73M2
ERYTHROCYTE [DISTWIDTH] IN BLOOD BY AUTOMATED COUNT: 13.5 % (ref 10–15)
GLUCOSE SERPL-MCNC: 140 MG/DL (ref 70–99)
HCO3 SERPL-SCNC: 20 MMOL/L (ref 22–29)
HCT VFR BLD AUTO: 44.1 % (ref 40–53)
HGB BLD-MCNC: 15.3 G/DL (ref 13.3–17.7)
MCH RBC QN AUTO: 31.9 PG (ref 26.5–33)
MCHC RBC AUTO-ENTMCNC: 34.7 G/DL (ref 31.5–36.5)
MCV RBC AUTO: 92 FL (ref 78–100)
PLATELET # BLD AUTO: 224 10E3/UL (ref 150–450)
POTASSIUM SERPL-SCNC: 4.3 MMOL/L (ref 3.4–5.3)
RBC # BLD AUTO: 4.8 10E6/UL (ref 4.4–5.9)
SODIUM SERPL-SCNC: 138 MMOL/L (ref 135–145)
WBC # BLD AUTO: 5.3 10E3/UL (ref 4–11)

## 2024-12-07 PROCEDURE — 999N000157 HC STATISTIC RCP TIME EA 10 MIN

## 2024-12-07 PROCEDURE — 258N000003 HC RX IP 258 OP 636: Performed by: ANESTHESIOLOGY

## 2024-12-07 PROCEDURE — 96376 TX/PRO/DX INJ SAME DRUG ADON: CPT

## 2024-12-07 PROCEDURE — 250N000013 HC RX MED GY IP 250 OP 250 PS 637: Performed by: INTERNAL MEDICINE

## 2024-12-07 PROCEDURE — 36415 COLL VENOUS BLD VENIPUNCTURE: CPT | Performed by: INTERNAL MEDICINE

## 2024-12-07 PROCEDURE — 250N000011 HC RX IP 250 OP 636: Performed by: EMERGENCY MEDICINE

## 2024-12-07 PROCEDURE — 96375 TX/PRO/DX INJ NEW DRUG ADDON: CPT

## 2024-12-07 PROCEDURE — 94660 CPAP INITIATION&MGMT: CPT

## 2024-12-07 PROCEDURE — 82565 ASSAY OF CREATININE: CPT | Performed by: INTERNAL MEDICINE

## 2024-12-07 PROCEDURE — 99238 HOSP IP/OBS DSCHRG MGMT 30/<: CPT | Performed by: INTERNAL MEDICINE

## 2024-12-07 PROCEDURE — G0378 HOSPITAL OBSERVATION PER HR: HCPCS

## 2024-12-07 PROCEDURE — 250N000011 HC RX IP 250 OP 636: Performed by: ANESTHESIOLOGY

## 2024-12-07 PROCEDURE — 85014 HEMATOCRIT: CPT | Performed by: INTERNAL MEDICINE

## 2024-12-07 PROCEDURE — 80048 BASIC METABOLIC PNL TOTAL CA: CPT | Performed by: INTERNAL MEDICINE

## 2024-12-07 PROCEDURE — 250N000011 HC RX IP 250 OP 636: Performed by: INTERNAL MEDICINE

## 2024-12-07 RX ORDER — PROCHLORPERAZINE MALEATE 10 MG
10 TABLET ORAL EVERY 6 HOURS PRN
Status: DISCONTINUED | OUTPATIENT
Start: 2024-12-07 | End: 2024-12-07 | Stop reason: HOSPADM

## 2024-12-07 RX ORDER — OXYCODONE HYDROCHLORIDE 5 MG/1
5 TABLET ORAL EVERY 6 HOURS PRN
Qty: 6 TABLET | Refills: 0 | Status: SHIPPED | OUTPATIENT
Start: 2024-12-07 | End: 2024-12-10

## 2024-12-07 RX ORDER — METOPROLOL SUCCINATE 25 MG/1
50 TABLET, EXTENDED RELEASE ORAL DAILY
Status: DISCONTINUED | OUTPATIENT
Start: 2024-12-07 | End: 2024-12-07 | Stop reason: HOSPADM

## 2024-12-07 RX ORDER — LEVOTHYROXINE SODIUM 150 UG/1
150 TABLET ORAL DAILY
Status: DISCONTINUED | OUTPATIENT
Start: 2024-12-07 | End: 2024-12-07 | Stop reason: HOSPADM

## 2024-12-07 RX ORDER — AMOXICILLIN 250 MG
2 CAPSULE ORAL 2 TIMES DAILY PRN
Status: DISCONTINUED | OUTPATIENT
Start: 2024-12-07 | End: 2024-12-07 | Stop reason: HOSPADM

## 2024-12-07 RX ORDER — TAMSULOSIN HYDROCHLORIDE 0.4 MG/1
0.4 CAPSULE ORAL DAILY
Status: DISCONTINUED | OUTPATIENT
Start: 2024-12-07 | End: 2024-12-07 | Stop reason: HOSPADM

## 2024-12-07 RX ORDER — AMOXICILLIN 250 MG
1 CAPSULE ORAL 2 TIMES DAILY PRN
Status: DISCONTINUED | OUTPATIENT
Start: 2024-12-07 | End: 2024-12-07 | Stop reason: HOSPADM

## 2024-12-07 RX ORDER — ACETAMINOPHEN 325 MG/1
650 TABLET ORAL EVERY 4 HOURS PRN
Status: DISCONTINUED | OUTPATIENT
Start: 2024-12-07 | End: 2024-12-07 | Stop reason: HOSPADM

## 2024-12-07 RX ORDER — POLYETHYLENE GLYCOL 3350 17 G/17G
17 POWDER, FOR SOLUTION ORAL DAILY
Status: DISCONTINUED | OUTPATIENT
Start: 2024-12-07 | End: 2024-12-07 | Stop reason: HOSPADM

## 2024-12-07 RX ORDER — ACETAMINOPHEN 650 MG/1
650 SUPPOSITORY RECTAL EVERY 4 HOURS PRN
Status: DISCONTINUED | OUTPATIENT
Start: 2024-12-07 | End: 2024-12-07 | Stop reason: HOSPADM

## 2024-12-07 RX ORDER — ONDANSETRON 2 MG/ML
4 INJECTION INTRAMUSCULAR; INTRAVENOUS EVERY 6 HOURS PRN
Status: DISCONTINUED | OUTPATIENT
Start: 2024-12-07 | End: 2024-12-07 | Stop reason: HOSPADM

## 2024-12-07 RX ORDER — ACETAMINOPHEN 500 MG
1000 TABLET ORAL EVERY 6 HOURS PRN
Qty: 100 TABLET | Refills: 0 | Status: SHIPPED | OUTPATIENT
Start: 2024-12-07

## 2024-12-07 RX ORDER — CEFTRIAXONE 1 G/1
1 INJECTION, POWDER, FOR SOLUTION INTRAMUSCULAR; INTRAVENOUS EVERY 24 HOURS
Status: DISCONTINUED | OUTPATIENT
Start: 2024-12-07 | End: 2024-12-07 | Stop reason: HOSPADM

## 2024-12-07 RX ORDER — ONDANSETRON 4 MG/1
4 TABLET, ORALLY DISINTEGRATING ORAL EVERY 6 HOURS PRN
Status: DISCONTINUED | OUTPATIENT
Start: 2024-12-07 | End: 2024-12-07 | Stop reason: HOSPADM

## 2024-12-07 RX ORDER — DOCUSATE SODIUM 100 MG/1
100 CAPSULE, LIQUID FILLED ORAL 2 TIMES DAILY
Qty: 30 CAPSULE | Refills: 0 | Status: SHIPPED | OUTPATIENT
Start: 2024-12-07

## 2024-12-07 RX ADMIN — HYDROMORPHONE HYDROCHLORIDE 0.5 MG: 1 INJECTION, SOLUTION INTRAMUSCULAR; INTRAVENOUS; SUBCUTANEOUS at 02:38

## 2024-12-07 RX ADMIN — POLYETHYLENE GLYCOL 3350 17 G: 17 POWDER, FOR SOLUTION ORAL at 07:32

## 2024-12-07 RX ADMIN — FENTANYL CITRATE 25 MCG: 50 INJECTION, SOLUTION INTRAMUSCULAR; INTRAVENOUS at 00:17

## 2024-12-07 RX ADMIN — FENTANYL CITRATE 25 MCG: 50 INJECTION, SOLUTION INTRAMUSCULAR; INTRAVENOUS at 01:19

## 2024-12-07 RX ADMIN — ACETAMINOPHEN 650 MG: 325 TABLET, FILM COATED ORAL at 07:32

## 2024-12-07 RX ADMIN — LEVOTHYROXINE SODIUM 150 MCG: 150 TABLET ORAL at 10:52

## 2024-12-07 RX ADMIN — HYDROMORPHONE HYDROCHLORIDE 0.5 MG: 1 INJECTION, SOLUTION INTRAMUSCULAR; INTRAVENOUS; SUBCUTANEOUS at 05:11

## 2024-12-07 RX ADMIN — CEFTRIAXONE 1 G: 1 INJECTION, POWDER, FOR SOLUTION INTRAMUSCULAR; INTRAVENOUS at 01:50

## 2024-12-07 RX ADMIN — SODIUM CHLORIDE, POTASSIUM CHLORIDE, SODIUM LACTATE AND CALCIUM CHLORIDE: 600; 310; 30; 20 INJECTION, SOLUTION INTRAVENOUS at 01:00

## 2024-12-07 RX ADMIN — TAMSULOSIN HYDROCHLORIDE 0.4 MG: 0.4 CAPSULE ORAL at 10:52

## 2024-12-07 ASSESSMENT — ACTIVITIES OF DAILY LIVING (ADL)
ADLS_ACUITY_SCORE: 31
ADLS_ACUITY_SCORE: 31
ADLS_ACUITY_SCORE: 32
ADLS_ACUITY_SCORE: 32
ADLS_ACUITY_SCORE: 54
ADLS_ACUITY_SCORE: 32
ADLS_ACUITY_SCORE: 31
ADLS_ACUITY_SCORE: 32
ADLS_ACUITY_SCORE: 32
ADLS_ACUITY_SCORE: 31
ADLS_ACUITY_SCORE: 32

## 2024-12-07 NOTE — ANESTHESIA POSTPROCEDURE EVALUATION
Patient: Rey Andre    Procedure: Procedure(s):  CYSTOSCOPY, WITH RIGHT RETROGRADE PYELOGRAM AND RIGHT URETERAL STENT INSERTION, BLADDER STONE EXTRACTION       Anesthesia Type:  General    Note:  Disposition: Outpatient   Postop Pain Control: Uneventful            Sign Out: Well controlled pain   PONV: No   Neuro/Psych: Uneventful            Sign Out: Acceptable/Baseline neuro status   Airway/Respiratory: Uneventful            Sign Out: Acceptable/Baseline resp. status   CV/Hemodynamics: Uneventful            Sign Out: Acceptable CV status; No obvious hypovolemia; No obvious fluid overload   Other NRE: NONE   DID A NON-ROUTINE EVENT OCCUR? No           Last vitals:  Vitals Value Taken Time   /72 12/07/24 0100   Temp 97.16  F (36.2  C) 12/07/24 0104   Pulse 81 12/07/24 0100   Resp 14 12/07/24 0100   SpO2 92 % 12/07/24 0100   Vitals shown include unfiled device data.    Electronically Signed By: Matthew Lizarraga MD  December 7, 2024  4:15 AM

## 2024-12-07 NOTE — PHARMACY-ADMISSION MEDICATION HISTORY
Pharmacy Intern Admission Medication History    Admission medication history is complete. The information provided in this note is only as accurate as the sources available at the time of the update.    Information Source(s): Patient via in-person    Pertinent Information: NA    Changes made to PTA medication list:  Added: None  Deleted: Norco, metronidazole  Changed: None    Allergies reviewed with patient and updates made in EHR: yes    Medication History Completed By: Zeenat Cardenas 2024 9:16 AM    PTA Med List   Medication Sig Last Dose/Taking    aspirin (ASA) 325 MG EC tablet Take 325 mg by mouth daily 2024    atorvastatin (LIPITOR) 20 MG tablet Take 1 tablet (20 mg) by mouth daily 2024    [] ciprofloxacin (CIPRO) 500 MG tablet Take 1 tablet (500 mg) by mouth once for 1 dose. 2024 at  5:00 PM    levothyroxine (SYNTHROID/LEVOTHROID) 150 MCG tablet Take 1 tablet (150 mcg) by mouth daily 2024    metoprolol succinate ER (TOPROL XL) 50 MG 24 hr tablet Take 1 tablet (50 mg) by mouth daily. 2024 at  7:30 AM    tamsulosin (FLOMAX) 0.4 MG capsule Take 1 capsule (0.4 mg) by mouth daily. 2024    tirzepatide-Weight Management (ZEPBOUND) 5 MG/0.5ML prefilled pen Inject 0.5 mLs (5 mg) subcutaneously every 7 days. 2024 Morning     
PAST MEDICAL HISTORY:  Anxiety and depression     History of depression

## 2024-12-07 NOTE — ED NOTES
"[facility]  ED Nurse Handoff Report    ED Chief complaint: Abdominal Pain and Post-op Problem  . ED Diagnosis:   Final diagnoses:   None       Allergies:   Allergies   Allergen Reactions    No Known Drug Allergy        Code Status: Full Code    Activity level - Baseline/Home:  independent.  Activity Level - Current:   independent.   Lift room needed: No.   Bariatric: No   Needed: No   Isolation: No.   Infection: Not Applicable.     Respiratory status: Room air    Vital Signs (within 30 minutes):   Vitals:    12/06/24 2036   BP: (!) 134/102   Pulse: 86   Resp: 22   Temp: 98.4  F (36.9  C)   TempSrc: Temporal   SpO2: 96%   Weight: 126.7 kg (279 lb 5.2 oz)   Height: 1.803 m (5' 11\")       Cardiac Rhythm:  ,      Pain level:    Patient confused: No.   Patient Falls Risk: activity supervised, mobility aid in reach, and room door open.   Elimination Status: Has voided     Patient Report - Initial Complaint: abdominal pain/nausea .   Focused Assessment:  Stent removed today, experiencing abdominal pain. Pt was told possible stricture of ureter post stent removal. Pt feels like he has another kidney stone. Pt presents with wife. Pt states has not been able to keep anything down due to pain. Pt still able to urinate. Pain radiates to testicle. Pain started at 1300 today. Stent was in for the past 3 weeks. Took percocet at 1700 and threw it up. 600mg ibuprofen at 1800. Nausea currently.            Abnormal Results:   Labs Ordered and Resulted from Time of ED Arrival to Time of ED Departure   BASIC METABOLIC PANEL - Abnormal       Result Value    Sodium 142      Potassium 4.2      Chloride 107      Carbon Dioxide (CO2) 21 (*)     Anion Gap 14      Urea Nitrogen 20.6      Creatinine 1.03      GFR Estimate 83      Calcium 8.6 (*)     Glucose 98     CBC WITH PLATELETS AND DIFFERENTIAL - Abnormal    WBC Count 11.4 (*)     RBC Count 4.95      Hemoglobin 15.4      Hematocrit 45.1      MCV 91      " MCH 31.1      MCHC 34.1      RDW 13.4      Platelet Count 226      % Neutrophils 72      % Lymphocytes 15      % Monocytes 10      % Eosinophils 2      % Basophils 0      % Immature Granulocytes 1      NRBCs per 100 WBC 0      Absolute Neutrophils 8.2      Absolute Lymphocytes 1.7      Absolute Monocytes 1.2      Absolute Eosinophils 0.2      Absolute Basophils 0.0      Absolute Immature Granulocytes 0.1      Absolute NRBCs 0.0     ROUTINE UA WITH MICROSCOPIC REFLEX TO CULTURE        Abd/pelvis CT no contrast - Stone Protocol    (Results Pending)       Treatments provided: see above see mar   Family Comments: none   OBS brochure/video discussed/provided to patient:  Yes  ED Medications:   Medications   HYDROmorphone (PF) (DILAUDID) injection 0.5 mg (0.5 mg Intravenous $Given 12/6/24 2113)   ondansetron (ZOFRAN) injection 4 mg (has no administration in time range)   ondansetron (ZOFRAN ODT) ODT tab 4 mg (4 mg Oral $Given 12/6/24 2045)   ketorolac (TORADOL) injection 15 mg (15 mg Intravenous $Given 12/6/24 2111)   sodium chloride 0.9% BOLUS 1,000 mL (1,000 mLs Intravenous $New Bag 12/6/24 2112)       Drips infusing:  Yes  For the majority of the shift this patient was Green.   Interventions performed were none .    Sepsis treatment initiated: No    Cares/treatment/interventions/medications to be completed following ED care: see above     ED Nurse Name: Shanelle Uribe RN  9:58 PM

## 2024-12-07 NOTE — PROGRESS NOTES
Urology    Patient feeling well    Ok for discharge home    I wrote for pain meds but hopefully doesn't need much    Follow up with Florentin on discharge    Sorin Panda MD   [facility]  [phone] clinic phone

## 2024-12-07 NOTE — PLAN OF CARE
"Assumed care [id number]. A&Ox4. SBA when OOB. On RA and cpap for NOC use. On a regular diet. C/O moderate abdominal/back pain and given PRN Dilaudid x2 with reports of relief. Possible discharge home today.     Goal Outcome Evaluation:      Plan of Care Reviewed With: patient    Overall Patient Progress: no changeOverall Patient Progress: no change    Outcome Evaluation: A&Ox4, VSS, IV abx, pain management, possibe discharge home today      Problem: Adult Inpatient Plan of Care  Goal: Plan of Care Review  Description: The Plan of Care Review/Shift note should be completed every shift.  The Outcome Evaluation is a brief statement about your assessment that the patient is improving, declining, or no change.  This information will be displayed automatically on your shift  note.  Outcome: Progressing  Flowsheets (Taken 12/7/2024 0424)  Outcome Evaluation: A&Ox4, VSS, IV abx, pain management, possibe discharge home today  Plan of Care Reviewed With: patient  Overall Patient Progress: no change  Goal: Patient-Specific Goal (Individualized)  Description: You can add care plan individualizations to a care plan. Examples of Individualization might be:  \"Parent requests to be called daily at 9am for status\", \"I have a hard time hearing out of my right ear\", or \"Do not touch me to wake me up as it startles  me\".  Outcome: Progressing  Goal: Absence of Hospital-Acquired Illness or Injury  Outcome: Progressing  Intervention: Identify and Manage Fall Risk  Recent Flowsheet Documentation  Taken 12/7/2024 0128 by Letty Ogden, RN  Safety Promotion/Fall Prevention:   activity supervised   lighting adjusted   nonskid shoes/slippers when out of bed   room near nurse's station   safety round/check completed  Intervention: Prevent Skin Injury  Recent Flowsheet Documentation  Taken 12/7/2024 0128 by Letty Ogden, RN  Body Position: position changed independently  Intervention: Prevent and Manage VTE (Venous Thromboembolism) " Risk  Recent Flowsheet Documentation  Taken 12/7/2024 0128 by Letty Ogden RN  VTE Prevention/Management: SCDs on (sequential compression devices)  Intervention: Prevent Infection  Recent Flowsheet Documentation  Taken 12/7/2024 0128 by Letty Ogden RN  Infection Prevention:   equipment surfaces disinfected   hand hygiene promoted   personal protective equipment utilized   rest/sleep promoted   single patient room provided  Goal: Optimal Comfort and Wellbeing  Outcome: Progressing  Intervention: Monitor Pain and Promote Comfort  Recent Flowsheet Documentation  Taken 12/7/2024 0304 by Letty Ogden RN  Pain Management Interventions: rest  Taken 12/7/2024 0238 by Letty Ogden RN  Pain Management Interventions: medication (see MAR)  Goal: Readiness for Transition of Care  Outcome: Progressing  Intervention: Mutually Develop Transition Plan  Recent Flowsheet Documentation  Taken 12/7/2024 0135 by Letty Ogden RN  Equipment Currently Used at Home: none     Problem: Pain Acute  Goal: Optimal Pain Control and Function  Outcome: Progressing  Intervention: Develop Pain Management Plan  Recent Flowsheet Documentation  Taken 12/7/2024 0304 by Letty Ogden RN  Pain Management Interventions: rest  Taken 12/7/2024 0238 by Letty Ogden RN  Pain Management Interventions: medication (see MAR)  Intervention: Prevent or Manage Pain  Recent Flowsheet Documentation  Taken 12/7/2024 0128 by Letty Ogden RN  Medication Review/Management: medications reviewed

## 2024-12-07 NOTE — DISCHARGE SUMMARY
"[facility]  Hospitalist Discharge Summary      Date of Admission:  12/6/2024  Date of Discharge:  12/7/2024  Discharging Provider: Tariq Al MD  Discharge Service: Hospitalist Service    Discharge Diagnoses   Right hydronephrosis status post ureteral stent placement  Urolithiasis      Clinically Significant Risk Factors     # Obesity: Estimated body mass index is 38.77 kg/m  as calculated from the following:    Height as of this encounter: 1.803 m (5' 11\").    Weight as of this encounter: 126.1 kg (278 lb).       Follow-ups Needed After Discharge   Follow-up Appointments       Follow-up and recommended labs and tests       Follow up with Urologist as instructed.                Unresulted Labs Ordered in the Past 30 Days of this Admission       Date and Time Order Name Status Description    12/6/2024 10:16 PM Urine Culture In process         These results will be followed up by hospitalist and urologist    Discharge Disposition   Discharged to home  Condition at discharge: Stable    Hospital Course   Rey Andre is a pleasant 59-year-old male with a history of  hyperlipidemia, hx of Left MCA stroke 2/2 PFO now s/p closure, no residual deficits, obesity/DAMARIS on CPAP, pre-diabetes, hypothyroidism, and issues with right ureteral stone admitted on 12/6/2024 with right ureteral hydronephrosis .  Patient was complicated history related to his right ureteral stone and prior stent placement. He initially had right ureteral stent placed on 11/13.  Had stent exchange 11/29 at which time it was noted that he had developed a ureteral stricture below the stone.  During that ureteroscopy Dr Lerma was unable to remove any stone fragments and the stone was just dusted.  Looks to have been a long and difficult procedure.  Patient had just stent removal  on 12/6 at follow-up with Dr Lerma around 830 am subsequently developing left some back pain which progressed associated with nausea and " vomiting and inability to urinate with pain that radiated to the right testicle.       Consultations This Hospital Stay   Urology    Code Status   Full Code    Time Spent on this Encounter   I, Tariq Al MD, personally saw the patient today and spent less than or equal to 30 minutes discharging this patient.       Tariq Al MD  [facility]  [address] [facility] [address]  Phone: [phone]  Fax: [phone]  ______________________________________________________________________    Physical Exam   Vital Signs: Temp: 97.5  F (36.4  C) Temp src: Oral BP: 106/71 Pulse: 78   Resp: 20 SpO2: 95 % O2 Device: None (Room air) Oxygen Delivery: 6 LPM  Weight: 278 lbs 0 oz  Alert and oriented, comfortable  Good air entry bilaterally, no wheeze  Nontender, positive bowel   No lower extremity edema.       Primary Care Physician   Stan Tapia    Discharge Orders      Reason for your hospital stay    Right hydronephrosis s/p stent     Follow-up and recommended labs and tests     Follow up with Urologist as instructed.     Activity    Your activity upon discharge: activity as tolerated     Diet    Follow this diet upon discharge: Current Diet:Orders Placed This Encounter      Regular Diet Adult       Significant Results and Procedures   Most Recent 3 CBC's:  Recent Labs   Lab Test 12/07/24 0823 12/06/24 2058 09/26/23  0810 09/08/23  1216   WBC 5.3 11.4*  --  5.8   HGB 15.3 15.4 14.9 15.2   MCV 92 91  --  92    226  --  237     Most Recent 3 BMP's:  Recent Labs   Lab Test 12/07/24 0823 12/06/24 2058 02/01/24  0952    142 142   POTASSIUM 4.3 4.2 4.2   CHLORIDE 104 107 109*   CO2 20* 21* 22   BUN 17.6 20.6 14.8   CR 0.84 1.03 0.85   ANIONGAP 14 14 11   DAMARIS 8.6* 8.6* 8.9   * 98 97   ,   Results for orders placed or performed during the hospital encounter of 12/06/24   Abd/pelvis CT no contrast - Stone Protocol    Narrative    EXAM: CT  ABDOMEN PELVIS W/O CONTRAST  LOCATION: [facility]  DATE: 12/6/2024    INDICATION: stent remvoved today, return severe pain  COMPARISON: 11/08/2024  TECHNIQUE: CT scan of the abdomen and pelvis was performed without IV contrast. Multiplanar reformats were obtained. Dose reduction techniques were used.  CONTRAST: None.    FINDINGS:   LOWER CHEST: Atrial septal occlusion device. Bibasilar atelectasis.    HEPATOBILIARY: Normal.    PANCREAS: Normal.    SPLEEN: Normal.    ADRENAL GLANDS: Normal.    KIDNEYS/BLADDER: Punctate bilateral renal calculi, more numerous on the right. Moderate right hydronephrosis. Right ureteral dilatation. 6 mm proximal right ureteral calculus. 2 mm mid ureteral calculus and 6 mm calculus at the pelvic inlet image 193.   Smaller calculi are seen in the distal ureter, UVJ and within the bladder. Bladder is under distended. Left parapelvic cysts.    BOWEL: Normal caliber. Normal appendix.    LYMPH NODES: Borderline lymph nodes at the nona hepatis. Haziness of small bowel mesentery with subcentimeter lymph nodes similar.    VASCULATURE: Mild atherosclerotic vascular calcification.    PELVIC ORGANS: Prominent prostate.    MUSCULOSKELETAL: Degenerative change osseous structures.      Impression    IMPRESSION:   1.  Moderate right hydroureteronephrosis. Multiple calculi are seen within the right ureter, largest 6 mm, with smaller calculi in the distal right ureter near the UVJ. Bladder calculi.  2.  Punctate, bilateral renal calculi.     XR Surgery JEFFERY L/T 5 Min Fluoro w Stills    Narrative    This exam was marked as non-reportable because it will not be read by a   radiologist or a [facility] non-radiologist provider.             Discharge Medications   Current Discharge Medication List        START taking these medications    Details   acetaminophen (TYLENOL) 500 MG tablet Take 2 tablets (1,000 mg) by mouth every 6 hours as needed for mild pain.  Qty: 100 tablet, Refills: 0     Associated Diagnoses: Right ureteral stone      docusate sodium (COLACE) 100 MG capsule Take 1 capsule (100 mg) by mouth 2 times daily.  Qty: 30 capsule, Refills: 0    Associated Diagnoses: Right ureteral stone      oxyCODONE (ROXICODONE) 5 MG tablet Take 1 tablet (5 mg) by mouth every 6 hours as needed.  Qty: 6 tablet, Refills: 0    Associated Diagnoses: Right ureteral stone           CONTINUE these medications which have NOT CHANGED    Details   aspirin (ASA) 325 MG EC tablet Take 325 mg by mouth daily      atorvastatin (LIPITOR) 20 MG tablet Take 1 tablet (20 mg) by mouth daily  Qty: 90 tablet, Refills: 3    Associated Diagnoses: Cerebrovascular accident (CVA) due to embolism of left middle cerebral artery (H)      levothyroxine (SYNTHROID/LEVOTHROID) 150 MCG tablet Take 1 tablet (150 mcg) by mouth daily  Qty: 90 tablet, Refills: 3    Associated Diagnoses: Hypothyroidism, unspecified type      metoprolol succinate ER (TOPROL XL) 50 MG 24 hr tablet Take 1 tablet (50 mg) by mouth daily.  Qty: 90 tablet, Refills: 1    Associated Diagnoses: Cerebrovascular accident (CVA) due to embolism of left middle cerebral artery (H); PFO (patent foramen ovale); Hyperlipidemia LDL goal <70; Thoracic aortic aneurysm without rupture (H)      tamsulosin (FLOMAX) 0.4 MG capsule Take 1 capsule (0.4 mg) by mouth daily.  Qty: 30 capsule, Refills: 11    Associated Diagnoses: Kidney stone      tirzepatide-Weight Management (ZEPBOUND) 5 MG/0.5ML prefilled pen Inject 0.5 mLs (5 mg) subcutaneously every 7 days.  Qty: 6 mL, Refills: 0    Associated Diagnoses: Class 3 obesity           STOP taking these medications       ciprofloxacin (CIPRO) 500 MG tablet Comments:   Reason for Stopping:             Allergies   Allergies   Allergen Reactions    No Known Drug Allergy

## 2024-12-07 NOTE — PROGRESS NOTES
Patient discharged, no complaints of pain, discharge instructions and follow up appointment acknowledged. Discharge meds not given but wife states she is going to come back later today to pick them up.

## 2024-12-07 NOTE — PROGRESS NOTES
Urology    Formal consult note to follow    See telephone note    I reviewed and interpreted CT personally      Right distal ureteral stone fragment    Right proximal ureteral stone cluster    - to OR for cystoscopy, right retrograde pyelogram, right ureteral stent placement  - admit postop for pain and nausea control  - likely home POD#1 if feeling better    Sorin Panda MD   [facility]  [phone] clinic phone

## 2024-12-07 NOTE — ANESTHESIA PREPROCEDURE EVALUATION
Anesthesia Pre-Procedure Evaluation    Patient: Rey Andre   MRN: [id number] : 1965        Procedure : Procedure(s):  CYSTOSCOPY, WITH RIGHT RETROGRADE PYELOGRAM AND RIGHT URETERAL STENT INSERTION          Past Medical History:   Diagnosis Date    Embolic stroke involving left middle cerebral artery (H) 2020    Hyperlipidemia     Hypertension     Hypothyroidism     Kidney stone     Recurent stones    Sen's neuroma of right foot     Obese     DAMARIS on CPAP     Palpitations 2020    PFO (patent foramen ovale)     Sinus of Valsalva aneurysm       Past Surgical History:   Procedure Laterality Date    ARTHROPLASTY KNEE Right 2021    Procedure: RIGHT TOTAL KNEE ARTHROPLASTY;  Surgeon: Ebenezer Stewart MD;  Location:  OR    ARTHROPLASTY KNEE Left 2023    Procedure: Left Total Knee Arthroplasty;  Surgeon: Ebenezer Stewart MD;  Location:  OR    CV PFO CLOSURE N/A 2021    Procedure: Patent Foramen Ovale Closure; gore cardioform 25mm,  Surgeon: Chi Alvarenga MD;  Location:  [facility]ARD[facility] LAB    CYSTOSCOPY      CYSTOSCOPY, RETROGRADES, COMBINED  2014    Procedure: COMBINED CYSTOSCOPY, RETROGRADES;  Surgeon: Francisco J Lerma MD;  Location:  OR    CYSTOSCOPY, RETROGRADES, COMBINED Right 2024    Procedure: Cystoscopy, retrogrades, combined;  Surgeon: Francisco J Lerma MD;  Location:  OR    ENT SURGERY      T & A    EP LOOP RECORDER EXPLANT N/A 2024    Procedure: Loop Recorder Removal;  Surgeon: Jarvis Best MD;  Location:  HE[facility] CARDI[facility] CATH LAB    EP LOOP RECORDER IMPLANT N/A 10/12/2020    Procedure: EP Loop Recorder Implant;  Surgeon: Lefty Ramires MD;  Location:  HEART CARDIAC CATH LAB    IR CAROTID CEREBRAL ANGIOGRAM LEFT  2020    KNEE SURGERY Right     arthroscopic    LASER HOLMIUM LITHOTRIPSY URETER(S), INSERT STENT, COMBINED  2012    Procedure: COMBINED CYSTOSCOPY, URETEROSCOPY, LASER HOLMIUM LITHOTRIPSY  URETER(S), INSERT STENT;  Video cystoscopy, Right Retrograde Right Ureteroscopy with Holmium Laser, Stone Extraction,  JJ Stent Placement ;  Surgeon: Francisco J Lerma MD;  Location: [facility] OR    LASER HOLMIUM LITHOTRIPSY URETER(S), INSERT STENT, COMBINED  05/24/2014    Procedure: COMBINED CYSTOSCOPY, URETEROSCOPY, LASER HOLMIUM LITHOTRIPSY URETER(S), INSERT STENT;  Surgeon: Francisco J Lerma MD;  Location: [facility] OR    LASER HOLMIUM LITHOTRIPSY URETER(S), INSERT STENT, COMBINED Right 03/05/2017    Procedure: COMBINED CYSTOSCOPY, URETEROSCOPY, LASER HOLMIUM LITHOTRIPSY URETER(S), INSERT STENT;  Surgeon: Chris Barrios MD;  Location: [facility] OR    LASER HOLMIUM LITHOTRIPSY URETER(S), INSERT STENT, COMBINED Right 11/13/2024    Procedure: Cystoscopy, right retrograde pyelogram, right ureteroscopy, placement of right ureteral stent, interpretation of fluoroscopic images;  Surgeon: Francisco J Lerma MD;  Location: [facility] OR    LASER HOLMIUM LITHOTRIPSY URETER(S), INSERT STENT, COMBINED Right 11/29/2024    Procedure: Cystoscopy, right ureteral stent exchange, right ureteroscopy with laser lithotripsy and stone basketing;  Surgeon: Francisco J Lerma MD;  Location: [facility] OR    ORTHOPEDIC SURGERY Right     Meniscal tear    ROTATOR CUFF REPAIR RT/LT      left shoulder    wisdom teeth        Allergies   Allergen Reactions    No Known Drug Allergy       Social History     Tobacco Use    Smoking status: Never     Passive exposure: Never    Smokeless tobacco: Never   Substance Use Topics    Alcohol use: Not Currently      Wt Readings from Last 1 Encounters:   12/06/24 126.7 kg (279 lb 5.2 oz)        Anesthesia Evaluation        History of anesthetic complications       ROS/MED HX  ENT/Pulmonary:     (+) sleep apnea,                                       Neurologic:     (+)          CVA,    TIA,                  Cardiovascular:     (+)  hypertension- -   -  - -                                      METS/Exercise  Tolerance:     Hematologic: Comments: Lab Test        12/06/24 09/26/23 09/08/23 12/07/21 05/06/21 03/06/20 03/05/20                       2058          0810          1216          1313          0740          0554          0728          WBC          11.4*         --          5.8           --          6.6            < >        6.5           HGB          15.4         14.9         15.2           < >        14.6           < >        14.4          MCV          91            --          92            --          92             < >        92            PLT          226           --          237           --          229            < >        188           INR           --           --           --           --          0.95          --          1.02           < > = values in this interval not displayed.                  Lab Test        12/06/24 02/01/24 09/26/23 09/08/23                       2058          0952          0810          1216          NA           142          142           --          141           POTASSIUM    4.2          4.2           --          4.3           CHLORIDE     107          109*          --          108*          CO2          21*          22            --          24            BUN          20.6         14.8          --          13.0          CR           1.03         0.85         0.70         0.86          ANIONGAP     14           11            --          9             DAMARIS          8.6*         8.9           --          9.0           GLC          98           97           143*         87                  Musculoskeletal:       GI/Hepatic:       Renal/Genitourinary:     (+) renal disease, type: ARF,     Nephrolithiasis ,       Endo:     (+)          thyroid problem, Thyroid disease - Other,    Obesity,       Psychiatric/Substance Use:       Infectious Disease:       Malignancy:       Other:            Physical Exam    Airway        Mallampati: II   TM  distance: > 3 FB   Neck ROM: full   Mouth opening: > 3 cm    Respiratory Devices and Support         Dental       (+) Minor Abnormalities - some fillings, tiny chips      Cardiovascular   cardiovascular exam normal          Pulmonary   pulmonary exam normal                OUTSIDE LABS:  CBC:   Lab Results   Component Value Date    WBC 11.4 (H) 12/06/2024    WBC 5.8 09/08/2023    HGB 15.4 12/06/2024    HGB 14.9 09/26/2023    HCT 45.1 12/06/2024    HCT 45.3 09/08/2023     12/06/2024     09/08/2023     BMP:   Lab Results   Component Value Date     12/06/2024     02/01/2024    POTASSIUM 4.2 12/06/2024    POTASSIUM 4.2 02/01/2024    CHLORIDE 107 12/06/2024    CHLORIDE 109 (H) 02/01/2024    CO2 21 (L) 12/06/2024    CO2 22 02/01/2024    BUN 20.6 12/06/2024    BUN 14.8 02/01/2024    CR 1.03 12/06/2024    CR 0.85 02/01/2024    GLC 98 12/06/2024    GLC 97 02/01/2024     COAGS:   Lab Results   Component Value Date    PTT 27 05/06/2021    INR 0.95 05/06/2021     POC:   Lab Results   Component Value Date    BGM 92 03/06/2020     HEPATIC:   Lab Results   Component Value Date    ALBUMIN 4.2 02/01/2024    PROTTOTAL 6.8 02/01/2024    ALT 44 02/01/2024    AST 32 02/01/2024    ALKPHOS 88 02/01/2024    BILITOTAL 0.4 02/01/2024     OTHER:   Lab Results   Component Value Date    A1C 5.7 (H) 09/12/2024    DAMARIS 8.6 (L) 12/06/2024    PHOS 2.6 03/06/2020    MAG 2.1 03/06/2020    TSH 2.67 09/12/2024       Anesthesia Plan    ASA Status:  3       Anesthesia Type: General.     - Airway: LMA   Induction: Propofol, Intravenous.   Maintenance: Balanced.        Consents    Anesthesia Plan(s) and associated risks, benefits, and realistic alternatives discussed. Questions answered and patient/representative(s) expressed understanding.     - Discussed:     - Discussed with:  Patient      - Extended Intubation/Ventilatory Support Discussed: No.      - Patient is DNR/DNI Status: No     Use of blood products discussed: No .  "    Postoperative Care    Pain management: IV analgesics.   PONV prophylaxis: Dexamethasone or Solumedrol, Ondansetron (or other 5HT-3)     Comments:               Matthew Lizarraga MD    I have reviewed the pertinent notes and labs in the chart from the past 30 days and (re)examined the patient.  Any updates or changes from those notes are reflected in this note.             # Drug Induced Platelet Defect: home medication list includes an antiplatelet medication             # Obesity: Estimated body mass index is 38.96 kg/m  as calculated from the following:    Height as of this encounter: 1.803 m (5' 11\").    Weight as of this encounter: 126.7 kg (279 lb 5.2 oz).             "

## 2024-12-07 NOTE — DISCHARGE INSTRUCTIONS
POSTOPERATIVE INSTRUCTIONS    Diagnosis-------------------------------   Right hydronephrosis, right ureteral stone, bladder stone    Procedure-------------------------------  Procedure(s) (LRB):  CYSTOSCOPY, WITH RIGHT RETROGRADE PYELOGRAM AND RIGHT URETERAL STENT INSERTION, BLADDER STONE EXTRACTION (Right)      Findings--------------------------------  Severe hydroureteronephrosis with both a distal ureteral obstruction and a proximal ureteral obstruction. Initially could not pass proximal ureteral obstruction with contrast or wire, needed angled glidewire and torque device to navigate. Stent placed.  Innumerable submillimeter to 1 mm bladder stones, irrigated out    Home-going instructions-----------------         Activity Limitation:     - No driving or operating heavy machinery until 24 hours after anesthesia    FOLLOW THESE INSTRUCTIONS AS INDICATED BELOW:  - Observe operative area for signs of excessive bleeding.  - You may shower.  - Increase fluid intake to promote clear urine.  - Resume usual diet as tolerated    What to expect while recovering-----------  - You may experience some intermittent bleeding that makes your urine pink or cherry colored. This is normal.  - However, if you are unable to urinate, passing large amount of clots, have kinga blood in your urine, or have a temperature >101 degrees, call the urology nurse on call, or present to your nearest emergency department.  - You are encouraged to walk daily, and have no activity restrictions.   - A URETERAL STENT has been placed that allows urine to flow unobstructed from your kidney into your bladder.  The stent has a curl in the kidney and a curl in the bladder.  The curl in the bladder can cause some urgency and frequency of urination as well as some mild blood in the urine.  The curl in the kidney can cause some mild flank discomfort.  This may be more noticeable when you urinate.  A URETERAL STENT is meant to be left in temporarily.  It must  be removed or changed no later than 3 months after it's insertion.  If it's not removed it can result in stone overgrowth on the stent that can cause pain, infection, and can be very difficult to remove.      Discharge Medications/instructions:   - Flomax (tamsulosin) to be taken daily for stent pain and irritation  - Take Tylenol 1000mg every 6 hours for pain  - Take oxycodone as needed for severe pain  - colace if taking oxycodone to avoid constipation      Questions/concerns------------------------  [facility]: [phone]    Future appointments  Follow up with Dr. Lerma to discuss next steps    Sorin Panda MD

## 2024-12-07 NOTE — H&P
History and Physical     Rey Andre MRN# [id number]   YOB: 1965 Age: 59 year old      Date of Admission:  12/6/2024    Primary care provider: Stan Tapia          Assessment and Plan:     Summary of Stay: Rye Andre is a 59 year old male with a history of  hyperlipidemia, hx of Left MCA stroke 2/2 PFO now s/p closure, no residual deficits, obesity/DAMARIS on CPAP, pre-diabetes, hypothyroidism, and issues with right ureteral stone admitted on 12/6/2024 with right ureteral hydronephrosis     He initially had right ureteral stent placed on 11/13.  Had stent exchange 11/29 at which time it was noted that he had developed a ureteral stricture below the stone.  During that ureteroscopy Dr Lerma was unable to remove any stone fragments and the stone was just dusted.  Looks to have been a long and difficult procedure.  Stent removed 12/6 at follow-up with Dr Lerma around 830 am     Around 1 pm noted some back pain which progressed throughout the day, and now associated with refractory n/v. Also with inability to urinate and having pain that radiates to the right testicle     ER VS with mild hypertension but otherwise wnl and afebrile   BMP creat 1.03, bicarb slightly low at 20 but AG 14  CBC mild leukocytosis 11.4 with normal diff  UA slightly cloudy, small blood/80 rbcs, small LE/43 wbcs, few bacteria     CT a/p:  Moderate right hydroureteronephrosis. Multiple calculi are seen within the right ureter, largest 6 mm, with smaller calculi in the distal right ureter near the UVJ. Bladder calculi.     ER contacted urology who took him to the [facility] for stent replacement.  He is admitted post op for monitoring and re-evaluation by urology to determine next steps     Problem List:   Right hydronephrosis   Complicated urologic case.  Initially with right ureteral stone and underwent stent placement 11/13, back for stent exchange and stone removal 11/29 but had developed ureteral stenosis  preventing ability to remove stone.  Had some ongoing discomfort and so at follow-up visit with Dr Lerma ureteral stent removed 12/6.  With a few hours developed progressively severe flank/testicular pain that caused refractory n/v. Has e/o hydro and multiple right ureteral stones. Admit here and UA with inflammatory markers and CT with recurrent right hydro.   -s/p ureteral stent placement by Dr Panda night of admission   -urology consultation to know what next steps might me  -empiric abx for ? Uti-see below   -will defer to urology whether or not to resume the tamsulosin     ? UTI   Has some inflammatory markers in UA and multiple urologic procedures of late  -rec'd cefazolin preop, cipro 500 mg po in the office earlier today,  will switch to ceftriaxone every day until UCx comes back and can help guide further therapy      Hx stroke without residual deficits.  Due to patent PFO which has now been closed  Hlp  Is on full dose ASA and atorva 20 mg every day  -hold asa until know what the urologic plan is going forward  -hold statin as is on obs status    DAMARIS  CPAP as at home     Obesity   Complicates cares, looks to be on tiraepatide but await med rec.  No need to give inpatient, can be resumed at discharge     DVT Prophylaxis: Pneumatic Compression Devices  Code Status: Full Code  Functional Status: independent  Art: not needed  Access:   PIV              Time spent 75 minutes reviewing epic including notes/labs/prior hx, current medications.  In addition to interviewing and examining the patient, updated patient and family regarding plan of care          Chief Complaint:     Back pain        History of Present Illness:   Rey Andre is a 59 year old male with a history of  hyperlipidemia, hx of Left MCA stroke 2/2 PFO now s/p closure, no residual deficits, obesity/DAMARIS on CPAP, pre-diabetes, hypothyroidism, and issues with right ureteral stone admitted on 12/6/2024 with right ureteral hydronephrosis      He initially had right ureteral stent placed on 11/13.  Had stent exchange 11/29 at which time it was noted that he had developed a ureteral stricture below the stone.  During that ureteroscopy Dr Lerma was unable to remove any stone fragments and the stone was just dusted.  Looks to have been a long and difficult procedure.  Stent removed 12/6 at follow-up with Dr Lerma around 830 am     Around 1 pm noted some back pain which progressed throughout the day, and now associated with refractory n/v. Also with inability to urinate and having pain that radiates to the right testicle     ER VS with mild hypertension but otherwise wnl and afebrile   BMP creat 1.03, bicarb slightly low at 20 but AG 14  CBC mild leukocytosis 11.4 with normal diff  UA slightly cloudy, small blood/80 rbcs, small LE/43 wbcs, few bacteria     CT a/p:  Moderate right hydroureteronephrosis. Multiple calculi are seen within the right ureter, largest 6 mm, with smaller calculi in the distal right ureter near the UVJ. Bladder calculi.     ER contacted urology who took him to the [facility] for stent replacement.  He is admitted post op for monitoring and re-evaluation by urology to determine next steps       The history is obtained in discussion with the ER provider  Chris Dawson MD, and the patient with excellent reliability      Epic and Care everywhere were extensively reviewed        Past Medical History:     Past Medical History:   Diagnosis Date    Embolic stroke involving left middle cerebral artery (H) 03/2020    Hyperlipidemia     Hypertension     Hypothyroidism     Kidney stone     Recurent stones    Sen's neuroma of right foot     Obese     DAMARIS on CPAP     Palpitations 03/06/2020    PFO (patent foramen ovale)     Sinus of Valsalva aneurysm              Past Surgical History:     Past Surgical History:   Procedure Laterality Date    ARTHROPLASTY KNEE Right 12/7/2021    Procedure: RIGHT TOTAL KNEE ARTHROPLASTY;  Surgeon: Terry  Ebenezer Lizarraga MD;  Location:  OR    ARTHROPLASTY KNEE Left 9/25/2023    Procedure: Left Total Knee Arthroplasty;  Surgeon: Ebenezer Stewart MD;  Location:  OR    CV PFO CLOSURE N/A 05/06/2021    Procedure: Patent Foramen Ovale Closure; gore cardioform 25mm,  Surgeon: Chi Alvarenga MD;  Location:  HEART CARDIAC CATH LAB    CYSTOSCOPY      CYSTOSCOPY, RETROGRADES, COMBINED  05/24/2014    Procedure: COMBINED CYSTOSCOPY, RETROGRADES;  Surgeon: Francisco J Lerma MD;  Location:  OR    CYSTOSCOPY, RETROGRADES, COMBINED Right 11/29/2024    Procedure: Cystoscopy, retrogrades, combined;  Surgeon: Francisco J Lerma MD;  Location:  OR    ENT SURGERY      T & A    EP LOOP RECORDER EXPLANT N/A 1/23/2024    Procedure: Loop Recorder Removal;  Surgeon: Jarvis Best MD;  Location:  HEART CARDIAC CATH LAB    EP LOOP RECORDER IMPLANT N/A 10/12/2020    Procedure: EP Loop Recorder Implant;  Surgeon: Lefty Ramires MD;  Location: [facility] CARDIAC CATH LAB    IR CAROTID CEREBRAL ANGIOGRAM LEFT  03/05/2020    KNEE SURGERY Right     arthroscopic    LASER HOLMIUM LITHOTRIPSY URETER(S), INSERT STENT, COMBINED  07/27/2012    Procedure: COMBINED CYSTOSCOPY, URETEROSCOPY, LASER HOLMIUM LITHOTRIPSY URETER(S), INSERT STENT;  Video cystoscopy, Right Retrograde Right Ureteroscopy with Holmium Laser, Stone Extraction,  JJ Stent Placement ;  Surgeon: Francisco J Lerma MD;  Location:  OR    LASER HOLMIUM LITHOTRIPSY URETER(S), INSERT STENT, COMBINED  05/24/2014    Procedure: COMBINED CYSTOSCOPY, URETEROSCOPY, LASER HOLMIUM LITHOTRIPSY URETER(S), INSERT STENT;  Surgeon: Francisco J Lerma MD;  Location: [facility] OR    LASER HOLMIUM LITHOTRIPSY URETER(S), INSERT STENT, COMBINED Right 03/05/2017    Procedure: COMBINED CYSTOSCOPY, URETEROSCOPY, LASER HOLMIUM LITHOTRIPSY URETER(S), INSERT STENT;  Surgeon: Chris Barrios MD;  Location:  OR    LASER HOLMIUM LITHOTRIPSY URETER(S), INSERT STENT, COMBINED  "Right 11/13/2024    Procedure: Cystoscopy, right retrograde pyelogram, right ureteroscopy, placement of right ureteral stent, interpretation of fluoroscopic images;  Surgeon: Francisco J Lerma MD;  Location: [facility] OR    LASER HOLMIUM LITHOTRIPSY URETER(S), INSERT STENT, COMBINED Right 11/29/2024    Procedure: Cystoscopy, right ureteral stent exchange, right ureteroscopy with laser lithotripsy and stone basketing;  Surgeon: Francisco J Lerma MD;  Location: [facility] OR    ORTHOPEDIC SURGERY Right     Meniscal tear    ROTATOR CUFF REPAIR RT/LT      left shoulder    wisdom teeth               Social History:     Social History     Tobacco Use    Smoking status: Never     Passive exposure: Never    Smokeless tobacco: Never   Substance Use Topics    Alcohol use: Not Currently             Family History:     Family History   Problem Relation Age of Onset    Hyperlipidemia Mother         pulm emboli, IUD perforation and developed clot    Substance Abuse Mother     Other - See Comments Father         Possible Autoimmune disorder , DVT, pulm embolism    Prostate Cancer Father     Depression Father     Obesity Father     Breast Cancer Sister             Allergies:     Allergies   Allergen Reactions    No Known Drug Allergy              Medications:     Await formal med rec          Review of Systems:     A Comprehensive greater than 10 system review of systems was carried out.  Pertinent positives and negatives are noted above.  Otherwise negative for contributory information.           Physical Exam:   Blood pressure 134/80, pulse 80, temperature 98.1  F (36.7  C), temperature source Oral, resp. rate 18, height 1.803 m (5' 11\"), weight 126.1 kg (278 lb), SpO2 94%.  Exam:  I see him in PACU and he is doing well extubated   General:  Pleasant nad looks stated age   HEENT:  Head nc/at sclera clear PER.  Neck is supple  Lungs: cta b nl effort   CV:  RRR no m/r/g no le edema  Abd:  obese S/nt/nd no r/g  Neuro:  Cn 2-12 grossly " intact and maee  Alert and oriented affect appropriate   Skin:  W/d no c/c               Data:     Results for orders placed or performed during the hospital encounter of 12/06/24   Abd/pelvis CT no contrast - Stone Protocol     Status: None    Narrative    EXAM: CT ABDOMEN PELVIS W/O CONTRAST  LOCATION: [facility]  DATE: 12/6/2024    INDICATION: stent remvoved today, return severe pain  COMPARISON: 11/08/2024  TECHNIQUE: CT scan of the abdomen and pelvis was performed without IV contrast. Multiplanar reformats were obtained. Dose reduction techniques were used.  CONTRAST: None.    FINDINGS:   LOWER CHEST: Atrial septal occlusion device. Bibasilar atelectasis.    HEPATOBILIARY: Normal.    PANCREAS: Normal.    SPLEEN: Normal.    ADRENAL GLANDS: Normal.    KIDNEYS/BLADDER: Punctate bilateral renal calculi, more numerous on the right. Moderate right hydronephrosis. Right ureteral dilatation. 6 mm proximal right ureteral calculus. 2 mm mid ureteral calculus and 6 mm calculus at the pelvic inlet image 193.   Smaller calculi are seen in the distal ureter, UVJ and within the bladder. Bladder is under distended. Left parapelvic cysts.    BOWEL: Normal caliber. Normal appendix.    LYMPH NODES: Borderline lymph nodes at the nona hepatis. Haziness of small bowel mesentery with subcentimeter lymph nodes similar.    VASCULATURE: Mild atherosclerotic vascular calcification.    PELVIC ORGANS: Prominent prostate.    MUSCULOSKELETAL: Degenerative change osseous structures.      Impression    IMPRESSION:   1.  Moderate right hydroureteronephrosis. Multiple calculi are seen within the right ureter, largest 6 mm, with smaller calculi in the distal right ureter near the UVJ. Bladder calculi.  2.  Punctate, bilateral renal calculi.     XR Surgery JEFFERY L/T 5 Min Fluoro w Stills     Status: None    Narrative    This exam was marked as non-reportable because it will not be read by a   radiologist or a [place]  non-radiologist provider.         Gable Draw     Status: None    Narrative    The following orders were created for panel order Gable Draw.  Procedure                               Abnormality         Status                     ---------                               -----------         ------                     Extra Blue Top Tube[[id number]]                              Final result               Extra Red Top Tube[[id number]]                               Final result                 Please view results for these tests on the individual orders.   Basic metabolic panel (BMP)     Status: Abnormal   Result Value Ref Range    Sodium 142 135 - 145 mmol/L    Potassium 4.2 3.4 - 5.3 mmol/L    Chloride 107 98 - 107 mmol/L    Carbon Dioxide (CO2) 21 (L) 22 - 29 mmol/L    Anion Gap 14 7 - 15 mmol/L    Urea Nitrogen 20.6 8.0 - 23.0 mg/dL    Creatinine 1.03 0.67 - 1.17 mg/dL    GFR Estimate 83 >60 mL/min/1.73m2    Calcium 8.6 (L) 8.8 - 10.4 mg/dL    Glucose 98 70 - 99 mg/dL   Extra Blue Top Tube     Status: None   Result Value Ref Range    Hold Specimen JIC    Extra Red Top Tube     Status: None   Result Value Ref Range    Hold Specimen JIC    CBC with platelets and differential     Status: Abnormal   Result Value Ref Range    WBC Count 11.4 (H) 4.0 - 11.0 10e3/uL    RBC Count 4.95 4.40 - 5.90 10e6/uL    Hemoglobin 15.4 13.3 - 17.7 g/dL    Hematocrit 45.1 40.0 - 53.0 %    MCV 91 78 - 100 fL    MCH 31.1 26.5 - 33.0 pg    MCHC 34.1 31.5 - 36.5 g/dL    RDW 13.4 10.0 - 15.0 %    Platelet Count 226 150 - 450 10e3/uL    % Neutrophils 72 %    % Lymphocytes 15 %    % Monocytes 10 %    % Eosinophils 2 %    % Basophils 0 %    % Immature Granulocytes 1 %    NRBCs per 100 WBC 0 <1 /100    Absolute Neutrophils 8.2 1.6 - 8.3 10e3/uL    Absolute Lymphocytes 1.7 0.8 - 5.3 10e3/uL    Absolute Monocytes 1.2 0.0 - 1.3 10e3/uL    Absolute Eosinophils 0.2 0.0 - 0.7 10e3/uL    Absolute Basophils 0.0 0.0 - 0.2 10e3/uL    Absolute Immature  Granulocytes 0.1 <=0.4 10e3/uL    Absolute NRBCs 0.0 10e3/uL   UA with Microscopic reflex to Culture     Status: Abnormal    Specimen: Urine, Clean Catch   Result Value Ref Range    Color Urine Yellow Colorless, Straw, Light Yellow, Yellow    Appearance Urine Slightly Cloudy (A) Clear    Glucose Urine Negative Negative mg/dL    Bilirubin Urine Negative Negative    Ketones Urine Negative Negative mg/dL    Specific Gravity Urine 1.026 1.003 - 1.035    Blood Urine Small (A) Negative    pH Urine 7.0 5.0 - 7.0    Protein Albumin Urine 30 (A) Negative mg/dL    Urobilinogen Urine Normal Normal, 2.0 mg/dL    Nitrite Urine Negative Negative    Leukocyte Esterase Urine Small (A) Negative    Bacteria Urine Few (A) None Seen /HPF    Mucus Urine Present (A) None Seen /LPF    RBC Urine 80 (H) <=2 /HPF    WBC Urine 43 (H) <=5 /HPF    Narrative    Urine Culture ordered based on laboratory criteria   CBC + differential     Status: Abnormal    Narrative    The following orders were created for panel order CBC + differential.  Procedure                               Abnormality         Status                     ---------                               -----------         ------                     CBC with platelets and d...[[id number]]  Abnormal            Final result                 Please view results for these tests on the individual orders.

## 2024-12-07 NOTE — OP NOTE
[facility]  Operative Note    Pre-operative diagnosis: Hydronephrosis of right kidney [N13.30]  Right ureteral stone [N20.1]   Post-operative diagnosis Hydronephrosis of right kidney [N13.30]  Right ureteral stone [N20.1]  Bladder stone   Procedure: Procedure(s):  CYSTOSCOPY, WITH RIGHT RETROGRADE PYELOGRAM AND RIGHT URETERAL STENT INSERTION, BLADDER STONE EXTRACTION SIMPLE  FLUOROSCOPIC INTERPRETATION <1 HOUR PHYSICIAN TIME   Surgeon: Sorin Panda MD   Assistants(s): none   Anesthesia: General    Estimated blood loss: None    Total IV fluids: (See anesthesia record)   Blood transfusion: No transfusion was given during surgery   Total urine output: Not measured   Drains: Right ureteral 6 Fr x 26 cm stent   Specimens: * No specimens in log *     Implants: Implant Name Type Inv. Item Serial No.  Lot No. LRB No. Used Action   STENT URETERAL POLARIS ULTRA [id number] - [id number] Stent STENT URETERAL POLARIS ULTRA [id number]  [employer] [id number] Right 1 Implanted        Findings:   Severe hydroureteronephrosis with both a distal ureteral obstruction and a proximal ureteral obstruction. Initially could not pass proximal ureteral obstruction with contrast or wire, needed angled glidewire and torque device to navigate. Stent placed.  Innumerable submillimeter to 1 mm bladder stones, irrigated out   Complications: None.   Condition: Stable               Description of procedure:  58 yo M s/p right ureteral stent 11/13/2024 followed by complex ureteroscopy 11/29/2024, s/p stent removal earlier today in clinic now with intractable right renal colic and associated nausea and vomiting, found to have multiple ureteral stones with associated hydronephrosis. I discussed with the patient need for urgent cystoscopy right stent placement to relieve obstruction. He understands he may need repeat ureteroscopic management to complete clearance of the ureteral stones.  Risks including stent pain and irritation, hematuria, infection, need for secondary procedure discussed. I offered placing smaller 5 Fr diameter stent to possibly facilitate passage of stones at the expense of increased risk of obstruction or migration of the stent and we agreed to proceed with standard 6 Fr stent. Informed consent obtained.    The patient was brought to operating room #14.  Preop antibiotics were given prior to the procedure.  General anesthesia was induced, he was placed in dorsolithotomy position, prepped and draped in standard sterile fashion.  A timeout is performed.    A 22 Iranian Stortz cystoscope was assembled and passed through the urethra and into the bladder.  The bladder had innumerable stones and stone fragments related to prior dusting of the right ureteral stone.  The right ureteral orifice was identified.  This was cannulated with a 5 Iranian tiger tail catheter.  Gentle retrograde pyelogram showed severe left hydroureter with a contrast cut off in the proximal ureter consistent with where the stone had been lodged previously.  I attempted to pass a 0.035 inch stiff shaft Glidewire up the ureter however the wire kept curling back at that level.  I then used an angled Glidewire with the assistance of a torque device to navigate this area.  Only with the angled Glidewire was able to traverse the narrowing and get a wire up to the renal pelvis.  The tiger tail was passed over the wire up to the renal pelvis and the angled Glidewire was removed.  Gentle retrograde pyelogram confirmed severe hydronephrosis and hydroureter.  The stiff shaft Glidewire was passed back up to the renal pelvis and the tiger tail was removed.  A 6 Iranian by 26 cm ureteral stent was then placed in the usual fashion under cystoscopic and fluoroscopic guidance with good curls noted proximally and distally.  The bladder stones were then irrigated out via the cystoscope.  Scope was removed.  Patient was cleaned up,  awoken from anesthesia and brought to PACU in stable condition. He will remain admitted overnight on observation for pain and nausea control. Likely discharge home POD#1. Will refer back to Dr. Lerma for further treatment    Sorin Panda MD   [facility]  [phone] clinic phone

## 2024-12-07 NOTE — CONSULTS
[facility] Urology Consultation    Rey Andre MRN# [id number]   Age: 59 year old YOB: 1965     Date of Admission:  12/6/2024    Reason for consult: Right renal colic and ureteral stones       Requesting physician: Eunice       Level of consult: One-time consult to assist in determining a diagnosis and to recommend an appropriate treatment plan           Assessment and Plan:   Assessment:   58 yo M s/p right ureteral stent 11/13/2024 followed by complex ureteroscopy 11/29/2024, s/p stent removal earlier today in clinic now with intractable right renal colic and associated nausea and vomiting, found to have multiple ureteral stones with associated hydronephrosis. I discussed with the patient need for urgent cystoscopy right stent placement to relieve obstruction. He understands he may need repeat ureteroscopic management to complete clearance of the ureteral stones. Risks including stent pain and irritation, hematuria, infection, need for secondary procedure discussed. I offered placing smaller 5 Fr diameter stent to possibly facilitate passage of stones at the expense of increased risk of obstruction or migration of the stent and we agreed to proceed with standard 6 Fr stent. Informed consent obtained.       Plan:   To OR: stent placement completed  Admit overnight, likely discharge POD#1 if doing well  Follow up with Dr. Lrema for consideration of repeat ureteroscopic management    Sorin Panda MD   [facility]rology  [phone] clinic phone               Chief Complaint:   Right renal colic, nausea/vomiting     History is obtained from the patient         History of Present Illness:   This patient is a 58 yo M s/p right ureteral stent 11/13/2024 followed by complex ureteroscopy 11/29/2024, course notable for ureteral stricture precluding stone basketing so stone dusted only     Stent removed early this morning. By about 1pm patient began having pain and nausea similar to stone  pain. He has not been able to keep any fluids down. Last solid food 10am but has been sipping water all day. Denies fever or chills. Pain much better now that he has had IV pain meds          Past Medical History:   I have reviewed this patient's past medical history and commented on sigificant events within the HPI  Past Medical History:   Diagnosis Date    Embolic stroke involving left middle cerebral artery (H) 03/2020    Hyperlipidemia     Hypertension     Hypothyroidism     Kidney stone     Recurent stones    Sen's neuroma of right foot     Obese     DAMARIS on CPAP     Palpitations 03/06/2020    PFO (patent foramen ovale)     Sinus of Valsalva aneurysm              Past Surgical History:   I have reviewed this patient's past surgical history  Past Surgical History:   Procedure Laterality Date    ARTHROPLASTY KNEE Right 12/7/2021    Procedure: RIGHT TOTAL KNEE ARTHROPLASTY;  Surgeon: Ebeenzer Stewart MD;  Location:  OR    ARTHROPLASTY KNEE Left 9/25/2023    Procedure: Left Total Knee Arthroplasty;  Surgeon: Ebenezer Stewart MD;  Location:  OR    CV PFO CLOSURE N/A 05/06/2021    Procedure: Patent Foramen Ovale Closure; gore cardioform 25mm,  Surgeon: Chi Alvarenga MD;  Location:  HE[facility]ARD[facility] CATH LAB    CYSTOSCOPY      CYSTOSCOPY, RETROGRADES, COMBINED  05/24/2014    Procedure: COMBINED CYSTOSCOPY, RETROGRADES;  Surgeon: Francisco J Lerma MD;  Location: [facility] OR    CYSTOSCOPY, RETROGRADES, COMBINED Right 11/29/2024    Procedure: Cystoscopy, retrogrades, combined;  Surgeon: Francisco J Lerma MD;  Location:  OR    ENT SURGERY      T & A    EP LOOP RECORDER EXPLANT N/A 1/23/2024    Procedure: Loop Recorder Removal;  Surgeon: Jarvis Best MD;  Location:  HE[facility]ARD[facility] CATH LAB    EP LOOP RECORDER IMPLANT N/A 10/12/2020    Procedure: EP Loop Recorder Implant;  Surgeon: Lefty Ramires MD;  Location:  HE[facility] CATH LAB    IR CAROTID CEREBRAL ANGIOGRAM LEFT  03/05/2020     KNEE SURGERY Right     arthroscopic    LASER HOLMIUM LITHOTRIPSY URETER(S), INSERT STENT, COMBINED  07/27/2012    Procedure: COMBINED CYSTOSCOPY, URETEROSCOPY, LASER HOLMIUM LITHOTRIPSY URETER(S), INSERT STENT;  Video cystoscopy, Right Retrograde Right Ureteroscopy with Holmium Laser, Stone Extraction,  JJ Stent Placement ;  Surgeon: Francisco J Lerma MD;  Location: [facility] OR    LASER HOLMIUM LITHOTRIPSY URETER(S), INSERT STENT, COMBINED  05/24/2014    Procedure: COMBINED CYSTOSCOPY, URETEROSCOPY, LASER HOLMIUM LITHOTRIPSY URETER(S), INSERT STENT;  Surgeon: Francisco J Lerma MD;  Location: [facility] OR    LASER HOLMIUM LITHOTRIPSY URETER(S), INSERT STENT, COMBINED Right 03/05/2017    Procedure: COMBINED CYSTOSCOPY, URETEROSCOPY, LASER HOLMIUM LITHOTRIPSY URETER(S), INSERT STENT;  Surgeon: Chris Barrios MD;  Location: [facility] OR    LASER HOLMIUM LITHOTRIPSY URETER(S), INSERT STENT, COMBINED Right 11/13/2024    Procedure: Cystoscopy, right retrograde pyelogram, right ureteroscopy, placement of right ureteral stent, interpretation of fluoroscopic images;  Surgeon: Francisco J Lerma MD;  Location: [facility] OR    LASER HOLMIUM LITHOTRIPSY URETER(S), INSERT STENT, COMBINED Right 11/29/2024    Procedure: Cystoscopy, right ureteral stent exchange, right ureteroscopy with laser lithotripsy and stone basketing;  Surgeon: Francisco J Lerma MD;  Location:  OR    ORTHOPEDIC SURGERY Right     Meniscal tear    ROTATOR CUFF REPAIR RT/LT      left shoulder    wisdom teeth               Social History:   I have reviewed this patient's social history  Social History     Tobacco Use    Smoking status: Never     Passive exposure: Never    Smokeless tobacco: Never   Substance Use Topics    Alcohol use: Not Currently             Family History:     Family History   Problem Relation Age of Onset    Hyperlipidemia Mother         pulm emboli, IUD perforation and developed clot    Substance Abuse Mother     Other - See Comments  Father         Possible Autoimmune disorder , DVT, pulm embolism    Prostate Cancer Father     Depression Father     Obesity Father     Breast Cancer Sister      Family history not discussed          Immunizations:     Immunization History   Administered Date(s) Administered    COVID-19 Bivalent 12+ (Pfizer) 02/01/2023    COVID-19 MONOVALENT 12+ (Pfizer) 11/19/2021    COVID-19 Monovalent 18+ (Moderna) 04/01/2021, 04/30/2021    Hepatitis B, Adult 02/01/2023    Influenza (IIV3) PF 10/19/2006, 10/03/2011    Influenza (intradermal) 01/16/2013    Influenza Vaccine >6 months,quad, PF 02/09/2018, 10/09/2018, 09/26/2019, 12/18/2020, 11/02/2021    Influenza Vaccine Trivalent (FluBlok) 09/12/2024    Pneumococcal 20 valent Conjugate (Prevnar 20) 02/01/2023    RSV Vaccine (Abrysvo) 09/12/2024    TDAP (Adacel,Boostrix) 09/09/2003, 06/18/2012, 02/01/2023    Td (Adult), Adsorbed 09/09/2003    Zoster recombinant adjuvanted (SHINGRIX) 12/18/2020, 11/02/2021             Allergies:     Allergies   Allergen Reactions    No Known Drug Allergy              Medications:     Current Facility-Administered Medications   Medication Dose Route Frequency Provider Last Rate Last Admin    [START ON 12/7/2024] acetaminophen (TYLENOL) tablet 975 mg  975 mg Oral Once Matthew Lizarraga MD        dexAMETHasone (DECADRON) injection 4 mg  4 mg Intravenous Once PRN Matthew Lizarraga MD        fentaNYL (PF) (SUBLIMAZE) injection 25 mcg  25 mcg Intravenous Q5 Min PRN Matthew Lizarraga MD        fentaNYL (PF) (SUBLIMAZE) injection 50 mcg  50 mcg Intravenous Q5 Min PRN Matthew Lizarraga MD   50 mcg at 12/06/24 2342    hydrALAZINE (APRESOLINE) injection 2.5-5 mg  2.5-5 mg Intravenous Q10 Min PRN Matthew Lizarraga MD        HYDROmorphone (DILAUDID) injection 0.2 mg  0.2 mg Intravenous Q5 Min PRN Matthew Lizarraga MD        HYDROmorphone (DILAUDID) injection 0.4 mg  0.4 mg Intravenous Q5 Min PRN Matthew Lizarraga MD        [Auto Hold] HYDROmorphone (PF) (DILAUDID) injection  0.5 mg  0.5 mg Intravenous Q15 Min PRN Chris Dawson MD   0.5 mg at 12/06/24 2113    ketorolac (TORADOL) injection 15 mg  15 mg Intravenous Once PRN Matthew Lizarraga MD        [START ON 12/7/2024] lactated ringers infusion   Intravenous Continuous Matthew Lizarraga MD        metoprolol (LOPRESSOR) injection 1-2 mg  1-2 mg Intravenous Q5 Min PRN Matthew Lizarraga MD        naloxone (NARCAN) injection 0.1 mg  0.1 mg Intravenous Q2 Min PRN Matthew Lizarraga MD        ondansetron (ZOFRAN ODT) ODT tab 4 mg  4 mg Oral Q30 Min PRN Matthew Lizarraga MD        Or    ondansetron (ZOFRAN) injection 4 mg  4 mg Intravenous Q30 Min PRN Matthew Lizarraga MD        [Auto Hold] ondansetron (ZOFRAN) injection 4 mg  4 mg Intravenous Q15 Min PRN Chris Dawson MD        prochlorperazine (COMPAZINE) injection 5 mg  5 mg Intravenous Q6H PRN Matthew Lizarraga MD                 Review of Systems:   The Review of Systems is negative other than noted in the HPI          Physical Exam:   Vitals were reviewed  Temp: 97.3  F (36.3  C) Temp src: Temporal BP: 132/79 Pulse: 81   Resp: 20 SpO2: 96 % O2 Device: None (Room air)    Constitutional:   fatigued   Eyes:   Eyes open   ENT:   Nc/at   Neck:   No neck mass   Hematologic / Lymphatic:   Not examined   Back:   Not examined   Lungs:   Nonlabored breathing on room air   Cardiovascular:   Extrem wwp   Abdomen:   Non distended   Chest / Breast:   Not examined   Genitounirinary:   Not examined   Musculoskeletal:   No joint redness or swelling   Neurologic:   GOODMAN   Neuropsychiatric:   Normal mood and affect   Skin:   No skin rash             Data:   All laboratory and imaging data in the past 24 hours reviewed  Results for orders placed or performed during the hospital encounter of 12/06/24 (from the past 24 hours)   [facility] Draw    Narrative    The following orders were created for panel order [facility] Draw.  Procedure                               Abnormality         Status                      ---------                               -----------         ------                     Extra Blue Top Tube[[id number]]                              Final result               Extra Red Top Tube[[id number]]                               Final result                 Please view results for these tests on the individual orders.   CBC + differential    Narrative    The following orders were created for panel order CBC + differential.  Procedure                               Abnormality         Status                     ---------                               -----------         ------                     CBC with platelets and d...[[id number]]  Abnormal            Final result                 Please view results for these tests on the individual orders.   Basic metabolic panel (BMP)   Result Value Ref Range    Sodium 142 135 - 145 mmol/L    Potassium 4.2 3.4 - 5.3 mmol/L    Chloride 107 98 - 107 mmol/L    Carbon Dioxide (CO2) 21 (L) 22 - 29 mmol/L    Anion Gap 14 7 - 15 mmol/L    Urea Nitrogen 20.6 8.0 - 23.0 mg/dL    Creatinine 1.03 0.67 - 1.17 mg/dL    GFR Estimate 83 >60 mL/min/1.73m2    Calcium 8.6 (L) 8.8 - 10.4 mg/dL    Glucose 98 70 - 99 mg/dL   Extra Blue Top Tube   Result Value Ref Range    Hold Specimen JIC    Extra Red Top Tube   Result Value Ref Range    Hold Specimen JIC    CBC with platelets and differential   Result Value Ref Range    WBC Count 11.4 (H) 4.0 - 11.0 10e3/uL    RBC Count 4.95 4.40 - 5.90 10e6/uL    Hemoglobin 15.4 13.3 - 17.7 g/dL    Hematocrit 45.1 40.0 - 53.0 %    MCV 91 78 - 100 fL    MCH 31.1 26.5 - 33.0 pg    MCHC 34.1 31.5 - 36.5 g/dL    RDW 13.4 10.0 - 15.0 %    Platelet Count 226 150 - 450 10e3/uL    % Neutrophils 72 %    % Lymphocytes 15 %    % Monocytes 10 %    % Eosinophils 2 %    % Basophils 0 %    % Immature Granulocytes 1 %    NRBCs per 100 WBC 0 <1 /100    Absolute Neutrophils 8.2 1.6 - 8.3 10e3/uL    Absolute Lymphocytes 1.7 0.8 - 5.3 10e3/uL    Absolute Monocytes 1.2 0.0 -  1.3 10e3/uL    Absolute Eosinophils 0.2 0.0 - 0.7 10e3/uL    Absolute Basophils 0.0 0.0 - 0.2 10e3/uL    Absolute Immature Granulocytes 0.1 <=0.4 10e3/uL    Absolute NRBCs 0.0 10e3/uL   UA with Microscopic reflex to Culture    Specimen: Urine, Clean Catch   Result Value Ref Range    Color Urine Yellow Colorless, Straw, Light Yellow, Yellow    Appearance Urine Slightly Cloudy (A) Clear    Glucose Urine Negative Negative mg/dL    Bilirubin Urine Negative Negative    Ketones Urine Negative Negative mg/dL    Specific Gravity Urine 1.026 1.003 - 1.035    Blood Urine Small (A) Negative    pH Urine 7.0 5.0 - 7.0    Protein Albumin Urine 30 (A) Negative mg/dL    Urobilinogen Urine Normal Normal, 2.0 mg/dL    Nitrite Urine Negative Negative    Leukocyte Esterase Urine Small (A) Negative    Bacteria Urine Few (A) None Seen /HPF    Mucus Urine Present (A) None Seen /LPF    RBC Urine 80 (H) <=2 /HPF    WBC Urine 43 (H) <=5 /HPF    Narrative    Urine Culture ordered based on laboratory criteria   Abd/pelvis CT no contrast - Stone Protocol    Narrative    EXAM: CT ABDOMEN PELVIS W/O CONTRAST  LOCATION: [facility]  DATE: 12/6/2024    INDICATION: stent remvoved today, return severe pain  COMPARISON: 11/08/2024  TECHNIQUE: CT scan of the abdomen and pelvis was performed without IV contrast. Multiplanar reformats were obtained. Dose reduction techniques were used.  CONTRAST: None.    FINDINGS:   LOWER CHEST: Atrial septal occlusion device. Bibasilar atelectasis.    HEPATOBILIARY: Normal.    PANCREAS: Normal.    SPLEEN: Normal.    ADRENAL GLANDS: Normal.    KIDNEYS/BLADDER: Punctate bilateral renal calculi, more numerous on the right. Moderate right hydronephrosis. Right ureteral dilatation. 6 mm proximal right ureteral calculus. 2 mm mid ureteral calculus and 6 mm calculus at the pelvic inlet image 193.   Smaller calculi are seen in the distal ureter, UVJ and within the bladder. Bladder is under distended. Left  parapelvic cysts.    BOWEL: Normal caliber. Normal appendix.    LYMPH NODES: Borderline lymph nodes at the nona hepatis. Haziness of small bowel mesentery with subcentimeter lymph nodes similar.    VASCULATURE: Mild atherosclerotic vascular calcification.    PELVIC ORGANS: Prominent prostate.    MUSCULOSKELETAL: Degenerative change osseous structures.      Impression    IMPRESSION:   1.  Moderate right hydroureteronephrosis. Multiple calculi are seen within the right ureter, largest 6 mm, with smaller calculi in the distal right ureter near the UVJ. Bladder calculi.  2.  Punctate, bilateral renal calculi.     XR Surgery JEFFEYR L/T 5 Min Fluoro w Stills    Narrative    This exam was marked as non-reportable because it will not be read by a   radiologist or a [place] non-radiologist provider.           All imaging studies reviewed and interpreted personally by me       I reviewed and interpreted CT personally       Right distal ureteral stone fragment    Right proximal ureteral stone cluster    Attestation:  I have reviewed today's vital signs, notes, medications, labs and imaging.  Amount of time performed on this consult: 60 minutes.    Sorin Panda MD

## 2024-12-07 NOTE — ANESTHESIA PROCEDURE NOTES
Airway       Patient location during procedure: OR       Procedure Start/Stop Times: 12/6/2024 11:08 AM  Staff -        CRNA: Thom Leigh APRN CRNA       Performed By: CRNAIndications and Patient Condition       Indications for airway management: carmen-procedural and airway protection       Induction type:intravenous       Mask difficulty assessment: 0 - not attempted    Final Airway Details       Final airway type: supraglottic airway    Supraglottic Airway Details        Type: LMA       Brand: I-Gel       LMA size: 5    Post intubation assessment        Placement verified by: capnometry, equal breath sounds and chest rise        Number of attempts at approach: 1       Secured with: commercial tube estes       Ease of procedure: easy       Dentition: Intact    Medication(s) Administered   Medication Administration Time: 12/6/2024 11:08 AM

## 2024-12-07 NOTE — PROGRESS NOTES
PRIMARY DIAGNOSIS: POST SURGICAL PAIN MANAGEMENT  OUTPATIENT/OBSERVATION GOALS TO BE MET BEFORE DISCHARGE:  ADLs back to baseline: No    Activity and level of assistance: Up with standby assistance.    Pain status: Improved but still requiring IV narcotics.    Return to near baseline physical activity: Yes     Discharge Planner Nurse   Safe discharge environment identified: Yes  Barriers to discharge: Yes       Entered by: Letty Ogden RN 12/07/2024 3:05 AM     Please review provider order for any additional goals.   Nurse to notify provider when observation goals have been met and patient is ready for discharge.    [facility] care [id number]. A&Ox4. SBA when OOB. On RA and cpap for NOC use. On a regular diet. C/O moderate abdominal/back pain and given PRN Dilaudid x1 with reports of relief. Possible discharge home today.

## 2024-12-07 NOTE — ED PROVIDER NOTES
History     Chief Complaint:  Flank pain       HPI   Rey Andre is a 59 year old male with history of kidney stones and symptomatic ureteral stenosis who presents with right flank pain, nausea, vomiting.  The patient notes that his ureteral stent was removed earlier today and he shortly thereafter had onset of severe right flank pain, nausea, and vomiting.  Given persistence of symptoms, he spoke to his urology team who recommended he be further evaluated here and undergo lab and imaging evaluation for possible repeat stenting.  He denies fevers, chills, or any other concerns.        Independent Historian:   None    Review of External Notes:   Reviewed urology visit from yesterday    ROS:  Review of Systems    Allergies:  No Known Drug Allergy       Past History:    Past Medical History:   Diagnosis Date    Embolic stroke involving left middle cerebral artery (H) 03/2020    Hyperlipidemia     Hypertension     Hypothyroidism     Kidney stone     Sen's neuroma of right foot     Obese     DAMARIS on CPAP     Palpitations 03/06/2020    PFO (patent foramen ovale)     Sinus of Valsalva aneurysm          Physical Exam     Patient Vitals for the past 24 hrs:  Vitals:    12/07/24 0025 12/07/24 0030 12/07/24 0045 12/07/24 0128   BP: 138/79 138/79  134/80   BP Location:    Left arm   Cuff Size:    Adult Regular   Pulse: 80 81  80   Resp: 17 14 12 18   Temp: 97.3  F (36.3  C) 97.2  F (36.2  C)  98.1  F (36.7  C)   TempSrc:    Oral   SpO2: 96% 95%  94%   Weight:    126.1 kg (278 lb)   Height:             Physical Exam  Constitutional: Alert, attentive  HENT:    Nose: Nose normal.   Eyes: EOM are normal.   CV: regular rate and rhythm; no murmurs, rubs or gallups  Chest: Effort normal and breath sounds normal.   GI:  There is no tenderness. No distension. Normal bowel sounds  MSK: Normal range of motion.   Neurological: Alert, attentive  Skin: Skin is warm and dry.      Emergency Department Course       Results for orders  placed or performed during the hospital encounter of 12/06/24   Abd/pelvis CT no contrast - Stone Protocol     Status: None    Narrative    EXAM: CT ABDOMEN PELVIS W/O CONTRAST  LOCATION: [facility]  DATE: 12/6/2024    INDICATION: stent remvoved today, return severe pain  COMPARISON: 11/08/2024  TECHNIQUE: CT scan of the abdomen and pelvis was performed without IV contrast. Multiplanar reformats were obtained. Dose reduction techniques were used.  CONTRAST: None.    FINDINGS:   LOWER CHEST: Atrial septal occlusion device. Bibasilar atelectasis.    HEPATOBILIARY: Normal.    PANCREAS: Normal.    SPLEEN: Normal.    ADRENAL GLANDS: Normal.    KIDNEYS/BLADDER: Punctate bilateral renal calculi, more numerous on the right. Moderate right hydronephrosis. Right ureteral dilatation. 6 mm proximal right ureteral calculus. 2 mm mid ureteral calculus and 6 mm calculus at the pelvic inlet image 193.   Smaller calculi are seen in the distal ureter, UVJ and within the bladder. Bladder is under distended. Left parapelvic cysts.    BOWEL: Normal caliber. Normal appendix.    LYMPH NODES: Borderline lymph nodes at the nona hepatis. Haziness of small bowel mesentery with subcentimeter lymph nodes similar.    VASCULATURE: Mild atherosclerotic vascular calcification.    PELVIC ORGANS: Prominent prostate.    MUSCULOSKELETAL: Degenerative change osseous structures.      Impression    IMPRESSION:   1.  Moderate right hydroureteronephrosis. Multiple calculi are seen within the right ureter, largest 6 mm, with smaller calculi in the distal right ureter near the UVJ. Bladder calculi.  2.  Punctate, bilateral renal calculi.     XR Surgery JEFFERY L/T 5 Min Fluoro w Stills     Status: None    Narrative    This exam was marked as non-reportable because it will not be read by a   radiologist or a [place] non-radiologist provider.         [place] Draw     Status: None    Narrative    The following orders were created for panel  order [place] Draw.  Procedure                               Abnormality         Status                     ---------                               -----------         ------                     Extra Blue Top Tube[[id number]]                              Final result               Extra Red Top Tube[[id number]]                               Final result                 Please view results for these tests on the individual orders.   Basic metabolic panel (BMP)     Status: Abnormal   Result Value Ref Range    Sodium 142 135 - 145 mmol/L    Potassium 4.2 3.4 - 5.3 mmol/L    Chloride 107 98 - 107 mmol/L    Carbon Dioxide (CO2) 21 (L) 22 - 29 mmol/L    Anion Gap 14 7 - 15 mmol/L    Urea Nitrogen 20.6 8.0 - 23.0 mg/dL    Creatinine 1.03 0.67 - 1.17 mg/dL    GFR Estimate 83 >60 mL/min/1.73m2    Calcium 8.6 (L) 8.8 - 10.4 mg/dL    Glucose 98 70 - 99 mg/dL   Extra Blue Top Tube     Status: None   Result Value Ref Range    Hold Specimen JIC    Extra Red Top Tube     Status: None   Result Value Ref Range    Hold Specimen JIC    CBC with platelets and differential     Status: Abnormal   Result Value Ref Range    WBC Count 11.4 (H) 4.0 - 11.0 10e3/uL    RBC Count 4.95 4.40 - 5.90 10e6/uL    Hemoglobin 15.4 13.3 - 17.7 g/dL    Hematocrit 45.1 40.0 - 53.0 %    MCV 91 78 - 100 fL    MCH 31.1 26.5 - 33.0 pg    MCHC 34.1 31.5 - 36.5 g/dL    RDW 13.4 10.0 - 15.0 %    Platelet Count 226 150 - 450 10e3/uL    % Neutrophils 72 %    % Lymphocytes 15 %    % Monocytes 10 %    % Eosinophils 2 %    % Basophils 0 %    % Immature Granulocytes 1 %    NRBCs per 100 WBC 0 <1 /100    Absolute Neutrophils 8.2 1.6 - 8.3 10e3/uL    Absolute Lymphocytes 1.7 0.8 - 5.3 10e3/uL    Absolute Monocytes 1.2 0.0 - 1.3 10e3/uL    Absolute Eosinophils 0.2 0.0 - 0.7 10e3/uL    Absolute Basophils 0.0 0.0 - 0.2 10e3/uL    Absolute Immature Granulocytes 0.1 <=0.4 10e3/uL    Absolute NRBCs 0.0 10e3/uL   UA with Microscopic reflex to Culture     Status: Abnormal     Specimen: Urine, Clean Catch   Result Value Ref Range    Color Urine Yellow Colorless, Straw, Light Yellow, Yellow    Appearance Urine Slightly Cloudy (A) Clear    Glucose Urine Negative Negative mg/dL    Bilirubin Urine Negative Negative    Ketones Urine Negative Negative mg/dL    Specific Gravity Urine 1.026 1.003 - 1.035    Blood Urine Small (A) Negative    pH Urine 7.0 5.0 - 7.0    Protein Albumin Urine 30 (A) Negative mg/dL    Urobilinogen Urine Normal Normal, 2.0 mg/dL    Nitrite Urine Negative Negative    Leukocyte Esterase Urine Small (A) Negative    Bacteria Urine Few (A) None Seen /HPF    Mucus Urine Present (A) None Seen /LPF    RBC Urine 80 (H) <=2 /HPF    WBC Urine 43 (H) <=5 /HPF    Narrative    Urine Culture ordered based on laboratory criteria   CBC + differential     Status: Abnormal    Narrative    The following orders were created for panel order CBC + differential.  Procedure                               Abnormality         Status                     ---------                               -----------         ------                     CBC with platelets and d...[[id number]]  Abnormal            Final result                 Please view results for these tests on the individual orders.       Emergency Department Course & Assessments:             Interventions:  Dilaudid 0.5 mg IV  Toradol 15 mg IV  NS 1 liter      Consultations/Discussion of Management or Tests:  Discussed the case with urology  Discussed the case with hospital service       Disposition:  The patient was admitted to the hospital under the care of Dr. Haywood.     Impression & Plan        Medical Decision Making:  This a pleasant 59-year-old well-established with [facility] who presents for evaluation of significant right flank pain and vomiting post stent removal.  CT validates that he has ongoing kidney stone and hydronephrosis.  There is no convincing evidence of UTI, he is afebrile and nontoxic.  Renal function is normal.   He will be admitted to the OR for further cares and stenting by urology.  Plan observation stay thereafter to ensure pain control and continue supportive cares.        Diagnosis:  Visit Diagnosis, Associated Orders, and Comments     ICD-10-CM    1. Hydronephrosis of right kidney  N13.30 Case Request: CYSTOSCOPY, WITH RIGHT RETROGRADE PYELOGRAM AND RIGHT URETERAL STENT INSERTION     Case Request: CYSTOSCOPY, WITH RIGHT RETROGRADE PYELOGRAM AND RIGHT URETERAL STENT INSERTION           2. Right ureteral stone  N20.1 Case Request: CYSTOSCOPY, WITH RIGHT RETROGRADE PYELOGRAM AND RIGHT URETERAL STENT INSERTION     Case Request: CYSTOSCOPY, WITH RIGHT RETROGRADE PYELOGRAM AND RIGHT URETERAL STENT INSERTION           3. Kidney stone  N20.0       4. Ureteral stenosis  Q62.10            Chris Dawson MD  12/07/24 0144

## 2024-12-07 NOTE — ANESTHESIA CARE TRANSFER NOTE
Patient: Rey Andre    Procedure: Procedure(s):  CYSTOSCOPY, WITH RIGHT RETROGRADE PYELOGRAM AND RIGHT URETERAL STENT INSERTION, BLADDER STONE EXTRACTION       Diagnosis: Hydronephrosis of right kidney [N13.30]  Right ureteral stone [N20.1]  Diagnosis Additional Information: No value filed.    Anesthesia Type:   General     Note:    Oropharynx: oropharynx clear of all foreign objects and spontaneously breathing  Level of Consciousness: awake  Oxygen Supplementation: face mask  Level of Supplemental Oxygen (L/min / FiO2): 10  Independent Airway: airway patency satisfactory and stable  Dentition: dentition unchanged  Vital Signs Stable: post-procedure vital signs reviewed and stable  Report to RN Given: handoff report given  Patient transferred to: PACU    Handoff Report: Identifed the Patient, Identified the Reponsible Provider, Reviewed the pertinent medical history, Discussed the surgical course, Reviewed Intra-OP anesthesia mangement and issues during anesthesia, Set expectations for post-procedure period and Allowed opportunity for questions and acknowledgement of understanding      Vitals:  Vitals Value Taken Time   /81 12/06/24 2334   Temp 97.34  F (36.3  C) 12/06/24 2335   Pulse 90 12/06/24 2335   Resp 10 12/06/24 2335   SpO2 99 % 12/06/24 2335   Vitals shown include unfiled device data.    Electronically Signed By: CASSANDRA Cotton CRNA  December 6, 2024  11:36 PM

## 2024-12-07 NOTE — ED TRIAGE NOTES
Stent removed today, experiencing abdominal pain. Pt was told possible stricture of ureter post stent removal. Pt feels like he has another kidney stone. Pt presents with wife. Pt states has not been able to keep anything down due to pain. Pt still able to urinate. Pain radiates to testicle. Pain started at 1300 today. Stent was in for the past 3 weeks. Took percocet at 1700 and threw it up. 600mg ibuprofen at 1800. Nausea currently.

## 2024-12-07 NOTE — PROGRESS NOTES
An auto titrate CPAP with no O2 bleed in was applied to the pt via the mask for @noc use.  The skin in contact with the mask and straps looks good and intact. Pt is tolerating it well. RT will continue to monitor and assess the pt's respiratory status and needs.  RT/RN huddle to discuss contraindications prior to placement? Yes    Arabella Stacy, RT on 12/7/2024 at 6:08 AM       Report to christopher CASTRO

## 2024-12-08 LAB — BACTERIA UR CULT: NO GROWTH

## 2024-12-09 ENCOUNTER — PATIENT OUTREACH (OUTPATIENT)
Dept: CARE COORDINATION | Facility: CLINIC | Age: 59
End: 2024-12-09
Payer: COMMERCIAL

## 2024-12-09 DIAGNOSIS — N20.0 KIDNEY STONE: Primary | ICD-10-CM

## 2024-12-09 DIAGNOSIS — N20.1 URETERAL STONE: Primary | ICD-10-CM

## 2024-12-09 RX ORDER — ACETAMINOPHEN 325 MG/1
975 TABLET ORAL ONCE
OUTPATIENT
Start: 2024-12-09 | End: 2024-12-09

## 2024-12-09 NOTE — PROGRESS NOTES
Spoke on phone re: CT findings  He has the stent back in place and feels better now  He also has travel plans and plans on flying on December 20    In light of his upcoming travel plans I recommended that we schedule him for cystoscopy with right sided stent removal, right ureteroscopy with laser lithotripsy and stone basketing, possible right ureteral stent replacement.  We discussed the procedure along with its risks and he wishes to proceed.    If the procedure cannot be done until next week we might consider repeating a noncontrast CT scan this week as well.

## 2024-12-09 NOTE — PROGRESS NOTES
[facility]: Transitions of Care Outreach  Chief Complaint   Patient presents with    Clinic Care Coordination - Post Hospital       Most Recent Admission Date: 12/6/2024   Most Recent Admission Diagnosis: Hydronephrosis of right kidney - N13.30  Right ureteral stone - N20.1     Most Recent Discharge Date: 12/7/2024   Most Recent Discharge Diagnosis: Hydronephrosis of right kidney - N13.30  Right ureteral stone - N20.1  Kidney stone - N20.0  Ureteral stenosis - Q62.10     Transitions of Care Assessment    Discharge Assessment  How are you doing now that you are home?: I'm doing good, thanks for checking in  How are your symptoms? (Red Flag symptoms escalate to triage hotline per guidelines): Improved  Do you know how to contact your clinic care team if you have future questions or changes to your health status? : Yes  Does the patient have their discharge instructions? : Yes  Does the patient have questions regarding their discharge instructions? : No  Were you started on any new medications or were there changes to any of your previous medications? : Yes  Does the patient have all of their medications?: Yes  Do you have questions regarding any of your medications? : No  Do you have all of your needed medical supplies or equipment (DME)?  (i.e. oxygen tank, CPAP, cane, etc.): Yes              [facility] Explained and offered Care Coordination support to eligible patients: Yes    Patient accepted? No    Follow up Plan          Future Appointments   Date Time Provider Department Center   12/16/2024  1:40 PM [id number] JALEELMISHA Albarado [facility]   12/18/2024  9:00 AM Lerma, Francisco J Levar, MD [facility]   12/19/2024 10:00 AM Stan Tapia PA-C [facility]   1/7/2025  8:20 AM [id number] [facility]   1/8/2025  8:15 AM Lerma, Francisco J Levar, MD [facility]   1/28/2025  8:30 AM Melinda Hyman MD [facility]       Outpatient Plan as outlined on AVS reviewed with patient.    For any urgent  concerns, please contact our 24 hour nurse triage line: [phone] ([phone])       Uzma Urias  Community Health Worker  [facility]  [phone]

## 2024-12-16 ENCOUNTER — ANESTHESIA EVENT (OUTPATIENT)
Dept: SURGERY | Facility: CLINIC | Age: 59
End: 2024-12-16
Payer: COMMERCIAL

## 2024-12-16 ENCOUNTER — ANCILLARY PROCEDURE (OUTPATIENT)
Dept: CT IMAGING | Facility: CLINIC | Age: 59
End: 2024-12-16
Attending: UROLOGY
Payer: COMMERCIAL

## 2024-12-16 DIAGNOSIS — N20.0 KIDNEY STONE: ICD-10-CM

## 2024-12-16 PROCEDURE — 74176 CT ABD & PELVIS W/O CONTRAST: CPT

## 2024-12-16 ASSESSMENT — LIFESTYLE VARIABLES: TOBACCO_USE: 0

## 2024-12-16 ASSESSMENT — COPD QUESTIONNAIRES: COPD: 0

## 2024-12-17 ENCOUNTER — HOSPITAL ENCOUNTER (OUTPATIENT)
Facility: CLINIC | Age: 59
Discharge: HOME | End: 2024-12-17
Attending: UROLOGY | Admitting: UROLOGY
Payer: COMMERCIAL

## 2024-12-17 ENCOUNTER — ANESTHESIA (OUTPATIENT)
Dept: SURGERY | Facility: CLINIC | Age: 59
End: 2024-12-17
Payer: COMMERCIAL

## 2024-12-17 ENCOUNTER — APPOINTMENT (OUTPATIENT)
Dept: GENERAL RADIOLOGY | Facility: CLINIC | Age: 59
End: 2024-12-17
Attending: UROLOGY
Payer: COMMERCIAL

## 2024-12-17 VITALS
HEIGHT: 71 IN | OXYGEN SATURATION: 94 % | DIASTOLIC BLOOD PRESSURE: 72 MMHG | RESPIRATION RATE: 16 BRPM | SYSTOLIC BLOOD PRESSURE: 123 MMHG | BODY MASS INDEX: 38.22 KG/M2 | TEMPERATURE: 96.2 F | HEART RATE: 76 BPM | WEIGHT: 273 LBS

## 2024-12-17 PROCEDURE — 710N000012 HC RECOVERY PHASE 2, PER MINUTE: Performed by: UROLOGY

## 2024-12-17 PROCEDURE — 258N000003 HC RX IP 258 OP 636: Performed by: ANESTHESIOLOGY

## 2024-12-17 PROCEDURE — 360N000076 HC SURGERY LEVEL 3, PER MIN: Performed by: UROLOGY

## 2024-12-17 PROCEDURE — C1758 CATHETER, URETERAL: HCPCS | Performed by: UROLOGY

## 2024-12-17 PROCEDURE — 250N000011 HC RX IP 250 OP 636: Performed by: ANESTHESIOLOGY

## 2024-12-17 PROCEDURE — 272N000002 HC OR SUPPLY OTHER OPNP: Performed by: UROLOGY

## 2024-12-17 PROCEDURE — 370N000017 HC ANESTHESIA TECHNICAL FEE, PER MIN: Performed by: UROLOGY

## 2024-12-17 PROCEDURE — C1769 GUIDE WIRE: HCPCS | Performed by: UROLOGY

## 2024-12-17 PROCEDURE — 272N000001 HC OR GENERAL SUPPLY STERILE: Performed by: UROLOGY

## 2024-12-17 PROCEDURE — 250N000025 HC SEVOFLURANE, PER MIN: Performed by: UROLOGY

## 2024-12-17 PROCEDURE — 82365 CALCULUS SPECTROSCOPY: CPT | Performed by: UROLOGY

## 2024-12-17 PROCEDURE — 52356 CYSTO/URETERO W/LITHOTRIPSY: CPT | Mod: RT | Performed by: UROLOGY

## 2024-12-17 PROCEDURE — 74420 UROGRAPHY RTRGR +-KUB: CPT | Mod: 26 | Performed by: UROLOGY

## 2024-12-17 PROCEDURE — 250N000009 HC RX 250: Performed by: ANESTHESIOLOGY

## 2024-12-17 PROCEDURE — 710N000009 HC RECOVERY PHASE 1, LEVEL 1, PER MIN: Performed by: UROLOGY

## 2024-12-17 PROCEDURE — 250N000013 HC RX MED GY IP 250 OP 250 PS 637: Performed by: UROLOGY

## 2024-12-17 PROCEDURE — C2617 STENT, NON-COR, TEM W/O DEL: HCPCS | Performed by: UROLOGY

## 2024-12-17 PROCEDURE — 999N000141 HC STATISTIC PRE-PROCEDURE NURSING ASSESSMENT: Performed by: UROLOGY

## 2024-12-17 PROCEDURE — 250N000011 HC RX IP 250 OP 636: Performed by: UROLOGY

## 2024-12-17 PROCEDURE — 255N000002 HC RX 255 OP 636: Performed by: UROLOGY

## 2024-12-17 PROCEDURE — 999N000179 XR SURGERY CARM FLUORO LESS THAN 5 MIN W STILLS: Mod: TC

## 2024-12-17 DEVICE — URETERAL STENT
Type: IMPLANTABLE DEVICE | Site: URETER | Status: FUNCTIONAL
Brand: POLARIS™ ULTRA

## 2024-12-17 RX ORDER — ONDANSETRON 2 MG/ML
INJECTION INTRAMUSCULAR; INTRAVENOUS PRN
Status: DISCONTINUED | OUTPATIENT
Start: 2024-12-17 | End: 2024-12-17

## 2024-12-17 RX ORDER — FENTANYL CITRATE 0.05 MG/ML
50 INJECTION, SOLUTION INTRAMUSCULAR; INTRAVENOUS
Status: DISCONTINUED | OUTPATIENT
Start: 2024-12-17 | End: 2024-12-17 | Stop reason: HOSPADM

## 2024-12-17 RX ORDER — FENTANYL CITRATE 0.05 MG/ML
50 INJECTION, SOLUTION INTRAMUSCULAR; INTRAVENOUS EVERY 5 MIN PRN
Status: DISCONTINUED | OUTPATIENT
Start: 2024-12-17 | End: 2024-12-17 | Stop reason: HOSPADM

## 2024-12-17 RX ORDER — ACETAMINOPHEN 650 MG/1
650 SUPPOSITORY RECTAL ONCE
Status: COMPLETED | OUTPATIENT
Start: 2024-12-17 | End: 2024-12-17

## 2024-12-17 RX ORDER — SODIUM CHLORIDE, SODIUM LACTATE, POTASSIUM CHLORIDE, CALCIUM CHLORIDE 600; 310; 30; 20 MG/100ML; MG/100ML; MG/100ML; MG/100ML
INJECTION, SOLUTION INTRAVENOUS CONTINUOUS
Status: DISCONTINUED | OUTPATIENT
Start: 2024-12-17 | End: 2024-12-17 | Stop reason: HOSPADM

## 2024-12-17 RX ORDER — ONDANSETRON 2 MG/ML
4 INJECTION INTRAMUSCULAR; INTRAVENOUS EVERY 30 MIN PRN
Status: DISCONTINUED | OUTPATIENT
Start: 2024-12-17 | End: 2024-12-17 | Stop reason: HOSPADM

## 2024-12-17 RX ORDER — ONDANSETRON 4 MG/1
4 TABLET, ORALLY DISINTEGRATING ORAL EVERY 30 MIN PRN
Status: DISCONTINUED | OUTPATIENT
Start: 2024-12-17 | End: 2024-12-17 | Stop reason: HOSPADM

## 2024-12-17 RX ORDER — FENTANYL CITRATE 0.05 MG/ML
25 INJECTION, SOLUTION INTRAMUSCULAR; INTRAVENOUS EVERY 5 MIN PRN
Status: DISCONTINUED | OUTPATIENT
Start: 2024-12-17 | End: 2024-12-17 | Stop reason: HOSPADM

## 2024-12-17 RX ORDER — DEXAMETHASONE SODIUM PHOSPHATE 4 MG/ML
4 INJECTION, SOLUTION INTRA-ARTICULAR; INTRALESIONAL; INTRAMUSCULAR; INTRAVENOUS; SOFT TISSUE
Status: DISCONTINUED | OUTPATIENT
Start: 2024-12-17 | End: 2024-12-17 | Stop reason: HOSPADM

## 2024-12-17 RX ORDER — HYDROMORPHONE HCL IN WATER/PF 6 MG/30 ML
0.4 PATIENT CONTROLLED ANALGESIA SYRINGE INTRAVENOUS EVERY 5 MIN PRN
Status: DISCONTINUED | OUTPATIENT
Start: 2024-12-17 | End: 2024-12-17 | Stop reason: HOSPADM

## 2024-12-17 RX ORDER — PROPOFOL 10 MG/ML
INJECTION, EMULSION INTRAVENOUS PRN
Status: DISCONTINUED | OUTPATIENT
Start: 2024-12-17 | End: 2024-12-17

## 2024-12-17 RX ORDER — APREPITANT 40 MG/1
40 CAPSULE ORAL ONCE
Status: COMPLETED | OUTPATIENT
Start: 2024-12-17 | End: 2024-12-17

## 2024-12-17 RX ORDER — CEFAZOLIN SODIUM/WATER 3 G/30 ML
3 SYRINGE (ML) INTRAVENOUS
Status: COMPLETED | OUTPATIENT
Start: 2024-12-17 | End: 2024-12-17

## 2024-12-17 RX ORDER — CEFAZOLIN SODIUM/WATER 3 G/30 ML
3 SYRINGE (ML) INTRAVENOUS SEE ADMIN INSTRUCTIONS
Status: DISCONTINUED | OUTPATIENT
Start: 2024-12-17 | End: 2024-12-17 | Stop reason: HOSPADM

## 2024-12-17 RX ORDER — LIDOCAINE 40 MG/G
CREAM TOPICAL
Status: DISCONTINUED | OUTPATIENT
Start: 2024-12-17 | End: 2024-12-17 | Stop reason: HOSPADM

## 2024-12-17 RX ORDER — FENTANYL CITRATE 50 UG/ML
INJECTION, SOLUTION INTRAMUSCULAR; INTRAVENOUS PRN
Status: DISCONTINUED | OUTPATIENT
Start: 2024-12-17 | End: 2024-12-17

## 2024-12-17 RX ORDER — NALOXONE HYDROCHLORIDE 0.4 MG/ML
0.1 INJECTION, SOLUTION INTRAMUSCULAR; INTRAVENOUS; SUBCUTANEOUS
Status: DISCONTINUED | OUTPATIENT
Start: 2024-12-17 | End: 2024-12-17 | Stop reason: HOSPADM

## 2024-12-17 RX ORDER — LIDOCAINE HYDROCHLORIDE 20 MG/ML
INJECTION, SOLUTION INFILTRATION; PERINEURAL PRN
Status: DISCONTINUED | OUTPATIENT
Start: 2024-12-17 | End: 2024-12-17

## 2024-12-17 RX ORDER — FLUMAZENIL 0.1 MG/ML
0.2 INJECTION, SOLUTION INTRAVENOUS
Status: DISCONTINUED | OUTPATIENT
Start: 2024-12-17 | End: 2024-12-17 | Stop reason: HOSPADM

## 2024-12-17 RX ORDER — HYDROMORPHONE HCL IN WATER/PF 6 MG/30 ML
0.2 PATIENT CONTROLLED ANALGESIA SYRINGE INTRAVENOUS EVERY 5 MIN PRN
Status: DISCONTINUED | OUTPATIENT
Start: 2024-12-17 | End: 2024-12-17 | Stop reason: HOSPADM

## 2024-12-17 RX ORDER — ACETAMINOPHEN 325 MG/1
975 TABLET ORAL ONCE
Status: COMPLETED | OUTPATIENT
Start: 2024-12-17 | End: 2024-12-17

## 2024-12-17 RX ORDER — IOPAMIDOL 612 MG/ML
INJECTION, SOLUTION INTRAVASCULAR PRN
Status: DISCONTINUED | OUTPATIENT
Start: 2024-12-17 | End: 2024-12-17 | Stop reason: HOSPADM

## 2024-12-17 RX ORDER — DEXAMETHASONE SODIUM PHOSPHATE 4 MG/ML
INJECTION, SOLUTION INTRA-ARTICULAR; INTRALESIONAL; INTRAMUSCULAR; INTRAVENOUS; SOFT TISSUE PRN
Status: DISCONTINUED | OUTPATIENT
Start: 2024-12-17 | End: 2024-12-17

## 2024-12-17 RX ADMIN — Medication 3 G: at 07:30

## 2024-12-17 RX ADMIN — FENTANYL CITRATE 50 MCG: 50 INJECTION INTRAMUSCULAR; INTRAVENOUS at 07:50

## 2024-12-17 RX ADMIN — DEXAMETHASONE SODIUM PHOSPHATE 4 MG: 4 INJECTION, SOLUTION INTRA-ARTICULAR; INTRALESIONAL; INTRAMUSCULAR; INTRAVENOUS; SOFT TISSUE at 07:44

## 2024-12-17 RX ADMIN — FENTANYL CITRATE 50 MCG: 50 INJECTION, SOLUTION INTRAMUSCULAR; INTRAVENOUS at 08:46

## 2024-12-17 RX ADMIN — FENTANYL CITRATE 50 MCG: 50 INJECTION INTRAMUSCULAR; INTRAVENOUS at 07:31

## 2024-12-17 RX ADMIN — APREPITANT 40 MG: 40 CAPSULE ORAL at 07:21

## 2024-12-17 RX ADMIN — SUCCINYLCHOLINE CHLORIDE 120 MG: 20 INJECTION, SOLUTION INTRAMUSCULAR; INTRAVENOUS; PARENTERAL at 07:38

## 2024-12-17 RX ADMIN — ONDANSETRON 4 MG: 2 INJECTION INTRAMUSCULAR; INTRAVENOUS at 07:44

## 2024-12-17 RX ADMIN — ACETAMINOPHEN 975 MG: 325 TABLET, FILM COATED ORAL at 06:35

## 2024-12-17 RX ADMIN — PROPOFOL 200 MG: 10 INJECTION, EMULSION INTRAVENOUS at 07:38

## 2024-12-17 RX ADMIN — LIDOCAINE HYDROCHLORIDE 100 MG: 20 INJECTION, SOLUTION INFILTRATION; PERINEURAL at 07:38

## 2024-12-17 RX ADMIN — SODIUM CHLORIDE, POTASSIUM CHLORIDE, SODIUM LACTATE AND CALCIUM CHLORIDE: 600; 310; 30; 20 INJECTION, SOLUTION INTRAVENOUS at 06:46

## 2024-12-17 RX ADMIN — MIDAZOLAM 2 MG: 1 INJECTION INTRAMUSCULAR; INTRAVENOUS at 07:31

## 2024-12-17 RX ADMIN — FENTANYL CITRATE 50 MCG: 50 INJECTION, SOLUTION INTRAMUSCULAR; INTRAVENOUS at 08:58

## 2024-12-17 ASSESSMENT — ACTIVITIES OF DAILY LIVING (ADL)
ADLS_ACUITY_SCORE: 52

## 2024-12-17 ASSESSMENT — ENCOUNTER SYMPTOMS: DYSRHYTHMIAS: 0

## 2024-12-17 NOTE — ANESTHESIA CARE TRANSFER NOTE
Patient: Rey Andre    Procedure: Procedure(s):  Cystoscopy, right ureteral stent removal, right ureteroscopy with laser lithotripsy and stone basketing.  right ureteral stent replacement.       Diagnosis: Ureteral stone [N20.1]  Diagnosis Additional Information: No value filed.    Anesthesia Type:   General     Note:    Oropharynx: oropharynx clear of all foreign objects and spontaneously breathing  Level of Consciousness: awake  Oxygen Supplementation: room air    Independent Airway: airway patency satisfactory and stable  Dentition: dentition unchanged  Vital Signs Stable: post-procedure vital signs reviewed and stable  Report to RN Given: handoff report given  Patient transferred to: PACU    Handoff Report: Identifed the Patient, Identified the Reponsible Provider, Reviewed the pertinent medical history, Discussed the surgical course, Reviewed Intra-OP anesthesia mangement and issues during anesthesia, Set expectations for post-procedure period and Allowed opportunity for questions and acknowledgement of understanding      Vitals:  Vitals Value Taken Time   BP     Temp     Pulse     Resp     SpO2         Electronically Signed By: Annmarie Liu  December 17, 2024  8:33 AM

## 2024-12-17 NOTE — OP NOTE
OPERATIVE REPORT    PREOPERATIVE DIAGNOSIS: Right ureteral stones    POSTOPERATIVE DIAGNOSIS: Same    PROCEDURES PERFORMED: Cystoscopy, right ureteral stent exchange, right retrograde pyelogram, interpretation of fluoroscopic images, right ureteroscopy with holmium laser lithotripsy and stone basketing.    SURGEON: Francisco J Lerma M.D.    ANESTHESIA: General    COMPLICATIONS: None.     SIGNIFICANT FINDINGS:       BRIEF OPERATIVE INDICATIONS: Rey Andre is a(n) 59 year old male who presented with multiple right ureteral stones after lengthy ureteroscopy.  He now presents for removal of remaining stone fragments.  After a discussion of all risks, benefits, and alternatives, the patient elected to proceed with definitive stone management. The patient understands the potential need for more than one procedure to eliminate all stone burden.      DESCRIPTION OF PROCEDURE:  After informed consent was obtained, the patient was transported to the operating room & placed supine on the table. After adequate anesthesia was induced, the patient was placed in lithotomy and prepped and draped in the usual sterile fashion. A timeout was taken to confirm correct patient, procedure and laterality. Pre-operative IV antibiotics were administered.     I introduced the 22 [place]ian rigid cystoscope through the urethra into the bladder and performed cystoscopy.  The bladder was normal throughout.  I grasped the right sided stent and pulled it to the level of urethral meatus.  I cannulated the stent with a Glidewire and fluoroscopic guidance and removed the stent.  Alongside the Glidewire passed the rigid uteroscope through the urethra into bind up the right ureter.  There are multiple very small stones in the right ureter.  They were so small that they were difficult to basket out but I was able to basket out all of the smaller stones.  In the proximal ureter the patient still had an area of narrowing and there was 1 stone that I  could not get past the narrow area.  Therefore I used the 200  m holmium laser fiber trach up this stone into multiple fragments.  These fragments were then basketed out and placed in the bladder.  I could then performed rigid ureteroscopy all the way to the kidney and there were no stones remaining.  I injected contrast into the right kidney for a retrograde pyelogram and there are no filling defects or dilatations present.  I removed uteroscope.  I passed a 5 x 26 double-J ureteral stent over the wire.  The wire is pulled back and a good curl be seen in the renal pelvis with fluoroscopy.  I reinserted the cystoscope and a good curl was seen in the bladder with direct visualization.  I drained the stone fragments in the bladder and removed the scope.  The procedure was concluded at this point.    The patient tolerated procedure well without complications.  He went to the postanesthetic care unit in good condition.  He will go home from there.  I left a string on the stent and I will instruct the patient to remove his own stent by pulling on the string in 5 days.    Due to the narrowing of his ureter, I will also have the patient get a noncontrast CT scan in 6 months for follow-up.

## 2024-12-17 NOTE — ANESTHESIA POSTPROCEDURE EVALUATION
Patient: Rey Andre    Procedure: Procedure(s):  Cystoscopy, right ureteral stent removal, right ureteroscopy with laser lithotripsy and stone basketing.  right ureteral stent replacement.       Anesthesia Type:  General    Note:  Disposition: Outpatient   Postop Pain Control: Uneventful            Sign Out: Well controlled pain   PONV: No   Neuro/Psych: Uneventful            Sign Out: Acceptable/Baseline neuro status   Airway/Respiratory: Uneventful            Sign Out: Acceptable/Baseline resp. status   CV/Hemodynamics: Uneventful            Sign Out: Acceptable CV status; No obvious hypovolemia; No obvious fluid overload   Other NRE: NONE   DID A NON-ROUTINE EVENT OCCUR? No       Last vitals:  Vitals Value Taken Time   /78 12/17/24 0913   Temp 36.6  C (97.8  F) 12/17/24 0846   Pulse 76 12/17/24 0913   Resp 12 12/17/24 0913   SpO2 95 % 12/17/24 0913       Electronically Signed By: Rey Linda MD  December 17, 2024  1:52 PM

## 2024-12-17 NOTE — ANESTHESIA PREPROCEDURE EVALUATION
Anesthesia Pre-Procedure Evaluation    Patient: Rey Andre   MRN: [id number] : 1965        Procedure : Procedure(s):  Cystoscopy, right ureteral stent removal, right ureteroscopy with laser lithotripsy and stone basketing.  Possible right ureteral stent replacement.          Past Medical History:   Diagnosis Date     Embolic stroke involving left middle cerebral artery (H) 2020     Hyperlipidemia      Hypertension      Hypothyroidism      Kidney stone     Recurent stones     Sen's neuroma of right foot      Obese      DAMARIS on CPAP      Palpitations 2020     PFO (patent foramen ovale)      Sinus of Valsalva aneurysm       Past Surgical History:   Procedure Laterality Date     ARTHROPLASTY KNEE Right 2021    Procedure: RIGHT TOTAL KNEE ARTHROPLASTY;  Surgeon: Ebenezer Stewart MD;  Location:  OR     ARTHROPLASTY KNEE Left 2023    Procedure: Left Total Knee Arthroplasty;  Surgeon: Ebenezer Stewart MD;  Location:  OR     CV PFO CLOSURE N/A 2021    Procedure: Patent Foramen Ovale Closure; gore cardioform 25mm,  Surgeon: Chi Alvarenga MD;  Location:  [facility]     CYSTOSCOPY       CYSTOSCOPY BLADDER WITH STONE EXTRACTION N/A 2024    Procedure: Bladder stone extraction simple;  Surgeon: Sorin Panda MD;  Location:  OR     CYSTOSCOPY, RETROGRADES, COMBINED  2014    Procedure: COMBINED CYSTOSCOPY, RETROGRADES;  Surgeon: Francisco J Lerma MD;  Location:  OR     CYSTOSCOPY, RETROGRADES, COMBINED Right 2024    Procedure: Cystoscopy, retrogrades, combined;  Surgeon: Francisco J Lerma MD;  Location:  OR     CYSTOSCOPY, RETROGRADES, INSERT STENT URETER(S), COMBINED Right 2024    Procedure: Cystoscopy with right retrograde pyelogram, right ureteral stent insertion, fluoroscopic interpretation <1 hour physician time;  Surgeon: Sorin Panda MD;  Location:  OR     ENT SURGERY      T & A     EP LOOP RECORDER EXPLANT  N/A 1/23/2024    Procedure: Loop Recorder Removal;  Surgeon: Jarvis Best MD;  Location:  [facility]     EP LOOP RECORDER IMPLANT N/A 10/12/2020    Procedure: EP Loop Recorder Implant;  Surgeon: Lefty Ramires MD;  Location:  [facility]     IR CAROTID CEREBRAL ANGIOGRAM LEFT  03/05/2020     KNEE SURGERY Right     arthroscopic     LASER HOLMIUM LITHOTRIPSY URETER(S), INSERT STENT, COMBINED  07/27/2012    Procedure: COMBINED CYSTOSCOPY, URETEROSCOPY, LASER HOLMIUM LITHOTRIPSY URETER(S), INSERT STENT;  Video cystoscopy, Right Retrograde Right Ureteroscopy with Holmium Laser, Stone Extraction,  JJ Stent Placement ;  Surgeon: Francisco J Lerma MD;  Location: [facility]     LASER HOLMIUM LITHOTRIPSY URETER(S), INSERT STENT, COMBINED  05/24/2014    Procedure: COMBINED CYSTOSCOPY, URETEROSCOPY, LASER HOLMIUM LITHOTRIPSY URETER(S), INSERT STENT;  Surgeon: Francisco J Lerma MD;  Location:  OR     LASER HOLMIUM LITHOTRIPSY URETER(S), INSERT STENT, COMBINED Right 03/05/2017    Procedure: COMBINED CYSTOSCOPY, URETEROSCOPY, LASER HOLMIUM LITHOTRIPSY URETER(S), INSERT STENT;  Surgeon: Chris Barrios MD;  Location: [facility] OR     LASER HOLMIUM LITHOTRIPSY URETER(S), INSERT STENT, COMBINED Right 11/13/2024    Procedure: Cystoscopy, right retrograde pyelogram, right ureteroscopy, placement of right ureteral stent, interpretation of fluoroscopic images;  Surgeon: Francisco J Lerma MD;  Location: [facility]     LASER HOLMIUM LITHOTRIPSY URETER(S), INSERT STENT, COMBINED Right 11/29/2024    Procedure: Cystoscopy, right ureteral stent exchange, right ureteroscopy with laser lithotripsy and stone basketing;  Surgeon: Francisco J Lerma MD;  Location:  OR     ORTHOPEDIC SURGERY Right     Meniscal tear     ROTATOR CUFF REPAIR RT/LT      left shoulder     wisdom teeth        Allergies   Allergen Reactions     No Known Drug Allergy       Social History     Tobacco Use     Smoking  status: Never     Passive exposure: Never     Smokeless tobacco: Never   Substance Use Topics     Alcohol use: Not Currently      Wt Readings from Last 1 Encounters:   12/07/24 126.1 kg (278 lb)        Anesthesia Evaluation   Pt has had prior anesthetic. Type: General.    No history of anesthetic complications       ROS/MED HX  ENT/Pulmonary:     (+) sleep apnea, uses CPAP,                                   (-) tobacco use, asthma and COPD   Neurologic:     (+)          CVA, date: 2020, without deficits,  TIA,                  Cardiovascular: Comment: PFO closure    (+) Dyslipidemia hypertension- -   -  - -                                   (-) CAD, CHF and arrhythmias   METS/Exercise Tolerance:     Hematologic:       Musculoskeletal:       GI/Hepatic:    (-) GERD and liver disease   Renal/Genitourinary:     (+)       Nephrolithiasis ,    (-) renal disease   Endo:     (+)          thyroid problem, hypothyroidism,    Obesity,    (-) Type I DM and Type II DM   Psychiatric/Substance Use:       Infectious Disease:       Malignancy:       Other:            Physical Exam    Airway        Mallampati: II   TM distance: > 3 FB   Neck ROM: full   Mouth opening: > 3 cm    Respiratory Devices and Support         Dental       (+) Modest Abnormalities - crowns, retainers, 1 or 2 missing teeth      Cardiovascular          Rhythm and rate: regular and normal     Pulmonary   pulmonary exam normal            OUTSIDE LABS:  CBC:   Lab Results   Component Value Date    WBC 5.3 12/07/2024    WBC 11.4 (H) 12/06/2024    HGB 15.3 12/07/2024    HGB 15.4 12/06/2024    HCT 44.1 12/07/2024    HCT 45.1 12/06/2024     12/07/2024     12/06/2024     BMP:   Lab Results   Component Value Date     12/07/2024     12/06/2024    POTASSIUM 4.3 12/07/2024    POTASSIUM 4.2 12/06/2024    CHLORIDE 104 12/07/2024    CHLORIDE 107 12/06/2024    CO2 20 (L) 12/07/2024    CO2 21 (L) 12/06/2024    BUN 17.6 12/07/2024    BUN 20.6 12/06/2024  "   CR 0.84 12/07/2024    CR 1.03 12/06/2024     (H) 12/07/2024    GLC 98 12/06/2024     COAGS:   Lab Results   Component Value Date    PTT 27 05/06/2021    INR 0.95 05/06/2021     POC:   Lab Results   Component Value Date    BGM 92 03/06/2020     HEPATIC:   Lab Results   Component Value Date    ALBUMIN 4.2 02/01/2024    PROTTOTAL 6.8 02/01/2024    ALT 44 02/01/2024    AST 32 02/01/2024    ALKPHOS 88 02/01/2024    BILITOTAL 0.4 02/01/2024     OTHER:   Lab Results   Component Value Date    A1C 5.7 (H) 09/12/2024    DAMARIS 8.6 (L) 12/07/2024    PHOS 2.6 03/06/2020    MAG 2.1 03/06/2020    TSH 2.67 09/12/2024       Anesthesia Plan    ASA Status:  3    NPO Status:  NPO Appropriate    Anesthesia Type: General.     - Airway: ETT   Induction: Intravenous.   Maintenance: Balanced.        Consents    Anesthesia Plan(s) and associated risks, benefits, and realistic alternatives discussed. Questions answered and patient/representative(s) expressed understanding.     - Discussed:     - Discussed with:  Patient            Postoperative Care    Pain management: IV analgesics.   PONV prophylaxis: Ondansetron (or other 5HT-3), Dexamethasone or Solumedrol, Aprepitant     Comments:               Rey Linda MD    I have reviewed the pertinent notes and labs in the chart from the past 30 days and (re)examined the patient.  Any updates or changes from those notes are reflected in this note.                         # Obesity: Estimated body mass index is 38.77 kg/m  as calculated from the following:    Height as of 12/7/24: 1.803 m (5' 11\").    Weight as of 12/7/24: 126.1 kg (278 lb).             "

## 2024-12-17 NOTE — ANESTHESIA PROCEDURE NOTES
Airway       Patient location during procedure: OR       Procedure Start/Stop Times: 12/17/2024 7:40 AM  Staff -        Anesthesiologist:  Rey Linda MD       CRNA: Annmarie Liu       Performed By: CRNA  Consent for Airway        Urgency: elective  Indications and Patient Condition       Indications for airway management: carmen-procedural       Induction type:RSI       Mask difficulty assessment: 0 - not attempted    Final Airway Details       Final airway type: endotracheal airway       Successful airway: ETT - single and Oral  Endotracheal Airway Details        ETT size (mm): 8.0       Cuffed: yes       Successful intubation technique: video laryngoscopy       VL Blade Size: Glidescope 4       Grade View of Cords: 1       Adjucts: stylet       Position: Right       Measured from: gums/teeth       Secured at (cm): 23       Bite block used: None    Post intubation assessment        Placement verified by: capnometry, equal breath sounds and chest rise        Number of attempts at approach: 1       Number of other approaches attempted: 0       Secured with: tape       Ease of procedure: easy       Dentition: Intact and Unchanged    Medication(s) Administered   Medication Administration Time: 12/17/2024 7:40 AM

## 2024-12-17 NOTE — DISCHARGE INSTRUCTIONS
Today you were given 975 mg of Tylenol at 6:45 am. The recommended daily maximum dose is 4000 mg.        Cystoscopy, Holmium Laser with Stent Placement Discharge Instructions    Holmium laser lithotripsy was used to break up your kidney stone(s). It is normal to have visible blood in the urine, burning, urgency and frequency following this procedure. These symptoms may last for a few days to weeks.     Diet:  To help pass stone fragments and clear the blood out of the urine, it is important to drink 6-8 glasses of fluids per day at home - at least 3-4 glasses should be water.     Return to the diet that you were on before the procedure, unless you are given specific diet instructions.    Activity:  Walk short distances and increase as your strength allows.  You may climb stairs.  Do not do strenuous exercise or heavy lifting until approved by surgeon.  Do not drive while taking narcotic pain medications.    Bathing:  You may take a shower.          Stent information    During surgery, a stent was placed in the ureter.  The ureter is the tube that drains urine from the kidney to the bladder.  The stent is placed to dilate (open) the ureter so the stone fragments can pass easily through the ureter or to decrease ureteral swelling after surgery, or to relieve an obstruction. The stent is made of rubber. The upper end of the stent curls in the kidney while the lower end rests in the bladder.    While the stent is in place you may experience the following symptoms:  Blood and/or small blood clots in urine.  Bladder spasm (frequency and urgency of urination).  Discomfort or aching in the back or side where the stent is.  Burning or discomfort at the end of urine stream.    To decrease these symptoms you should:  Take pain medication as prescribed.  Drink plenty of fluids.  If you experience pain at the end of urination try not emptying your bladder completely.  If having discomfort in back or side, decrease  activity.    Call your physician if these signs/symptoms are present:  Pain that is not relieved by a short rest or ordered pain medications.  Temperature at or above 101.0 F or chills.  Inability or difficulty urinating.   Excessive blood in urine.  Any questions or concerns.      **Because you had anesthesia today and your history of sleep apnea, it is extremely important that you use your CPAP machine for the next 24 hours while napping or sleeping.**    Same Day Surgery Discharge Instructions for  Sedation and General Anesthesia     It's not unusual to feel dizzy, light-headed or faint for up to 24 hours after surgery or while taking pain medication.  If you have these symptoms: sit for a few minutes before standing and have someone assist you when you get up to walk or use the bathroom.    You should rest and relax for the next 24 hours. We recommend you make arrangements to have an adult stay with you for at least 24 hours after your discharge.  Avoid hazardous and strenuous activity.    DO NOT DRIVE any vehicle or operate mechanical equipment for 24 hours following the end of your surgery.  Even though you may feel normal, your reactions may be affected by the medication you have received.    Do not drink alcoholic beverages for 24 hours following surgery.     Slowly progress to your regular diet as you feel able. It's not unusual to feel nauseated and/or vomit after receiving anesthesia.  If you develop these symptoms, drink clear liquids (apple juice, ginger ale, broth, 7-up, etc. ) until you feel better.  If your nausea and vomiting persists for 24 hours, please notify your surgeon.      All narcotic pain medications, along with inactivity and anesthesia, can cause constipation. Drinking plenty of liquids and increasing fiber intake will help.    For any questions of a medical nature, call your surgeon.    Do not make important decisions for 24 hours.    If you had general anesthesia, you may have a sore  throat for a couple of days related to the breathing tube used during surgery.  You may use Cepacol lozenges to help with this discomfort.  If it worsens or if you develop a fever, contact your surgeon.     If you feel your pain is not well managed with the pain medications prescribed by your surgeon, please contact your surgeon's office to let them know so they can address your concerns.

## 2024-12-17 NOTE — OR NURSING
Patient took his zepbound on 12/12 and his ASA 12/16. Ok with  and WENDY Han to proceed with surgery.

## 2024-12-19 ENCOUNTER — VIRTUAL VISIT (OUTPATIENT)
Dept: FAMILY MEDICINE | Facility: CLINIC | Age: 59
End: 2024-12-19
Payer: COMMERCIAL

## 2024-12-19 DIAGNOSIS — N20.0 NEPHROLITHIASIS: ICD-10-CM

## 2024-12-19 DIAGNOSIS — E66.813 CLASS 3 OBESITY: Primary | ICD-10-CM

## 2024-12-19 NOTE — PROGRESS NOTES
Phillip is a 59 year old who is being evaluated via a billable video visit.    How would you like to obtain your AVS? MyChart  If the video visit is dropped, the invitation should be resent by: Text to cell phone: [phone]  Will anyone else be joining your video visit? No      Assessment & Plan     Class 3 obesity  Patient has noted significant weight loss since starting Zepbound and tolerating well.  This is aiding in his appetite and food noise.  Given percentage lost of 9.1% from original weight, to maintain current regimen and recheck in 6 months.  If weight reduction plateaus, consider increasing Zepbound in the future as long as no side effects developed.  - tirzepatide-Weight Management (ZEPBOUND) 5 MG/0.5ML prefilled pen; Inject 0.5 mLs (5 mg) subcutaneously every 7 days.  Medication use and side effects discussed with the patient. Patient is in complete understanding and agreement with plan.       Nephrolithiasis  Recent multiple ER visits and hospitalizations in relation to this.  Followed closely by urology.  Currently having no symptoms and follow-up as scheduled with urology.  Maintain follow-up as scheduled and monitor for any new or worsening symptoms.  If he is ever experienced to call urology department for instructions.        MED REC REQUIRED  Post Medication Reconciliation Status:  Discharge medications reconciled, continue medications without change        Subjective   Phillip is a 59 year old, presenting for the following health issues:  Recheck Medication and Hospital F/U        12/19/2024     9:32 AM   Additional Questions   Roomed by Yuridia CRESPO CMA   Accompanied by Self     History of Present Illness       Reason for visit:  Weight management prescription    He eats 2-3 servings of fruits and vegetables daily.He consumes 0 sweetened beverage(s) daily.He exercises with enough effort to increase his heart rate 20 to 29 minutes per day.  He exercises with enough effort to increase his heart rate 4  days per week.   He is taking medications regularly.          Hospital Follow-up Visit:    Hospital/Nursing Home/IP Rehab Facility: [facility]  Date of Admission: 12/6/24    Date of Discharge: 12/7/24  Reason(s) for Admission: Kidney stone  Was the patient in the ICU or did the patient experience delirium during hospitalization?  No  Do you have any other stressors you would like to discuss with your provider? No    Problems taking medications regularly:  None  Medication changes since discharge: None  Problems adhering to non-medication therapy:  None    Summary of hospitalization:  [facility] discharge summary reviewed  Diagnostic Tests/Treatments reviewed.  Follow up needed: none  Other Healthcare Providers Involved in Patient s Care:         Specialist appointment - urology scheduled.  Update since discharge: improved.     In summary in November patient suffered from acute kidney stone.  Stent was placed which resulted in secondary strictures.  Difficulty during cystoscopy procedures were seen and patient eventually returned to the ER on 6 December and was admitted for additional cystoscopy.  This was then repeated on 17 December and stent was replaced at this time.  He now is instructed to remove the stent on his own and has follow-up scheduled for urology in January.  He denies any current symptoms.  He is hopeful that his recent complications with kidney stones are resolved.  No fever or chills.    Plan of care communicated with patient       Patient also presents today to discuss weight management.  In September his original weight was 297 pounds with a BMI of 41.48.  This was as recent as September 12, 2024.  He presents today for follow-up and weight check.  He was started on Zepbound 2.5 mg for 1 month and increase to 5 mg.  He continues 5 mg with some side effects of mild dry stools but no constipation and mild nausea and headaches the day after taken but  "otherwise this resolves.  Overall tolerates well and he is happy with results.  His weight currently today is 270 pounds with a height of 5 foot 11 inches.  Putting his BMI at 37.66.        Objective    Vitals - Patient Reported  Weight (Patient Reported): 122.5 kg (270 lb)  Height (Patient Reported): 180.3 cm (5' 11\")  BMI (Based on Pt Reported Ht/Wt): 37.66        Physical Exam   GENERAL: alert and no distress  EYES: Eyes grossly normal to inspection.  No discharge or erythema, or obvious scleral/conjunctival abnormalities.  RESP: No audible wheeze, cough, or visible cyanosis.    SKIN: Visible skin clear. No significant rash, abnormal pigmentation or lesions.  NEURO: Cranial nerves grossly intact.  Mentation and speech appropriate for age.  PSYCH: Appropriate affect, tone, and pace of words          Video-Visit Details    Type of service:  Video Visit   Originating Location (pt. Location): Home    Distant Location (provider location):  On-site  Platform used for Video Visit: Edmar  Signed Electronically by: Stan Tapia PA-C    "

## 2024-12-20 LAB
APPEARANCE STONE: NORMAL
COMPN STONE: NORMAL
SPECIMEN WT: 41 MG

## 2025-01-02 ENCOUNTER — PATIENT OUTREACH (OUTPATIENT)
Dept: CARE COORDINATION | Facility: CLINIC | Age: 60
End: 2025-01-02
Payer: COMMERCIAL

## 2025-01-07 ENCOUNTER — HOSPITAL ENCOUNTER (OUTPATIENT)
Dept: CT IMAGING | Facility: CLINIC | Age: 60
Discharge: HOME | End: 2025-01-07
Attending: UROLOGY
Payer: COMMERCIAL

## 2025-01-07 DIAGNOSIS — N20.0 CALCULUS OF KIDNEY: ICD-10-CM

## 2025-01-07 PROCEDURE — 74176 CT ABD & PELVIS W/O CONTRAST: CPT

## 2025-01-08 ENCOUNTER — VIRTUAL VISIT (OUTPATIENT)
Dept: UROLOGY | Facility: CLINIC | Age: 60
End: 2025-01-08
Payer: COMMERCIAL

## 2025-01-08 DIAGNOSIS — N20.0 KIDNEY STONE: Primary | ICD-10-CM

## 2025-01-08 NOTE — NURSING NOTE
Current patient location: [place]    Is the patient currently in the state of [place]? YES    Visit mode:VIDEO    If the visit is dropped, the patient can be reconnected by:VIDEO VISIT: Send to e-mail at: [email or web address]    Will anyone else be joining the visit? NO  (If patient encounters technical issues they should call [phone]    Are changes needed to the allergy or medication list? No  Niesha reported no changes to e-check in information for visit. VF did not review e-check in information again with Niesha due to this.       Are refills needed on medications prescribed by this physician? Discuss with provider    Rooming Documentation:  Not applicable    Reason for visit: RECHECK    Rmoelia STALLINGS

## 2025-01-08 NOTE — PROGRESS NOTES
Office Visit Note  [facility]  [phone]    UROLOGIC DIAGNOSES:   Kidney stones  Proximal ureteral stricture    CURRENT INTERVENTIONS:   S/P ureteroscopy x 2    HISTORY:   Phillip underwent his second ureteroscopy on December 17 and he removed his stent 5 days later and he did well postoperatively.      PAST MEDICAL HISTORY:   Past Medical History:   Diagnosis Date    Embolic stroke involving left middle cerebral artery (H) 03/2020    Hyperlipidemia     Hypertension     Hypothyroidism     Kidney stone     Recurent stones    Sen's neuroma of right foot     Obese     DAMARIS on CPAP     Palpitations 03/06/2020    PFO (patent foramen ovale)     Sinus of Valsalva aneurysm        PAST SURGICAL HISTORY:   Past Surgical History:   Procedure Laterality Date    ARTHROPLASTY KNEE Right 12/7/2021    Procedure: RIGHT TOTAL KNEE ARTHROPLASTY;  Surgeon: Ebenezer Stewart MD;  Location:  OR    ARTHROPLASTY KNEE Left 9/25/2023    Procedure: Left Total Knee Arthroplasty;  Surgeon: Ebenezer Stewart MD;  Location:  OR    CV PFO CLOSURE N/A 05/06/2021    Procedure: Patent Foramen Ovale Closure; gore cardioform 25mm,  Surgeon: Chi Alvarenga MD;  Location: [facility] CARDIAC CATH LAB    CYSTOSCOPY      CYSTOSCOPY BLADDER WITH STONE EXTRACTION N/A 12/6/2024    Procedure: Bladder stone extraction simple;  Surgeon: Sorin Panda MD;  Location:  OR    CYSTOSCOPY, RETROGRADES, COMBINED  05/24/2014    Procedure: COMBINED CYSTOSCOPY, RETROGRADES;  Surgeon: Francisco J Lerma MD;  Location:  OR    CYSTOSCOPY, RETROGRADES, COMBINED Right 11/29/2024    Procedure: Cystoscopy, retrogrades, combined;  Surgeon: Francisco J Lerma MD;  Location:  OR    CYSTOSCOPY, RETROGRADES, COMBINED Right 12/17/2024    Procedure: Cystoscopy, retrogrades, combined;  Surgeon: Francisco J Lerma MD;  Location:  OR    CYSTOSCOPY, RETROGRADES, INSERT STENT URETER(S), COMBINED Right 12/6/2024    Procedure: Cystoscopy with  right retrograde pyelogram, right ureteral stent insertion, fluoroscopic interpretation <1 hour physician time;  Surgeon: Sorin Panda MD;  Location:  OR    ENT SURGERY      T & A    EP LOOP RECORDER EXPLANT N/A 1/23/2024    Procedure: Loop Recorder Removal;  Surgeon: Jarvis Best MD;  Location:  HEART CARDIAC CATH LAB    EP LOOP RECORDER IMPLANT N/A 10/12/2020    Procedure: EP Loop Recorder Implant;  Surgeon: Lefty Ramires MD;  Location:  HEART CARDIAC CATH LAB    IR CAROTID CEREBRAL ANGIOGRAM LEFT  03/05/2020    KNEE SURGERY Right     arthroscopic    LASER HOLMIUM LITHOTRIPSY URETER(S), INSERT STENT, COMBINED  07/27/2012    Procedure: COMBINED CYSTOSCOPY, URETEROSCOPY, LASER HOLMIUM LITHOTRIPSY URETER(S), INSERT STENT;  Video cystoscopy, Right Retrograde Right Ureteroscopy with Holmium Laser, Stone Extraction,  JJ Stent Placement ;  Surgeon: Francisco J Lerma MD;  Location: [facility] OR    LASER HOLMIUM LITHOTRIPSY URETER(S), INSERT STENT, COMBINED  05/24/2014    Procedure: COMBINED CYSTOSCOPY, URETEROSCOPY, LASER HOLMIUM LITHOTRIPSY URETER(S), INSERT STENT;  Surgeon: Francisco J Lerma MD;  Location: [facility] OR    LASER HOLMIUM LITHOTRIPSY URETER(S), INSERT STENT, COMBINED Right 03/05/2017    Procedure: COMBINED CYSTOSCOPY, URETEROSCOPY, LASER HOLMIUM LITHOTRIPSY URETER(S), INSERT STENT;  Surgeon: Chris Barrios MD;  Location: [facility] OR    LASER HOLMIUM LITHOTRIPSY URETER(S), INSERT STENT, COMBINED Right 11/13/2024    Procedure: Cystoscopy, right retrograde pyelogram, right ureteroscopy, placement of right ureteral stent, interpretation of fluoroscopic images;  Surgeon: Francisco J Lerma MD;  Location: [facility] OR    LASER HOLMIUM LITHOTRIPSY URETER(S), INSERT STENT, COMBINED Right 11/29/2024    Procedure: Cystoscopy, right ureteral stent exchange, right ureteroscopy with laser lithotripsy and stone basketing;  Surgeon: Francisco J Lerma MD;  Location: [facility] OR    LASER HOLMIUM  LITHOTRIPSY URETER(S), INSERT STENT, COMBINED Right 12/17/2024    Procedure: Cystoscopy, right ureteral stent removal, right ureteroscopy with laser lithotripsy and stone basketing.  right ureteral stent replacement.;  Surgeon: Francisco J Lerma MD;  Location: [facility] OR    ORTHOPEDIC SURGERY Right     Meniscal tear    ROTATOR CUFF REPAIR RT/LT      left shoulder    wisdom teeth         FAMILY HISTORY:   Family History   Problem Relation Age of Onset    Hyperlipidemia Mother         pulm emboli, IUD perforation and developed clot    Substance Abuse Mother     Other - See Comments Father         Possible Autoimmune disorder , DVT, pulm embolism    Prostate Cancer Father     Depression Father     Obesity Father     Breast Cancer Sister        SOCIAL HISTORY:   Social History     Tobacco Use    Smoking status: Never     Passive exposure: Never    Smokeless tobacco: Never   Substance Use Topics    Alcohol use: Not Currently       REVIEW OF SYSTEMS:  Skin: No rash, pruritis, or skin pigmentation  Eyes: No changes in vision  Ears/Nose/Throat: No changes in hearing, no nosebleeds  Respiratory: No shortness of breath, dyspnea on exertion, cough, or hemoptysis  Cardiovascular: No chest pain or palpitations  Gastrointestinal: No diarrhea or constipation. No abdominal pain. No hematochezia  Genitourinary: see HPI  Musculoskeletal: No pain or swelling of joints, normal range of motion  Neurologic: No weakness or tremors  Psychiatric: No recent changes in memory or mood  Hematologic/Lymphatic/Immunologic: No easy bruising or enlarged lymph nodes  Endocrine: No weight gain or loss      PHYSICAL EXAM:    General: Alert and oriented to time, place, and self. In NAD   HEENT: Head AT/NC, EOMI, CN Grossly intact   Lungs: no respiratory distress, or pursed lip breathing   Heart: No obvious jugular venous distension present   Musculoskeltal: Normal movements. Normal appearing musculature  Skin: no suspicious lesions or rashes   Neuro:  Alert, oriented, speech and mentation normal; moving all 4 extremities equally.   Psych: affect and mood normal    Imaging: I personally reviewed his CT scan images.  No hydronephrosis on either side.  Tiny submillimeter nonobstructing kidney stones in each side.    Urinalysis: UA RESULTS:  Recent Labs   Lab Test 12/06/24  7945   COLOR Yellow   APPEARANCE Slightly Cloudy*   URINEGLC Negative   URINEBILI Negative   URINEKETONE Negative   SG 1.026   UBLD Small*   URINEPH 7.0   PROTEIN 30*   NITRITE Negative   LEUKEST Small*   RBCU 80*   WBCU 43*       PSA:     Post Void Residual:     Other labs: None today      IMPRESSION:  Doing well    PLAN:  He is doing well with no recurrence of hydronephrosis and no recurrence of stones.  I recommended that in 6 months we check another CT scan because of concerns about a possible possible right ureteral stricture.      Francisco J Lerma M.D.              Virtual Visit Details    Video start time: 8:19 AM  Video end time: 8:24 AM    Type of service:  Video Visit     Originating Location (pt. Location): Home    Distant Location (provider location):  On-site  Platform used for Video Visit: Edmar

## 2025-01-08 NOTE — LETTER
1/8/2025       RE: Rey Andre  [address]  [place] [address]     Dear Colleague,    Thank you for referring your patient, Rey Andre, to the [facility] [place] at [facility]. Please see a copy of my visit note below.    Office Visit Note  [facility]  [phone]    UROLOGIC DIAGNOSES:   Kidney stones  Proximal ureteral stricture    CURRENT INTERVENTIONS:   S/P ureteroscopy x 2    HISTORY:   Phillip underwent his second ureteroscopy on December 17 and he removed his stent 5 days later and he did well postoperatively.      PAST MEDICAL HISTORY:   Past Medical History:   Diagnosis Date     Embolic stroke involving left middle cerebral artery (H) 03/2020     Hyperlipidemia      Hypertension      Hypothyroidism      Kidney stone     Recurent stones     Sen's neuroma of right foot      Obese      DAMARIS on CPAP      Palpitations 03/06/2020     PFO (patent foramen ovale)      Sinus of Valsalva aneurysm        PAST SURGICAL HISTORY:   Past Surgical History:   Procedure Laterality Date     ARTHROPLASTY KNEE Right 12/7/2021    Procedure: RIGHT TOTAL KNEE ARTHROPLASTY;  Surgeon: Ebenezer Stewart MD;  Location:  OR     ARTHROPLASTY KNEE Left 9/25/2023    Procedure: Left Total Knee Arthroplasty;  Surgeon: Ebenezer Stewart MD;  Location:  OR     CV PFO CLOSURE N/A 05/06/2021    Procedure: Patent Foramen Ovale Closure; gore cardioform 25mm,  Surgeon: Chi Alvarenga MD;  Location:  HEART CARDIAC CATH LAB     CYSTOSCOPY       CYSTOSCOPY BLADDER WITH STONE EXTRACTION N/A 12/6/2024    Procedure: Bladder stone extraction simple;  Surgeon: Sorin Panda MD;  Location:  OR     CYSTOSCOPY, RETROGRADES, COMBINED  05/24/2014    Procedure: COMBINED CYSTOSCOPY, RETROGRADES;  Surgeon: Francisco J Lerma MD;  Location:  OR     CYSTOSCOPY, RETROGRADES, COMBINED Right 11/29/2024    Procedure: Cystoscopy, retrogrades,  combined;  Surgeon: Francisco J Lerma MD;  Location:  OR     CYSTOSCOPY, RETROGRADES, COMBINED Right 12/17/2024    Procedure: Cystoscopy, retrogrades, combined;  Surgeon: Francisco J Lerma MD;  Location:  OR     CYSTOSCOPY, RETROGRADES, INSERT STENT URETER(S), COMBINED Right 12/6/2024    Procedure: Cystoscopy with right retrograde pyelogram, right ureteral stent insertion, fluoroscopic interpretation <1 hour physician time;  Surgeon: Sorin Panda MD;  Location:  OR     ENT SURGERY      T & A     EP LOOP RECORDER EXPLANT N/A 1/23/2024    Procedure: Loop Recorder Removal;  Surgeon: Jarvis Best MD;  Location:  HEART CARDIAC CATH LAB     EP LOOP RECORDER IMPLANT N/A 10/12/2020    Procedure: EP Loop Recorder Implant;  Surgeon: Lefty Ramires MD;  Location:  HEART CARDIAC CATH LAB     IR CAROTID CEREBRAL ANGIOGRAM LEFT  03/05/2020     KNEE SURGERY Right     arthroscopic     LASER HOLMIUM LITHOTRIPSY URETER(S), INSERT STENT, COMBINED  07/27/2012    Procedure: COMBINED CYSTOSCOPY, URETEROSCOPY, LASER HOLMIUM LITHOTRIPSY URETER(S), INSERT STENT;  Video cystoscopy, Right Retrograde Right Ureteroscopy with Holmium Laser, Stone Extraction,  JJ Stent Placement ;  Surgeon: Francisco J Lerma MD;  Location:  OR     LASER HOLMIUM LITHOTRIPSY URETER(S), INSERT STENT, COMBINED  05/24/2014    Procedure: COMBINED CYSTOSCOPY, URETEROSCOPY, LASER HOLMIUM LITHOTRIPSY URETER(S), INSERT STENT;  Surgeon: Francisco J Lerma MD;  Location:  OR     LASER HOLMIUM LITHOTRIPSY URETER(S), INSERT STENT, COMBINED Right 03/05/2017    Procedure: COMBINED CYSTOSCOPY, URETEROSCOPY, LASER HOLMIUM LITHOTRIPSY URETER(S), INSERT STENT;  Surgeon: Chris Barrios MD;  Location:  OR     LASER HOLMIUM LITHOTRIPSY URETER(S), INSERT STENT, COMBINED Right 11/13/2024    Procedure: Cystoscopy, right retrograde pyelogram, right ureteroscopy, placement of right ureteral stent, interpretation of fluoroscopic  images;  Surgeon: Francisco J Lerma MD;  Location: [facility] OR     LASER HOLMIUM LITHOTRIPSY URETER(S), INSERT STENT, COMBINED Right 11/29/2024    Procedure: Cystoscopy, right ureteral stent exchange, right ureteroscopy with laser lithotripsy and stone basketing;  Surgeon: Francisco J Lerma MD;  Location: [facility] OR     LASER HOLMIUM LITHOTRIPSY URETER(S), INSERT STENT, COMBINED Right 12/17/2024    Procedure: Cystoscopy, right ureteral stent removal, right ureteroscopy with laser lithotripsy and stone basketing.  right ureteral stent replacement.;  Surgeon: Francisco J Lerma MD;  Location: [facility] OR     ORTHOPEDIC SURGERY Right     Meniscal tear     ROTATOR CUFF REPAIR RT/LT      left shoulder     wisdom teeth         FAMILY HISTORY:   Family History   Problem Relation Age of Onset     Hyperlipidemia Mother         pulm emboli, IUD perforation and developed clot     Substance Abuse Mother      Other - See Comments Father         Possible Autoimmune disorder , DVT, pulm embolism     Prostate Cancer Father      Depression Father      Obesity Father      Breast Cancer Sister        SOCIAL HISTORY:   Social History     Tobacco Use     Smoking status: Never     Passive exposure: Never     Smokeless tobacco: Never   Substance Use Topics     Alcohol use: Not Currently       REVIEW OF SYSTEMS:  Skin: No rash, pruritis, or skin pigmentation  Eyes: No changes in vision  Ears/Nose/Throat: No changes in hearing, no nosebleeds  Respiratory: No shortness of breath, dyspnea on exertion, cough, or hemoptysis  Cardiovascular: No chest pain or palpitations  Gastrointestinal: No diarrhea or constipation. No abdominal pain. No hematochezia  Genitourinary: see HPI  Musculoskeletal: No pain or swelling of joints, normal range of motion  Neurologic: No weakness or tremors  Psychiatric: No recent changes in memory or mood  Hematologic/Lymphatic/Immunologic: No easy bruising or enlarged lymph nodes  Endocrine: No weight gain or  loss      PHYSICAL EXAM:    General: Alert and oriented to time, place, and self. In NAD   HEENT: Head AT/NC, EOMI, CN Grossly intact   Lungs: no respiratory distress, or pursed lip breathing   Heart: No obvious jugular venous distension present   Musculoskeltal: Normal movements. Normal appearing musculature  Skin: no suspicious lesions or rashes   Neuro: Alert, oriented, speech and mentation normal; moving all 4 extremities equally.   Psych: affect and mood normal    Imaging: I personally reviewed his CT scan images.  No hydronephrosis on either side.  Tiny submillimeter nonobstructing kidney stones in each side.    Urinalysis: UA RESULTS:  Recent Labs   Lab Test 12/06/24  2155   COLOR Yellow   APPEARANCE Slightly Cloudy*   URINEGLC Negative   URINEBILI Negative   URINEKETONE Negative   SG 1.026   UBLD Small*   URINEPH 7.0   PROTEIN 30*   NITRITE Negative   LEUKEST Small*   RBCU 80*   WBCU 43*       PSA:     Post Void Residual:     Other labs: None today      IMPRESSION:  Doing well    PLAN:  He is doing well with no recurrence of hydronephrosis and no recurrence of stones.  I recommended that in 6 months we check another CT scan because of concerns about a possible possible right ureteral stricture.      Francisco J Lerma M.D.              Virtual Visit Details    Video start time: 8:19 AM  Video end time: 8:24 AM    Type of service:  Video Visit     Originating Location (pt. Location): Home    Distant Location (provider location):  On-site  Platform used for Video Visit: Edmar      Again, thank you for allowing me to participate in the care of your patient.      Sincerely,    Francisco J Lerma MD

## 2025-01-16 ENCOUNTER — PATIENT OUTREACH (OUTPATIENT)
Dept: CARE COORDINATION | Facility: CLINIC | Age: 60
End: 2025-01-16
Payer: COMMERCIAL

## 2025-03-02 ENCOUNTER — HEALTH MAINTENANCE LETTER (OUTPATIENT)
Age: 60
End: 2025-03-02

## 2025-03-17 DIAGNOSIS — I71.20 THORACIC AORTIC ANEURYSM WITHOUT RUPTURE: ICD-10-CM

## 2025-03-17 DIAGNOSIS — I63.412 CEREBROVASCULAR ACCIDENT (CVA) DUE TO EMBOLISM OF LEFT MIDDLE CEREBRAL ARTERY (H): ICD-10-CM

## 2025-03-17 DIAGNOSIS — Q21.12 PFO (PATENT FORAMEN OVALE): ICD-10-CM

## 2025-03-17 DIAGNOSIS — E78.5 HYPERLIPIDEMIA LDL GOAL <70: ICD-10-CM

## 2025-03-17 RX ORDER — METOPROLOL SUCCINATE 50 MG/1
50 TABLET, EXTENDED RELEASE ORAL DAILY
Qty: 90 TABLET | Refills: 1 | Status: SHIPPED | OUTPATIENT
Start: 2025-03-17

## 2025-03-19 DIAGNOSIS — E03.9 HYPOTHYROIDISM, UNSPECIFIED TYPE: ICD-10-CM

## 2025-03-19 RX ORDER — LEVOTHYROXINE SODIUM 150 UG/1
150 TABLET ORAL DAILY
Qty: 90 TABLET | Refills: 0 | Status: SHIPPED | OUTPATIENT
Start: 2025-03-19

## 2025-04-02 DIAGNOSIS — I63.412 CEREBROVASCULAR ACCIDENT (CVA) DUE TO EMBOLISM OF LEFT MIDDLE CEREBRAL ARTERY (H): ICD-10-CM

## 2025-04-02 RX ORDER — ATORVASTATIN CALCIUM 20 MG/1
20 TABLET, FILM COATED ORAL DAILY
Qty: 90 TABLET | Refills: 1 | Status: SHIPPED | OUTPATIENT
Start: 2025-04-02

## 2025-05-17 ENCOUNTER — MYC MEDICAL ADVICE (OUTPATIENT)
Dept: FAMILY MEDICINE | Facility: CLINIC | Age: 60
End: 2025-05-17
Payer: COMMERCIAL

## 2025-05-17 DIAGNOSIS — E66.813 CLASS 3 OBESITY (H): Primary | ICD-10-CM

## 2025-06-17 DIAGNOSIS — E03.9 HYPOTHYROIDISM, UNSPECIFIED TYPE: ICD-10-CM

## 2025-06-18 RX ORDER — LEVOTHYROXINE SODIUM 150 UG/1
150 TABLET ORAL DAILY
Qty: 90 TABLET | Refills: 0 | Status: SHIPPED | OUTPATIENT
Start: 2025-06-18

## 2025-07-12 DIAGNOSIS — E66.813 CLASS 3 OBESITY (H): ICD-10-CM

## 2025-07-14 RX ORDER — TIRZEPATIDE 7.5 MG/.5ML
INJECTION, SOLUTION SUBCUTANEOUS
Qty: 6 ML | Refills: 1 | Status: SHIPPED | OUTPATIENT
Start: 2025-07-14

## 2025-07-25 ENCOUNTER — TELEPHONE (OUTPATIENT)
Dept: SLEEP MEDICINE | Facility: CLINIC | Age: 60
End: 2025-07-25
Payer: COMMERCIAL

## 2025-07-25 ENCOUNTER — HOSPITAL ENCOUNTER (OUTPATIENT)
Dept: CT IMAGING | Facility: CLINIC | Age: 60
Discharge: HOME | End: 2025-07-25
Attending: UROLOGY | Admitting: UROLOGY
Payer: COMMERCIAL

## 2025-07-25 DIAGNOSIS — N20.0 KIDNEY STONE: ICD-10-CM

## 2025-07-25 PROCEDURE — 74176 CT ABD & PELVIS W/O CONTRAST: CPT

## 2025-07-25 NOTE — TELEPHONE ENCOUNTER
General Call    Contacts       Contact Date/Time Type Contact Phone/Fax    07/25/2025 03:21 PM CDT Phone (Incoming) Phillip Andre (Self) [phone] (M)     Requesting CPAP supplies renewal before next scheduled appointment          Reason for Call:  CPAP Supplies    What are your questions or concerns:  Patient is requesting a new supplies order for CPAP be placed since he is needing a new mask, hose, filters, etc.     Was previously with [facility] but has not been able to re-establish anywhere else.    Date of last appointment with provider: N/A    Could we send this information to you in Earbits or would you prefer to receive a phone call?:   Patient would prefer a phone call   Okay to leave a detailed message?: Yes at Cell number on file:    Telephone Information:   Mobile [phone]

## 2025-07-28 ENCOUNTER — MYC MEDICAL ADVICE (OUTPATIENT)
Dept: SLEEP MEDICINE | Facility: CLINIC | Age: 60
End: 2025-07-28
Payer: COMMERCIAL

## 2025-07-28 DIAGNOSIS — G47.33 OSA (OBSTRUCTIVE SLEEP APNEA): Primary | ICD-10-CM

## 2025-07-31 ENCOUNTER — PATIENT OUTREACH (OUTPATIENT)
Dept: CARE COORDINATION | Facility: CLINIC | Age: 60
End: 2025-07-31
Payer: COMMERCIAL

## 2025-07-31 NOTE — TELEPHONE ENCOUNTER
Patient seeking order for supplies, LOV 2021, has follow up scheduled in October.  Pended for provider consideration.

## (undated) DEVICE — KIT HAND CONTROL ANGIOTOUCH ACIST 65CM AT-P65

## (undated) DEVICE — SOL WATER IRRIG 3000ML BAG 2B7117

## (undated) DEVICE — BONE CLEANING TIP INTERPULSE  0210-010-000

## (undated) DEVICE — TUBING IRRIG TUR Y TYPE 96" LF 6543-01

## (undated) DEVICE — SOL WATER IRRIG 1000ML BOTTLE 2F7114

## (undated) DEVICE — DECANTER BAG 2002S

## (undated) DEVICE — PACK CYSTO CUSTOM RIDGES

## (undated) DEVICE — SUCTION IRR SYSTEM W/O TIP INTERPULSE HANDPIECE 0210-100-000

## (undated) DEVICE — AQUACEL

## (undated) DEVICE — INTRODUCER SHEATH GREEN 6.5FRX11CM .038IN PSI-6F-11-038ACT

## (undated) DEVICE — BLADE SAW SAGITTAL STRK 18X90X1.19MM HD SYS 6 6118-119-090

## (undated) DEVICE — SYR 50ML LL W/O NDL 309653

## (undated) DEVICE — LINEN FULL SHEET 5511

## (undated) DEVICE — SU MONOCRYL 2-0 CT-2 27" Y333H

## (undated) DEVICE — PREP DYNA-HEX 4% CHG SCRUB 4OZ BOTTLE MDS098710

## (undated) DEVICE — TORQUE DEVICE

## (undated) DEVICE — GLOVE BIOGEL PI MICRO INDICATOR UNDERGLOVE SZ 7.5 48975

## (undated) DEVICE — DRSG AQUACEL AG 3.5X9.75" HYDROFIBER 412011

## (undated) DEVICE — GLOVE BIOGEL PI MICRO SZ 7.0 48570

## (undated) DEVICE — TOTE ANGIO CORP PC15AT SAN32CC83O

## (undated) DEVICE — GUIDEWIRE URO STR STIFF .035"X150CM NITINOL 150NSS35

## (undated) DEVICE — WRAP EZY KNEE 1213PP

## (undated) DEVICE — BONE CEMENT MIXEVAC III HI VAC KIT  0206-015-000

## (undated) DEVICE — SOL NACL 0.9% IRRIG 3000ML BAG 2B7477

## (undated) DEVICE — Device

## (undated) DEVICE — CATH URETERAL FLEX TIP TIGERTAIL 06FRX70CM 139006

## (undated) DEVICE — LINEN TOWEL PACK X5 5464

## (undated) DEVICE — HOLMIUM LASER HIGH WATTAGE

## (undated) DEVICE — SYR 20ML LL W/O NDL 302830

## (undated) DEVICE — GOWN IMPERVIOUS SPECIALTY XL/XLONG 39049

## (undated) DEVICE — DRSG ABDOMINAL 07 1/2X8" 7197D

## (undated) DEVICE — GUIDEWIRE ZIPWIRE ANG STIFF .035"X150CM M006630223B0

## (undated) DEVICE — COVER FOOTSWITCH W/CINCH 20X24" 923267

## (undated) DEVICE — RAD RX ISOVUE 300 (50ML) 61% IOPAMIDOL CHARGE PER ML

## (undated) DEVICE — BAG CLEAR TRASH 1.3M 39X33" P4040C

## (undated) DEVICE — GLOVE BIOGEL PI ULTRATOUCH SZ 7.5 41175

## (undated) DEVICE — DRSG AQUACEL AG HYDROFIBER  3.5X10" 422605

## (undated) DEVICE — SU VICRYL 0 CT-1 CR 8X18" J740D

## (undated) DEVICE — GLOVE BIOGEL PI MICRO INDICATOR UNDERGLOVE SZ 7.0 48970

## (undated) DEVICE — LINEN HALF SHEET 5512

## (undated) DEVICE — CAST PADDING 6" STERILE 9046S

## (undated) DEVICE — NDL SPINAL 18GA 3.5" 405184

## (undated) DEVICE — DRAPE SHEET REV FOLD 3/4 9349

## (undated) DEVICE — FIBER LASER 200 UM DISPOSABLE TFL-FBX200S

## (undated) DEVICE — DRAPE CONVERTORS U-DRAPE 60X72" 8476

## (undated) DEVICE — BASKET RETRIEVAL 1.9FRX120CM ESCAPE NTNL 4 WIRE 390-201

## (undated) DEVICE — CONTRAST ISOVUE 300 61% IOPAMIDOL 10X30ML VIAL 131525

## (undated) DEVICE — BASKET STONE RETRIEVAL NTNL ZERO TIP 1.9FRX90CM M0063901050

## (undated) DEVICE — MANIFOLD NEPTUNE 4 PORT 700-20

## (undated) DEVICE — PAD CHUX UNDERPAD 30X36" P3036C

## (undated) DEVICE — SOL NACL 0.9% IRRIG 1000ML BOTTLE 2F7124

## (undated) DEVICE — PREP CHLORAPREP 26ML TINTED ORANGE  260815

## (undated) DEVICE — GLOVE BIOGEL PI SZ 8.5 40885

## (undated) DEVICE — CLOSURE DEVICE 6FR VASC PROGLIDE MEDICATED SUTURE 12673-03

## (undated) DEVICE — NDL PERC ENTRY THINWALL 18GA 7.0" G00166

## (undated) DEVICE — SOLUTION WOUND CLEANSING 3/4OZ 10% PVP EA-L3011FB-50

## (undated) DEVICE — BLADE SAW RECIP STRK 70X12.5X1.2MM 0277-096-281

## (undated) DEVICE — PREP SCRUB CARE CHLOROXYLENOL (PCMX) 4OZ 29902-004

## (undated) DEVICE — SUTURE STRATAFIX SPIRAL 3-0 SXMD2B412

## (undated) DEVICE — GLOVE BIOGEL PI MICRO INDICATOR UNDERGLOVE SZ 8.5 48985

## (undated) DEVICE — ESU GROUND PAD UNIVERSAL W/O CORD

## (undated) DEVICE — WIRE GUIDE 0.035"X260CM AMPTLAZ XSTIFF CVD THSCF-35-260-3-A

## (undated) DEVICE — LASER FIBER HOLMIUM MOSES 200 D/F/L AC-10030100

## (undated) DEVICE — FIBER LASER THULIUM 365 UM DISPOSABLE TFL-FBX365S

## (undated) DEVICE — GLOVE PROTEXIS W/NEU-THERA 8.0  2D73TE80

## (undated) DEVICE — PAD CHUX UNDERPAD 23X24" 7136

## (undated) DEVICE — BAG DRAIN URO FOR SIEMENS 8MM ADAPTER NS CC164NS-A

## (undated) DEVICE — SYR 30ML LL W/O NDL 302832

## (undated) DEVICE — DEFIB PRO-PADZ LVP LQD GEL ADULT 8900-2105-01

## (undated) DEVICE — GUIDEWIRE SENSOR DUAL FLEX STR 0.035"X150CM M0066703080

## (undated) DEVICE — GLOVE PROTEXIS POWDER FREE 8.5 ORTHOPEDIC 2D73ET85

## (undated) DEVICE — PACK TOTAL KNEE SOP15TKFSD

## (undated) DEVICE — DRAPE SLEEVE MEDT STERILE 10FT 6177

## (undated) DEVICE — INTRODUCER SHEATH 12FRX12CM FAST-CATH 406128

## (undated) DEVICE — GLOVE PROTEXIS MICRO 8.0  2D73PM80

## (undated) DEVICE — GLOVE PROTEXIS W/NEU-THERA 7.5  2D73TE75

## (undated) DEVICE — GLOVE PROTEXIS BLUE W/NEU-THERA 8.5  2D73EB85

## (undated) DEVICE — CATH ANGIO 2 SH THNWL 6FR

## (undated) DEVICE — PACK TUR CUSTOM SBA15RUFSE

## (undated) DEVICE — SU ETHICON STRATAFIX SPR PS 3-0 30X30CM SXMP2B412

## (undated) RX ORDER — CEFAZOLIN SODIUM IN 0.9 % NACL 3 G/100 ML
INTRAVENOUS SOLUTION, PIGGYBACK (ML) INTRAVENOUS
Status: DISPENSED
Start: 2021-12-07

## (undated) RX ORDER — PROPOFOL 10 MG/ML
INJECTION, EMULSION INTRAVENOUS
Status: DISPENSED
Start: 2024-11-29

## (undated) RX ORDER — FENTANYL CITRATE 50 UG/ML
INJECTION, SOLUTION INTRAMUSCULAR; INTRAVENOUS
Status: DISPENSED
Start: 2020-10-12

## (undated) RX ORDER — PROPOFOL 10 MG/ML
INJECTION, EMULSION INTRAVENOUS
Status: DISPENSED
Start: 2023-09-25

## (undated) RX ORDER — DEXAMETHASONE SODIUM PHOSPHATE 4 MG/ML
INJECTION, SOLUTION INTRA-ARTICULAR; INTRALESIONAL; INTRAMUSCULAR; INTRAVENOUS; SOFT TISSUE
Status: DISPENSED
Start: 2023-09-25

## (undated) RX ORDER — DEXAMETHASONE SODIUM PHOSPHATE 4 MG/ML
INJECTION, SOLUTION INTRA-ARTICULAR; INTRALESIONAL; INTRAMUSCULAR; INTRAVENOUS; SOFT TISSUE
Status: DISPENSED
Start: 2024-12-06

## (undated) RX ORDER — LIDOCAINE HYDROCHLORIDE 10 MG/ML
INJECTION, SOLUTION EPIDURAL; INFILTRATION; INTRACAUDAL; PERINEURAL
Status: DISPENSED
Start: 2024-12-06

## (undated) RX ORDER — FENTANYL CITRATE 0.05 MG/ML
INJECTION, SOLUTION INTRAMUSCULAR; INTRAVENOUS
Status: DISPENSED
Start: 2024-12-17

## (undated) RX ORDER — ONDANSETRON 2 MG/ML
INJECTION INTRAMUSCULAR; INTRAVENOUS
Status: DISPENSED
Start: 2024-12-17

## (undated) RX ORDER — LIDOCAINE HYDROCHLORIDE 40 MG/ML
SOLUTION TOPICAL
Status: DISPENSED
Start: 2020-06-05

## (undated) RX ORDER — FENTANYL CITRATE 50 UG/ML
INJECTION, SOLUTION INTRAMUSCULAR; INTRAVENOUS
Status: DISPENSED
Start: 2017-03-05

## (undated) RX ORDER — GLYCOPYRROLATE 0.2 MG/ML
INJECTION, SOLUTION INTRAMUSCULAR; INTRAVENOUS
Status: DISPENSED
Start: 2024-11-29

## (undated) RX ORDER — ACETAMINOPHEN 325 MG/1
TABLET ORAL
Status: DISPENSED
Start: 2024-12-17

## (undated) RX ORDER — DEXAMETHASONE SODIUM PHOSPHATE 4 MG/ML
INJECTION, SOLUTION INTRA-ARTICULAR; INTRALESIONAL; INTRAMUSCULAR; INTRAVENOUS; SOFT TISSUE
Status: DISPENSED
Start: 2024-11-13

## (undated) RX ORDER — FENTANYL CITRATE 50 UG/ML
INJECTION, SOLUTION INTRAMUSCULAR; INTRAVENOUS
Status: DISPENSED
Start: 2024-11-29

## (undated) RX ORDER — PROPOFOL 10 MG/ML
INJECTION, EMULSION INTRAVENOUS
Status: DISPENSED
Start: 2017-03-05

## (undated) RX ORDER — ONDANSETRON 2 MG/ML
INJECTION INTRAMUSCULAR; INTRAVENOUS
Status: DISPENSED
Start: 2024-11-13

## (undated) RX ORDER — BUPIVACAINE HYDROCHLORIDE 2.5 MG/ML
INJECTION, SOLUTION EPIDURAL; INFILTRATION; INTRACAUDAL
Status: DISPENSED
Start: 2021-05-06

## (undated) RX ORDER — FENTANYL CITRATE 50 UG/ML
INJECTION, SOLUTION INTRAMUSCULAR; INTRAVENOUS
Status: DISPENSED
Start: 2024-12-06

## (undated) RX ORDER — FENTANYL CITRATE-0.9 % NACL/PF 10 MCG/ML
PLASTIC BAG, INJECTION (ML) INTRAVENOUS
Status: DISPENSED
Start: 2024-11-13

## (undated) RX ORDER — ACETAMINOPHEN 325 MG/1
TABLET ORAL
Status: DISPENSED
Start: 2024-11-13

## (undated) RX ORDER — CEFAZOLIN SODIUM/WATER 3 G/30 ML
SYRINGE (ML) INTRAVENOUS
Status: DISPENSED
Start: 2023-09-25

## (undated) RX ORDER — DEXAMETHASONE SODIUM PHOSPHATE 4 MG/ML
INJECTION, SOLUTION INTRA-ARTICULAR; INTRALESIONAL; INTRAMUSCULAR; INTRAVENOUS; SOFT TISSUE
Status: DISPENSED
Start: 2024-12-17

## (undated) RX ORDER — LIDOCAINE HYDROCHLORIDE 10 MG/ML
INJECTION, SOLUTION EPIDURAL; INFILTRATION; INTRACAUDAL; PERINEURAL
Status: DISPENSED
Start: 2020-10-12

## (undated) RX ORDER — DEXAMETHASONE SODIUM PHOSPHATE 4 MG/ML
INJECTION, SOLUTION INTRA-ARTICULAR; INTRALESIONAL; INTRAMUSCULAR; INTRAVENOUS; SOFT TISSUE
Status: DISPENSED
Start: 2024-11-29

## (undated) RX ORDER — ACETAMINOPHEN 325 MG/1
TABLET ORAL
Status: DISPENSED
Start: 2024-11-29

## (undated) RX ORDER — LIDOCAINE HYDROCHLORIDE 10 MG/ML
INJECTION, SOLUTION EPIDURAL; INFILTRATION; INTRACAUDAL; PERINEURAL
Status: DISPENSED
Start: 2017-03-05

## (undated) RX ORDER — ACETAMINOPHEN 325 MG/1
TABLET ORAL
Status: DISPENSED
Start: 2024-12-06

## (undated) RX ORDER — PROPOFOL 10 MG/ML
INJECTION, EMULSION INTRAVENOUS
Status: DISPENSED
Start: 2024-12-17

## (undated) RX ORDER — ONDANSETRON 2 MG/ML
INJECTION INTRAMUSCULAR; INTRAVENOUS
Status: DISPENSED
Start: 2017-03-05

## (undated) RX ORDER — GLYCOPYRROLATE 0.2 MG/ML
INJECTION INTRAMUSCULAR; INTRAVENOUS
Status: DISPENSED
Start: 2017-03-05

## (undated) RX ORDER — ONDANSETRON 2 MG/ML
INJECTION INTRAMUSCULAR; INTRAVENOUS
Status: DISPENSED
Start: 2024-11-29

## (undated) RX ORDER — PROTAMINE SULFATE 10 MG/ML
INJECTION, SOLUTION INTRAVENOUS
Status: DISPENSED
Start: 2021-05-06

## (undated) RX ORDER — LIDOCAINE HYDROCHLORIDE 10 MG/ML
INJECTION, SOLUTION EPIDURAL; INFILTRATION; INTRACAUDAL; PERINEURAL
Status: DISPENSED
Start: 2024-01-23

## (undated) RX ORDER — FENTANYL CITRATE 50 UG/ML
INJECTION, SOLUTION INTRAMUSCULAR; INTRAVENOUS
Status: DISPENSED
Start: 2020-06-05

## (undated) RX ORDER — ACETAMINOPHEN 325 MG/1
TABLET ORAL
Status: DISPENSED
Start: 2021-12-07

## (undated) RX ORDER — TRANEXAMIC ACID 650 MG/1
TABLET ORAL
Status: DISPENSED
Start: 2023-09-25

## (undated) RX ORDER — LIDOCAINE HYDROCHLORIDE 10 MG/ML
INJECTION, SOLUTION EPIDURAL; INFILTRATION; INTRACAUDAL; PERINEURAL
Status: DISPENSED
Start: 2024-11-29

## (undated) RX ORDER — CEFAZOLIN SODIUM 2 G/100ML
INJECTION, SOLUTION INTRAVENOUS
Status: DISPENSED
Start: 2021-05-06

## (undated) RX ORDER — PROPOFOL 10 MG/ML
INJECTION, EMULSION INTRAVENOUS
Status: DISPENSED
Start: 2024-11-13

## (undated) RX ORDER — KETOROLAC TROMETHAMINE 30 MG/ML
INJECTION, SOLUTION INTRAMUSCULAR; INTRAVENOUS
Status: DISPENSED
Start: 2024-11-13

## (undated) RX ORDER — LIDOCAINE HYDROCHLORIDE 10 MG/ML
INJECTION, SOLUTION EPIDURAL; INFILTRATION; INTRACAUDAL; PERINEURAL
Status: DISPENSED
Start: 2024-11-13

## (undated) RX ORDER — DEXAMETHASONE SODIUM PHOSPHATE 4 MG/ML
INJECTION, SOLUTION INTRA-ARTICULAR; INTRALESIONAL; INTRAMUSCULAR; INTRAVENOUS; SOFT TISSUE
Status: DISPENSED
Start: 2017-03-05

## (undated) RX ORDER — GLYCOPYRROLATE 0.2 MG/ML
INJECTION, SOLUTION INTRAMUSCULAR; INTRAVENOUS
Status: DISPENSED
Start: 2020-06-05

## (undated) RX ORDER — PROPOFOL 10 MG/ML
INJECTION, EMULSION INTRAVENOUS
Status: DISPENSED
Start: 2021-12-07

## (undated) RX ORDER — FENTANYL CITRATE 50 UG/ML
INJECTION, SOLUTION INTRAMUSCULAR; INTRAVENOUS
Status: DISPENSED
Start: 2023-09-25

## (undated) RX ORDER — NALOXONE HYDROCHLORIDE 0.4 MG/ML
INJECTION, SOLUTION INTRAMUSCULAR; INTRAVENOUS; SUBCUTANEOUS
Status: DISPENSED
Start: 2020-06-05

## (undated) RX ORDER — APREPITANT 40 MG/1
CAPSULE ORAL
Status: DISPENSED
Start: 2024-12-17

## (undated) RX ORDER — FENTANYL CITRATE 50 UG/ML
INJECTION, SOLUTION INTRAMUSCULAR; INTRAVENOUS
Status: DISPENSED
Start: 2021-12-07

## (undated) RX ORDER — FENTANYL CITRATE 50 UG/ML
INJECTION, SOLUTION INTRAMUSCULAR; INTRAVENOUS
Status: DISPENSED
Start: 2021-05-06

## (undated) RX ORDER — FENTANYL CITRATE 50 UG/ML
INJECTION, SOLUTION INTRAMUSCULAR; INTRAVENOUS
Status: DISPENSED
Start: 2024-11-13

## (undated) RX ORDER — EPHEDRINE SULFATE 50 MG/ML
INJECTION, SOLUTION INTRAMUSCULAR; INTRAVENOUS; SUBCUTANEOUS
Status: DISPENSED
Start: 2024-12-06

## (undated) RX ORDER — METOPROLOL SUCCINATE 50 MG/1
TABLET, EXTENDED RELEASE ORAL
Status: DISPENSED
Start: 2021-12-07

## (undated) RX ORDER — FENTANYL CITRATE 50 UG/ML
INJECTION, SOLUTION INTRAMUSCULAR; INTRAVENOUS
Status: DISPENSED
Start: 2024-12-17

## (undated) RX ORDER — METOPROLOL SUCCINATE 50 MG/1
TABLET, EXTENDED RELEASE ORAL
Status: DISPENSED
Start: 2023-09-25

## (undated) RX ORDER — CEFAZOLIN SODIUM/WATER 3 G/30 ML
SYRINGE (ML) INTRAVENOUS
Status: DISPENSED
Start: 2024-11-13

## (undated) RX ORDER — FLUMAZENIL 0.1 MG/ML
INJECTION, SOLUTION INTRAVENOUS
Status: DISPENSED
Start: 2020-06-05

## (undated) RX ORDER — PROPOFOL 10 MG/ML
INJECTION, EMULSION INTRAVENOUS
Status: DISPENSED
Start: 2024-12-06

## (undated) RX ORDER — CEFAZOLIN SODIUM/WATER 3 G/30 ML
SYRINGE (ML) INTRAVENOUS
Status: DISPENSED
Start: 2024-12-06

## (undated) RX ORDER — FENTANYL CITRATE-0.9 % NACL/PF 10 MCG/ML
PLASTIC BAG, INJECTION (ML) INTRAVENOUS
Status: DISPENSED
Start: 2024-11-29

## (undated) RX ORDER — CEFAZOLIN SODIUM/WATER 3 G/30 ML
SYRINGE (ML) INTRAVENOUS
Status: DISPENSED
Start: 2024-11-29